# Patient Record
Sex: FEMALE | Race: WHITE | NOT HISPANIC OR LATINO | Employment: OTHER | URBAN - METROPOLITAN AREA
[De-identification: names, ages, dates, MRNs, and addresses within clinical notes are randomized per-mention and may not be internally consistent; named-entity substitution may affect disease eponyms.]

---

## 2017-01-30 ENCOUNTER — PATIENT OUTREACH (OUTPATIENT)
Dept: HEALTH INFORMATION MANAGEMENT | Facility: OTHER | Age: 67
End: 2017-01-30

## 2017-01-31 NOTE — PROGRESS NOTES
01/30/17 1ST ATTEMPT AWV-MOBILE NUMBER IS NOT PT'S NUMBER ANYMORE  DUE: , DIABETES MONOFILAMENT, RETINAL SCREENING, MICROALBUMIN, A1C, FLU (WEBIZ CONFIRMED)

## 2017-02-10 ENCOUNTER — APPOINTMENT (OUTPATIENT)
Dept: RADIOLOGY | Facility: IMAGING CENTER | Age: 67
End: 2017-02-10
Attending: FAMILY MEDICINE
Payer: COMMERCIAL

## 2017-02-10 ENCOUNTER — OFFICE VISIT (OUTPATIENT)
Dept: MEDICAL GROUP | Facility: PHYSICIAN GROUP | Age: 67
End: 2017-02-10
Payer: COMMERCIAL

## 2017-02-10 VITALS
OXYGEN SATURATION: 94 % | SYSTOLIC BLOOD PRESSURE: 124 MMHG | HEIGHT: 63 IN | RESPIRATION RATE: 16 BRPM | TEMPERATURE: 97.6 F | BODY MASS INDEX: 31.54 KG/M2 | WEIGHT: 178 LBS | DIASTOLIC BLOOD PRESSURE: 72 MMHG | HEART RATE: 86 BPM

## 2017-02-10 DIAGNOSIS — M16.0 BILATERAL PRIMARY OSTEOARTHRITIS OF HIP: ICD-10-CM

## 2017-02-10 DIAGNOSIS — C50.912 BILATERAL MALIGNANT NEOPLASM OF BREAST IN FEMALE, UNSPECIFIED SITE OF BREAST: ICD-10-CM

## 2017-02-10 DIAGNOSIS — C50.911 BILATERAL MALIGNANT NEOPLASM OF BREAST IN FEMALE, UNSPECIFIED SITE OF BREAST: ICD-10-CM

## 2017-02-10 DIAGNOSIS — E66.9 OBESITY (BMI 30-39.9): ICD-10-CM

## 2017-02-10 DIAGNOSIS — D22.9 CHANGING NEVUS: ICD-10-CM

## 2017-02-10 DIAGNOSIS — Z13.6 SCREENING FOR CARDIOVASCULAR CONDITION: ICD-10-CM

## 2017-02-10 DIAGNOSIS — R73.02 GLUCOSE INTOLERANCE (IMPAIRED GLUCOSE TOLERANCE): ICD-10-CM

## 2017-02-10 DIAGNOSIS — R01.1 SYSTOLIC MURMUR: ICD-10-CM

## 2017-02-10 PROBLEM — C50.919 BREAST CANCER (HCC): Status: ACTIVE | Noted: 2017-02-10

## 2017-02-10 PROCEDURE — G8432 DEP SCR NOT DOC, RNG: HCPCS | Performed by: FAMILY MEDICINE

## 2017-02-10 PROCEDURE — 3014F SCREEN MAMMO DOC REV: CPT | Performed by: FAMILY MEDICINE

## 2017-02-10 PROCEDURE — G8484 FLU IMMUNIZE NO ADMIN: HCPCS | Performed by: FAMILY MEDICINE

## 2017-02-10 PROCEDURE — 4040F PNEUMOC VAC/ADMIN/RCVD: CPT | Performed by: FAMILY MEDICINE

## 2017-02-10 PROCEDURE — 73521 X-RAY EXAM HIPS BI 2 VIEWS: CPT | Mod: TC | Performed by: FAMILY MEDICINE

## 2017-02-10 PROCEDURE — G8419 CALC BMI OUT NRM PARAM NOF/U: HCPCS | Performed by: FAMILY MEDICINE

## 2017-02-10 PROCEDURE — 1101F PT FALLS ASSESS-DOCD LE1/YR: CPT | Performed by: FAMILY MEDICINE

## 2017-02-10 PROCEDURE — 3017F COLORECTAL CA SCREEN DOC REV: CPT | Performed by: FAMILY MEDICINE

## 2017-02-10 PROCEDURE — 99214 OFFICE O/P EST MOD 30 MIN: CPT | Performed by: FAMILY MEDICINE

## 2017-02-10 PROCEDURE — 1036F TOBACCO NON-USER: CPT | Performed by: FAMILY MEDICINE

## 2017-02-10 RX ORDER — LIDOCAINE 50 MG/G
1 PATCH TOPICAL EVERY 12 HOURS
Qty: 30 PATCH | Refills: 2 | Status: SHIPPED | OUTPATIENT
Start: 2017-02-10 | End: 2019-04-11

## 2017-02-10 ASSESSMENT — PATIENT HEALTH QUESTIONNAIRE - PHQ9: CLINICAL INTERPRETATION OF PHQ2 SCORE: 0

## 2017-02-10 NOTE — PROGRESS NOTES
Subjective:     Chief Complaint   Patient presents with   • Establish Alicia Madsen Maura Romo is a 66 y.o. female here today for follow up on chronic medical conditions.  Patient has a new complaint:Pt has a hx of bilat hip OA.  Pt notes R hip has become worse and for approximately 3-4 weeks. Patient reports pain is in the lateral and front aspect of her hip. She denies numbness, tingling, loss of sensation, loss of bowel or bladder function, low back pain or falls. Pain is up to 9/10 when working as a . Patient denies history of osteoporosis. Pt reports DEXA at [facility] in 2015.    Patient's history of glucose intolerance. She has not yet met criteria for diabetes. Risks discussed with patient.    Patient has history of bilateral malignant breast cancer. She is currently followed by oncology. Patient underwent bilateral mastectomy. She is currently taking anastrozole daily. Patient did not need chemotherapy or radiation.    Patient's also concerned about changing pigmented lesion on the lateral aspect of her face. Patient states it has been present for approximately 2-3 months. Is currently raised irritated at times.  Allergies   Allergen Reactions   • Aspirin      Intolerance     • Cipro Xr Hives   • Diclofenac      Current medicines (including changes today)  Current Outpatient Prescriptions   Medication Sig Dispense Refill   • lidocaine (LIDODERM) 5 % Patch Apply 1 Patch to skin as directed every 12 hours. 30 Patch 2   • zolpidem (AMBIEN) 10 MG Tab TAKE ONE TABLET BY MOUTH AT BEDTIME AS NEEDED FOR SLEEP 90 Tab 4   • simvastatin (ZOCOR) 20 MG Tab Take 1 Tab by mouth every evening. 90 Tab 4   • telmisartan-hydrochlorothiazide (MICARDIS HCT) 40-12.5 MG per tablet Take 1 Tab by mouth every day. 90 Tab 4   • anastrozole (ARIMIDEX) 1 MG Tab Take 1 mg by mouth every day.     • Blood Glucose Monitoring Suppl (ONE TOUCH ULTRA MINI) W/DEVICE KIT      • ONE TOUCH ULTRA TEST strip      • ONETOUCH  "DELICA LANCETS 33G MISC      • Misc. Devices KIT Diagnosis 250.02. Freestyle glucometer, test strips, #100, RF11 and lancets #100, RF 11. To test fasting in the AM. May substitute brand acceptable to insurance. 1 Each 0     No current facility-administered medications for this visit.     Social History   Substance Use Topics   • Smoking status: Never Smoker    • Smokeless tobacco: Never Used      Comment: avoid any and all tobacco products   • Alcohol Use: 0.0 oz/week     0 Standard drinks or equivalent per week      Comment: once a year     Family Status   Relation Status Death Age   • Mother       alzheimer    • Father       Family History   Problem Relation Age of Onset   • Lung Disease Father      pneumonia   • Stroke Mother      She    has a past medical history of Diverticulosis of colon (2011); Post hysterectomy menopause (2011); Dental disorder; Pain; Anesthesia; HTN (hypertension) (3/14/2013); and Breast cancer (CMS-HCC) (2/10/2017).        ROS   GEN: no weight loss, fevers, or chills  HEENT: no blurry vision or change in vision, no ear pain, no difficulty swallowing, no throat pain, no runny nose, no nasal congestion  Resp: no shortness of breath, no cough  CV: no racing heart, no irregular beats, no chest pain  Abd: no nausea, no vomiting, no diarrhea, no constipation, no blood in stool, no dark stools, no incontinence  : no dysuria, no hematuria, no urinary incontinence, no increased frequency  MSK: Hip pain as per history of present illness Neuro: no headaches, no dizziness, no LOC, no weakness, no numbness/tingling       Objective:     Blood pressure 124/72, pulse 86, temperature 36.4 °C (97.6 °F), resp. rate 16, height 1.6 m (5' 3\"), weight 80.74 kg (178 lb), SpO2 94 %. Body mass index is 31.54 kg/(m^2).   Physical Exam:  Constitutional: Alert, no distress.  Skin: Warm, dry, good turgor, no rashes in visible areas, right temporal area 0.5 cm raised scaly hyperpigmented " lesion.  Eye: Equal, round and reactive, conjunctiva clear, lids normal.  ENMT: Lips without lesions, good dentition, oropharynx non-erythematous, no exudate, moist oral mucosa  Neck: Trachea midline, no masses, no thyromegaly. No cervical or supraclavicular lymphadenopathy. Full ROM  Respiratory: Unlabored respiratory effort, good air movement, lungs clear to auscultation, no wheezes, no ronchi.  Cardiovascular:RRR, +S1, S2, 3/6 systolic ejection murmur , no peripheral edema, pedal and radial pulses equal and intact bilat  Abdomen: Soft, non-tender, no masses, no hepatosplenomegaly.  MSK:5/5 muscle strength in upper extremities as well as lower extremity bilaterally, right hip TTP on lateral and anterior aspect, pain with abduction and abduction.  Psych: Alert and oriented x3, appropriate affect and mood.  Neuro: CN2-12 intact, no gross motor or sensory deficits      Assessment and Plan:   The following treatment plan was discussed    1. Bilateral primary osteoarthritis of hip  Acute on chronic. Worsening pain. Recommend bilateral hip x-rays. Also recommend Lidoderm patches for treatment as needed. Based on findings will suggest further follow-up. I also recommend patient referred to orthopedics.  - DX-HIP-BILATERAL-WITH PELVIS-2 VIEWS; Future  - lidocaine (LIDODERM) 5 % Patch; Apply 1 Patch to skin as directed every 12 hours.  Dispense: 30 Patch; Refill: 2  - REFERRAL TO ORTHOPEDICS  - COMP METABOLIC PANEL; Future  - CBC WITH DIFFERENTIAL; Future    2. Systolic murmur  Patient reports history of systolic murmur. Patient denies ever having a cold. Therefore echo will be ordered.   EHOCARDIOGRAM COMP W/O CONT; Future    3. Glucose intolerance (impaired glucose tolerance)  Based on history. Will obtain A1c and treat as indicated. We advise to reduce sugar/carbohydrate/alcohol, loose weight, eat more vegetables and lean meats such as fish/chicken/turkey. We also recommend 30 minutes of cardiovascular exercise 5 days  of the week.   - COMP METABOLIC PANEL; Future  - CBC WITH DIFFERENTIAL; Future  - HEMOGLOBIN A1C; Future    4. Bilateral malignant neoplasm of breast in female, unspecified site of breast (CMS-HCC)  Patient will continue to follow with oncology. Patient will also continue anastrozole.  - COMP METABOLIC PANEL; Future  - CBC WITH DIFFERENTIAL; Future    5. Changing nevus  New problem Reported. Recommend follow-up with dermatology  - REFERRAL TO DERMATOLOGY    6. Screening for cardiovascular condition  - LIPID PROFILE; Future    7. Obesity (BMI 30-39.9)  Pt educated on the increase of morbidity and mortality associated with excess weight including DM, Heart Disease, HTN, stroke, and sleep apnea.  Pt advised weight loss of 5% through reduced calorie, low fat diet and 150 mins of exercise a week  - Patient identified as having weight management issue.  Appropriate orders and counseling given.      Followup: Return in about 3 months (around 5/10/2017) for chronic conditions.    Please note that this dictation was created using voice recognition software. I have made every reasonable attempt to correct obvious errors, but I expect that there are errors of grammar and possibly content that I did not discover before finalizing the note.

## 2017-02-10 NOTE — MR AVS SNAPSHOT
"        Tena Carter Demetrius   2/10/2017 3:40 PM   Office Visit   MRN: [id number]    Department:  [facility]   Dept Phone:  [phone]    Description:  Female : 1950   Provider:  Erica Tristan D.O.           Reason for Visit     Establish Care           Allergies as of 2/10/2017     Allergen Noted Reactions    Aspirin 10/16/2014       Intolerance      Cipro Xr 10/30/2010   Hives    Diclofenac 2012         You were diagnosed with     Bilateral primary osteoarthritis of hip   [4927890]       Glucose intolerance (impaired glucose tolerance)   [434774]       Bilateral malignant neoplasm of breast in female, unspecified site of breast (CMS-HCC)   [7651978]       Screening for cardiovascular condition   [891118]       Changing nevus   [153448]         Vital Signs     Blood Pressure Pulse Temperature Respirations Height Weight    124/72 mmHg 86 36.4 °C (97.6 °F) 16 1.6 m (5' 3\") 80.74 kg (178 lb)    Body Mass Index Oxygen Saturation Smoking Status             31.54 kg/m2 94% Never Smoker          Basic Information     Date Of Birth Sex Race Ethnicity Preferred Language    1950 Female White Non- English      Problem List              ICD-10-CM Priority Class Noted - Resolved    Diverticulosis of colon K57.30   2011 - Present    Post hysterectomy menopause E89.40, Z90.710   2011 - Present    Vitamin D deficiency E55.9   4/10/2012 - Present    Hypercholesteremia E78.00   4/10/2012 - Present    Essential hypertension I10   3/14/2013 - Present    Atrophic vaginitis N95.2   2013 - Present    Glucose intolerance (impaired glucose tolerance) R73.02   2015 - Present    Breast cancer (CMS-HCC) C50.919   2/10/2017 - Present      Health Maintenance        Date Due Completion Dates    IMM ZOSTER VACCINE 2010 ---    RETINAL SCREENING 2015 (Done)    Override on 2014: Done (no retinopathy by pt report)    DIABETES MONOFILAMENT / LE EXAM 2015 " 4/17/2014 (Done)    Override on 4/17/2014: Done (normal)    URINE ACR / MICROALBUMIN 4/18/2015 4/18/2014    A1C SCREENING 7/14/2016 1/14/2016, 8/13/2015, 4/16/2015, 10/17/2014, 10/16/2014, 4/18/2014, 5/9/2013    IMM INFLUENZA (1) 9/1/2016 10/14/2015    BONE DENSITY 4/19/2017 4/19/2012    FASTING LIPID PROFILE 4/28/2017 4/28/2016, 8/11/2015, 10/17/2014, 4/18/2014, 3/15/2013, 11/11/2011    SERUM CREATININE 12/29/2017 12/29/2016, 2/2/2016, 12/29/2015, 8/11/2015, 4/18/2014, 3/15/2013, 11/11/2011    IMM PNEUMOCOCCAL 65+ (ADULT) LOW/MEDIUM RISK SERIES (2 of 2 - PPSV23) 4/17/2019 4/16/2015, 4/17/2014    IMM DTaP/Tdap/Td Vaccine (2 - Td) 4/10/2022 4/10/2012            Current Immunizations     13-VALENT PCV PREVNAR 4/16/2015    Influenza Vaccine Adult HD 10/14/2015    Pneumococcal polysaccharide vaccine (PPSV-23) 4/17/2014    Tdap Vaccine 4/10/2012  1:33 PM      Below and/or attached are the medications your provider expects you to take. Review all of your home medications and newly ordered medications with your provider and/or pharmacist. Follow medication instructions as directed by your provider and/or pharmacist. Please keep your medication list with you and share with your provider. Update the information when medications are discontinued, doses are changed, or new medications (including over-the-counter products) are added; and carry medication information at all times in the event of emergency situations     Allergies:  ASPIRIN - (reactions not documented)     CIPRO XR - Hives     DICLOFENAC - (reactions not documented)               Medications  Valid as of: February 10, 2017 -  4:58 PM    Generic Name Brand Name Tablet Size Instructions for use    Anastrozole (Tab) ARIMIDEX 1 MG Take 1 mg by mouth every day.        Blood Glucose Monitoring Suppl (Kit) ONE TOUCH ULTRA MINI W/DEVICE         Glucose Blood (Strip) ONE TOUCH ULTRA TEST          Lancets (Misc) ONETOUCH DELICA LANCETS 33G          Lidocaine (Patch)  LIDODERM 5 % Apply 1 Patch to skin as directed every 12 hours.        Misc. Devices (Kit) Misc. Devices  Diagnosis 250.02. Freestyle glucometer, test strips, #100, RF11 and lancets #100, RF 11. To test fasting in the AM. May substitute brand acceptable to insurance.        Simvastatin (Tab) ZOCOR 20 MG Take 1 Tab by mouth every evening.        Telmisartan-HCTZ (Tab) MICARDIS HCT 40-12.5 MG Take 1 Tab by mouth every day.        Zolpidem Tartrate (Tab) AMBIEN 10 MG TAKE ONE TABLET BY MOUTH AT BEDTIME AS NEEDED FOR SLEEP        .                 Medicines prescribed today were sent to:     [facility] [address] [place] [address], [place] - [address] [place] [address]    Phone: [phone] Fax: [phone]    Open 24 Hours?: No      Medication refill instructions:       If your prescription bottle indicates you have medication refills left, it is not necessary to call your provider’s office. Please contact your pharmacy and they will refill your medication.    If your prescription bottle indicates you do not have any refills left, you may request refills at any time through one of the following ways: The online [employer] system (except Urgent Care), by calling your provider’s office, or by asking your pharmacy to contact your provider’s office with a refill request. Medication refills are processed only during regular business hours and may not be available until the next business day. Your provider may request additional information or to have a follow-up visit with you prior to refilling your medication.   *Please Note: Medication refills are assigned a new Rx number when refilled electronically. Your pharmacy may indicate that no refills were authorized even though a new prescription for the same medication is available at the pharmacy. Please request the medicine by name with the pharmacy before contacting your provider for a refill.        Your To Do List     Future Labs/Procedures Complete By  Expires    HEMOGLOBIN A1C  5/11/2017 2/10/2018    CBC WITH DIFFERENTIAL  As directed 2/10/2018    COMP METABOLIC PANEL  As directed 2/10/2018    DX-HIP-BILATERAL-WITH PELVIS-2 VIEWS  As directed 2/10/2018    LIPID PROFILE  As directed 2/10/2018      Referral     A referral request has been sent to our patient care coordination department. Please allow 3-5 business days for us to process this request and contact you either by phone or mail. If you do not hear from us by the 5th business day, please call us at [phone].        Other Notes About Your Plan                  MyChart Status: Patient Declined

## 2017-02-14 ENCOUNTER — TELEPHONE (OUTPATIENT)
Dept: MEDICAL GROUP | Facility: PHYSICIAN GROUP | Age: 67
End: 2017-02-14

## 2017-02-14 NOTE — PROGRESS NOTES
Attempt #:4     Annual Wellness Visit Scheduling  1. Scheduling Status:Not Scheduled. Patient states they are not interested        Care Gap Scheduling (Attempt to Schedule EACH Overdue Care Gap!)  PT DECLINED AND HUNG UP-UNABLE TO OFFER CARE GAPS     Health Maintenance Due   Topic Date Due   • IMM ZOSTER VACCINE  02/28/2010   • IMM INFLUENZA (1) 09/01/2016         MyChart Activation: declined

## 2017-02-14 NOTE — TELEPHONE ENCOUNTER
----- Message from Erica Tristan D.O. sent at 2/10/2017  6:06 PM PST -----  Please call pt to inform her that hip x-ray shows no sign of fracture. There is mild degenerative changes of both hips. I recommend she follow orthopedics.    -Dr. Tristan

## 2017-03-10 LAB
ALBUMIN SERPL-MCNC: 4.3 G/DL (ref 3.6–4.8)
ALBUMIN/GLOB SERPL: 1.7 {RATIO} (ref 1.1–2.5)
ALP SERPL-CCNC: 83 IU/L (ref 39–117)
ALT SERPL-CCNC: 14 IU/L (ref 0–32)
AST SERPL-CCNC: 12 IU/L (ref 0–40)
BASOPHILS # BLD AUTO: 0 X10E3/UL (ref 0–0.2)
BASOPHILS NFR BLD AUTO: 0 %
BILIRUB SERPL-MCNC: 0.4 MG/DL (ref 0–1.2)
BUN SERPL-MCNC: 18 MG/DL (ref 8–27)
BUN/CREAT SERPL: 23 (ref 11–26)
CALCIUM SERPL-MCNC: 9.7 MG/DL (ref 8.7–10.3)
CHLORIDE SERPL-SCNC: 102 MMOL/L (ref 96–106)
CHOLEST SERPL-MCNC: 151 MG/DL (ref 100–199)
CO2 SERPL-SCNC: 27 MMOL/L (ref 18–29)
COMMENT 011824: NORMAL
CREAT SERPL-MCNC: 0.77 MG/DL (ref 0.57–1)
EOSINOPHIL # BLD AUTO: 0.5 X10E3/UL (ref 0–0.4)
EOSINOPHIL NFR BLD AUTO: 5 %
ERYTHROCYTE [DISTWIDTH] IN BLOOD BY AUTOMATED COUNT: 13.1 % (ref 12.3–15.4)
GLOBULIN SER CALC-MCNC: 2.6 G/DL (ref 1.5–4.5)
GLUCOSE SERPL-MCNC: 128 MG/DL (ref 65–99)
HBA1C MFR BLD: 6.6 % (ref 4.8–5.6)
HCT VFR BLD AUTO: 40.7 % (ref 34–46.6)
HDLC SERPL-MCNC: 48 MG/DL
HGB BLD-MCNC: 12.9 G/DL (ref 11.1–15.9)
IMM GRANULOCYTES # BLD: 0 X10E3/UL (ref 0–0.1)
IMM GRANULOCYTES NFR BLD: 0 %
IMMATURE CELLS  115398: ABNORMAL
LDLC SERPL CALC-MCNC: 80 MG/DL (ref 0–99)
LYMPHOCYTES # BLD AUTO: 3 X10E3/UL (ref 0.7–3.1)
LYMPHOCYTES NFR BLD AUTO: 34 %
MCH RBC QN AUTO: 29.3 PG (ref 26.6–33)
MCHC RBC AUTO-ENTMCNC: 31.7 G/DL (ref 31.5–35.7)
MCV RBC AUTO: 92 FL (ref 79–97)
MONOCYTES # BLD AUTO: 0.6 X10E3/UL (ref 0.1–0.9)
MONOCYTES NFR BLD AUTO: 7 %
MORPHOLOGY BLD-IMP: ABNORMAL
NEUTROPHILS # BLD AUTO: 4.8 X10E3/UL (ref 1.4–7)
NEUTROPHILS NFR BLD AUTO: 54 %
NRBC BLD AUTO-RTO: ABNORMAL %
PLATELET # BLD AUTO: 330 X10E3/UL (ref 150–379)
POTASSIUM SERPL-SCNC: 4.7 MMOL/L (ref 3.5–5.2)
PROT SERPL-MCNC: 6.9 G/DL (ref 6–8.5)
RBC # BLD AUTO: 4.41 X10E6/UL (ref 3.77–5.28)
SODIUM SERPL-SCNC: 145 MMOL/L (ref 134–144)
TRIGL SERPL-MCNC: 116 MG/DL (ref 0–149)
VLDLC SERPL CALC-MCNC: 23 MG/DL (ref 5–40)
WBC # BLD AUTO: 8.9 X10E3/UL (ref 3.4–10.8)

## 2017-03-13 ENCOUNTER — TELEPHONE (OUTPATIENT)
Dept: MEDICAL GROUP | Facility: PHYSICIAN GROUP | Age: 67
End: 2017-03-13

## 2017-03-13 NOTE — TELEPHONE ENCOUNTER
----- Message from Alyssa Otero D.O. sent at 3/10/2017  7:28 PM PST -----  Glycemic control has worsened compared to previous labs and is now in the range of diabetes.  Advise scheduling follow-up appointment with PCP to discuss lab results.  (Alyssa Otero DO covering for Erica Tristan D.O.)

## 2017-03-14 ENCOUNTER — TELEPHONE (OUTPATIENT)
Dept: URGENT CARE | Facility: PHYSICIAN GROUP | Age: 67
End: 2017-03-14

## 2017-03-22 ENCOUNTER — OFFICE VISIT (OUTPATIENT)
Dept: MEDICAL GROUP | Facility: PHYSICIAN GROUP | Age: 67
End: 2017-03-22
Payer: COMMERCIAL

## 2017-03-22 VITALS
HEIGHT: 63 IN | SYSTOLIC BLOOD PRESSURE: 130 MMHG | TEMPERATURE: 98.1 F | OXYGEN SATURATION: 97 % | RESPIRATION RATE: 16 BRPM | DIASTOLIC BLOOD PRESSURE: 70 MMHG | BODY MASS INDEX: 31.54 KG/M2 | HEART RATE: 104 BPM | WEIGHT: 178 LBS

## 2017-03-22 DIAGNOSIS — E78.00 HYPERCHOLESTEREMIA: ICD-10-CM

## 2017-03-22 DIAGNOSIS — I10 ESSENTIAL HYPERTENSION: ICD-10-CM

## 2017-03-22 DIAGNOSIS — E66.9 OBESITY (BMI 30-39.9): ICD-10-CM

## 2017-03-22 DIAGNOSIS — E11.9 TYPE 2 DIABETES MELLITUS WITHOUT COMPLICATION, WITHOUT LONG-TERM CURRENT USE OF INSULIN (HCC): ICD-10-CM

## 2017-03-22 PROCEDURE — 3044F HG A1C LEVEL LT 7.0%: CPT | Performed by: FAMILY MEDICINE

## 2017-03-22 PROCEDURE — 99214 OFFICE O/P EST MOD 30 MIN: CPT | Performed by: FAMILY MEDICINE

## 2017-03-22 PROCEDURE — 1101F PT FALLS ASSESS-DOCD LE1/YR: CPT | Performed by: FAMILY MEDICINE

## 2017-03-22 PROCEDURE — 3014F SCREEN MAMMO DOC REV: CPT | Performed by: FAMILY MEDICINE

## 2017-03-22 PROCEDURE — G8417 CALC BMI ABV UP PARAM F/U: HCPCS | Performed by: FAMILY MEDICINE

## 2017-03-22 PROCEDURE — G8432 DEP SCR NOT DOC, RNG: HCPCS | Performed by: FAMILY MEDICINE

## 2017-03-22 PROCEDURE — 1036F TOBACCO NON-USER: CPT | Performed by: FAMILY MEDICINE

## 2017-03-22 PROCEDURE — G8484 FLU IMMUNIZE NO ADMIN: HCPCS | Performed by: FAMILY MEDICINE

## 2017-03-22 PROCEDURE — 4040F PNEUMOC VAC/ADMIN/RCVD: CPT | Performed by: FAMILY MEDICINE

## 2017-03-22 RX ORDER — METFORMIN HYDROCHLORIDE 500 MG/1
500 TABLET, EXTENDED RELEASE ORAL DAILY
Qty: 90 TAB | Refills: 0 | Status: SHIPPED | OUTPATIENT
Start: 2017-03-22 | End: 2017-04-27 | Stop reason: SINTOL

## 2017-03-22 NOTE — MR AVS SNAPSHOT
"        Tena Romo   3/22/2017 2:40 PM   Office Visit   MRN: [id number]    Department:  [facility]   Dept Phone:  [phone]    Description:  Female : 1950   Provider:  Erica Tristan D.O.           Reason for Visit     Follow-Up           Allergies as of 3/22/2017     Allergen Noted Reactions    Aspirin 10/16/2014       Intolerance      Cipro Xr 10/30/2010   Hives    Diclofenac 2012         You were diagnosed with     Type 2 diabetes mellitus without complication, without long-term current use of insulin (CMS-HCC)   [1887790]         Vital Signs     Blood Pressure Pulse Temperature Respirations Height Weight    130/70 mmHg 104 36.7 °C (98.1 °F) 16 1.6 m (5' 2.99\") 80.74 kg (178 lb)    Body Mass Index Oxygen Saturation Smoking Status             31.54 kg/m2 97% Never Smoker          Basic Information     Date Of Birth Sex Race Ethnicity Preferred Language    1950 Female White Non- English      Your appointments     2017  1:00 PM   ANNUAL EXAM PREVENTATIVE with Erica Tristan D.O.   [facility])    [address], [address]  [facility] [id number] [phone]              Problem List              ICD-10-CM Priority Class Noted - Resolved    Diverticulosis of colon K57.30   2011 - Present    Post hysterectomy menopause E89.40, Z90.710   2011 - Present    Vitamin D deficiency E55.9   4/10/2012 - Present    Hypercholesteremia E78.00   4/10/2012 - Present    Essential hypertension I10   3/14/2013 - Present    Atrophic vaginitis N95.2   2013 - Present    Type 2 diabetes mellitus without complication, without long-term current use of insulin (CMS-HCC) E11.9   2015 - Present    Breast cancer (CMS-HCC) C50.919   2/10/2017 - Present    Obesity (BMI 30-39.9) E66.9   2/10/2017 - Present      Health Maintenance        Date Due Completion Dates    IMM ZOSTER VACCINE 2010 ---    IMM INFLUENZA (1) 2016 " 10/14/2015    BONE DENSITY 4/19/2017 4/19/2012    IMM PNEUMOCOCCAL 65+ (ADULT) LOW/MEDIUM RISK SERIES (2 of 2 - PPSV23) 4/17/2019 4/16/2015, 4/17/2014    IMM DTaP/Tdap/Td Vaccine (2 - Td) 4/10/2022 4/10/2012            Current Immunizations     13-VALENT PCV PREVNAR 4/16/2015    Influenza Vaccine Adult HD 10/14/2015    Pneumococcal polysaccharide vaccine (PPSV-23) 4/17/2014    Tdap Vaccine 4/10/2012  1:33 PM      Below and/or attached are the medications your provider expects you to take. Review all of your home medications and newly ordered medications with your provider and/or pharmacist. Follow medication instructions as directed by your provider and/or pharmacist. Please keep your medication list with you and share with your provider. Update the information when medications are discontinued, doses are changed, or new medications (including over-the-counter products) are added; and carry medication information at all times in the event of emergency situations     Allergies:  ASPIRIN - (reactions not documented)     CIPRO XR - Hives     DICLOFENAC - (reactions not documented)               Medications  Valid as of: March 22, 2017 -  4:40 PM    Generic Name Brand Name Tablet Size Instructions for use    Anastrozole (Tab) ARIMIDEX 1 MG Take 1 mg by mouth every day.        Blood Glucose Monitoring Suppl (Kit) ONE TOUCH ULTRA MINI W/DEVICE         Glucose Blood (Strip) ONE TOUCH ULTRA TEST          Lancets (Misc) ONETOUCH DELICA LANCETS 33G          Lidocaine (Patch) LIDODERM 5 % Apply 1 Patch to skin as directed every 12 hours.        MetFORMIN HCl (TABLET SR 24 HR) GLUCOPHAGE  MG Take 1 Tab by mouth every day.        Misc. Devices (Kit) Misc. Devices  Diagnosis 250.02. Freestyle glucometer, test strips, #100, RF11 and lancets #100, RF 11. To test fasting in the AM. May substitute brand acceptable to insurance.        Simvastatin (Tab) ZOCOR 20 MG Take 1 Tab by mouth every evening.        Telmisartan-HCTZ (Tab)  MICARDIS HCT 40-12.5 MG Take 1 Tab by mouth every day.        Zolpidem Tartrate (Tab) AMBIEN 10 MG TAKE ONE TABLET BY MOUTH AT BEDTIME AS NEEDED FOR SLEEP        .                 Medicines prescribed today were sent to:     [facility] [address] [place] [address], [place] - [address] [facility]    [address] [facility] [place] [address]    Phone: [phone] Fax: [phone]    Open 24 Hours?: No      Medication refill instructions:       If your prescription bottle indicates you have medication refills left, it is not necessary to call your provider’s office. Please contact your pharmacy and they will refill your medication.    If your prescription bottle indicates you do not have any refills left, you may request refills at any time through one of the following ways: The online [employer] system (except Urgent Care), by calling your provider’s office, or by asking your pharmacy to contact your provider’s office with a refill request. Medication refills are processed only during regular business hours and may not be available until the next business day. Your provider may request additional information or to have a follow-up visit with you prior to refilling your medication.   *Please Note: Medication refills are assigned a new Rx number when refilled electronically. Your pharmacy may indicate that no refills were authorized even though a new prescription for the same medication is available at the pharmacy. Please request the medicine by name with the pharmacy before contacting your provider for a refill.        Your To Do List     Future Labs/Procedures Complete By Expires    HEMOGLOBIN A1C  As directed 3/22/2018    MICROALBUMIN CREAT RATIO URINE  As directed 3/22/2018      Other Notes About Your Plan                  MyChart Status: Patient Declined

## 2017-03-22 NOTE — PROGRESS NOTES
Subjective:     Chief Complaint   Patient presents with   • Follow-Up       Tena Maura Romo is a 67 y.o. female here today for hyperglycemia on lab results. Patient is a history of impaired fasting blood sugars. Patient's most recent A1c is 6.6. Patient has history impaired fasting glucose. Review of A1c from 2012 to 2017 shows range 6.1-6.6. Prior A1c in January 2016 was 6.4.  Patient states she is currently trying to make diet and lifestyle changes. She reports exercising approximately 2-3 times per week by walking. Patient understands carb counting and healthy diet changes. She is currently in a health improvement plan through her employer.      Allergies   Allergen Reactions   • Aspirin      Intolerance     • Cipro Xr Hives   • Diclofenac      Current medicines (including changes today)  Current Outpatient Prescriptions   Medication Sig Dispense Refill   • metformin ER (GLUCOPHAGE XR) 500 MG TABLET SR 24 HR Take 1 Tab by mouth every day. 90 Tab 0   • lidocaine (LIDODERM) 5 % Patch Apply 1 Patch to skin as directed every 12 hours. 30 Patch 2   • zolpidem (AMBIEN) 10 MG Tab TAKE ONE TABLET BY MOUTH AT BEDTIME AS NEEDED FOR SLEEP 90 Tab 4   • simvastatin (ZOCOR) 20 MG Tab Take 1 Tab by mouth every evening. 90 Tab 4   • telmisartan-hydrochlorothiazide (MICARDIS HCT) 40-12.5 MG per tablet Take 1 Tab by mouth every day. 90 Tab 4   • anastrozole (ARIMIDEX) 1 MG Tab Take 1 mg by mouth every day.     • Blood Glucose Monitoring Suppl (ONE TOUCH ULTRA MINI) W/DEVICE KIT      • ONE TOUCH ULTRA TEST strip      • ONETOUCH DELICA LANCETS 33G MISC      • Misc. Devices KIT Diagnosis 250.02. Freestyle glucometer, test strips, #100, RF11 and lancets #100, RF 11. To test fasting in the AM. May substitute brand acceptable to insurance. 1 Each 0     No current facility-administered medications for this visit.     Social History   Substance Use Topics   • Smoking status: Never Smoker    • Smokeless tobacco: Never Used       "Comment: avoid any and all tobacco products   • Alcohol Use: 0.0 oz/week     0 Standard drinks or equivalent per week      Comment: once a year     Family Status   Relation Status Death Age   • Mother       alzheimer    • Father       Family History   Problem Relation Age of Onset   • Lung Disease Father      pneumonia   • Stroke Mother      She    has a past medical history of Diverticulosis of colon (2011); Post hysterectomy menopause (2011); Dental disorder; Pain; Anesthesia; HTN (hypertension) (3/14/2013); and Breast cancer (CMS-HCC) (2/10/2017).        ROS   GEN: no weight loss, fevers, or chills  HEENT: no blurry vision or change in vision  Resp: no shortness of breath, no cough  CV: no racing heart, no irregular beats, no chest pain  Abd: no nausea, no vomiting, no diarrhea, no constipation, no blood in stool, no dark stools, no incontinence  : no dysuria, no hematuria, no urinary incontinence, no increased frequency  MSK: no muscle aches, no joint pain, no limited motion  Neuro: no headaches, no dizziness, no LOC, no weakness, no numbness/tingling       Objective:     Blood pressure 130/70, pulse 104, temperature 36.7 °C (98.1 °F), resp. rate 16, height 1.6 m (5' 2.99\"), weight 80.74 kg (178 lb), SpO2 97 %. Body mass index is 31.54 kg/(m^2).   Physical Exam:  Constitutional: Alert, no distress.  Skin: Warm, dry, good turgor, no rashes in visible areas.  Eye: Equal, round and reactive, conjunctiva clear, lids normal.  ENMT: Lips without lesions, good dentition, oropharynx non-erythematous, no exudate, moist oral mucosa, bilateral tympanic membranes: No bulging, no retraction, no fluid, nonerythematous, positive light reflex, external auditory canals: Clear, scant cerumen, nonerythematous  Neck: Trachea midline, no masses, no thyromegaly. No cervical or supraclavicular lymphadenopathy. Full ROM  Respiratory: Unlabored respiratory effort, good air movement, lungs clear to " auscultation, no wheezes, no ronchi.  Cardiovascular:RRR, +S1, S2, no murmur, no peripheral edema, pedal and radial pulses equal and intact bilat  Abdomen: Soft, non-tender, no masses, no hepatosplenomegaly.  MSK:5/5 muscle strength in upper extremities as well as lower extremity bilaterally  Psych: Alert and oriented x3, appropriate affect and mood.  Neuro: CN2-12 intact, no gross motor or sensory deficits      Assessment and Plan:   The following treatment plan was discussed    1. Type 2 diabetes mellitus without complication, without long-term current use of insulin (CMS-[facility])  New diagnosis based on most recent A1c of 6.6. Discussed A1c goals extensively with the patient. Recommend she start metformin. Risks and benefits of metformin discussed with patient. She is currently taking statin and ARB. Patient has a reported allergy to aspirin and therefore she will not be starting this medication. Recommend follow-up in 3 months after A1c has been rechecked. Patient also for diabetic services but she states she is getting current health improvement plans to work and is not interested in any other services at this time.  - metformin ER (GLUCOPHAGE XR) 500 MG TABLET SR 24 HR; Take 1 Tab by mouth every day.  Dispense: 90 Tab; Refill: 0  - HEMOGLOBIN A1C; Future  - MICROALBUMIN CREAT RATIO URINE; Future    2. Essential hypertension  Chronic: Well-controlled continue Micardis. Need for careful compliance with medication and controlled blood pressure in relation to diabetes discussed with patient.    3. Hypercholesteremia  Chronic: Continue statin medication. Patient is currently having no symptoms of muscle aches or pains. Role of statin medication in relation to diabetes discussed with patient.    4. Obesity (BMI 30-39.9)  Pt educated on the increase of morbidity and mortality associated with excess weight including DM, Heart Disease, HTN, stroke, and sleep apnea.  Pt advised weight loss of 5% through reduced calorie, low  fat diet and 150 mins of exercise a week        Followup: Return in about 3 months (around 6/22/2017).    Please note that this dictation was created using voice recognition software. I have made every reasonable attempt to correct obvious errors, but I expect that there are errors of grammar and possibly content that I did not discover before finalizing the note.

## 2017-04-27 ENCOUNTER — OFFICE VISIT (OUTPATIENT)
Dept: MEDICAL GROUP | Facility: PHYSICIAN GROUP | Age: 67
End: 2017-04-27
Payer: COMMERCIAL

## 2017-04-27 VITALS
DIASTOLIC BLOOD PRESSURE: 64 MMHG | HEART RATE: 74 BPM | OXYGEN SATURATION: 97 % | SYSTOLIC BLOOD PRESSURE: 128 MMHG | HEIGHT: 63 IN | WEIGHT: 176 LBS | RESPIRATION RATE: 16 BRPM | BODY MASS INDEX: 31.18 KG/M2 | TEMPERATURE: 98.1 F

## 2017-04-27 DIAGNOSIS — C50.912 BILATERAL MALIGNANT NEOPLASM OF BREAST IN FEMALE, UNSPECIFIED SITE OF BREAST: ICD-10-CM

## 2017-04-27 DIAGNOSIS — I10 ESSENTIAL HYPERTENSION: ICD-10-CM

## 2017-04-27 DIAGNOSIS — E11.9 TYPE 2 DIABETES MELLITUS WITHOUT COMPLICATION, WITHOUT LONG-TERM CURRENT USE OF INSULIN (HCC): ICD-10-CM

## 2017-04-27 DIAGNOSIS — C50.911 BILATERAL MALIGNANT NEOPLASM OF BREAST IN FEMALE, UNSPECIFIED SITE OF BREAST: ICD-10-CM

## 2017-04-27 DIAGNOSIS — Z00.00 ANNUAL PHYSICAL EXAM: ICD-10-CM

## 2017-04-27 PROCEDURE — G8419 CALC BMI OUT NRM PARAM NOF/U: HCPCS | Performed by: FAMILY MEDICINE

## 2017-04-27 PROCEDURE — 99397 PER PM REEVAL EST PAT 65+ YR: CPT | Performed by: FAMILY MEDICINE

## 2017-04-27 NOTE — MR AVS SNAPSHOT
"        Tena Romo   2017 1:00 PM   Office Visit   MRN: [id number]    Department:  [facility]   Dept Phone:  [phone]    Description:  Female : 1950   Provider:  Erica Tristan D.O.           Reason for Visit     Annual Exam           Allergies as of 2017     Allergen Noted Reactions    Aspirin 10/16/2014       Intolerance      Cipro Xr 10/30/2010   Hives    Diclofenac 2012       Metformin 2017   Rash    Erythematous rash      You were diagnosed with     Annual physical exam   [632774]       Type 2 diabetes mellitus without complication, without long-term current use of insulin (CMS-HCC)   [1808097]       Bilateral malignant neoplasm of breast in female, unspecified site of breast (CMS-HCC)   [8469638]       Essential hypertension   [0427206]         Vital Signs     Blood Pressure Pulse Temperature Respirations Height Weight    128/64 mmHg 74 36.7 °C (98.1 °F) 16 1.6 m (5' 2.99\") 79.833 kg (176 lb)    Body Mass Index Oxygen Saturation Smoking Status             31.18 kg/m2 97% Never Smoker          Basic Information     Date Of Birth Sex Race Ethnicity Preferred Language    1950 Female White Non- English      Your appointments     2017  9:00 AM   Established Patient with Erica Tristan D.O.   [facility])    [address] [place] [address], [address]  [facility] [address] [phone]           You will be receiving a confirmation call a few days before your appointment from our automated call confirmation system.              Problem List              ICD-10-CM Priority Class Noted - Resolved    Diverticulosis of colon K57.30   2011 - Present    Post hysterectomy menopause E89.40, Z90.710   2011 - Present    Vitamin D deficiency E55.9   4/10/2012 - Present    Hypercholesteremia E78.00   4/10/2012 - Present    Essential hypertension I10   3/14/2013 - Present    Atrophic vaginitis N95.2   2013 - Present "    Type 2 diabetes mellitus without complication, without long-term current use of insulin (CMS-HCC) E11.9   4/16/2015 - Present    Breast cancer (CMS-HCC) C50.919   2/10/2017 - Present    Obesity (BMI 30-39.9) E66.9   2/10/2017 - Present      Health Maintenance        Date Due Completion Dates    IMM ZOSTER VACCINE 2/28/2010 ---    IMM PNEUMOCOCCAL 65+ (ADULT) LOW/MEDIUM RISK SERIES (2 of 2 - PPSV23) 4/17/2019 4/16/2015, 4/17/2014    BONE DENSITY 2/1/2020 2/1/2015 (N/S), 4/19/2012    Override on 2/1/2015: (N/S)    IMM DTaP/Tdap/Td Vaccine (2 - Td) 4/10/2022 4/10/2012            Current Immunizations     13-VALENT PCV PREVNAR 4/16/2015    Influenza Vaccine Adult HD 10/14/2015    Pneumococcal polysaccharide vaccine (PPSV-23) 4/17/2014    Tdap Vaccine 4/10/2012  1:33 PM      Below and/or attached are the medications your provider expects you to take. Review all of your home medications and newly ordered medications with your provider and/or pharmacist. Follow medication instructions as directed by your provider and/or pharmacist. Please keep your medication list with you and share with your provider. Update the information when medications are discontinued, doses are changed, or new medications (including over-the-counter products) are added; and carry medication information at all times in the event of emergency situations     Allergies:  ASPIRIN - (reactions not documented)     CIPRO XR - Hives     DICLOFENAC - (reactions not documented)     METFORMIN - Rash               Medications  Valid as of: April 27, 2017 -  1:36 PM    Generic Name Brand Name Tablet Size Instructions for use    Anastrozole (Tab) ARIMIDEX 1 MG Take 1 mg by mouth every day.        Blood Glucose Monitoring Suppl (Kit) ONE TOUCH ULTRA MINI W/DEVICE         Glucose Blood (Strip) glucose blood  1 Strip by Other route every day.        Lancets (Misc) ONETOUCH DELICA LANCETS 33G          Lidocaine (Patch) LIDODERM 5 % Apply 1 Patch to skin as  directed every 12 hours.        Misc. Devices (Kit) Misc. Devices  Diagnosis 250.02. Freestyle glucometer, test strips, #100, RF11 and lancets #100, RF 11. To test fasting in the AM. May substitute brand acceptable to insurance.        Simvastatin (Tab) ZOCOR 20 MG Take 1 Tab by mouth every evening.        Telmisartan-HCTZ (Tab) MICARDIS HCT 40-12.5 MG Take 1 Tab by mouth every day.        .                 Medicines prescribed today were sent to:     [facility] [address] [place] [address], [place] - [address] [place] [address]    Phone: [phone] Fax: [phone]    Open 24 Hours?: No      Medication refill instructions:       If your prescription bottle indicates you have medication refills left, it is not necessary to call your provider’s office. Please contact your pharmacy and they will refill your medication.    If your prescription bottle indicates you do not have any refills left, you may request refills at any time through one of the following ways: The online [employer] system (except Urgent Care), by calling your provider’s office, or by asking your pharmacy to contact your provider’s office with a refill request. Medication refills are processed only during regular business hours and may not be available until the next business day. Your provider may request additional information or to have a follow-up visit with you prior to refilling your medication.   *Please Note: Medication refills are assigned a new Rx number when refilled electronically. Your pharmacy may indicate that no refills were authorized even though a new prescription for the same medication is available at the pharmacy. Please request the medicine by name with the pharmacy before contacting your provider for a refill.        Your To Do List     Future Labs/Procedures Complete By Expires    COMP METABOLIC PANEL  7/26/2017 4/27/2018    HEMOGLOBIN A1C  As directed 4/27/2018    MICROALBUMIN CREAT RATIO URINE  As  directed 4/27/2018      Other Notes About Your Plan                  MyChart Status: Patient Declined

## 2017-04-27 NOTE — PROGRESS NOTES
Subjective:     Chief Complaint   Patient presents with   • Annual Exam       Tena Romo is a 67 y.o. female here today for annual exam.     Pt has a hx of DM.  Pt reports hives to metformin.  She has made lifestyule and diet changes.  Morning fasting blood sugars are 113-138.   Patient has a history of breast cancer. She is currently taking anastrozole daily for a total of 5 years. She also continues to follow with oncology.    The patient has a history of hypertension. She is currently taking Micardis daily without side effects. Denies blurry vision, change of vision, headaches, chest pain, change in urination or lower extremity swelling.    Pt uses Lidoderm patch and ibuprofen PRN for hip pain.   Allergies   Allergen Reactions   • Aspirin      Intolerance     • Cipro Xr Hives   • Diclofenac    • Metformin Rash     Erythematous rash     Current medicines (including changes today)  Current Outpatient Prescriptions   Medication Sig Dispense Refill   • glucose blood (ONE TOUCH ULTRA TEST) strip 1 Strip by Other route every day. 100 Strip 3   • simvastatin (ZOCOR) 20 MG Tab Take 1 Tab by mouth every evening. 90 Tab 4   • telmisartan-hydrochlorothiazide (MICARDIS HCT) 40-12.5 MG per tablet Take 1 Tab by mouth every day. 90 Tab 4   • anastrozole (ARIMIDEX) 1 MG Tab Take 1 mg by mouth every day.     • lidocaine (LIDODERM) 5 % Patch Apply 1 Patch to skin as directed every 12 hours. 30 Patch 2   • Blood Glucose Monitoring Suppl (ONE TOUCH ULTRA MINI) W/DEVICE KIT      • ONETOUCH DELICA LANCETS 33G MISC      • Misc. Devices KIT Diagnosis 250.02. Freestyle glucometer, test strips, #100, RF11 and lancets #100, RF 11. To test fasting in the AM. May substitute brand acceptable to insurance. 1 Each 0     No current facility-administered medications for this visit.     Social History   Substance Use Topics   • Smoking status: Never Smoker    • Smokeless tobacco: Never Used      Comment: avoid any and all tobacco  "products   • Alcohol Use: 0.0 oz/week     0 Standard drinks or equivalent per week      Comment: once a year     Family Status   Relation Status Death Age   • Mother       alzheimer    • Father       Family History   Problem Relation Age of Onset   • Lung Disease Father      pneumonia   • Stroke Mother      She    has a past medical history of Diverticulosis of colon (2011); Post hysterectomy menopause (2011); Dental disorder; Pain; Anesthesia; HTN (hypertension) (3/14/2013); and Breast cancer (CMS-HCC) (2/10/2017).        ROS  GEN: no weight loss, fevers, or chills  HEENT: no blurry vision or change in vision, no ear pain, no difficulty swallowing, no throat pain, no runny nose, no nasal congestion  Resp: no shortness of breath, no cough  CV: no racing heart, no irregular beats, no chest pain  Abd: no nausea, no vomiting, no diarrhea, no constipation, no blood in stool, no dark stools, no incontinence  : no dysuria, no hematuria, no urinary incontinence, no increased frequency  MSK: no muscle aches, no joint pain, no limited motion  Neuro: no headaches, no dizziness, no LOC, no weakness, no numbness/tingling       Objective:     Blood pressure 128/64, pulse 74, temperature 36.7 °C (98.1 °F), resp. rate 16, height 1.6 m (5' 2.99\"), weight 79.833 kg (176 lb), SpO2 97 %. Body mass index is 31.18 kg/(m^2).   Physical Exam:  Constitutional: Alert, no distress.  Skin: Warm, dry, good turgor, no rashes in visible areas.  Eye: Equal, round and reactive, conjunctiva clear, lids normal.  ENMT: Lips without lesions, oropharynx non-erythematous, no exudate, moist oral mucosa, bilateral tympanic membranes: No bulging, no retraction, no fluid, nonerythematous, positive light reflex, external auditory canals: Clear, scant cerumen, nonerythematous  Neck: Trachea midline, no masses, no thyromegaly. No cervical or supraclavicular lymphadenopathy. Full ROM  Respiratory: Unlabored respiratory effort, good " air movement, lungs clear to auscultation, no wheezes, no ronchi.  Cardiovascular:RRR, +S1, S2, no murmur, no peripheral edema, pedal and radial pulses equal and intact bilat  Abdomen: Soft, non-tender, no masses, no hepatosplenomegaly.  MSK:5/5 muscle strength in upper extremities as well as lower extremity bilaterally  Psych: Alert and oriented x3, appropriate affect and mood.  Neuro: CN2-12 intact, no gross motor or sensory deficits      Assessment and Plan:   The following treatment plan was discussed    1. Annual physical exam      2. Type 2 diabetes mellitus without complication, without long-term current use of insulin (CMS-HCC)  Currently diet controlled. Recommend repeat A1c as well as CMP.  - HEMOGLOBIN A1C; Future  - MICROALBUMIN CREAT RATIO URINE; Future  - COMP METABOLIC PANEL; Future  - glucose blood (ONE TOUCH ULTRA TEST) strip; 1 Strip by Other route every day.  Dispense: 100 Strip; Refill: 3    3. Bilateral malignant neoplasm of breast in female, unspecified site of breast (CMS-HCC)  Chronic: Continue to follow with oncology. Continue anastrozole    4. Essential hypertension  Chronic: Well-controlled continue Micardis      Followup: Return in about 6 months (around 10/27/2017) for F/U DM.    Please note that this dictation was created using voice recognition software. I have made every reasonable attempt to correct obvious errors, but I expect that there are errors of grammar and possibly content that I did not discover before finalizing the note.

## 2017-05-03 RX ORDER — LANCETS 33 GAUGE
EACH MISCELLANEOUS
Qty: 100 EACH | Refills: 1 | Status: SHIPPED
Start: 2017-05-03 | End: 2020-04-15

## 2017-05-30 RX ORDER — TELMISARTAN AND HYDROCHLORTHIAZIDE 40; 12.5 MG/1; MG/1
TABLET ORAL
Qty: 90 TAB | Refills: 1 | Status: SHIPPED | OUTPATIENT
Start: 2017-05-30 | End: 2017-11-25 | Stop reason: SDUPTHER

## 2017-05-30 RX ORDER — SIMVASTATIN 20 MG
TABLET ORAL
Qty: 90 TAB | Refills: 1 | Status: SHIPPED | OUTPATIENT
Start: 2017-05-30 | End: 2018-01-07 | Stop reason: SDUPTHER

## 2017-05-30 NOTE — TELEPHONE ENCOUNTER
Was the patient seen in the last year in this department? Yes     Does patient have an active prescription for medications requested? No     Received Request Via: Pharmacy      Pt met protocol?: Yes, OV last month   Lab Results   Component Value Date/Time    [facility] 48 03/09/2017 07:28 AM

## 2017-05-30 NOTE — TELEPHONE ENCOUNTER
Pt has had OV within the 12 month protocol and lipid panel is current. 6 month supply sent to pharmacy.   Lab Results   Component Value Date/Time    CHOLESTEROL, 03/09/2017 07:28 AM    LDL 80 03/09/2017 07:28 AM    HDL 48 03/09/2017 07:28 AM    TRIGLYCERIDES 116 03/09/2017 07:28 AM       Lab Results   Component Value Date/Time    SODIUM 145* 03/09/2017 07:28 AM    POTASSIUM 4.7 03/09/2017 07:28 AM    CHLORIDE 102 03/09/2017 07:28 AM    CO2 27 03/09/2017 07:28 AM    GLUCOSE 128* 03/09/2017 07:28 AM    BUN 18 03/09/2017 07:28 AM    CREATININE 0.77 03/09/2017 07:28 AM    BUN-CREATININE RATIO 23 03/09/2017 07:28 AM    GLOM FILT RATE, EST >59 12/01/2010 12:00 AM     Lab Results   Component Value Date/Time    ALKALINE PHOSPHATASE 83 03/09/2017 07:28 AM    AST(SGOT) 12 03/09/2017 07:28 AM    ALT(SGPT) 14 03/09/2017 07:28 AM    TOTAL BILIRUBIN 0.4 03/09/2017 07:28 AM

## 2017-05-31 NOTE — TELEPHONE ENCOUNTER
Refill X 6 months, sent to pharmacy.Pt. Seen in the last 6 months per protocol.   Lab Results   Component Value Date/Time    SODIUM 145* 03/09/2017 07:28 AM    POTASSIUM 4.7 03/09/2017 07:28 AM    CHLORIDE 102 03/09/2017 07:28 AM    CO2 27 03/09/2017 07:28 AM    GLUCOSE 128* 03/09/2017 07:28 AM    BUN 18 03/09/2017 07:28 AM    CREATININE 0.77 03/09/2017 07:28 AM    BUN-CREATININE RATIO 23 03/09/2017 07:28 AM    GLOM FILT RATE, EST >59 12/01/2010 12:00 AM

## 2017-08-28 ENCOUNTER — OFFICE VISIT (OUTPATIENT)
Dept: MEDICAL GROUP | Facility: PHYSICIAN GROUP | Age: 67
End: 2017-08-28
Payer: COMMERCIAL

## 2017-08-28 VITALS
DIASTOLIC BLOOD PRESSURE: 64 MMHG | HEIGHT: 63 IN | RESPIRATION RATE: 16 BRPM | SYSTOLIC BLOOD PRESSURE: 122 MMHG | WEIGHT: 176 LBS | HEART RATE: 74 BPM | BODY MASS INDEX: 31.18 KG/M2 | OXYGEN SATURATION: 93 % | TEMPERATURE: 98.1 F

## 2017-08-28 DIAGNOSIS — M15.9 PRIMARY OSTEOARTHRITIS INVOLVING MULTIPLE JOINTS: ICD-10-CM

## 2017-08-28 DIAGNOSIS — E11.9 TYPE 2 DIABETES MELLITUS WITHOUT COMPLICATION, WITHOUT LONG-TERM CURRENT USE OF INSULIN (HCC): ICD-10-CM

## 2017-08-28 DIAGNOSIS — I10 ESSENTIAL HYPERTENSION: ICD-10-CM

## 2017-08-28 PROCEDURE — 20610 DRAIN/INJ JOINT/BURSA W/O US: CPT | Performed by: FAMILY MEDICINE

## 2017-08-28 PROCEDURE — S0020 INJECTION, BUPIVICAINE HYDRO: HCPCS | Performed by: FAMILY MEDICINE

## 2017-08-28 PROCEDURE — 99213 OFFICE O/P EST LOW 20 MIN: CPT | Mod: 25 | Performed by: FAMILY MEDICINE

## 2017-08-28 RX ORDER — BUPIVACAINE HYDROCHLORIDE 5 MG/ML
10 INJECTION, SOLUTION EPIDURAL; INTRACAUDAL ONCE
Status: DISCONTINUED | OUTPATIENT
Start: 2017-08-28 | End: 2017-08-28

## 2017-08-28 RX ORDER — BUPIVACAINE HYDROCHLORIDE 5 MG/ML
1 INJECTION, SOLUTION EPIDURAL; INTRACAUDAL ONCE
OUTPATIENT
Start: 2017-08-28 | End: 2017-08-29

## 2017-08-28 RX ORDER — METHYLPREDNISOLONE ACETATE 40 MG/ML
40 INJECTION, SUSPENSION INTRA-ARTICULAR; INTRALESIONAL; INTRAMUSCULAR; SOFT TISSUE ONCE
OUTPATIENT
Start: 2017-08-28 | End: 2017-08-29

## 2017-08-28 RX ORDER — LIDOCAINE HYDROCHLORIDE 10 MG/ML
1 INJECTION, SOLUTION EPIDURAL; INFILTRATION; INTRACAUDAL; PERINEURAL ONCE
OUTPATIENT
Start: 2017-08-28 | End: 2017-08-29

## 2017-08-28 NOTE — PROGRESS NOTES
Subjective:     Chief Complaint   Patient presents with   • Arthritis       Tena Maura Romo is a 67 y.o. female here today for Left knee pain. Pt reports chronic Bilat hip pain and L knee Pt was seen by Ortho who recommend MRi, but pt has not yet gone. Patient reports she currently has left knee pain has significantly worsened. She is now having 10/10 pain throughout most the day. Patient is taking Aleve and Tylenol with little improvement. The patient was prescribed Mobic but wishes not to take due to cardiovascular risks.   Patient works as a  and also does extensive guarding. Patient states that with standing and bending of the knee pain is 10/10. Pain improves with rest ice and elevation down to 6/10. Pain radiates up into hip and down to mid calf. Patient denies any fall,. Patient denies weakness, numbness, tingling.      The patient reports no recent changes in her blood sugars.    Patient also reports that her blood pressure has been well controlled.      Allergies   Allergen Reactions   • Aspirin      Intolerance     • Cipro Xr Hives   • Diclofenac    • Metformin Rash     Erythematous rash     Current medicines (including changes today)  Current Outpatient Prescriptions   Medication Sig Dispense Refill   • lidocaine (XYLOCAINE) 2 % Gel 1 application BID PRN for pain 1 Bottle 2   • simvastatin (ZOCOR) 20 MG Tab TAKE ONE TABLET BY MOUTH ONCE DAILY IN THE EVENING 90 Tab 1   • telmisartan-hydrochlorothiazide (MICARDIS HCT) 40-12.5 MG per tablet TAKE ONE TABLET BY MOUTH ONCE DAILY 90 Tab 1   • anastrozole (ARIMIDEX) 1 MG Tab Take 1 mg by mouth every day.     • ONETOUCH DELICA LANCETS 33G Misc USE TO TEST BLOOD SUGAR IN THE MORNING 100 Each 1   • glucose blood (ONE TOUCH ULTRA TEST) strip 1 Strip by Other route every day. 100 Strip 3   • lidocaine (LIDODERM) 5 % Patch Apply 1 Patch to skin as directed every 12 hours. 30 Patch 2   • Blood Glucose Monitoring Suppl (ONE TOUCH ULTRA MINI) W/DEVICE KIT  "     • [facility] [place]. Devices KIT Diagnosis 250.02. Freestyle glucometer, test strips, #100, RF11 and lancets #100, RF 11. To test fasting in the AM. May substitute brand acceptable to insurance. 1 Each 0     Current Facility-Administered Medications   Medication Dose Route Frequency Provider Last Rate Last Dose   • methylPREDNISolone acetate (DEPO-MEDROL) injection 40 mg  40 mg Intramuscular Once Erica Tristan D.O.       • lidocaine PF (XYLOCAINE-MPF) 1 % injection 1 mL  1 mL Injection Once Erica Tristan D.O.       • bupivacaine (pf) (MARCAINE/SENSORCAINE) 0.5 % injection 1 mL  1 mL Injection Once Erica Tristan D.O.         Social History   Substance Use Topics   • Smoking status: Never Smoker   • Smokeless tobacco: Never Used      Comment: avoid any and all tobacco products   • Alcohol use 0.0 oz/week      Comment: once a year     Family Status   Relation Status   • Mother     alzheimer    • Father      Family History   Problem Relation Age of Onset   • Stroke Mother    • Lung Disease Father      pneumonia     She    has a past medical history of Anesthesia; Breast cancer (CMS-HCC) (2/10/2017); Dental disorder; Diverticulosis of colon (2011); HTN (hypertension) (3/14/2013); Pain; and Post hysterectomy menopause (2011).        ROS   GEN: no weight loss, fevers, or chills  HEENT: no blurry vision or change in vision, no ear pain, no difficulty swallowing, no throat pain, no runny nose, no nasal congestion  Resp: no shortness of breath, no cough  CV: no racing heart, no irregular beats, no chest pain  MSK: no muscle aches  Neuro: no headaches, no dizziness, no LOC, no weakness, no numbness/tingling       Objective:     Blood pressure 122/64, pulse 74, temperature 36.7 °C (98.1 °F), resp. rate 16, height 1.6 m (5' 3\"), weight 79.8 kg (176 lb), SpO2 93 %. Body mass index is 31.18 kg/m².   Physical Exam:  Constitutional: Alert, no distress.  Skin: Warm, dry, good turgor, no rashes in visible " areas.  Eye: Equal, round and reactive, conjunctiva clear, lids normal.  Respiratory: Unlabored respiratory effort, good air movement, lungs clear to auscultation, no wheezes, no ronchi.  Cardiovascular:RRR, +S1, S2, no murmur, no peripheral edema, pedal and radial pulses equal and intact bilat  Abdomen: Soft, non-tender, no masses, no hepatosplenomegaly.  MSK:5/5 muscle strength in upper extremities as well as lower extremity bilaterally, left knee mild effusion, crepitus, tenderness to palpation mostly on medial aspect  Psych: Alert and oriented, appropriate affect and mood.  Neuro: CN2-12 intact, no gross motor or sensory deficits    PROCEDURE NOTE.  Discussed risk and benefit and patient agrees to kenalog injection of L knee. The joint was prepped with betadine which was allowed to dry. A 25 guage needle was inserted into the anterior medial aspect of the joint. 1 cc of 1% lidocaine without epi, 1cc of bupivacaine, and 1 cc of kenalog 40 was then injected into the joint. The area was cleaned with alcohol swab and band-aid was applied. Patient tolerated procedure well with no complications, no blood loss. Pt advised to move knee over the next few hours.  Effects of treatment will slowly onset over the next 24-48hrs      Assessment and Plan:   The following treatment plan was discussed    1. Primary osteoarthritis involving multiple joints  Recommend lidocaine cream. Risks and benefits as discussed as above. Injection noted as above.  - lidocaine (XYLOCAINE) 2 % Gel; 1 application BID PRN for pain  Dispense: 1 Bottle; Refill: 2  - methylPREDNISolone acetate (DEPO-MEDROL) injection 40 mg; 1 mL by Intramuscular route Once.  - lidocaine PF (XYLOCAINE-MPF) 1 % injection 1 mL; 1 mL by Injection route Once.  - bupivacaine (pf) (MARCAINE/SENSORCAINE) 0.5 % injection 1 mL; 1 mL by Injection route Once.    2. Type 2 diabetes mellitus without complication, without long-term current use of insulin (CMS-[facility])  Recommend  patient continue to take blood sugars. Advised her that blood sugars may be elevated due to steroid injection.    3. Essential hypertension  Chronic: Recommend continuing to follow blood pressure as injection may cause slight elevation in blood pressures.      Followup: Return if symptoms worsen or fail to improve.    Please note that this dictation was created using voice recognition software. I have made every reasonable attempt to correct obvious errors, but I expect that there are errors of grammar and possibly content that I did not discover before finalizing the note.

## 2017-10-13 LAB
ALBUMIN/CREAT UR: ABNORMAL MG/G CREAT (ref 0–30)
CREAT UR-MCNC: 71.5 MG/DL
HBA1C MFR BLD: 6.4 % (ref 4.8–5.6)
MICROALBUMIN UR-MCNC: <3 UG/ML

## 2017-11-03 ENCOUNTER — OFFICE VISIT (OUTPATIENT)
Dept: MEDICAL GROUP | Facility: PHYSICIAN GROUP | Age: 67
End: 2017-11-03
Payer: COMMERCIAL

## 2017-11-03 VITALS
HEIGHT: 63 IN | SYSTOLIC BLOOD PRESSURE: 132 MMHG | HEART RATE: 86 BPM | TEMPERATURE: 98.3 F | WEIGHT: 176 LBS | RESPIRATION RATE: 16 BRPM | OXYGEN SATURATION: 95 % | DIASTOLIC BLOOD PRESSURE: 72 MMHG | BODY MASS INDEX: 31.18 KG/M2

## 2017-11-03 DIAGNOSIS — M15.9 PRIMARY OSTEOARTHRITIS INVOLVING MULTIPLE JOINTS: ICD-10-CM

## 2017-11-03 DIAGNOSIS — E11.9 TYPE 2 DIABETES MELLITUS WITHOUT COMPLICATION, WITHOUT LONG-TERM CURRENT USE OF INSULIN (HCC): ICD-10-CM

## 2017-11-03 PROBLEM — M15.0 PRIMARY OSTEOARTHRITIS INVOLVING MULTIPLE JOINTS: Status: ACTIVE | Noted: 2017-11-03

## 2017-11-03 PROCEDURE — 99214 OFFICE O/P EST MOD 30 MIN: CPT | Performed by: FAMILY MEDICINE

## 2017-11-03 RX ORDER — METFORMIN HYDROCHLORIDE 500 MG/1
500 TABLET, EXTENDED RELEASE ORAL DAILY
Qty: 90 TAB | Refills: 0
Start: 2017-11-03 | End: 2018-03-04

## 2017-11-03 NOTE — PROGRESS NOTES
Subjective:     Chief Complaint   Patient presents with   • Diabetes Mellitus   • Arthritis       Tena Romo is a 67 y.o. female here today for follow up on DM and OA.   Pt reports improvement in L knee pain with Steriod injection. Patient continues to have pain in right knee with difficulty standing and ambulating great distances.    Patient does not routinely take blood sugars. She was previously controlling sugars with diet only but is now interested in starting medication.    Allergies   Allergen Reactions   • Aspirin      Intolerance     • Cipro Xr Hives   • Diclofenac    • Metformin Rash     Erythematous rash     Current medicines (including changes today)  Current Outpatient Prescriptions   Medication Sig Dispense Refill   • metformin ER (GLUCOPHAGE XR) 500 MG TABLET SR 24 HR Take 1 Tab by mouth every day. 90 Tab 0   • lidocaine (XYLOCAINE) 2 % Gel 1 application BID PRN for pain 1 Bottle 2   • simvastatin (ZOCOR) 20 MG Tab TAKE ONE TABLET BY MOUTH ONCE DAILY IN THE EVENING 90 Tab 1   • telmisartan-hydrochlorothiazide (MICARDIS HCT) 40-12.5 MG per tablet TAKE ONE TABLET BY MOUTH ONCE DAILY 90 Tab 1   • ONETOUCH DELICA LANCETS 33G Misc USE TO TEST BLOOD SUGAR IN THE MORNING 100 Each 1   • glucose blood (ONE TOUCH ULTRA TEST) strip 1 Strip by Other route every day. 100 Strip 3   • lidocaine (LIDODERM) 5 % Patch Apply 1 Patch to skin as directed every 12 hours. 30 Patch 2   • anastrozole (ARIMIDEX) 1 MG Tab Take 1 mg by mouth every day.     • Blood Glucose Monitoring Suppl (ONE TOUCH ULTRA MINI) W/DEVICE KIT      • Misc. Devices KIT Diagnosis 250.02. Freestyle glucometer, test strips, #100, RF11 and lancets #100, RF 11. To test fasting in the AM. May substitute brand acceptable to insurance. 1 Each 0     No current facility-administered medications for this visit.      Social History   Substance Use Topics   • Smoking status: Never Smoker   • Smokeless tobacco: Never Used      Comment: avoid any and  "all tobacco products   • Alcohol use 0.0 oz/week      Comment: once a year     Family Status   Relation Status   • Mother     alzheimer    • Father      Family History   Problem Relation Age of Onset   • Stroke Mother    • Lung Disease Father      pneumonia     She    has a past medical history of Anesthesia; Breast cancer (CMS-HCC) (2/10/2017); Dental disorder; Diverticulosis of colon (2011); HTN (hypertension) (3/14/2013); Pain; and Post hysterectomy menopause (2011).        ROS  GEN: no weight loss, fevers, or chills  HEENT: no blurry vision or change in vision  Resp: no shortness of breath, no cough  CV: no racing heart, no irregular beats, no chest pain  Abd: no nausea, no vomiting, no diarrhea, no constipation, no blood in stool, no dark stools, no incontinence  : no dysuria, no hematuria, no urinary incontinence, no increased frequency  MSK: no muscle aches, bilat knee joint pain, no limited motion  Neuro: no headaches, no dizziness, no LOC, no weakness, no numbness/tingling       Objective:     Blood pressure 132/72, pulse 86, temperature 36.8 °C (98.3 °F), resp. rate 16, height 1.6 m (5' 3\"), weight 79.8 kg (176 lb), SpO2 95 %. Body mass index is 31.18 kg/m².  Physical Exam:  Constitutional: Alert, no distress.  Skin: Warm, dry, good turgor, no rashes in visible areas.  Eye: Equal, round and reactive, conjunctiva clear, lids normal.  ENMT: Lips without lesions, oropharynx non-erythematous, no exudate, moist oral mucosa  Neck: Trachea midline, no masses, no thyromegaly. No cervical or supraclavicular lymphadenopathy. Full ROM  Respiratory: Unlabored respiratory effort, good air movement, lungs clear to auscultation, no wheezes, no ronchi.  Cardiovascular:RRR, +S1, S2, no murmur, no peripheral edema, pedal and radial pulses equal and intact bilat  Abdomen: Soft, non-tender, no masses, no hepatosplenomegaly.  MSK:5/5 muscle strength in upper extremities as well as lower extremity " bilaterallyBilateral knee crepitus, difficulty with flexion and extension of right knee  Psych: Alert and oriented, appropriate affect and mood.  Neuro: CN2-12 intact, no gross motor or sensory deficits      Assessment and Plan:   The following treatment plan was discussed    1. Type 2 diabetes mellitus without complication, without long-term current use of insulin (CMS-HCC)  A1c currently 6.2. Patient is not taking any medications. Patient states that she would like to start taking metformin which was previously prescribed. Patient has medication at home. Risk and benefits discussed. Patient reports previous rash noted with metformin was probably not due to medication.    2. Primary osteoarthritis involving multiple joints  Chronic: Bilateral knees. Recommend during injection of right knee      Followup: Return if symptoms worsen or fail to improve, for R knee injections.    Please note that this dictation was created using voice recognition software. I have made every reasonable attempt to correct obvious errors, but I expect that there are errors of grammar and possibly content that I did not discover before finalizing the note.

## 2017-11-08 ENCOUNTER — OFFICE VISIT (OUTPATIENT)
Dept: OTHER | Facility: IMAGING CENTER | Age: 67
End: 2017-11-08
Payer: COMMERCIAL

## 2017-11-08 DIAGNOSIS — M15.9 PRIMARY OSTEOARTHRITIS INVOLVING MULTIPLE JOINTS: ICD-10-CM

## 2017-11-08 DIAGNOSIS — M54.41 CHRONIC RIGHT-SIDED LOW BACK PAIN WITH RIGHT-SIDED SCIATICA: ICD-10-CM

## 2017-11-08 DIAGNOSIS — G89.29 CHRONIC RIGHT-SIDED LOW BACK PAIN WITH RIGHT-SIDED SCIATICA: ICD-10-CM

## 2017-11-08 PROCEDURE — 99999 PR NO CHARGE: CPT | Mod: 25 | Performed by: FAMILY MEDICINE

## 2017-11-08 PROCEDURE — 97813 ACUP 1/> W/ESTIM 1ST 15 MIN: CPT | Performed by: FAMILY MEDICINE

## 2017-11-08 PROCEDURE — 97811 ACUP 1/> W/O ESTIM EA ADD 15: CPT | Performed by: FAMILY MEDICINE

## 2017-11-08 NOTE — PATIENT INSTRUCTIONS
Have encouraged the patient to rest after the acupuncture session today - taking naps or going to sleep early as necessary.  Increase intake of water and refrain from strenuous activities.  Patient may expect to feel transient worsening of symptoms, but this should resolve to benefit in the next day or two after treatment.    The side effects of Acupuncture needle insertion include: minor bruising, bleeding, or pain at the site of needle insertion.  If more worrisome symptoms, such as continued bleeding, severe bruising, or continue pain or altered sensation persist, please contact [facility] office @ [phone]

## 2017-11-08 NOTE — PROGRESS NOTES
[facility] Acupuncture Progress Note  [address] [place] [address]  Dept: [phone]      Patient Name: Tena Romo   MRN: [id number]  YOB: 1950  PCP: Erica Mcconnell D.O.  Date of Service: 11/8/2017 12:55 PM    CC Hip pain   HPI Patient is a 66 yo  female with chronic problems with her hips at both sides that hurts her the most at night.  She was evaluated for surgery and does not wish to be dependent on the opioid with her chronic needs of waking up with pain in the middle of the night.  She also has issues with chronic posterior shoulder that is tight at least twice a week.  Currently she has been using nightly ibuprofen to reduce her pain during the night.  She has no tenderness now.  She had prior family experience with acupuncture and now she has been excited about trying this out on her own.   ROS Birthplace: Not asked  Color:  Season:  -handed  Scars:   PMH Past Medical History:   Diagnosis Date   • Anesthesia     post of nausea   • Breast cancer (CMS-HCC) 2/10/2017   • Dental disorder     permanent bridge   • Diverticulosis of colon 11/4/2011   • HTN (hypertension) 3/14/2013   • Pain     right shoulder down to thumb   • Post hysterectomy menopause 11/4/2011     Past Surgical History:   Procedure Laterality Date   • SHOULDER ARTHROSCOPY W/ ROTATOR CUFF REPAIR  2/9/2012    Performed by JENNY LIMON at [facility]   • SHOULDER DECOMPRESSION ARTHROSCOPIC  2/9/2012    Performed by JENNY LIMON at [facility]   • CLAVICLE DISTAL EXCISION  2/9/2012    Performed by JENNY LIMON at [facility]   • BUNIONECTOMY  2009    bilateral   • ABDOMINAL HYSTERECTOMY TOTAL  2001    endometriosis   • CHOLECYSTECTOMY  1999    laparoscopy   • MASTECTOMY     • TONSILLECTOMY  as child       Social History     Social History   • Marital status:      Spouse name: N/A   • Number of children: N/A   • Years of  education: N/A     Social History Main Topics   • Smoking status: Never Smoker   • Smokeless tobacco: Never Used      Comment: avoid any and all tobacco products   • Alcohol use 0.0 oz/week      Comment: once a year   • Drug use: No   • Sexual activity: Not on file      Comment: . Work at [employer]     Other Topics Concern   • Not on file     Social History Narrative   • No narrative on file      MEDS Current Outpatient Prescriptions on File Prior to Visit   Medication Sig Dispense Refill   • metformin ER (GLUCOPHAGE XR) 500 MG TABLET SR 24 HR Take 1 Tab by mouth every day. 90 Tab 0   • lidocaine (XYLOCAINE) 2 % Gel 1 application BID PRN for pain 1 Bottle 2   • simvastatin (ZOCOR) 20 MG Tab TAKE ONE TABLET BY MOUTH ONCE DAILY IN THE EVENING 90 Tab 1   • telmisartan-hydrochlorothiazide (MICARDIS HCT) 40-12.5 MG per tablet TAKE ONE TABLET BY MOUTH ONCE DAILY 90 Tab 1   • ONETOUCH DELICA LANCETS 33G Misc USE TO TEST BLOOD SUGAR IN THE MORNING 100 Each 1   • glucose blood (ONE TOUCH ULTRA TEST) strip 1 Strip by Other route every day. 100 Strip 3   • lidocaine (LIDODERM) 5 % Patch Apply 1 Patch to skin as directed every 12 hours. 30 Patch 2   • anastrozole (ARIMIDEX) 1 MG Tab Take 1 mg by mouth every day.     • Blood Glucose Monitoring Suppl (ONE TOUCH ULTRA MINI) W/DEVICE KIT      • Misc. Devices KIT Diagnosis 250.02. Freestyle glucometer, test strips, #100, RF11 and lancets #100, RF 11. To test fasting in the AM. May substitute brand acceptable to insurance. 1 Each 0     No current facility-administered medications on file prior to visit.       ALLERGIES Allergies   Allergen Reactions   • Aspirin      Intolerance     • Cipro Xr Hives   • Diclofenac    • Metformin Rash     Erythematous rash      PE Titi Exam: Stomach Qi: (-) pecking radial pulse  (+) Oketsu, (-) Immune,   (+) Adrenal - B/LKid16 St9 DaiMai ASIS Kid2         Assessment Eastern Liver/blood stagnation, Adrenal exhaustion.    Western Encounter Diagnoses    Name Primary?   • Chronic right-sided low back pain with right-sided sciatica    • Primary osteoarthritis involving multiple joints                   Plan Set 1: Left (Lv4, Lu5)  Set 2: B/L LI10-11 area, B/L (Kd10, Lv8, Tw9)  Set 3: L (TW 3--> 10, LR 3.3 --> 8, KD 7 --> KD 10), R (PC 5--> 9, GB 39 ++> 34, BL 59 ++> BL 40)  Set 4: Tyrone shaikh, R elis lei.     More than 16 minutes of this 20 minute interview were spent in discussing the benefits and utility of acupuncture.  I answered patient questions about efficacy, safety, and what to expect during a treatment, and the likely number of treatments needed. Have encouraged the patient to rest after the acupuncture session today - taking naps or going to sleep early as necessary.  Increase intake of water and refrain from strenuous activities.  Patient may expect to feel transient worsening of symptoms, but this should resolve to benefit in the next day or two after treatment.    The side effects of Acupuncture needle insertion include: minor bruising, bleeding, or pain at the site of needle insertion.  If more worrisome symptoms, such as continued bleeding, severe bruising, or continue pain or altered sensation persist, please contact [facility] office.  Total acupuncture treatment time = 45 minutes.  Patient will schedule another appointment to return for treatment.    Demetrio Angulo D.O.

## 2017-11-21 ENCOUNTER — TELEPHONE (OUTPATIENT)
Dept: MEDICAL GROUP | Facility: PHYSICIAN GROUP | Age: 67
End: 2017-11-21

## 2017-11-21 NOTE — TELEPHONE ENCOUNTER
I called pt to get her appt rescheduled. Once she picked up I told her who I was, I asked her how she was doing and she told me she was busy. I asked her if she had a quick minuet to reschedule her appointment because Dr Tristan was going to be out on 11/22. She said no you cannot cancel my appointment or reschedule, I said ok im really sorry but what would you like me to do to help you, she said find someone else to do it and I informed her that no one else in this office does injections only Dr Tristan does, she stated that she would go to urgent care or the ER, I said ok, would you like to reschedule your appointment still for next week sometime to have Dr Tristan take a look and she said no that she will call and complain and I cannot do anything. And she hung up.    Supervisor Carol has been notified.

## 2017-11-27 RX ORDER — TELMISARTAN AND HYDROCHLORTHIAZIDE 40; 12.5 MG/1; MG/1
TABLET ORAL
Qty: 90 TAB | Refills: 1 | Status: SHIPPED | OUTPATIENT
Start: 2017-11-27 | End: 2018-01-07 | Stop reason: SDUPTHER

## 2017-11-27 NOTE — TELEPHONE ENCOUNTER
Refill X 6 months, sent to pharmacy.Pt. Seen in the last 6 months per protocol.   Lab Results   Component Value Date/Time    SODIUM 145 (H) 03/09/2017 07:28 AM    SODIUM 140 12/29/2016 09:37 AM    POTASSIUM 4.7 03/09/2017 07:28 AM    POTASSIUM 4.8 12/29/2016 09:37 AM    CHLORIDE 102 03/09/2017 07:28 AM    CHLORIDE 106 12/29/2016 09:37 AM    CO2 27 03/09/2017 07:28 AM    CO2 27 12/29/2016 09:37 AM    GLUCOSE 128 (H) 03/09/2017 07:28 AM    GLUCOSE 123 (H) 12/29/2016 09:37 AM    BUN 18 03/09/2017 07:28 AM    BUN 29 (H) 12/29/2016 09:37 AM    CREATININE 0.77 03/09/2017 07:28 AM    CREATININE 0.90 12/29/2016 09:37 AM    CREATININE 0.86 12/01/2010 12:00 AM    BUNCREATRAT 23 03/09/2017 07:28 AM    BUNCREATRAT 27 12/01/2010 12:00 AM    GLOMRATE >59 12/01/2010 12:00 AM

## 2017-12-07 ENCOUNTER — OFFICE VISIT (OUTPATIENT)
Dept: OTHER | Facility: IMAGING CENTER | Age: 67
End: 2017-12-07
Payer: COMMERCIAL

## 2017-12-07 DIAGNOSIS — M15.9 PRIMARY OSTEOARTHRITIS INVOLVING MULTIPLE JOINTS: ICD-10-CM

## 2017-12-07 DIAGNOSIS — G89.29 CHRONIC RIGHT-SIDED LOW BACK PAIN WITH RIGHT-SIDED SCIATICA: ICD-10-CM

## 2017-12-07 DIAGNOSIS — M54.41 CHRONIC RIGHT-SIDED LOW BACK PAIN WITH RIGHT-SIDED SCIATICA: ICD-10-CM

## 2017-12-07 PROCEDURE — 99213 OFFICE O/P EST LOW 20 MIN: CPT | Mod: 25 | Performed by: FAMILY MEDICINE

## 2017-12-07 PROCEDURE — 97813 ACUP 1/> W/ESTIM 1ST 15 MIN: CPT | Performed by: FAMILY MEDICINE

## 2017-12-07 PROCEDURE — 97811 ACUP 1/> W/O ESTIM EA ADD 15: CPT | Performed by: FAMILY MEDICINE

## 2017-12-07 NOTE — PROGRESS NOTES
[facility] Acupuncture Progress Note  [address] [place] [address]  Dept: [phone]      Patient Name: Tena Romo   MRN: [id number]  YOB: 1950  PCP: Erica Mcconnell D.O.  Date of Service: 12/7/2017 12:47 PM    CC Hip pain   HPI Patient is a 66 yo  female with chronic problems with her hips at both sides that hurts her the most at night.  She was evaluated for surgery and does not wish to be dependent on the opioid with her chronic needs of waking up with pain in the middle of the night.  She also has issues with chronic posterior shoulder that is tight at least twice a week.  Currently she has been using nightly ibuprofen to reduce her pain during the night.  She has no tenderness now.  She had prior family experience with acupuncture and now she has been excited about trying this out on her own.   ROS Birthplace: Not asked  Color:  Season:  -handed  Scars:   PMH Past Medical History:   Diagnosis Date   • Anesthesia     post of nausea   • Breast cancer (CMS-HCC) 2/10/2017   • Dental disorder     permanent bridge   • Diverticulosis of colon 11/4/2011   • HTN (hypertension) 3/14/2013   • Pain     right shoulder down to thumb   • Post hysterectomy menopause 11/4/2011     Past Surgical History:   Procedure Laterality Date   • SHOULDER ARTHROSCOPY W/ ROTATOR CUFF REPAIR  2/9/2012    Performed by [facility]NY [facility]ON at [facility]   • SHOULDER DECOMPRESSION ARTHROSCOPIC  2/9/2012    Performed by JENNY LIMON at [facility]   • CLAVICLE DISTAL EXCISION  2/9/2012    Performed by JENNY LIMON at [facility]   • BUNIONECTOMY  2009    bilateral   • ABDOMINAL HYSTERECTOMY TOTAL  2001    endometriosis   • CHOLECYSTECTOMY  1999    laparoscopy   • MASTECTOMY     • TONSILLECTOMY  as child       Social History     Social History   • Marital status:      Spouse name: N/A   • Number of children: N/A   • Years of  education: N/A     Social History Main Topics   • Smoking status: Never Smoker   • Smokeless tobacco: Never Used      Comment: avoid any and all tobacco products   • Alcohol use 0.0 oz/week      Comment: once a year   • Drug use: No   • Sexual activity: Not on file      Comment: . Work at [employer]     Other Topics Concern   • Not on file     Social History Narrative   • No narrative on file      MEDS Current Outpatient Prescriptions on File Prior to Visit   Medication Sig Dispense Refill   • telmisartan-hydrochlorothiazide (MICARDIS HCT) 40-12.5 MG per tablet TAKE ONE TABLET BY MOUTH ONCE DAILY 90 Tab 1   • metformin ER (GLUCOPHAGE XR) 500 MG TABLET SR 24 HR Take 1 Tab by mouth every day. 90 Tab 0   • lidocaine (XYLOCAINE) 2 % Gel 1 application BID PRN for pain 1 Bottle 2   • simvastatin (ZOCOR) 20 MG Tab TAKE ONE TABLET BY MOUTH ONCE DAILY IN THE EVENING 90 Tab 1   • ONETOUCH DELICA LANCETS 33G Misc USE TO TEST BLOOD SUGAR IN THE MORNING 100 Each 1   • glucose blood (ONE TOUCH ULTRA TEST) strip 1 Strip by Other route every day. 100 Strip 3   • lidocaine (LIDODERM) 5 % Patch Apply 1 Patch to skin as directed every 12 hours. 30 Patch 2   • anastrozole (ARIMIDEX) 1 MG Tab Take 1 mg by mouth every day.     • Blood Glucose Monitoring Suppl (ONE TOUCH ULTRA MINI) W/DEVICE KIT      • Misc. Devices KIT Diagnosis 250.02. Freestyle glucometer, test strips, #100, RF11 and lancets #100, RF 11. To test fasting in the AM. May substitute brand acceptable to insurance. 1 Each 0     No current facility-administered medications on file prior to visit.       ALLERGIES Allergies   Allergen Reactions   • Aspirin      Intolerance     • Cipro Xr Hives   • Diclofenac    • Metformin Rash     Erythematous rash      PE Titi Exam: Stomach Qi: (-) pecking radial pulse  (+) Oketsu, (-) Immune,   (+) Adrenal - B/LKid16 St9 DaiMai ASIS Kid2         Assessment Eastern Liver/blood stagnation, Adrenal exhaustion.    Western Encounter Diagnoses    Name Primary?   • Primary osteoarthritis involving multiple joints    • Chronic right-sided low back pain with right-sided sciatica                   Plan Set 1: Left (Lv4, Lu5)  Set 2: B/L LI10-11 area, B/L (Kd10, Lv8, Tw9)  Set 3: L (TW 3--> 10, LR 3.3 --> 8, KD 7 --> KD 10), R (PC 5--> 9, GB 39 ++> 34, BL 59 ++> BL 40)  Set 4: Tyrone shaikh, R elis lei.   Total face to face time was 20 minutes with more than 15 minutes spent discussing with the patient about her condition which did not include procedure time. >50% of the face to face time was spent in counseling and coordination of care. Topics discussed included:   Continue BID back stretch and utilize antiinflammatory diet.  Total acupuncture treatment time = 45 minutes      Demetrio Angulo D.O.

## 2017-12-08 NOTE — PATIENT INSTRUCTIONS
Have encouraged the patient to rest after the acupuncture session today - taking naps or going to sleep early as necessary.  Increase intake of water and refrain from strenuous activities.  Patient may expect to feel transient worsening of symptoms, but this should resolve to benefit in the next day or two after treatment.    The side effects of Acupuncture needle insertion include: minor bruising, bleeding, or pain at the site of needle insertion.  If more worrisome symptoms, such as continued bleeding, severe bruising, or continue pain or altered sensation persist, please contact [facility] office @ [phone]

## 2017-12-14 ENCOUNTER — GYNECOLOGY VISIT (OUTPATIENT)
Dept: OBGYN | Facility: CLINIC | Age: 67
End: 2017-12-14
Payer: COMMERCIAL

## 2017-12-14 VITALS
SYSTOLIC BLOOD PRESSURE: 132 MMHG | HEIGHT: 63 IN | WEIGHT: 177 LBS | DIASTOLIC BLOOD PRESSURE: 82 MMHG | BODY MASS INDEX: 31.36 KG/M2

## 2017-12-14 DIAGNOSIS — N94.819 VULVODYNIA: ICD-10-CM

## 2017-12-14 DIAGNOSIS — N95.2 ATROPHIC VAGINITIS: ICD-10-CM

## 2017-12-14 PROCEDURE — 99203 OFFICE O/P NEW LOW 30 MIN: CPT | Performed by: OBSTETRICS & GYNECOLOGY

## 2017-12-14 RX ORDER — CLOBETASOL PROPIONATE 0.5 MG/G
1 OINTMENT TOPICAL
Qty: 1 TUBE | Refills: 6 | Status: SHIPPED | OUTPATIENT
Start: 2017-12-14 | End: 2020-02-19 | Stop reason: SDUPTHER

## 2017-12-14 NOTE — PROGRESS NOTES
Chief complaint; new patient    Tena Romo is a 67 y.o.  who presents complaining of vaginal itching and irritation over the last 3-4 years. Patient is status post bilateral mastectomy for stage I breast cancer she also receives tamoxifen-negative lymph nodes Dr. Inman is her oncologist. Patient has used clobetasol with good success in the past. Status post JOLYNN/BSO very mild stress urinary incontinence    Review of systems; denies fever chills abdominal pain, denies chest pain shortness of breath or urinary symptoms  Past medical history-  Past Medical History:   Diagnosis Date   • Anesthesia     post of nausea   • Arthritis    • Breast cancer (CMS-HCC) 2/10/2017   • Dental disorder     permanent bridge   • Diabetes (CMS-HCC)    • Diverticulosis of colon 2011   • HTN (hypertension) 3/14/2013   • Hypertension    • Pain     right shoulder down to thumb   • Post hysterectomy menopause 2011     Past surgical history-  Past Surgical History:   Procedure Laterality Date   • SHOULDER ARTHROSCOPY W/ ROTATOR CUFF REPAIR  2012    Performed by JENNY LIMON at [facility] ORS   • SHOULDER DECOMPRESSION ARTHROSCOPIC  2012    Performed by JENNY LIMON at [facility] ORS   • CLAVICLE DISTAL EXCISION  2012    Performed by JENNY LIMON at [facility] ORS   • BUNIONECTOMY      bilateral   • ABDOMINAL HYSTERECTOMY TOTAL      endometriosis   • CHOLECYSTECTOMY      laparoscopy   • MASTECTOMY     • TONSILLECTOMY  as child     Allergies-Aspirin; Cipro xr; Diclofenac; and Metformin  Medications-  Current Outpatient Prescriptions on File Prior to Visit   Medication Sig Dispense Refill   • telmisartan-hydrochlorothiazide (MICARDIS HCT) 40-12.5 MG per tablet TAKE ONE TABLET BY MOUTH ONCE DAILY 90 Tab 1   • metformin ER (GLUCOPHAGE XR) 500 MG TABLET SR 24 HR Take 1 Tab by mouth every day. 90 Tab 0   • lidocaine (XYLOCAINE) 2 % Gel 1 application BID PRN  "for pain 1 Bottle 2   • simvastatin (ZOCOR) 20 MG Tab TAKE ONE TABLET BY MOUTH ONCE DAILY IN THE EVENING 90 Tab 1   • ONETOUCH DELICA LANCETS 33G Misc USE TO TEST BLOOD SUGAR IN THE MORNING 100 Each 1   • glucose blood (ONE TOUCH ULTRA TEST) strip 1 Strip by Other route every day. 100 Strip 3   • lidocaine (LIDODERM) 5 % Patch Apply 1 Patch to skin as directed every 12 hours. 30 Patch 2   • anastrozole (ARIMIDEX) 1 MG Tab Take 1 mg by mouth every day.     • Blood Glucose Monitoring Suppl (ONE TOUCH ULTRA MINI) W/DEVICE KIT      • Misc. Devices KIT Diagnosis 250.02. Freestyle glucometer, test strips, #100, RF11 and lancets #100, RF 11. To test fasting in the AM. May substitute brand acceptable to insurance. 1 Each 0     No current facility-administered medications on file prior to visit.      Social history-  Social History     Social History   • Marital status:      Spouse name: N/A   • Number of children: N/A   • Years of education: N/A     Occupational History   • Not on file.     Social History Main Topics   • Smoking status: Never Smoker   • Smokeless tobacco: Never Used      Comment: avoid any and all tobacco products   • Alcohol use 0.0 oz/week      Comment: once a year   • Drug use: No   • Sexual activity: Not Currently     Partners: Male      Comment: . Work at [employer]     Other Topics Concern   • Not on file     Social History Narrative   • No narrative on file     Past Family History-no history of breast or ovarian cancer    Physical examination;  Alert and oriented x3  General a thin well-developed well-nourished female in no apparent distress  Vitals:    12/14/17 1316   BP: 132/82   Weight: 80.3 kg (177 lb)   Height: 1.6 m (5' 3\")     Skin is warm and dry  Neck-supple  HEENT-head-atraumatic, normocephalic, EOMI, PERRLA  Cardiovascular-regular rate and rhythm, normal S1-S2, no murmurs or gallops  Lungs-clear to auscultation bilaterally  Back-negative CVA tenderness  Abdomen-nondistended " positive bowel sounds soft nontender no masses or hepatosplenomegaly  Pelvic examination;  External genitalia-no visible lesions   Vagina-no blood or discharge  Cervix-surgically absent  Uterus-surgically absent  Adnexa surgically absent  Extremities without cyanosis clubbing or edema  Neurologic exam grossly intact    Impression;  Vulvodynia  Atrophic vaginitis    Plan;  Clobetasol 0.05% ointment to be used one time every week with refills  Would not recommend HRT or Premarin vaginal cream due to history of breast cancer      30  Minutes spent with the patient in face-to-face contact, greater than 50% of the time spent on counseling and coordination of care. All questions answered in detail.

## 2018-01-08 RX ORDER — TELMISARTAN AND HYDROCHLORTHIAZIDE 40; 12.5 MG/1; MG/1
1 TABLET ORAL DAILY
Qty: 30 TAB | Refills: 1 | Status: SHIPPED | OUTPATIENT
Start: 2018-01-08 | End: 2019-02-25 | Stop reason: SDUPTHER

## 2018-01-08 RX ORDER — SIMVASTATIN 20 MG
TABLET ORAL
Qty: 30 TAB | Refills: 1 | Status: SHIPPED | OUTPATIENT
Start: 2018-01-08 | End: 2018-01-25 | Stop reason: SDUPTHER

## 2018-01-08 NOTE — TELEPHONE ENCOUNTER
Pt had to reschedule her new pt appt with Nicki due to work conflict. The next soonest was in March. She will need refills of two of her meds before march. Please advise, thank you

## 2018-01-25 ENCOUNTER — PATIENT OUTREACH (OUTPATIENT)
Dept: HEALTH INFORMATION MANAGEMENT | Facility: OTHER | Age: 68
End: 2018-01-25

## 2018-01-25 RX ORDER — SIMVASTATIN 20 MG
TABLET ORAL
Qty: 90 TAB | Refills: 1 | Status: SHIPPED | OUTPATIENT
Start: 2018-01-25 | End: 2018-07-26 | Stop reason: SDUPTHER

## 2018-01-25 NOTE — TELEPHONE ENCOUNTER
Was the patient seen in the last year in this department? Yes     Does patient have an active prescription for medications requested? No     Received Request Via: Pharmacy      Pt met protocol?: Yes, OV 11/17, refills done early this month, pharmacy requesting change in qty for insurance billing. Please change to 90 day supply and re-send, if appropriate.

## 2018-01-25 NOTE — PROGRESS NOTES
1. Attempt #: Final    2. HealthConnect Verified: no    3. Verify PCP: yes    4. Care Team Updated:       •   DME Company (gait device, O2, CPAP, etc.): NO       •   Other Specialists (eye doctor, derm, GYN, cardiology, endo, etc): NO    5.  Reviewed/Updated the following with patient:       •   Communication Preference Obtained? YES       •   Preferred Pharmacy? YES       •   Preferred Lab? YES       •   Family History (document living status of immediate family members and if + hx of cancer, diabetes, hypertension, hyperlipidemia, heart attack, stroke) YES. Was Abstract Encounter opened and chart updated? YES    6. Stream Tags Activation: declined    7. Stream Tags [place]: no    8. Annual Wellness Visit Scheduling  Scheduling Status:Scheduled      9. Care Gap Scheduling (Attempt to Schedule EACH Overdue Care Gap!)     Health Maintenance Due   Topic Date Due   • IMM ZOSTER VACCINE  02/28/2010        Scheduled patient for Annual Wellness Visit    10. Patient was advised: “This is a free wellness visit. The provider will screen for medical conditions to help you stay healthy. If you have other concerns to address you may be asked to discuss these at a separate visit or there may be an additional fee.”     11. Patient was informed to arrive 15 min prior to their scheduled appointment and bring in their medication bottles.

## 2018-01-25 NOTE — TELEPHONE ENCOUNTER
Pt has had OV within the 12 month protocol and lipid panel is current. 6 month supply sent to pharmacy.   Lab Results   Component Value Date/Time    CHOLSTRLTOT 151 03/09/2017 07:28 AM    CHOLSTRLTOT 174 04/28/2016 09:17 AM    LDL 80 03/09/2017 07:28 AM    LDL 95 04/28/2016 09:17 AM    HDL 48 03/09/2017 07:28 AM    HDL 50 04/28/2016 09:17 AM    TRIGLYCERIDE 116 03/09/2017 07:28 AM    TRIGLYCERIDE 146 04/28/2016 09:17 AM       Lab Results   Component Value Date/Time    SODIUM 145 (H) 03/09/2017 07:28 AM    SODIUM 140 12/29/2016 09:37 AM    POTASSIUM 4.7 03/09/2017 07:28 AM    POTASSIUM 4.8 12/29/2016 09:37 AM    CHLORIDE 102 03/09/2017 07:28 AM    CHLORIDE 106 12/29/2016 09:37 AM    CO2 27 03/09/2017 07:28 AM    CO2 27 12/29/2016 09:37 AM    GLUCOSE 128 (H) 03/09/2017 07:28 AM    GLUCOSE 123 (H) 12/29/2016 09:37 AM    BUN 18 03/09/2017 07:28 AM    BUN 29 (H) 12/29/2016 09:37 AM    CREATININE 0.77 03/09/2017 07:28 AM    CREATININE 0.90 12/29/2016 09:37 AM    CREATININE 0.86 12/01/2010 12:00 AM    BUNCREATRAT 23 03/09/2017 07:28 AM    BUNCREATRAT 27 12/01/2010 12:00 AM    GLOMRATE >59 12/01/2010 12:00 AM     Lab Results   Component Value Date/Time    ALKPHOSPHAT 83 03/09/2017 07:28 AM    ALKPHOSPHAT 70 12/29/2016 09:37 AM    ASTSGOT 12 03/09/2017 07:28 AM    ASTSGOT 13 12/29/2016 09:37 AM    ALTSGPT 14 03/09/2017 07:28 AM    ALTSGPT 14 12/29/2016 09:37 AM    TBILIRUBIN 0.4 03/09/2017 07:28 AM    TBILIRUBIN 0.5 12/29/2016 09:37 AM

## 2018-02-26 ENCOUNTER — APPOINTMENT (OUTPATIENT)
Dept: OTHER | Facility: IMAGING CENTER | Age: 68
End: 2018-02-26

## 2018-03-04 ENCOUNTER — OFFICE VISIT (OUTPATIENT)
Dept: URGENT CARE | Facility: PHYSICIAN GROUP | Age: 68
End: 2018-03-04
Payer: COMMERCIAL

## 2018-03-04 ENCOUNTER — HOSPITAL ENCOUNTER (OUTPATIENT)
Dept: RADIOLOGY | Facility: MEDICAL CENTER | Age: 68
End: 2018-03-04
Attending: NURSE PRACTITIONER
Payer: COMMERCIAL

## 2018-03-04 VITALS
OXYGEN SATURATION: 98 % | RESPIRATION RATE: 16 BRPM | HEIGHT: 63 IN | BODY MASS INDEX: 30.65 KG/M2 | SYSTOLIC BLOOD PRESSURE: 122 MMHG | WEIGHT: 173 LBS | HEART RATE: 87 BPM | TEMPERATURE: 100.3 F | DIASTOLIC BLOOD PRESSURE: 60 MMHG

## 2018-03-04 DIAGNOSIS — K52.9 AGE (ACUTE GASTROENTERITIS): ICD-10-CM

## 2018-03-04 DIAGNOSIS — R10.12 LEFT UPPER QUADRANT PAIN: ICD-10-CM

## 2018-03-04 DIAGNOSIS — K57.90 DIVERTICULOSIS OF INTESTINE WITHOUT BLEEDING, UNSPECIFIED INTESTINAL TRACT LOCATION: ICD-10-CM

## 2018-03-04 PROCEDURE — 99214 OFFICE O/P EST MOD 30 MIN: CPT | Performed by: NURSE PRACTITIONER

## 2018-03-04 PROCEDURE — 74176 CT ABD & PELVIS W/O CONTRAST: CPT

## 2018-03-04 RX ORDER — ONDANSETRON 4 MG/1
4 TABLET, ORALLY DISINTEGRATING ORAL EVERY 8 HOURS PRN
Qty: 10 TAB | Refills: 0 | Status: SHIPPED | OUTPATIENT
Start: 2018-03-04 | End: 2018-04-18

## 2018-03-04 RX ORDER — ONDANSETRON 4 MG/1
4 TABLET, ORALLY DISINTEGRATING ORAL ONCE
Status: COMPLETED | OUTPATIENT
Start: 2018-03-04 | End: 2018-03-04

## 2018-03-04 RX ADMIN — ONDANSETRON 4 MG: 4 TABLET, ORALLY DISINTEGRATING ORAL at 13:56

## 2018-03-04 ASSESSMENT — ENCOUNTER SYMPTOMS
SORE THROAT: 0
DIZZINESS: 0
CHILLS: 0
MYALGIAS: 0
DIARRHEA: 1
ABDOMINAL PAIN: 1
CONSTIPATION: 0
SHORTNESS OF BREATH: 0
NAUSEA: 1
EYE PAIN: 0
VOMITING: 1
FEVER: 1

## 2018-03-15 ENCOUNTER — TELEPHONE (OUTPATIENT)
Dept: MEDICAL GROUP | Facility: PHYSICIAN GROUP | Age: 68
End: 2018-03-15

## 2018-03-15 NOTE — TELEPHONE ENCOUNTER
Future Appointments       Provider Department Center    3/16/2018 3:05 PM Heather Thayer P.A.-C. [facility]    4/5/2018 10:30 AM TRISHA Silva; [place] [facility]  [facility]        ESTABLISHED PATIENT PRE-VISIT PLANNING     Note: Patient will not be contacted if there is no indication to call.     1.  Reviewed notes from the last few office visits within the medical group: Yes 03/04/2018    2.  If any orders were placed at last visit or intended to be done for this visit (i.e. 6 mos follow-up), do we have Results/Consult Notes?        •  Labs - Labs ordered, NOT completed. Patient advised to complete prior to next appointment.       •  Imaging - Imaging ordered, completed and results are in chart.       •  Referrals - Referral ordered, patient has NOT been seen.    3. Is this appointment scheduled as a Hospital Follow-Up? No    4.  Immunizations were updated in Epic using WebIZ?: Yes       •  Web Iz Recommendations: ZOSTAVAX (Shingles)    5.  Patient is due for the following Health Maintenance Topics:   Health Maintenance Due   Topic Date Due   • IMM ZOSTER VACCINE  02/28/2010         6.  MDX printed and highlighted for Provider? N\A INS UMR     7.  Patient was informed to arrive 15 min prior to their scheduled appointment and bring in their medication bottles. Confirmed through automated call

## 2018-03-16 ENCOUNTER — OFFICE VISIT (OUTPATIENT)
Dept: MEDICAL GROUP | Facility: PHYSICIAN GROUP | Age: 68
End: 2018-03-16
Payer: COMMERCIAL

## 2018-03-16 VITALS
HEIGHT: 63 IN | BODY MASS INDEX: 31.18 KG/M2 | WEIGHT: 176 LBS | SYSTOLIC BLOOD PRESSURE: 126 MMHG | HEART RATE: 85 BPM | OXYGEN SATURATION: 95 % | TEMPERATURE: 98.1 F | DIASTOLIC BLOOD PRESSURE: 70 MMHG

## 2018-03-16 DIAGNOSIS — R10.12 LUQ ABDOMINAL PAIN: ICD-10-CM

## 2018-03-16 DIAGNOSIS — I10 ESSENTIAL HYPERTENSION: ICD-10-CM

## 2018-03-16 DIAGNOSIS — E66.9 OBESITY (BMI 30-39.9): ICD-10-CM

## 2018-03-16 DIAGNOSIS — E78.00 HYPERCHOLESTEREMIA: ICD-10-CM

## 2018-03-16 DIAGNOSIS — E11.9 TYPE 2 DIABETES MELLITUS WITHOUT COMPLICATION, WITHOUT LONG-TERM CURRENT USE OF INSULIN (HCC): ICD-10-CM

## 2018-03-16 LAB
HBA1C MFR BLD: 6.4 % (ref ?–5.8)
INT CON NEG: NEGATIVE
INT CON POS: POSITIVE

## 2018-03-16 PROCEDURE — 83036 HEMOGLOBIN GLYCOSYLATED A1C: CPT | Performed by: PHYSICIAN ASSISTANT

## 2018-03-16 PROCEDURE — 99214 OFFICE O/P EST MOD 30 MIN: CPT | Performed by: PHYSICIAN ASSISTANT

## 2018-03-16 RX ORDER — LANCETS 30 GAUGE
EACH MISCELLANEOUS
Qty: 100 EACH | Refills: 3 | Status: SHIPPED
Start: 2018-03-16 | End: 2020-04-15

## 2018-03-16 ASSESSMENT — PATIENT HEALTH QUESTIONNAIRE - PHQ9: CLINICAL INTERPRETATION OF PHQ2 SCORE: 0

## 2018-03-19 NOTE — PROGRESS NOTES
"Subjective:   Tena Romo is a 68 y.o. female here today for follow-up on diabetes, hypertension, hyperlipidemia. She is a former patient of Erica Tristan DO.    HPI:    Patient presents to the office today to follow up on her diabetes. She states she is worried because her fasting blood sugars have been running a bit higher than normal and she is wondering if she needs to be started on a medication. She brings her log in to show me today. She states she was previously taking Metformin, but developed a rash on arm so is scared to take it again. She denies being on any other diabetes medications. She has been trying to improve her lifestyle. Has completely cut out soda and has significantly decreased sugar consumption. Is walking about 5 miles at least 4 days per week.     Patient also has hypertension which has been well-controlled with telmisartan-HCTZ 40-12.5 mg, and hyperlipidemia, for which she takes simvastatin 20 mg daily.     Finally, patient endorses an ongoing intermittent left upper abdominal pain that radiates to her back. Has been going on for the last ~4 months. Describes pain as \"burning\" sensation that is worse after eating. Pain is not associated with any nausea, vomiting, stools changes/bloody stools. She mentions that she had what she thinks was food poisoning or stomach flu at the beginning of the month. She was seen in urgent care for this and underwent CT scan of her abdomen. Would like to know the results of this.    Current medicines (including changes today)  Current Outpatient Prescriptions   Medication Sig Dispense Refill   • Blood Glucose Monitoring Suppl Device Meter: Dispense Device of Insurance Preference. Sig. Use as directed for blood sugar monitoring. #1. NR. 1 Device 0   • Blood Glucose Monitoring Suppl Supplies Misc Test strips order: Test strips for insurance-preferred meter. Sig: use every morning before breakfast and prn ssx high or low sugar 100 Strip 3   • Lancets " "Misc Lancets order: Lancets for insurance-preferred meter. Sig: use every morning before breakfast and prn ssx high or low sugar. 100 Each 3   • simvastatin (ZOCOR) 20 MG Tab TAKE ONE TABLET BY MOUTH ONCE DAILY IN THE EVENING 90 Tab 1   • telmisartan-hydrochlorothiazide (MICARDIS HCT) 40-12.5 MG per tablet Take 1 Tab by mouth every day. 30 Tab 1   • ONETOUCH DELICA LANCETS 33G Misc USE TO TEST BLOOD SUGAR IN THE MORNING 100 Each 1   • glucose blood (ONE TOUCH ULTRA TEST) strip 1 Strip by Other route every day. 100 Strip 3   • anastrozole (ARIMIDEX) 1 MG Tab Take 1 mg by mouth every day.     • Blood Glucose Monitoring Suppl (ONE TOUCH ULTRA MINI) W/DEVICE KIT      • ondansetron (ZOFRAN ODT) 4 MG TABLET DISPERSIBLE Take 1 Tab by mouth every 8 hours as needed. 10 Tab 0   • clobetasol (TEMOVATE) 0.05 % Ointment Apply 1 Drop to affected area(s) 1 time daily as needed. 1 Tube 6   • lidocaine (XYLOCAINE) 2 % Gel 1 application BID PRN for pain 1 Bottle 2   • lidocaine (LIDODERM) 5 % Patch Apply 1 Patch to skin as directed every 12 hours. 30 Patch 2   • Misc. Devices KIT Diagnosis 250.02. Freestyle glucometer, test strips, #100, RF11 and lancets #100, RF 11. To test fasting in the AM. May substitute brand acceptable to insurance. 1 Each 0     No current facility-administered medications for this visit.      She  has a past medical history of Anesthesia; Arthritis; Breast cancer (CMS-HCC) (2/10/2017); Dental disorder; Diabetes (CMS-HCC); Diverticulosis of colon (11/4/2011); HTN (hypertension) (3/14/2013); Hypertension; Pain; and Post hysterectomy menopause (11/4/2011).    ROS   Pertinent positives as per HPI  Pulmonary ROS: No shortness of breath  Cardiovascular ROS: No chest pain     Objective:     Blood pressure 126/70, pulse 85, temperature 36.7 °C (98.1 °F), height 1.6 m (5' 3\"), weight 79.8 kg (176 lb), SpO2 95 %. Body mass index is 31.18 kg/m².     Physical Exam:  Constitutional: Alert, no distress.  Skin: Warm, dry, " good turgor, no rashes in visible areas.  Eye: Conjunctiva clear, lids normal.  ENMT: Lips without lesions, moist mucus membranes.  Respiratory: Unlabored respiratory effort, lungs clear to auscultation, no wheezes, no rhonchi.  Cardiovascular: Normal S1, S2, no murmur, no lower extremity edema.  Abdomen: Normoactive bowel sounds. Abdomen is soft, non-distended. Mild tenderness to LUQ. No rebound or guarding. No palpable HSM.      Assessment and Plan:   The following treatment plan was discussed    1. Type 2 diabetes mellitus without complication, without long-term current use of insulin (CMS-HCC)  Reviewed patient's BS log. Fasting values between 130-high 150s. POCT A1c done during visit which was 6.4. Advised patient that given that A1c is at goal, recommend continuing management of diabetes with lifestyle changes that she has initiated. She did express concerns that her current glucometer may be malfunctioning, so I placed order for new meter and supplies.Needs labs before next visit.  - POCT  A1C  - COMP METABOLIC PANEL; Future  - Blood Glucose Monitoring Suppl Device; Meter: Dispense Device of Insurance Preference. Sig. Use as directed for blood sugar monitoring. #1. NR.  Dispense: 1 Device; Refill: 0  - Blood Glucose Monitoring Suppl Supplies Misc; Test strips order: Test strips for insurance-preferred meter. Sig: use every morning before breakfast and prn ssx high or low sugar  Dispense: 100 Strip; Refill: 3  - Lancets Misc; Lancets order: Lancets for insurance-preferred meter. Sig: use every morning before breakfast and prn ssx high or low sugar.  Dispense: 100 Each; Refill: 3    2. Essential hypertension  Chronic issue, well-controlled on current medication. Continue telmisartan-HCTZ.at current dose.  - COMP METABOLIC PANEL; Future    3. Hypercholesteremia  Chronic issue, well-controlled on statin. Last lipid panel from 3/2017 reviewed.    Cholesterol,Tot 151  100 - 199 mg/dL Final   Triglycerides 116  0 -  149 mg/dL Final   HDL 48  >39 mg/dL Final   VLDL Cholesterol Calc 23  5 - 40 mg/dL Final   LDL 80  0 - 99 mg/dL Final     - COMP METABOLIC PANEL; Future  - LIPID PROFILE; Future    4. LUQ abdominal pain  Ongoing issue since last November. Reviewed CT abdomen results with patient which were normal other than mention of diverticulosis without diverticulitis. I have recommended to patient that she start an OTC acid-reducing medication like ranitidine or omeprazole. Given location and worsening after eating, may be secondary to GERD/gastritis. Follow up if symptoms significantly worsen or persist.    5. Obesity (BMI 30-39.9)  - Patient identified as having weight management issue.  Appropriate orders and counseling given.      Followup: Return for establish new PCP; Jacqueline.    Heather Thayer P.A.-C.

## 2018-03-23 LAB
ALBUMIN SERPL-MCNC: 4.4 G/DL (ref 3.6–4.8)
ALBUMIN/GLOB SERPL: 1.5 {RATIO} (ref 1.2–2.2)
ALP SERPL-CCNC: 87 IU/L (ref 39–117)
ALT SERPL-CCNC: 16 IU/L (ref 0–32)
AST SERPL-CCNC: 13 IU/L (ref 0–40)
BILIRUB SERPL-MCNC: 0.5 MG/DL (ref 0–1.2)
BUN SERPL-MCNC: 23 MG/DL (ref 8–27)
BUN/CREAT SERPL: 32 (ref 12–28)
CALCIUM SERPL-MCNC: 9.7 MG/DL (ref 8.7–10.3)
CHLORIDE SERPL-SCNC: 100 MMOL/L (ref 96–106)
CHOLEST SERPL-MCNC: 156 MG/DL (ref 100–199)
CO2 SERPL-SCNC: 25 MMOL/L (ref 18–29)
CREAT SERPL-MCNC: 0.73 MG/DL (ref 0.57–1)
GFR SERPLBLD CREATININE-BSD FMLA CKD-EPI: 85 ML/MIN/1.73
GFR SERPLBLD CREATININE-BSD FMLA CKD-EPI: 98 ML/MIN/1.73
GLOBULIN SER CALC-MCNC: 3 G/DL (ref 1.5–4.5)
GLUCOSE SERPL-MCNC: 123 MG/DL (ref 65–99)
HDLC SERPL-MCNC: 45 MG/DL
LABORATORY COMMENT REPORT: NORMAL
LDLC SERPL CALC-MCNC: 88 MG/DL (ref 0–99)
POTASSIUM SERPL-SCNC: 4.4 MMOL/L (ref 3.5–5.2)
PROT SERPL-MCNC: 7.4 G/DL (ref 6–8.5)
SODIUM SERPL-SCNC: 139 MMOL/L (ref 134–144)
TRIGL SERPL-MCNC: 113 MG/DL (ref 0–149)
VLDLC SERPL CALC-MCNC: 23 MG/DL (ref 5–40)

## 2018-03-26 ENCOUNTER — TELEPHONE (OUTPATIENT)
Dept: MEDICAL GROUP | Facility: PHYSICIAN GROUP | Age: 68
End: 2018-03-26

## 2018-03-26 NOTE — TELEPHONE ENCOUNTER
----- Message from Heather Thayer P.A.-C. sent at 3/23/2018  3:48 PM PDT -----  Please inform patient that her recent labs show nothing of concern.  Heather Thayer P.A.-C.

## 2018-04-09 ENCOUNTER — TELEPHONE (OUTPATIENT)
Dept: MEDICAL GROUP | Facility: PHYSICIAN GROUP | Age: 68
End: 2018-04-09

## 2018-04-09 NOTE — TELEPHONE ENCOUNTER
Future Appointments       Provider Department Center    4/18/2018 3:20 PM Ivonne Minor M.D.; [place] [facility]  [facility]    6/27/2018 9:25 AM Heather Thayer P.A.-C. [facility]        ANNUAL WELLNESS VISIT PRE-VISIT PLANNING WITH OUTREACH    1.  Immunizations were updated in Epic using WebIZ?:Yes       •  WebIZ Recommendations: PREVNAR (PCV13) , TDAP and ZOSTAVAX (Shingles)       •  Is patient due for Tdap? YES. Patient was notified of copay/out of pocket cost.       •  Is patient due for Shingles?YES. Patient was notified of copay/out of pocket cost.    2.  MDX printed for Provider? NO INS MCR     3.  Reviewed note from last office visit with PCP: YES 03/16/2018    4.  If any orders were placed at last visit, do we have Results/Consult Notes?        •  Labs - Labs ordered, completed on 03/22/2018 and results are in chart.       •  Imaging - Imaging was not ordered at last office visit.       •  Referrals - No referrals were ordered at last office visit.    5.  Patient is due for the following Health Maintenance Topics:   Health Maintenance Due   Topic Date Due   • IMM ZOSTER VACCINE  02/28/2010     6.  Patient has the following Care Path diagnoses on Problem List:  DIABETES    Verify Care Teams During Visit

## 2018-04-18 ENCOUNTER — OFFICE VISIT (OUTPATIENT)
Dept: MEDICAL GROUP | Facility: PHYSICIAN GROUP | Age: 68
End: 2018-04-18
Payer: COMMERCIAL

## 2018-04-18 VITALS
WEIGHT: 177 LBS | OXYGEN SATURATION: 95 % | TEMPERATURE: 98.6 F | HEIGHT: 63 IN | DIASTOLIC BLOOD PRESSURE: 68 MMHG | BODY MASS INDEX: 31.36 KG/M2 | SYSTOLIC BLOOD PRESSURE: 126 MMHG | HEART RATE: 80 BPM

## 2018-04-18 DIAGNOSIS — Z00.00 HEALTHCARE MAINTENANCE: ICD-10-CM

## 2018-04-18 DIAGNOSIS — E11.9 TYPE 2 DIABETES MELLITUS WITHOUT COMPLICATION, WITHOUT LONG-TERM CURRENT USE OF INSULIN (HCC): ICD-10-CM

## 2018-04-18 DIAGNOSIS — Z12.11 COLON CANCER SCREENING: ICD-10-CM

## 2018-04-18 DIAGNOSIS — M15.9 PRIMARY OSTEOARTHRITIS INVOLVING MULTIPLE JOINTS: ICD-10-CM

## 2018-04-18 DIAGNOSIS — I10 ESSENTIAL HYPERTENSION: ICD-10-CM

## 2018-04-18 DIAGNOSIS — E55.9 VITAMIN D DEFICIENCY: ICD-10-CM

## 2018-04-18 DIAGNOSIS — E78.00 HYPERCHOLESTEREMIA: ICD-10-CM

## 2018-04-18 DIAGNOSIS — C50.912 BILATERAL MALIGNANT NEOPLASM OF BREAST IN FEMALE, UNSPECIFIED ESTROGEN RECEPTOR STATUS, UNSPECIFIED SITE OF BREAST (HCC): ICD-10-CM

## 2018-04-18 DIAGNOSIS — C50.911 BILATERAL MALIGNANT NEOPLASM OF BREAST IN FEMALE, UNSPECIFIED ESTROGEN RECEPTOR STATUS, UNSPECIFIED SITE OF BREAST (HCC): ICD-10-CM

## 2018-04-18 DIAGNOSIS — G89.29 CHRONIC RIGHT-SIDED LOW BACK PAIN WITH RIGHT-SIDED SCIATICA: ICD-10-CM

## 2018-04-18 DIAGNOSIS — E66.9 OBESITY (BMI 30-39.9): ICD-10-CM

## 2018-04-18 DIAGNOSIS — M54.41 CHRONIC RIGHT-SIDED LOW BACK PAIN WITH RIGHT-SIDED SCIATICA: ICD-10-CM

## 2018-04-18 PROCEDURE — G0438 PPPS, INITIAL VISIT: HCPCS | Performed by: INTERNAL MEDICINE

## 2018-04-18 ASSESSMENT — ACTIVITIES OF DAILY LIVING (ADL): BATHING_REQUIRES_ASSISTANCE: 0

## 2018-04-18 ASSESSMENT — PATIENT HEALTH QUESTIONNAIRE - PHQ9: CLINICAL INTERPRETATION OF PHQ2 SCORE: 0

## 2018-04-18 NOTE — ASSESSMENT & PLAN NOTE
This is a chronic health problem that is well controlled with current medications and lifestyle measures.Diagnosed in 2015. Kit mastom, now on anastrozole. Follows Oncology .

## 2018-04-18 NOTE — ASSESSMENT & PLAN NOTE
This is a chronic health problem that is well controlled with current medications and lifestyle measures. Well-controlled on current simvastatin 20 mg daily at bedtime for

## 2018-04-18 NOTE — ASSESSMENT & PLAN NOTE
This is a chronic health problem that is well controlled with current medications and lifestyle measures. Well controlled on once a month of Aleve very rarely used.

## 2018-04-18 NOTE — PATIENT INSTRUCTIONS
American Diabetes Association (ADA) nutritional guidelines --   1. A diet that includes carbohydrates from fruits, vegetables, whole grains, legumes, and low-fat milk is encouraged.The use of lower glycemic index and glycemic load meals may provide a modest additional benefit for glycemic control.  Low-Glycemic Foods  1. Sweet potatoes  2. Many vegetables, including leafy greens, asparagus, cauliflower  3. Steel-cut oatmeal  4. [place]arrow  5. Quinoa  6. Legumes, including lentils, chickpeas  7. Elijah bread  8. Skim milk  9. Reduced-fat yogurt  10. Sesame seeds, peanuts, flax seeds  2. A variety of eating patterns (Mediterranean, low fat, low carbohydrate, vegetarian) are acceptable.  3. Fat quality is more important than fat quantity-  with monounsaturated and polyunsaturated fatty acids (eg, those found in fish, olive oil, nuts).   4. Protein intake goals should be individualized but not lower than 0.8 g/kg body weight per day (the recommended daily allowance). Patients should be encouraged to substitute lean meats, fish, eggs, beans, peas, soy products, and nuts and seeds for red meat.  5. Fiber intake should be at least 14 grams per 1000 calories daily; higher fiber intake may improve glycemic control.  6. A reduced sodium intake of 2300 mg per day with a diet high in fruits, vegetables, and low-fat dairy products is prudent. For individuals with hypertension, further reduction in sodium may be necessary.  7. Foods containing sucrose to be avoided.  Physical Activity - 30 minutes or more of moderate-intensity physical activity on most days of the week .      ------------------------------------------------

## 2018-04-18 NOTE — PROGRESS NOTES
CC : Patient is here for annual wellness visit, colon cancer screening.    HPI:  Tena is a 68 y.o. here for Medicare Annual Wellness Visit.     Essential hypertension  This is a chronic health problem that is well controlled with current medications and lifestyle measures.. Patient off Estrace 126/68. Currently on medication telmisartan/HCTZ 40/12.5 mg daily. Denies any symptoms of headache, blurry vision, focal weakness.    Healthcare maintenance  Last colonoscopy around 5 years back at [place], Dr. Demarco. Polypectomy done at that time and will need a repeat colonoscopy at this time now. No need of any more mammograms. Last DEXA scan in 2017 was within normal limits done at [place]'s need to get the records from that.    Hypercholesteremia  This is a chronic health problem that is well controlled with current medications and lifestyle measures. Well-controlled on current simvastatin 20 mg daily at bedtime for    Type 2 diabetes mellitus without complication, without long-term current use of insulin (CMS-[facility])  This is a chronic health problem that is well controlled with current medications and lifestyle measures. Currently managed well on diet and lifestyle modification. Not on any medications at this time.    Vitamin D deficiency  This is a chronic health problem that is well controlled with current medications and lifestyle measures. Currently on multivitamin vitamin D supplements, last checked in 2016 within normal limits.    Breast cancer (CMS-HCC)  This is a chronic health problem that is well controlled with current medications and lifestyle measures.Diagnosed in 2015. Bilat masectomy, now on anastrozole. Follows Oncology .     Chronic right-sided low back pain with right-sided sciatica  This is a chronic health problem that is well controlled with current medications and lifestyle measures. Well controlled on once a month of Aleve very rarely used.      PHQ score 0, BMI > 31 refusing for any  health improvement program , no tobacco, no fall injuries.      Patient Active Problem List    Diagnosis Date Noted   • Chronic right-sided low back pain with right-sided sciatica 11/08/2017   • Primary osteoarthritis involving multiple joints 11/03/2017   • Breast cancer (CMS-HCC) 02/10/2017   • Obesity (BMI 30-39.9) 02/10/2017   • Type 2 diabetes mellitus without complication, without long-term current use of insulin (CMS-HCC) 04/16/2015   • Atrophic vaginitis 07/25/2013   • Essential hypertension 03/14/2013   • Vitamin D deficiency 04/10/2012   • Hypercholesteremia 04/10/2012   • Diverticulosis of colon 11/04/2011   • Post hysterectomy menopause 11/04/2011       Current Outpatient Prescriptions   Medication Sig Dispense Refill   • Blood Glucose Monitoring Suppl Device Meter: Dispense Device of Insurance Preference. Sig. Use as directed for blood sugar monitoring. #1. NR. 1 Device 0   • Blood Glucose Monitoring Suppl Supplies Misc Test strips order: Test strips for insurance-preferred meter. Sig: use every morning before breakfast and prn ssx high or low sugar 100 Strip 3   • Lancets Misc Lancets order: Lancets for insurance-preferred meter. Sig: use every morning before breakfast and prn ssx high or low sugar. 100 Each 3   • simvastatin (ZOCOR) 20 MG Tab TAKE ONE TABLET BY MOUTH ONCE DAILY IN THE EVENING 90 Tab 1   • telmisartan-hydrochlorothiazide (MICARDIS HCT) 40-12.5 MG per tablet Take 1 Tab by mouth every day. 30 Tab 1   • clobetasol (TEMOVATE) 0.05 % Ointment Apply 1 Drop to affected area(s) 1 time daily as needed. 1 Tube 6   • ONETOUCH DELICA LANCETS 33G Misc USE TO TEST BLOOD SUGAR IN THE MORNING 100 Each 1   • glucose blood (ONE TOUCH ULTRA TEST) strip 1 Strip by Other route every day. 100 Strip 3   • lidocaine (LIDODERM) 5 % Patch Apply 1 Patch to skin as directed every 12 hours. 30 Patch 2   • anastrozole (ARIMIDEX) 1 MG Tab Take 1 mg by mouth every day.     • Blood Glucose Monitoring Suppl (ONE TOUCH  ULTRA MINI) W/DEVICE KIT      • Misc. Devices KIT Diagnosis 250.02. Freestyle glucometer, test strips, #100, RF11 and lancets #100, RF 11. To test fasting in the AM. May substitute brand acceptable to insurance. 1 Each 0   • ondansetron (ZOFRAN ODT) 4 MG TABLET DISPERSIBLE Take 1 Tab by mouth every 8 hours as needed. 10 Tab 0   • lidocaine (XYLOCAINE) 2 % Gel 1 application BID PRN for pain 1 Bottle 2     No current facility-administered medications for this visit.         Patient is taking medications as noted in medication list.  Current supplements as per medication list.     Allergies: Aspirin; Cipro xr; Diclofenac; and Metformin    Current social contact/activities: Visit with friends at work   Patient's perception of their health: good    Is patient current with immunizations? Yes.    She  reports that she has never smoked. She has never used smokeless tobacco. She reports that she does not drink alcohol or use drugs.  Counseling given: Not Answered        DPA/Advanced directive: Patient has Living Will, but it is not on file. Instructed to bring in a copy to scan into their chart.    ROS:    Gait: Uses no assistive device   Ostomy: No   Other tubes: No   Amputations: Yes Masectomy   Chronic oxygen use: No   Last eye exam: 2017   Wears hearing aids: No   : Reports urinary leakage during the last 6 months that has not interfered at all with their daily activities or sleep.      Screening:  DIABETES    Has patient ever had diabetes education? Yes, and is NOT interested in more at this time.      Depression Screening  Little interest or pleasure in doing things?  0 - not at all  Feeling down, depressed, or hopeless? 0 - not at all  Patient Health Questionnaire Score: 0    If depressive symptoms identified deferred to follow up visit unless specifically addressed in assessment and plan.    Interpretation of PHQ-9 Total Score   Score Severity   1-4 No Depression   5-9 Mild Depression   10-14 Moderate Depression    15-19 Moderately Severe Depression   20-27 Severe Depression    Screening for Cognitive Impairment  Three Minute Recall (apple, watch, bryan)  3/3 [facility] kitchen baby   Draw clock face with all 12 numbers set to the hand to show 10 minutes past 11 o'clock  1 3:45 4/5  If cognitive concerns identified, deferred for follow up unless specifically addressed in assessment and plan.    Fall Risk Assessment  Has the patient had two or more falls in the last year or any fall with injury in the last year?  No  If fall risk identified, deferred for follow up unless specifically addressed in assessment and plan.    Safety Assessment  Throw rugs on floor.  No  Handrails on all stairs.  Yes  Good lighting in all hallways.  Yes  Difficulty hearing.  No  Patient counseled about all safety risks that were identified.    Functional Assessment ADLs  Are there any barriers preventing you from cooking for yourself or meeting nutritional needs?  No.    Are there any barriers preventing you from driving safely or obtaining transportation?  No.    Are there any barriers preventing you from using a telephone or calling for help?  No.    Are there any barriers preventing you from shopping?  No.    Are there any barriers preventing you from taking care of your own finances?  No.    Are there any barriers preventing you from managing your medications?  No.    Are there any barriers preventing you from showering, bathing or dressing yourself?  No.    Are you currently engaging any exercise or physical activity?  Yes.       Health Maintenance Summary                IMM PNEUMOCOCCAL 65+ (ADULT) LOW/MEDIUM RISK SERIES Next Due 4/17/2019      Done 4/16/2015 Imm Admin: Pneumococcal Conjugate Vaccine (Prevnar/PCV-13)     Patient has more history with this topic...    BONE DENSITY Next Due 2/1/2020      Not specified 2/1/2015      Patient has more history with this topic...    IMM DTaP/Tdap/Td Vaccine Next Due 4/10/2022      Done 4/10/2012  "Imm Admin: Tdap Vaccine          Patient Care Team:  Pcp Pt States None as PCP - General (Family Medicine)  Erendira Gifford M.D. (Oncology)    Social History   Substance Use Topics   • Smoking status: Never Smoker   • Smokeless tobacco: Never Used      Comment: avoid any and all tobacco products   • Alcohol use No      Comment: once a year     Family History   Problem Relation Age of Onset   • Stroke Mother    • Lung Disease Father      pneumonia   • Hyperlipidemia Father      She  has a past medical history of Anesthesia; Arthritis; Breast cancer (CMS-HCC) (2/10/2017); Dental disorder; Diabetes (CMS-HCC); Diverticulosis of colon (11/4/2011); HTN (hypertension) (3/14/2013); Hypertension; Pain; and Post hysterectomy menopause (11/4/2011).   Past Surgical History:   Procedure Laterality Date   • SHOULDER ARTHROSCOPY W/ ROTATOR CUFF REPAIR  2/9/2012    Performed by JENNY LIMON at [facility]   • SHOULDER DECOMPRESSION ARTHROSCOPIC  2/9/2012    Performed by JENNY LIMON at [facility]   • CLAVICLE DISTAL EXCISION  2/9/2012    Performed by JENNY LIMON at [facility]   • BUNIONECTOMY  2009    bilateral   • ABDOMINAL HYSTERECTOMY TOTAL  2001    endometriosis   • CHOLECYSTECTOMY  1999    laparoscopy   • MASTECTOMY     • TONSILLECTOMY  as child           PHYSICAL EXAM  VITAL SIGNS:   /68   Pulse 80   Temp 37 °C (98.6 °F)   Ht 1.6 m (5' 3\")   Wt 80.3 kg (177 lb)   SpO2 95%   BMI 31.35 kg/m²   Constitutional: Well developed, Well nourished, No acute distress, Non-toxic appearance.    HENT: Normocephalic, Atraumatic, Bilateral external ears normal, Oropharynx moist, No oral exudates, Nose normal.    Eyes: PERRLA, EOMI, Conjunctiva normal, No discharge.    Neck: Normal range of motion, No tenderness, Supple, No stridor.    Lymphatic: No lymphadenopathy noted.    Chest exam : Bilateral complete radical mastectomy.  Cardiovascular: Regular rate and rhythm,  No " murmurs, No rubs, No gallops.    Thorax & Lungs: Normal breath sounds, No respiratory distress, No wheezing, No chest tenderness.    Abdomen: Bowel sounds normal, Soft, No tenderness, No masses, No pulsatile masses.    Skin: Warm, Dry, No erythema, No rash.    Back: No tenderness, No CVA tenderness.   Extremities: Intact distal pulses, No edema, No tenderness, No cyanosis, No clubbing.    Musculoskeletal: Good range of motion in all major joints. No tenderness to palpation or major deformities noted.    Neurologic: Alert & oriented x 3, Normal motor function, Normal sensory function, No focal deficits noted.    Psychiatric: Affect normal, Judgment normal, Mood normal.       Hearing good.    Dentition upper dentures  Alert, oriented in no acute distress.  Eye contact is good, speech goal directed, affect calm      Assessment and Plan. The following treatment and monitoring plan is recommended:      1. Type 2 diabetes mellitus without complication, without long-term current use of insulin (CMS-[facility])  Stable at this time last A1c 6.4 in March 2018, currently well controlled on diet and lifestyle modification. I have given her instructions of American diabetes Association diet on her after visit summary today.    2. Primary osteoarthritis involving multiple joints  3. Chronic right-sided low back pain with right-sided sciatica  Well controlled on when necessary Aleve rarely used once a month.    4. Obesity (BMI 30-39.9)  Pt educated on the increase of morbidity and mortality associated with excess weight including DM, Heart Disease, HTN, stroke, and sleep apnea.  Pt advised weight loss of 5% through reduced calorie, low fat diet and 150 mins of exercise a week . Recommend obesity clinic if patient is unsuccessful with weight loss, patient refusing for any help in treatment program at this time.    5. Hypercholesteremia  Stable and well controlled on current simvastatin 20 mg daily at bedtime to continue the same.    6.  Essential hypertension  Stable and well controlled on current telemetry started on/HCTZ at 40/12.5 mg daily. Continue same    7. Bilateral malignant neoplasm of breast in female, unspecified estrogen receptor status, unspecified site of breast (CMS-HCC)  Currently on medication and anastrozole 1 mg daily, has regular DEXA scan last done in 2017 within normal limits. Follows oncology Dr. Gifford.      8. Vitamin D deficiency  Well-controlled, currently on over-the-counter vitamin D supplements.    9. Colon cancer screening  Given strong family history of colon cancer, previous history of diverticulosis of colon last colonoscopy 5 years back will need a repeat colonoscopy at this time. Referral to GI for colonoscopy given.  - REFERRAL TO GI FOR COLONOSCOPY      Services suggested: No services needed at this time  Health Care Screening: Age-appropriate preventive services recommended by USPTF and ACIP covered by Medicare were discussed today. Services ordered if indicated and agreed upon by the patient.  Referrals offered: PT/OT/Nutrition counseling/Behavioral Health/Smoking cessation as per orders if indicated.    Discussion today about general wellness and lifestyle habits:    · Prevent falls and reduce trip hazards; Cautioned about securing or removing rugs.  · Have a working fire alarm and carbon monoxide detector;   · Engage in regular physical activity and social activities     Follow-up: No Follow-up on file.

## 2018-04-18 NOTE — ASSESSMENT & PLAN NOTE
This is a chronic health problem that is well controlled with current medications and lifestyle measures. Currently managed well on diet and lifestyle modification. Not on any medications at this time.

## 2018-04-18 NOTE — ASSESSMENT & PLAN NOTE
This is a chronic health problem that is well controlled with current medications and lifestyle measures. Currently on multivitamin vitamin D supplements, last checked in 2016 within normal limits.

## 2018-04-18 NOTE — ASSESSMENT & PLAN NOTE
Last colonoscopy around 5 years back at [place], Dr. Demarco. Polypectomy done at that time and will need a repeat colonoscopy at this time now. No need of any more mammograms. Last DEXA scan in 2017 was within normal limits done at [place]'s need to get the records from that.

## 2018-04-18 NOTE — LETTER
[facility]  Pcp Pt States None  No address on file  Fax: [phone]   Authorization for Release/Disclosure of   Protected Health Information   Name: GREY ROMO : 1950 SSN: [id number]   Address: [address] [place] [address]  [place] [address] Phone:    [phone] (home)    I authorize the entity listed below to release/disclose the PHI below to:   [facility]/Pcp Pt States None and Ivonne Minor M.D.   Provider or Entity Name:  Oncology   Address   City, State, [facility]   Phone:      Fax:     Reason for request: continuity of care   Information to be released:    [  ] LAST COLONOSCOPY,  including any PATH REPORT and follow-up  [  ] LAST FIT/COLOGUARD RESULT [  ] LAST DEXA  [  ] LAST MAMMOGRAM  [  ] LAST PAP  [  ] LAST LABS [  ] RETINA EXAM REPORT  [  ] IMMUNIZATION RECORDS  [ xxx ] Release all info      [  ] Check here and initial the line next to each item to release ALL health information INCLUDING  _____ Care and treatment for drug and / or alcohol abuse  _____ HIV testing, infection status, or AIDS  _____ Genetic Testing    DATES OF SERVICE OR TIME PERIOD TO BE DISCLOSED: _____________  I understand and acknowledge that:  * This Authorization may be revoked at any time by you in writing, except if your health information has already been used or disclosed.  * Your health information that will be used or disclosed as a result of you signing this authorization could be re-disclosed by the recipient. If this occurs, your re-disclosed health information may no longer be protected by State or Federal laws.  * You may refuse to sign this Authorization. Your refusal will not affect your ability to obtain treatment.  * This Authorization becomes effective upon signing and will  on (date) __________.      If no date is indicated, this Authorization will  one (1) year from the signature date.    Name: Grey Romo    Signature:   Date:     2018       PLEASE FAX REQUESTED  RECORDS BACK TO: [phone]

## 2018-04-18 NOTE — LETTER
[facility]  Pcp Pt States None  No address on file  Fax: [phone]   Authorization for Release/Disclosure of   Protected Health Information   Name: GREY ROMO : 1950 SSN: [id number]   Address: [address] [place] [id number] Phone:    [phone] (home)    I authorize the entity listed below to release/disclose the PHI below to:   [facility]/Pcp Pt States None and Ivonne Minor M.D.   Provider or Entity Name:     Address   City, State, [facility]   Phone:      Fax:     Reason for request: continuity of care   Information to be released:    [ xxx ] LAST COLONOSCOPY,  including any PATH REPORT and follow-up  [  ] LAST FIT/COLOGUARD RESULT [  ] LAST DEXA  [  ] LAST MAMMOGRAM  [  ] LAST PAP  [  ] LAST LABS [  ] RETINA EXAM REPORT  [  ] IMMUNIZATION RECORDS  [  ] Release all info      [  ] Check here and initial the line next to each item to release ALL health information INCLUDING  _____ Care and treatment for drug and / or alcohol abuse  _____ HIV testing, infection status, or AIDS  _____ Genetic Testing    DATES OF SERVICE OR TIME PERIOD TO BE DISCLOSED: _____________  I understand and acknowledge that:  * This Authorization may be revoked at any time by you in writing, except if your health information has already been used or disclosed.  * Your health information that will be used or disclosed as a result of you signing this authorization could be re-disclosed by the recipient. If this occurs, your re-disclosed health information may no longer be protected by State or Federal laws.  * You may refuse to sign this Authorization. Your refusal will not affect your ability to obtain treatment.  * This Authorization becomes effective upon signing and will  on (date) __________.      If no date is indicated, this Authorization will  one (1) year from the signature date.    Name: Grey Romo    Signature:   Date:     2018       PLEASE FAX REQUESTED RECORDS  BACK TO: [phone]

## 2018-04-18 NOTE — ASSESSMENT & PLAN NOTE
This is a chronic health problem that is well controlled with current medications and lifestyle measures.. Patient off Estrace 126/68. Currently on medication telmisartan/HCTZ 40/12.5 mg daily. Denies any symptoms of headache, blurry vision, focal weakness.

## 2018-06-26 ENCOUNTER — TELEPHONE (OUTPATIENT)
Dept: MEDICAL GROUP | Facility: PHYSICIAN GROUP | Age: 68
End: 2018-06-26

## 2018-06-26 NOTE — TELEPHONE ENCOUNTER
Future Appointments       Provider Department Center    6/27/2018 1:25 PM Heather Thayer P.A.-C. [facility] MELANIE Arnett    7/5/2018 9:00 AM INTEGRATIVE PROVIDER [facility]         ESTABLISHED PATIENT PRE-VISIT PLANNING     Note: Patient will not be contacted if there is no indication to call.     1.  Reviewed notes from the last few office visits within the medical group: Yes    2.  If any orders were placed at last visit or intended to be done for this visit (i.e. 6 mos follow-up), do we have Results/Consult Notes?        •  Labs - Labs were not ordered at last office visit.       •  Imaging - Imaging was not ordered at last office visit.       •  Referrals - Referral ordered, patient has NOT been seen.    3. Is this appointment scheduled as a Hospital Follow-Up? No    4.  Immunizations were updated in Epic using WebIZ?: Yes       •  Web Iz Recommendations: PREVNAR (PCV13) , TDAP and ZOSTAVAX (Shingles)    5.  Patient is due for the following Health Maintenance Topics:   There are no preventive care reminders to display for this patient.      6.  MDX printed for Provider? NO INS UMR     7.  Patient was informed to arrive 15 min prior to their scheduled appointment and bring in their medication bottles. Confirmed through automated call

## 2018-06-27 ENCOUNTER — OFFICE VISIT (OUTPATIENT)
Dept: MEDICAL GROUP | Facility: PHYSICIAN GROUP | Age: 68
End: 2018-06-27
Payer: COMMERCIAL

## 2018-06-27 VITALS
HEART RATE: 64 BPM | TEMPERATURE: 98.3 F | HEIGHT: 63 IN | BODY MASS INDEX: 31.89 KG/M2 | DIASTOLIC BLOOD PRESSURE: 64 MMHG | WEIGHT: 180 LBS | SYSTOLIC BLOOD PRESSURE: 110 MMHG | OXYGEN SATURATION: 94 %

## 2018-06-27 DIAGNOSIS — E66.9 OBESITY (BMI 30-39.9): ICD-10-CM

## 2018-06-27 DIAGNOSIS — C50.912 BILATERAL MALIGNANT NEOPLASM OF BREAST IN FEMALE, UNSPECIFIED ESTROGEN RECEPTOR STATUS, UNSPECIFIED SITE OF BREAST (HCC): ICD-10-CM

## 2018-06-27 DIAGNOSIS — C50.911 BILATERAL MALIGNANT NEOPLASM OF BREAST IN FEMALE, UNSPECIFIED ESTROGEN RECEPTOR STATUS, UNSPECIFIED SITE OF BREAST (HCC): ICD-10-CM

## 2018-06-27 DIAGNOSIS — E11.9 TYPE 2 DIABETES MELLITUS WITHOUT COMPLICATION, WITHOUT LONG-TERM CURRENT USE OF INSULIN (HCC): Primary | ICD-10-CM

## 2018-06-27 DIAGNOSIS — E78.00 HYPERCHOLESTEREMIA: ICD-10-CM

## 2018-06-27 DIAGNOSIS — R07.89 CHEST TIGHTNESS: ICD-10-CM

## 2018-06-27 DIAGNOSIS — I10 ESSENTIAL HYPERTENSION: ICD-10-CM

## 2018-06-27 DIAGNOSIS — M15.9 PRIMARY OSTEOARTHRITIS INVOLVING MULTIPLE JOINTS: ICD-10-CM

## 2018-06-27 PROBLEM — Z00.00 HEALTHCARE MAINTENANCE: Status: RESOLVED | Noted: 2018-04-18 | Resolved: 2018-06-27

## 2018-06-27 PROCEDURE — 99214 OFFICE O/P EST MOD 30 MIN: CPT | Performed by: PHYSICIAN ASSISTANT

## 2018-06-27 NOTE — ASSESSMENT & PLAN NOTE
Diagnosed in 2016. Underwent bilateral mastectomy in 10/2016. Continues to take Arimidex. Follows with oncologist every 6 months.

## 2018-06-27 NOTE — ASSESSMENT & PLAN NOTE
Complains of ongoing intermittent bilateral chest tightness for the last 6 months. Denies worsening with exertion. Typically notices the most at the end of the day when she is relaxing. Doesn't happen every day, but usually at least a couple times per week. Chest discomfort is not associated with any other symptoms including dyspnea, dizziness, palpitations, or nausea. She states she mentioned the chest tightness to her oncologist who told her it was a result of scar tissue from her prior breast surgery, but she is still worried about it.

## 2018-06-27 NOTE — ASSESSMENT & PLAN NOTE
Patient endorses chronic arthritis issues in shoulders and knees. States pain is not bad enough to the point where she feels the need to take any medication.

## 2018-06-29 NOTE — PROGRESS NOTES
Subjective:   Tena Romo is a 68 y.o. female here today for chest tightness, follow-up on chronic conditions. Is a new patient to me and is also establishing care today.    Previous PCP: Erica Tristan DO    HPI: Patient has the following current medical problems/concerns:    Chest tightness  Complains of ongoing intermittent bilateral chest tightness for the last 6 months. Denies worsening with exertion. Typically notices the most at the end of the day when she is relaxing. Doesn't happen every day, but usually at least a couple times per week. Chest discomfort is not associated with any other symptoms including dyspnea, dizziness, palpitations, or nausea. She states she mentioned the chest tightness to her oncologist who told her it was a result of scar tissue from her prior breast surgery, but she is still worried about it.    Breast cancer (CMS-HCC)  Diagnosed in 2016. Underwent bilateral mastectomy in 10/2016. Continues to take Arimidex. Follows with oncologist every 6 months.    Essential hypertension  Treated with telmisartan-HCTZ 40-12.5 mg daily. States blood pressures have been running fine.     Type 2 diabetes mellitus without complication, without long-term current use of insulin (CMS-HCC)  Not currently on any medications. Controls with diet. Last A1c in 3/2018.    Primary osteoarthritis involving multiple joints  Patient endorses chronic arthritis issues in shoulders and knees. States pain is not bad enough to the point where she feels the need to take any medication.    Hypercholesteremia  Treated with simvastatin 20 mg daily. Last lipid panel in 3/2018.       Current medicines (including changes today)  Current Outpatient Prescriptions   Medication Sig Dispense Refill   • VITAMIN D, ERGOCALCIFEROL, PO Take  by mouth.     • Blood Glucose Monitoring Suppl Device Meter: Dispense Device of Insurance Preference. Sig. Use as directed for blood sugar monitoring. #1. NR. 1 Device 0   • Blood  "Glucose Monitoring Suppl Supplies Misc Test strips order: Test strips for insurance-preferred meter. Sig: use every morning before breakfast and prn ssx high or low sugar 100 Strip 3   • Lancets Misc Lancets order: Lancets for insurance-preferred meter. Sig: use every morning before breakfast and prn ssx high or low sugar. 100 Each 3   • simvastatin (ZOCOR) 20 MG Tab TAKE ONE TABLET BY MOUTH ONCE DAILY IN THE EVENING 90 Tab 1   • telmisartan-hydrochlorothiazide (MICARDIS HCT) 40-12.5 MG per tablet Take 1 Tab by mouth every day. 30 Tab 1   • ONETOUCH DELICA LANCETS 33G Misc USE TO TEST BLOOD SUGAR IN THE MORNING 100 Each 1   • glucose blood (ONE TOUCH ULTRA TEST) strip 1 Strip by Other route every day. 100 Strip 3   • anastrozole (ARIMIDEX) 1 MG Tab Take 1 mg by mouth every day.     • Blood Glucose Monitoring Suppl (ONE TOUCH ULTRA MINI) W/DEVICE KIT      • Misc. Devices KIT Diagnosis 250.02. Freestyle glucometer, test strips, #100, RF11 and lancets #100, RF 11. To test fasting in the AM. May substitute brand acceptable to insurance. 1 Each 0   • clobetasol (TEMOVATE) 0.05 % Ointment Apply 1 Drop to affected area(s) 1 time daily as needed. 1 Tube 6   • lidocaine (LIDODERM) 5 % Patch Apply 1 Patch to skin as directed every 12 hours. 30 Patch 2     No current facility-administered medications for this visit.      She  has a past medical history of Anesthesia; Arthritis; Breast cancer (HCC) (2/10/2017); Dental disorder; Diabetes (HCC); Diverticulosis of colon (11/4/2011); HTN (hypertension) (3/14/2013); Hypertension; Pain; and Post hysterectomy menopause (11/4/2011).    ROS  As per HPI.     Objective:     Blood pressure 110/64, pulse 64, temperature 36.8 °C (98.3 °F), height 1.6 m (5' 3\"), weight 81.6 kg (180 lb), SpO2 94 %. Body mass index is 31.89 kg/m².     Physical Exam:  Constitutional: Alert, well-appearing, no distress.  Skin: Warm, dry, good turgor, no rashes in visible areas.  Eye: Pupils are equal and round, " conjunctiva clear, lids normal.  ENMT: Lips without lesions, moist mucus membranes.  Chest wall: No deformity or tenderness to palpation.  Respiratory: Unlabored respiratory effort, lungs clear to auscultation, no wheezes, no rhonchi.  Cardiovascular: Normal S1, S2, no murmur, no lower extremity edema.      Assessment and Plan:   The following treatment plan was discussed    1. Chest tightness  New problem. Agree that scar tissue is likely contributing. Symptoms not consistent with angina, but she does have risk factors for cardiac disease including hypertension, diabetes, and obesity. Recommend outpatient stress testing for further evaluation.  - CARDIAC STRESS TEST TREADMILL ONLY; Future    2. Essential hypertension  Chronic issue, well-controlled on current medication. Blood pressure today in the office is 110/64. Continue telmisartan and hydrochlorothiazide.    3. Type 2 diabetes mellitus without complication, without long-term current use of insulin (HCC)  Chronic issue, well-controlled with lifestyle intervention. No current medications. Last A1c from 3/2018 reviewed which was at goal at 6.4. Will be due for another A1c before next appointment in 9/2018.  - HEMOGLOBIN A1C; Future    4. Bilateral malignant neoplasm of breast in female, unspecified estrogen receptor status, unspecified site of breast (HCC)  Previous bilateral mastectomy, stable on Arimidex. Will continue to follow with her oncologist.    5. Hypercholesteremia  Chronic issue, well-controlled with daily simvastatin. Reviewed last lipid panel from 3/2018 which was normal. Continue current management.    Cholesterol,Tot 151  100 - 199 mg/dL Final   Triglycerides 116  0 - 149 mg/dL Final   HDL 48  >39 mg/dL Final   VLDL Cholesterol Calc 23  5 - 40 mg/dL Final   LDL 80  0 - 99 mg/dL Final     6. Primary osteoarthritis involving multiple joints  Chronic issue, stable and not functionally limiting. We'll continue to monitor.    7. Obesity (BMI  30-39.9)  - REFERRAL TO [facility]PROV[facility]GR[facility] (HIP) Services Requested: Physician Medical Weight Management Program, Registered Dietitian for Medical Nutrition Therapy; Reason for Referral? BMI>30; Reason for Visit: Overweight/Obesity      Followup: Return in about 3 months (around 9/27/2018).    Heather Thayer P.A.-C.

## 2018-07-12 ENCOUNTER — NON-PROVIDER VISIT (OUTPATIENT)
Dept: CARDIOLOGY | Facility: MEDICAL CENTER | Age: 68
End: 2018-07-12
Payer: COMMERCIAL

## 2018-07-12 VITALS
DIASTOLIC BLOOD PRESSURE: 70 MMHG | SYSTOLIC BLOOD PRESSURE: 100 MMHG | BODY MASS INDEX: 31.89 KG/M2 | WEIGHT: 180 LBS | OXYGEN SATURATION: 93 % | HEART RATE: 77 BPM | HEIGHT: 63 IN

## 2018-07-12 DIAGNOSIS — R07.89 CHEST TIGHTNESS: ICD-10-CM

## 2018-07-12 LAB — TREADMILL STRESS: NORMAL

## 2018-07-12 PROCEDURE — 93015 CV STRESS TEST SUPVJ I&R: CPT | Performed by: INTERNAL MEDICINE

## 2018-07-26 NOTE — TELEPHONE ENCOUNTER
Was the patient seen in the last year in this department? Yes    Does patient have an active prescription for medications requested? No     Received Request Via: Pharmacy      Pt met protocol?: Yes    LAT OV 06/27/2018    BP Readings from Last 1 Encounters:   07/12/18 100/70       Lab Results  Component Value Date/Time   HBA1C 6.4 03/16/2018 1542       Lab Results  Component Value Date/Time   CHOLSTRLTOT 156 03/22/2018 0914       Lab Results  Component Value Date/Time   TRIGLYCERIDE 113 03/22/2018 0914       Lab Results  Component Value Date/Time   HDL 45 03/22/2018 0914       Lab Results  Component Value Date/Time   LDL 88 03/22/2018 0914

## 2018-07-27 DIAGNOSIS — I10 ESSENTIAL HYPERTENSION: ICD-10-CM

## 2018-07-27 DIAGNOSIS — E11.9 TYPE 2 DIABETES MELLITUS WITHOUT COMPLICATION, WITHOUT LONG-TERM CURRENT USE OF INSULIN (HCC): ICD-10-CM

## 2018-07-27 DIAGNOSIS — E66.9 OBESITY (BMI 30-39.9): ICD-10-CM

## 2018-07-27 DIAGNOSIS — E78.00 HYPERCHOLESTEREMIA: ICD-10-CM

## 2018-07-27 RX ORDER — SIMVASTATIN 20 MG
TABLET ORAL
Qty: 90 TAB | Refills: 1 | Status: SHIPPED | OUTPATIENT
Start: 2018-07-27 | End: 2019-01-11 | Stop reason: SDUPTHER

## 2018-07-27 NOTE — TELEPHONE ENCOUNTER
Pt has had OV within the 12 month protocol and lipid panel is current. 6 month supply sent to pharmacy.   Lab Results   Component Value Date/Time    CHOLSTRLTOT 156 03/22/2018 09:14 AM    CHOLSTRLTOT 174 04/28/2016 09:17 AM    LDL 88 03/22/2018 09:14 AM    LDL 95 04/28/2016 09:17 AM    HDL 45 03/22/2018 09:14 AM    HDL 50 04/28/2016 09:17 AM    TRIGLYCERIDE 113 03/22/2018 09:14 AM    TRIGLYCERIDE 146 04/28/2016 09:17 AM       Lab Results   Component Value Date/Time    SODIUM 139 03/22/2018 09:14 AM    SODIUM 140 12/29/2016 09:37 AM    POTASSIUM 4.4 03/22/2018 09:14 AM    POTASSIUM 4.8 12/29/2016 09:37 AM    CHLORIDE 100 03/22/2018 09:14 AM    CHLORIDE 106 12/29/2016 09:37 AM    CO2 25 03/22/2018 09:14 AM    CO2 27 12/29/2016 09:37 AM    GLUCOSE 123 (H) 03/22/2018 09:14 AM    GLUCOSE 123 (H) 12/29/2016 09:37 AM    BUN 23 03/22/2018 09:14 AM    BUN 29 (H) 12/29/2016 09:37 AM    CREATININE 0.73 03/22/2018 09:14 AM    CREATININE 0.90 12/29/2016 09:37 AM    CREATININE 0.86 12/01/2010 12:00 AM    BUNCREATRAT 32 (H) 03/22/2018 09:14 AM    BUNCREATRAT 27 12/01/2010 12:00 AM    GLOMRATE >59 12/01/2010 12:00 AM     Lab Results   Component Value Date/Time    ALKPHOSPHAT 87 03/22/2018 09:14 AM    ALKPHOSPHAT 70 12/29/2016 09:37 AM    ASTSGOT 13 03/22/2018 09:14 AM    ASTSGOT 13 12/29/2016 09:37 AM    ALTSGPT 16 03/22/2018 09:14 AM    ALTSGPT 14 12/29/2016 09:37 AM    TBILIRUBIN 0.5 03/22/2018 09:14 AM    TBILIRUBIN 0.5 12/29/2016 09:37 AM

## 2018-09-21 LAB — HBA1C MFR BLD: 6.7 % (ref 4.8–5.6)

## 2018-09-25 ENCOUNTER — TELEPHONE (OUTPATIENT)
Dept: MEDICAL GROUP | Facility: PHYSICIAN GROUP | Age: 68
End: 2018-09-25

## 2018-09-25 NOTE — TELEPHONE ENCOUNTER
----- Message from Heather Thayer P.A.-C. sent at 9/25/2018 10:17 AM PDT -----  Please advise patient that A1c has gone up from last check up but is still at goal of less than 7. Recommend re-check in 6 months.  Heather Thayer P.A.-C.

## 2018-09-26 ENCOUNTER — TELEPHONE (OUTPATIENT)
Dept: MEDICAL GROUP | Facility: PHYSICIAN GROUP | Age: 68
End: 2018-09-26

## 2018-09-26 NOTE — TELEPHONE ENCOUNTER
Future Appointments       Provider Department Center    9/27/2018 11:05 AM Heather Thayer P.A.-C. [facility]        ESTABLISHED PATIENT PRE-VISIT PLANNING     Note: Patient will not be contacted if there is no indication to call.     1.  Reviewed notes from the last few office visits within the medical group: Yes    2.  If any orders were placed at last visit or intended to be done for this visit (i.e. 6 mos follow-up), do we have Results/Consult Notes?        •  Labs - Labs ordered, completed on 09/20/18 and results are in chart.       •  Imaging - Imaging was not ordered at last office visit.       •  Referrals - Referral ordered, patient has NOT been seen.    3. Is this appointment scheduled as a Hospital Follow-Up? No    4.  Immunizations were updated in [employer] using WebIZ?: Yes       •  Web Iz Recommendations: FLU, PREVNAR (PCV13) , TDAP and ZOSTAVAX (Shingles)    5.  Patient is due for the following Health Maintenance Topics:   Health Maintenance Due   Topic Date Due   • IMM ZOSTER VACCINES (1 of 2) 02/28/2000   • IMM INFLUENZA (1) 09/01/2018       6.  MDX printed for Provider? NO    7.  Patient was informed to arrive 15 min prior to their scheduled appointment and bring in their medication bottles.

## 2018-09-27 ENCOUNTER — OFFICE VISIT (OUTPATIENT)
Dept: MEDICAL GROUP | Facility: PHYSICIAN GROUP | Age: 68
End: 2018-09-27
Payer: COMMERCIAL

## 2018-09-27 VITALS
OXYGEN SATURATION: 96 % | HEART RATE: 66 BPM | BODY MASS INDEX: 31.36 KG/M2 | DIASTOLIC BLOOD PRESSURE: 70 MMHG | HEIGHT: 63 IN | TEMPERATURE: 98.6 F | SYSTOLIC BLOOD PRESSURE: 118 MMHG | WEIGHT: 177 LBS

## 2018-09-27 DIAGNOSIS — E78.00 HYPERCHOLESTEREMIA: ICD-10-CM

## 2018-09-27 DIAGNOSIS — R07.89 CHEST TIGHTNESS: ICD-10-CM

## 2018-09-27 DIAGNOSIS — E11.9 TYPE 2 DIABETES MELLITUS WITHOUT COMPLICATION, WITHOUT LONG-TERM CURRENT USE OF INSULIN (HCC): ICD-10-CM

## 2018-09-27 DIAGNOSIS — J30.2 SEASONAL ALLERGIC RHINITIS, UNSPECIFIED TRIGGER: ICD-10-CM

## 2018-09-27 PROCEDURE — 99214 OFFICE O/P EST MOD 30 MIN: CPT | Performed by: PHYSICIAN ASSISTANT

## 2018-09-27 NOTE — PROGRESS NOTES
"Subjective:   Tena Romo is a 68 y.o. female here today for follow-up on diabetes, chest tightness, allergies. Is an established patient of mine.    HPI:    Patient presents to the office today in follow-up. She recently had recheck of A1c and would like to go through those results. She also recently had a treadmill stress test done for evaluation of some ongoing chest tightness that she's been experiencing off and on over the last 6 months. She states that other than feeling \"exhausted\" from exercising, she did not experience any chest pain, significant dyspnea, or other symptoms during the test. Since the last time I saw her, states that the chest tightness has completely resolved. She did recall her oncologist telling her at one point that it may be due to scar tissue from previous breast surgery.    Patient mentions today that she is suffering from allergies. She states that she typically gets bad allergy symptoms around this time of year every year, right around the transition from summer to fall. States that usually symptoms significantly dissipated within about a month. She has significant nasal congestion, runny nose, and postnasal drainage. The drainage is causing her to gag at night when she is trying to sleep. Has been using oral antihistamines, but only in the evening hours as they make her too sleepy during the day. Is using some type of nasal spray as well. She does state that she has a slew of various allergy medications at home that she plans to start using.      Current medicines (including changes today)  Current Outpatient Prescriptions   Medication Sig Dispense Refill   • simvastatin (ZOCOR) 20 MG Tab TAKE ONE TABLET BY MOUTH ONCE DAILY IN THE EVENING 90 Tab 1   • VITAMIN D, ERGOCALCIFEROL, PO Take  by mouth.     • Blood Glucose Monitoring Suppl Device Meter: Dispense Device of Insurance Preference. Sig. Use as directed for blood sugar monitoring. #1. NR. 1 Device 0   • Blood " "Glucose Monitoring Suppl Supplies Misc Test strips order: Test strips for insurance-preferred meter. Sig: use every morning before breakfast and prn ssx high or low sugar 100 Strip 3   • Lancets Misc Lancets order: Lancets for insurance-preferred meter. Sig: use every morning before breakfast and prn ssx high or low sugar. 100 Each 3   • telmisartan-hydrochlorothiazide (MICARDIS HCT) 40-12.5 MG per tablet Take 1 Tab by mouth every day. 30 Tab 1   • ONETOUCH DELICA LANCETS 33G Misc USE TO TEST BLOOD SUGAR IN THE MORNING 100 Each 1   • glucose blood (ONE TOUCH ULTRA TEST) strip 1 Strip by Other route every day. 100 Strip 3   • anastrozole (ARIMIDEX) 1 MG Tab Take 1 mg by mouth every day.     • Blood Glucose Monitoring Suppl (ONE TOUCH ULTRA MINI) W/DEVICE KIT      • Misc. Devices KIT Diagnosis 250.02. Freestyle glucometer, test strips, #100, RF11 and lancets #100, RF 11. To test fasting in the AM. May substitute brand acceptable to insurance. 1 Each 0   • clobetasol (TEMOVATE) 0.05 % Ointment Apply 1 Drop to affected area(s) 1 time daily as needed. 1 Tube 6   • lidocaine (LIDODERM) 5 % Patch Apply 1 Patch to skin as directed every 12 hours. 30 Patch 2     No current facility-administered medications for this visit.      She  has a past medical history of Anesthesia; Arthritis; Breast cancer (HCC) (2/10/2017); Dental disorder; Diabetes (HCC); Diverticulosis of colon (11/4/2011); HTN (hypertension) (3/14/2013); Hypertension; Pain; and Post hysterectomy menopause (11/4/2011).    ROS  Pulmonary ROS: No wheezing, No shortness of breath  Cardiovascular ROS: No chest pain       Objective:     Blood pressure 118/70, pulse 66, temperature 37 °C (98.6 °F), height 1.6 m (5' 3\"), weight 80.3 kg (177 lb), SpO2 96 %, not currently breastfeeding. Body mass index is 31.35 kg/m².     Physical Exam:  Constitutional: Alert, well-appearing, no distress.  Skin: Warm, dry, good turgor, no rashes in visible areas.  Eye: Pupils are equal and " round, conjunctiva clear, lids normal.  ENMT: +nasal congestion. Lips without lesions, moist mucus membranes.        Assessment and Plan:   The following treatment plan was discussed    1. Type 2 diabetes mellitus without complication, without long-term current use of insulin (HCC)  Chronic issue, currently well controlled with lifestyle modification only. Is not taking any medications. Most recent A1c this month was 6.7 which is a slight increase from her previous in March, which was 6.4. Advised patient that she is still at-goal and she should continue lifestyle changes to control blood sugars. We'll plan on another recheck along with metabolic panel in 6 months with another follow-up visit at that time.  - COMP METABOLIC PANEL; Future  - HEMOGLOBIN A1C; Future    2. Chest tightness  Established problem, has been getting intermittent symptoms since late last year, none recently. Reviewed exercise stress test results. Other than poor exercise capacity, results were normal. No evidence of myocardial ischemia. Patient reassured. Given that chest tightness has resolved, will continue to monitor. Chest pain likely musculoskeletal in nature, possibly due to scar tissue as her oncologist has suggested.    3. Hypercholesteremia  Chronic issue, well-controlled on daily simvastatin. Will be due for repeat lipid check in 6 months, which I have ordered.  - LIPID PROFILE; Future    4. Seasonal allergic rhinitis, unspecified trigger  New problem to me but chronic symptoms per patient. Has had recent flare-up. Discussed treatment options with patient including oral antihistamines, nasal steroids, nasal antihistamines. Patient declines any prescriptions currently as she has quite a bit of things at home already that she plans on trying. Follow up as needed.      Followup: Return in about 6 months (around 3/27/2019).    Heather Thayer P.A.-C.

## 2018-10-30 ENCOUNTER — TELEPHONE (OUTPATIENT)
Dept: MEDICAL GROUP | Facility: PHYSICIAN GROUP | Age: 68
End: 2018-10-30

## 2018-10-30 NOTE — TELEPHONE ENCOUNTER
Future Appointments       Provider Department [place]    10/31/2018 9:45 AM Heather Thayer P.A.-C. [facility]    4/11/2019 11:05 AM Heather Thayer P.A.-C. [facility]        ESTABLISHED PATIENT PRE-VISIT PLANNING     Note: Patient will not be contacted if there is no indication to call.     1.  Reviewed notes from the last few office visits within the medical group: Yes    2.  If any orders were placed at last visit or intended to be done for this visit (i.e. 6 mos follow-up), do we have Results/Consult Notes?        •  Labs - Labs ordered, NOT completed. Patient advised to complete prior to next appointment.       •  Imaging - Imaging was not ordered at last office visit.       •  Referrals - No referrals were ordered at last office visit.    3. Is this appointment scheduled as a Hospital Follow-Up? No    4.  Immunizations were updated in Epic using WebIZ?: Yes       •  Web Iz Recommendations: PREVNAR (PCV13) , TDAP and ZOSTAVAX (Shingles)    5.  Patient is due for the following Health Maintenance Topics:   Health Maintenance Due   Topic Date Due   • IMM ZOSTER VACCINES (1 of 2) 02/28/2000       6.  MDX printed for Provider? NO    7.  Patient was informed to arrive 15 min prior to their scheduled appointment and bring in their medication bottles.

## 2018-10-31 ENCOUNTER — OFFICE VISIT (OUTPATIENT)
Dept: MEDICAL GROUP | Facility: PHYSICIAN GROUP | Age: 68
End: 2018-10-31
Payer: COMMERCIAL

## 2018-10-31 ENCOUNTER — APPOINTMENT (OUTPATIENT)
Dept: RADIOLOGY | Facility: IMAGING CENTER | Age: 68
End: 2018-10-31
Attending: PHYSICIAN ASSISTANT
Payer: COMMERCIAL

## 2018-10-31 VITALS
HEART RATE: 86 BPM | DIASTOLIC BLOOD PRESSURE: 60 MMHG | WEIGHT: 177.8 LBS | BODY MASS INDEX: 31.5 KG/M2 | OXYGEN SATURATION: 95 % | SYSTOLIC BLOOD PRESSURE: 128 MMHG | TEMPERATURE: 98.8 F | HEIGHT: 63 IN | RESPIRATION RATE: 18 BRPM

## 2018-10-31 DIAGNOSIS — R07.89 LEFT-SIDED CHEST WALL PAIN: ICD-10-CM

## 2018-10-31 PROCEDURE — 99214 OFFICE O/P EST MOD 30 MIN: CPT | Performed by: PHYSICIAN ASSISTANT

## 2018-10-31 PROCEDURE — 71046 X-RAY EXAM CHEST 2 VIEWS: CPT | Mod: TC | Performed by: PHYSICIAN ASSISTANT

## 2018-10-31 ASSESSMENT — PAIN SCALES - GENERAL: PAINLEVEL: 4=SLIGHT-MODERATE PAIN

## 2018-10-31 NOTE — PATIENT INSTRUCTIONS
Chest Wall Pain  Chest wall pain is pain in or around the bones  and muscles of your chest. Sometimes, an injury   causes this pain and sometimes, the cause may  not be known. This pain may take several weeks or     longer to get better.  Follow these instructions at home:  Pay attention to any changes in your symptoms. Take   these actions to help with your pain:  · Rest as told by your health care provider.  · Avoid activities that cause pain. These include             any activities that use your chest           muscles or your abdominal and side muscles            To lift heavy items.  · If directed, apply ice to the painful area:  ¨ Put ice in a plastic bag.  ¨ Place a towel between your skin and the       bag.  ¨ Leave the ice on for 20 minutes, 2-3 times per day.  · Take over-the-counter and prescription medicines only as told by your health care provider.  · Do not use tobacco products, including cigarettes, chewing tobacco, and e-cigarettes. If you need help quitting, ask your health care provider.  · Keep all follow-up visits as told by your health care provider. This is important.  Contact a health care provider if:  · You have a fever.  · Your chest pain becomes worse.  · You have new symptoms.  Get help right away if:  · You have nausea or vomiting.  · You feel sweaty or light-headed.  · You have a cough with phlegm (sputum) or you cough up blood.  · You develop shortness of breath.  This information is not intended to replace advice given to you by your health care provider. Make sure you discuss any questions you have with your health care provider.  Document Released: 12/18/2006 Document Revised: 04/27/2017 Document Reviewed: 03/14/2016  [employer]vier Interactive Patient Education © 2017 [employer]

## 2018-10-31 NOTE — PROGRESS NOTES
Subjective:   Tena Romo is a 68 y.o. female here today for chest pain. Is an established patient of mine.    HPI:    Patient presents to the office today with complaints of a few episodes of severe left-sided chest pain.  She states that she noticed the first episode a little over a week ago.  She was just lying in bed at the time and mentions that her  wanted to call for an ambulance, but then the pain subsided after about 30 minutes.  She then had the same type of pain a couple of nights later and most recently, experienced it on Saturday.  She states that when the pain comes, she will develop extreme tenderness to the left side of her chest, pain with respiration and pain with arm movement.  She has felt somewhat short of breath when the pain comes, but attributes this to worsening pain with deep breaths rather than having any difficulty breathing.  She denies any triggers for the episodes.  She was not exerting herself in any way when the pains came.  Denies any associated diaphoresis or nausea.  Pain would start in the left lower chest wall and radiated into her armpit and shoulder on that side.  She denies any known precipitating injury or recent increase in activity.  Her biggest concern is for her heart. She currently feels a mild discomfort in the left lower chest and armpit, but not severe like it has been on the 3 recent occasions of concern.    Current medicines (including changes today)  Current Outpatient Prescriptions   Medication Sig Dispense Refill   • simvastatin (ZOCOR) 20 MG Tab TAKE ONE TABLET BY MOUTH ONCE DAILY IN THE EVENING 90 Tab 1   • VITAMIN D, ERGOCALCIFEROL, PO Take  by mouth.     • Blood Glucose Monitoring Suppl Device Meter: Dispense Device of Insurance Preference. Sig. Use as directed for blood sugar monitoring. #1. NR. 1 Device 0   • Blood Glucose Monitoring Suppl Supplies Misc Test strips order: Test strips for insurance-preferred meter. Sig: use every morning  "before breakfast and prn ssx high or low sugar 100 Strip 3   • Lancets Misc Lancets order: Lancets for insurance-preferred meter. Sig: use every morning before breakfast and prn ssx high or low sugar. 100 Each 3   • telmisartan-hydrochlorothiazide (MICARDIS HCT) 40-12.5 MG per tablet Take 1 Tab by mouth every day. 30 Tab 1   • clobetasol (TEMOVATE) 0.05 % Ointment Apply 1 Drop to affected area(s) 1 time daily as needed. 1 Tube 6   • ONETOUCH DELICA LANCETS 33G Misc USE TO TEST BLOOD SUGAR IN THE MORNING 100 Each 1   • glucose blood (ONE TOUCH ULTRA TEST) strip 1 Strip by Other route every day. 100 Strip 3   • lidocaine (LIDODERM) 5 % Patch Apply 1 Patch to skin as directed every 12 hours. 30 Patch 2   • anastrozole (ARIMIDEX) 1 MG Tab Take 1 mg by mouth every day.     • Blood Glucose Monitoring Suppl (ONE TOUCH ULTRA MINI) W/DEVICE KIT      • Misc. Devices KIT Diagnosis 250.02. Freestyle glucometer, test strips, #100, RF11 and lancets #100, RF 11. To test fasting in the AM. May substitute brand acceptable to insurance. 1 Each 0     No current facility-administered medications for this visit.      She  has a past medical history of Anesthesia; Arthritis; Breast cancer (HCC) (2/10/2017); Dental disorder; Diabetes (HCC); Diverticulosis of colon (11/4/2011); HTN (hypertension) (3/14/2013); Hypertension; Pain; and Post hysterectomy menopause (11/4/2011).    ROS  As per HPI.       Objective:     Blood pressure 128/60, pulse 86, temperature 37.1 °C (98.8 °F), temperature source Temporal, resp. rate 18, height 1.6 m (5' 3\"), weight 80.6 kg (177 lb 12.8 oz), SpO2 95 %, not currently breastfeeding. Body mass index is 31.5 kg/m².     Physical Exam:  Constitutional: Alert, well-appearing, no current distress.  Skin: Warm, dry, good turgor, no rashes in visible areas.  Eye: Pupils are equal and round, conjunctiva clear, lids normal.  ENMT: Lips without lesions, moist mucus membranes.  Neck: No masses. No submandibular or " cervical lymphadenopathy.  Chest: Chest wall examined.  Bilateral breasts are surgically absent and well-healed surgical scars visible.  There is no erythema or edema noted to the chest wall.  There is localized tenderness to palpation of the left lower chest wall and lower axilla.  Patient endorses increase in pain with internal and external rotation of her left shoulder.  Respiratory: Unlabored respiratory effort, SpO2 95% on room air. Lungs clear to auscultation, no wheezes, no rhonchi.  Cardiovascular: Normal S1, S2, no murmur.      Assessment and Plan:   The following treatment plan was discussed    1. Left-sided chest wall pain  New problem, uncontrolled.  Exam today is consistent with musculoskeletal origin, likely chest wall strain.  Vitals today are very reassuring.  Presentation is not consistent with cardiac origin for pain and she recently had cardiac stress test which was negative for any signs of ischemia.  Will obtain chest x-ray today.  Patient was counseled on supportive management including starting daily anti-inflammatories, heating pad, avoidance of excessive activity.  Will have her follow-up with in 2 weeks for reevaluation to see if her symptoms are improving.  - DX-CHEST-2 VIEWS; Future      Followup: Return in about 2 weeks (around 11/14/2018) for f/u chest wall pain; Short.    Heather Thayer P.A.-C.

## 2018-11-05 ENCOUNTER — TELEPHONE (OUTPATIENT)
Dept: MEDICAL GROUP | Facility: PHYSICIAN GROUP | Age: 68
End: 2018-11-05

## 2018-11-05 NOTE — TELEPHONE ENCOUNTER
----- Message from Heather Thayer P.A.-C. sent at 11/4/2018  9:11 PM PST -----  Please inform patient of normal CXR results.  Heather Thayer P.A.-C.

## 2018-11-05 NOTE — TELEPHONE ENCOUNTER
Phone Number Called: [phone] (Cell)      Message: LMTCB    Left Message for patient to call back: yes

## 2018-11-06 NOTE — TELEPHONE ENCOUNTER
Phone Number Called: [phone] (home)     Message: Informed pt's  of message below.     Left Message for patient to call back: yes

## 2019-01-11 ENCOUNTER — OFFICE VISIT (OUTPATIENT)
Dept: MEDICAL GROUP | Facility: PHYSICIAN GROUP | Age: 69
End: 2019-01-11
Payer: COMMERCIAL

## 2019-01-11 VITALS
DIASTOLIC BLOOD PRESSURE: 78 MMHG | TEMPERATURE: 97.9 F | OXYGEN SATURATION: 98 % | HEIGHT: 63 IN | BODY MASS INDEX: 31.18 KG/M2 | WEIGHT: 176 LBS | SYSTOLIC BLOOD PRESSURE: 124 MMHG | HEART RATE: 68 BPM

## 2019-01-11 DIAGNOSIS — E78.00 HYPERCHOLESTEREMIA: ICD-10-CM

## 2019-01-11 DIAGNOSIS — Z85.3 HISTORY OF BREAST CANCER: ICD-10-CM

## 2019-01-11 DIAGNOSIS — Z79.811 USE OF ANASTROZOLE (ARIMIDEX): ICD-10-CM

## 2019-01-11 PROCEDURE — 99213 OFFICE O/P EST LOW 20 MIN: CPT | Performed by: PHYSICIAN ASSISTANT

## 2019-01-11 RX ORDER — SIMVASTATIN 20 MG
TABLET ORAL
Qty: 90 TAB | Refills: 3 | Status: SHIPPED | OUTPATIENT
Start: 2019-01-11 | End: 2019-04-11 | Stop reason: SDUPTHER

## 2019-01-11 ASSESSMENT — PATIENT HEALTH QUESTIONNAIRE - PHQ9: CLINICAL INTERPRETATION OF PHQ2 SCORE: 0

## 2019-01-11 NOTE — PROGRESS NOTES
Subjective:   Tena Romo is a 68 y.o. female here today for follow-up on breast cancer, new oncology referral. Is an established patient of mine.    HPI:    Patient is here today to request new referral to oncology. She hast history of left breast cancer diagnosed in 2015 and is s/p bilateral mastectomy. She is requesting referral to a new oncologist. She was following with Dr. Gifford at [facility] but states the office location recently changed to [facility] and she doesn't want to make the drive. She is on Arimidex which she continues to tolerate well.    Patient also needing refills of simvastatin which she takes for hyperlipidemia.      Current medicines (including changes today)  Current Outpatient Prescriptions   Medication Sig Dispense Refill   • simvastatin (ZOCOR) 20 MG Tab TAKE ONE TABLET BY MOUTH ONCE DAILY IN THE EVENING 90 Tab 3   • VITAMIN D, ERGOCALCIFEROL, PO Take  by mouth.     • Blood Glucose Monitoring Suppl Device Meter: Dispense Device of Insurance Preference. Sig. Use as directed for blood sugar monitoring. #1. NR. 1 Device 0   • Blood Glucose Monitoring Suppl Supplies Misc Test strips order: Test strips for insurance-preferred meter. Sig: use every morning before breakfast and prn ssx high or low sugar 100 Strip 3   • Lancets Misc Lancets order: Lancets for insurance-preferred meter. Sig: use every morning before breakfast and prn ssx high or low sugar. 100 Each 3   • telmisartan-hydrochlorothiazide (MICARDIS HCT) 40-12.5 MG per tablet Take 1 Tab by mouth every day. 30 Tab 1   • ONETOUCH DELICA LANCETS 33G Misc USE TO TEST BLOOD SUGAR IN THE MORNING 100 Each 1   • glucose blood (ONE TOUCH ULTRA TEST) strip 1 Strip by Other route every day. 100 Strip 3   • anastrozole (ARIMIDEX) 1 MG Tab Take 1 mg by mouth every day.     • Blood Glucose Monitoring Suppl (ONE TOUCH ULTRA MINI) W/DEVICE KIT      • Misc. Devices KIT Diagnosis 250.02. Freestyle glucometer, test strips,  "#100, RF11 and lancets #100, RF 11. To test fasting in the AM. May substitute brand acceptable to insurance. 1 Each 0   • clobetasol (TEMOVATE) 0.05 % Ointment Apply 1 Drop to affected area(s) 1 time daily as needed. 1 Tube 6   • lidocaine (LIDODERM) 5 % Patch Apply 1 Patch to skin as directed every 12 hours. 30 Patch 2     No current facility-administered medications for this visit.      She  has a past medical history of Anesthesia; Arthritis; Breast cancer (HCC) (2/10/2017); Dental disorder; Diabetes (HCC); Diverticulosis of colon (11/4/2011); HTN (hypertension) (3/14/2013); Hypertension; Pain; and Post hysterectomy menopause (11/4/2011).    ROS  Constitutional ROS: Positive for fatigue - attributes to long hours as work       Objective:     Blood pressure 124/78, pulse 68, temperature 36.6 °C (97.9 °F), height 1.6 m (5' 3\"), weight 79.8 kg (176 lb), SpO2 98 %, not currently breastfeeding. Body mass index is 31.18 kg/m².     Physical Exam:  Constitutional: Alert, well-appearing, no distress.  Skin: No rashes in visible areas.  Eye: Conjunctiva clear, lids normal.  ENMT: Lips without lesions, moist mucus membranes.      Assessment and Plan:   The following treatment plan was discussed    1. History of breast cancer  2. Use of anastrozole (Arimidex)  Established problems, well-controlled. I have ordered new referral to [facility].  - REFERRAL TO HEMATOLOGY ONCOLOGY Referral to? [facility]    3. Hypercholesteremia  Established problem, well-controlled. Simvastatin refilled. Due for labs before next appointment in April which were previously ordered.  - simvastatin (ZOCOR) 20 MG Tab; TAKE ONE TABLET BY MOUTH ONCE DAILY IN THE EVENING  Dispense: 90 Tab; Refill: 3      Followup: Return in about 3 months (around 4/11/2019).    Heather Thayer P.A.-C.             "

## 2019-01-15 ENCOUNTER — TELEPHONE (OUTPATIENT)
Dept: HEMATOLOGY ONCOLOGY | Facility: MEDICAL CENTER | Age: 69
End: 2019-01-15

## 2019-01-15 NOTE — TELEPHONE ENCOUNTER
1st attempt to contact the patient- Left message with patients , for patient requesting a return call to schedule New Oncology appointment. NP/[facility] /History of breast cancer/ Heather Thayer

## 2019-02-08 LAB
ALBUMIN SERPL-MCNC: 4.6 G/DL (ref 3.6–4.8)
ALBUMIN/GLOB SERPL: 1.7 {RATIO} (ref 1.2–2.2)
ALP SERPL-CCNC: 89 IU/L (ref 39–117)
ALT SERPL-CCNC: 14 IU/L (ref 0–32)
AST SERPL-CCNC: 13 IU/L (ref 0–40)
BILIRUB SERPL-MCNC: 0.5 MG/DL (ref 0–1.2)
BUN SERPL-MCNC: 22 MG/DL (ref 8–27)
BUN/CREAT SERPL: 23 (ref 12–28)
CALCIUM SERPL-MCNC: 10.1 MG/DL (ref 8.7–10.3)
CHLORIDE SERPL-SCNC: 100 MMOL/L (ref 96–106)
CHOLEST SERPL-MCNC: 163 MG/DL (ref 100–199)
CO2 SERPL-SCNC: 24 MMOL/L (ref 20–29)
CREAT SERPL-MCNC: 0.94 MG/DL (ref 0.57–1)
GLOBULIN SER CALC-MCNC: 2.7 G/DL (ref 1.5–4.5)
GLUCOSE SERPL-MCNC: 132 MG/DL (ref 65–99)
HBA1C MFR BLD: 6.7 % (ref 4.8–5.6)
HDLC SERPL-MCNC: 45 MG/DL
LABORATORY COMMENT REPORT: NORMAL
LDLC SERPL CALC-MCNC: 90 MG/DL (ref 0–99)
POTASSIUM SERPL-SCNC: 4.7 MMOL/L (ref 3.5–5.2)
PROT SERPL-MCNC: 7.3 G/DL (ref 6–8.5)
SODIUM SERPL-SCNC: 139 MMOL/L (ref 134–144)
TRIGL SERPL-MCNC: 138 MG/DL (ref 0–149)
VLDLC SERPL CALC-MCNC: 28 MG/DL (ref 5–40)

## 2019-02-11 ENCOUNTER — TELEPHONE (OUTPATIENT)
Dept: MEDICAL GROUP | Facility: PHYSICIAN GROUP | Age: 69
End: 2019-02-11

## 2019-02-11 NOTE — TELEPHONE ENCOUNTER
----- Message from Heather Thayer P.A.-C. sent at 2/10/2019 10:43 PM PST -----  Please inform patient that A1c is stable at 6.7 and her other labs look fine.  Heather Thayer P.A.-C.

## 2019-02-13 ENCOUNTER — OFFICE VISIT (OUTPATIENT)
Dept: HEMATOLOGY ONCOLOGY | Facility: MEDICAL CENTER | Age: 69
End: 2019-02-13
Payer: COMMERCIAL

## 2019-02-13 VITALS
WEIGHT: 177.25 LBS | DIASTOLIC BLOOD PRESSURE: 72 MMHG | TEMPERATURE: 97.9 F | BODY MASS INDEX: 31.41 KG/M2 | OXYGEN SATURATION: 97 % | RESPIRATION RATE: 16 BRPM | SYSTOLIC BLOOD PRESSURE: 128 MMHG | HEIGHT: 63 IN | HEART RATE: 82 BPM

## 2019-02-13 DIAGNOSIS — Z85.3 HISTORY OF BREAST CANCER: ICD-10-CM

## 2019-02-13 PROCEDURE — 99203 OFFICE O/P NEW LOW 30 MIN: CPT | Mod: GC | Performed by: INTERNAL MEDICINE

## 2019-02-13 RX ORDER — ANASTROZOLE 1 MG/1
1 TABLET ORAL DAILY
Qty: 90 TAB | Refills: 3 | Status: SHIPPED | OUTPATIENT
Start: 2019-02-13 | End: 2019-03-22 | Stop reason: SDUPTHER

## 2019-02-13 ASSESSMENT — PAIN SCALES - GENERAL: PAINLEVEL: NO PAIN

## 2019-02-13 NOTE — PROGRESS NOTES
Date of visit: 2/13/2019  11:55 AM    Chief Complaint-breast cancer surveillance    Identification/Prior relevant history: Tena Romo  is a 68 y.o. year old female who is here as a new patient for breast cancer surveillance.  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  Status post bilateral mastectomy on 10/26/2015.  She is currently on anastrozole, and has been tolerating it.  She has been following with Dr. Gifford at [facility], however the office location changed and the patient would prefer not to drive to that location.    HPI: Overall the patient is doing well.  She has no new complaints.  She experiences intermittent chest pain tightness 2-3 times a month.  This has been addressed by her primary care provider.  She works a stressful job as a  and will sometimes feel this pain.  Taking aspirin resolves it.  Her primary care provider is also managing impaired fasting glucose and hypercholesterolemia.  The patient does not note any new lymphadenopathy.  She states that her incision sites are well healing and appearing good.  She denies any history of heart attack.  No bone pain.  She does have occasional vaginal dryness and slight weight gain, however no hot flashes.  Dr. Giffrod had ordered a DEXA scan for bone density, however the patient has not had that study done yet.  She denies any fever, chills, shortness of breath.    Past Medical History:      Past Medical History:   Diagnosis Date   • Anesthesia     post of nausea   • Arthritis    • Breast cancer (HCC) 2/10/2017   • Dental disorder     permanent bridge   • Diabetes (HCC)    • Diverticulosis of colon 11/4/2011   • HTN (hypertension) 3/14/2013   • Hypertension    • Pain     right shoulder down to thumb   • Post hysterectomy menopause 11/4/2011       Past surgical history:       Past Surgical History:   Procedure Laterality Date   • SHOULDER ARTHROSCOPY  W/ ROTATOR CUFF REPAIR  2/9/2012    Performed by JENNY LIMON at SURGERY [facility]   • SHOULDER DECOMPRESSION ARTHROSCOPIC  2/9/2012    Performed by JENNY LIMON at SURGERY [facility]   • CLAVICLE DISTAL EXCISION  2/9/2012    Performed by JENNY LIMON at SURGERY [facility]   • BUNIONECTOMY  2009    bilateral   • ABDOMINAL HYSTERECTOMY TOTAL  2001    endometriosis   • CHOLECYSTECTOMY  1999    laparoscopy   • MASTECTOMY     • TONSILLECTOMY  as child       Allergies:       Aspirin; Cipro xr; Diclofenac; and Metformin    Medications:         Current Outpatient Prescriptions   Medication Sig Dispense Refill   • anastrozole (ARIMIDEX) 1 MG Tab Take 1 Tab by mouth every day. 90 Tab 3   • simvastatin (ZOCOR) 20 MG Tab TAKE ONE TABLET BY MOUTH ONCE DAILY IN THE EVENING 90 Tab 3   • Blood Glucose Monitoring Suppl Device Meter: Dispense Device of Insurance Preference. Sig. Use as directed for blood sugar monitoring. #1. NR. 1 Device 0   • Blood Glucose Monitoring Suppl Supplies Misc Test strips order: Test strips for insurance-preferred meter. Sig: use every morning before breakfast and prn ssx high or low sugar 100 Strip 3   • Lancets Misc Lancets order: Lancets for insurance-preferred meter. Sig: use every morning before breakfast and prn ssx high or low sugar. 100 Each 3   • telmisartan-hydrochlorothiazide (MICARDIS HCT) 40-12.5 MG per tablet Take 1 Tab by mouth every day. 30 Tab 1   • clobetasol (TEMOVATE) 0.05 % Ointment Apply 1 Drop to affected area(s) 1 time daily as needed. 1 Tube 6   • ONETOUCH DELICA LANCETS 33G Misc USE TO TEST BLOOD SUGAR IN THE MORNING 100 Each 1   • glucose blood (ONE TOUCH ULTRA TEST) strip 1 Strip by Other route every day. 100 Strip 3   • Blood Glucose Monitoring Suppl (ONE TOUCH ULTRA MINI) W/DEVICE KIT      • Misc. Devices KIT Diagnosis 250.02. Freestyle glucometer, test strips, #100, RF11 and lancets #100, RF 11. To test fasting in the AM. May substitute brand  acceptable to insurance. 1 Each 0   • VITAMIN D, ERGOCALCIFEROL, PO Take  by mouth.     • lidocaine (LIDODERM) 5 % Patch Apply 1 Patch to skin as directed every 12 hours. 30 Patch 2     No current facility-administered medications for this visit.          Social History:     Social History     Social History   • Marital status:      Spouse name: N/A   • Number of children: N/A   • Years of education: N/A     Occupational History   • Not on file.     Social History Main Topics   • Smoking status: Never Smoker   • Smokeless tobacco: Never Used      Comment: avoid any and all tobacco products   • Alcohol use No      Comment: once a year   • Drug use: No   • Sexual activity: Not Currently     Partners: Male      Comment: . Work at [employer]     Other Topics Concern   • Not on file     Social History Narrative   • No narrative on file       Family History:      Family History   Problem Relation Age of Onset   • Stroke Mother    • Lung Disease Father         pneumonia   • Hyperlipidemia Father        Review of Systems:  All other review of systems are negative except what was mentioned above in the HPI.    Constitutional: Negative for fever, chills, weight loss and malaise/fatigue.    HEENT: No new auditory or visual complaints. No sore throat and neck pain.     Respiratory: Negative for cough, sputum production, shortness of breath and wheezing.    Cardiovascular: Intermittent chest tightness.  Negative for palpitations, orthopnea and leg swelling.    Gastrointestinal: Negative for heartburn, nausea, vomiting and abdominal pain.    Genitourinary: Occasional vaginal dryness.  Negative for dysuria, hematuria    Musculoskeletal: No new arthralgias or myalgias   Skin: Negative for rash and itching.    Neurological: Negative for focal weakness and headaches.    Endo/Heme/Allergies: No abnormal bleed/bruise.    Psychiatric/Behavioral: No new depression/anxiety.    Physical Exam:  Vitals: /72 (BP Location:  "Right arm, Patient Position: Sitting, BP Cuff Size: Adult)   Pulse 82   Temp 36.6 °C (97.9 °F) (Temporal)   Resp 16   Ht 1.6 m (5' 3\")   Wt 80.4 kg (177 lb 4 oz)   SpO2 97%   BMI 31.40 kg/m²     General: Not in acute distress, alert and oriented x 3  HEENT: No pallor, icterus. Oropharynx clear.   Neck: Supple, no palpable masses.  Lymph nodes: No palpable cervical, supraclavicular, axillary or inguinal lymphadenopathy.    CVS: regular rate and rhythm, no rubs or gallops  *Chest wall: Well-healing bilateral mastectomy scars.  RESP: Clear to auscultate bilaterally, no wheezing or crackles.   ABD: Soft, non tender, non distended, positive bowel sounds, no palpable organomegaly  EXT: No edema or cyanosis  CNS: Alert and oriented x3, No focal deficits.  Skin- No rash      Labs:   Orders Only on 02/07/2019   Component Date Value Ref Range Status   • Glucose 02/07/2019 132* 65 - 99 mg/dL Final   • Bun 02/07/2019 22  8 - 27 mg/dL Final   • Creatinine 02/07/2019 0.94  0.57 - 1.00 mg/dL Final   • GFR If Non  02/07/2019 63  >59 mL/min/1.73 Final   • GFR If  02/07/2019 72  >59 mL/min/1.73 Final   • Bun-Creatinine Ratio 02/07/2019 23  12 - 28 Final   • Sodium 02/07/2019 139  134 - 144 mmol/L Final   • Potassium 02/07/2019 4.7  3.5 - 5.2 mmol/L Final   • Chloride 02/07/2019 100  96 - 106 mmol/L Final   • Co2 02/07/2019 24  20 - 29 mmol/L Final   • Calcium 02/07/2019 10.1  8.7 - 10.3 mg/dL Final   • Total Protein 02/07/2019 7.3  6.0 - 8.5 g/dL Final   • Albumin 02/07/2019 4.6  3.6 - 4.8 g/dL Final   • Globulin 02/07/2019 2.7  1.5 - 4.5 g/dL Final   • A-G Ratio 02/07/2019 1.7  1.2 - 2.2 Final   • Total Bilirubin 02/07/2019 0.5  0.0 - 1.2 mg/dL Final   • Alkaline Phosphatase 02/07/2019 89  39 - 117 IU/L Final   • AST(SGOT) 02/07/2019 13  0 - 40 IU/L Final   • ALT(SGPT) 02/07/2019 14  0 - 32 IU/L Final   • Cholesterol,Tot 02/07/2019 163  100 - 199 mg/dL Final   • Triglycerides 02/07/2019 138  0 " - 149 mg/dL Final   • HDL 02/07/2019 45  >39 mg/dL Final   • VLDL Cholesterol Calc 02/07/2019 28  5 - 40 mg/dL Final   • LDL 02/07/2019 90  0 - 99 mg/dL Final   • Comment: 02/07/2019 CANCELED   Final    Result canceled by the ancillary   • Glycohemoglobin 02/07/2019 6.7* 4.8 - 5.6 % Final    Comment: Prediabetes: 5.7 - 6.4  Diabetes: >6.4  Glycemic control for adults with diabetes: <7.0               Assessment and Plan:    History of breast cancer  -Stage IA two primary breast cancers left side and right breast DCIS on right side s/p bilateral mastectomy on 10/26/15. Margins clear, Two LN neg.  -Former patient of Dr. Gifford at [facility]  -Started anastrozole 1 mg by mouth daily on 12/3/2015.  She has been tolerating it well without any major side effects.  -Clinically, the patient is doing well without any evidence of disease recurrence.   -Intermittent chest pain is likely associated with mastectomy.  -Plan: Continue anastrozole for a total of 5 years as planned by prior oncologist.  Ordered bone density scan.  Patient follow-up in 6 months.     She agreed and verbalized  agreement and understanding with the current plan.  I answered all questions and concerns at this time         Please note that this dictation was created using voice recognition software. I have made every reasonable attempt to correct obvious errors, but I expect that there are errors of grammar and possibly content that I did not discover before finalizing the note.      SIGNATURES:  Manjinder Sow    CC:  KADIE Polanco Ashley L, P.A*

## 2019-02-25 RX ORDER — TELMISARTAN AND HYDROCHLORTHIAZIDE 40; 12.5 MG/1; MG/1
1 TABLET ORAL DAILY
Qty: 90 TAB | Refills: 1 | Status: SHIPPED | OUTPATIENT
Start: 2019-02-25 | End: 2019-08-30 | Stop reason: SDUPTHER

## 2019-02-25 NOTE — TELEPHONE ENCOUNTER
Refill X 6 months, sent to pharmacy.Pt. Seen in the last 6 months per protocol.   Lab Results   Component Value Date/Time    SODIUM 139 02/07/2019 08:55 AM    SODIUM 140 12/29/2016 09:37 AM    POTASSIUM 4.7 02/07/2019 08:55 AM    POTASSIUM 4.8 12/29/2016 09:37 AM    CHLORIDE 100 02/07/2019 08:55 AM    CHLORIDE 106 12/29/2016 09:37 AM    CO2 24 02/07/2019 08:55 AM    CO2 27 12/29/2016 09:37 AM    GLUCOSE 132 (H) 02/07/2019 08:55 AM    GLUCOSE 123 (H) 12/29/2016 09:37 AM    BUN 22 02/07/2019 08:55 AM    BUN 29 (H) 12/29/2016 09:37 AM    CREATININE 0.94 02/07/2019 08:55 AM    CREATININE 0.90 12/29/2016 09:37 AM    CREATININE 0.86 12/01/2010 12:00 AM    BUNCREATRAT 23 02/07/2019 08:55 AM    BUNCREATRAT 27 12/01/2010 12:00 AM    GLOMRATE >59 12/01/2010 12:00 AM

## 2019-02-25 NOTE — TELEPHONE ENCOUNTER
Was the patient seen in the last year in this department? Yes    Does patient have an active prescription for medications requested? No     Received Request Via: Pharmacy      Pt met protocol?: Yes, OV last week   BP Readings from Last 1 Encounters:   02/13/19 128/72

## 2019-02-27 ENCOUNTER — HOSPITAL ENCOUNTER (OUTPATIENT)
Dept: RADIOLOGY | Facility: MEDICAL CENTER | Age: 69
End: 2019-02-27
Attending: INTERNAL MEDICINE
Payer: COMMERCIAL

## 2019-02-27 DIAGNOSIS — Z85.3 HISTORY OF BREAST CANCER: ICD-10-CM

## 2019-02-27 PROCEDURE — 77080 DXA BONE DENSITY AXIAL: CPT

## 2019-03-26 RX ORDER — ANASTROZOLE 1 MG/1
1 TABLET ORAL DAILY
Qty: 90 TAB | Refills: 1 | Status: SHIPPED | OUTPATIENT
Start: 2019-03-26 | End: 2019-08-13 | Stop reason: SDUPTHER

## 2019-04-11 ENCOUNTER — OFFICE VISIT (OUTPATIENT)
Dept: MEDICAL GROUP | Facility: PHYSICIAN GROUP | Age: 69
End: 2019-04-11
Payer: COMMERCIAL

## 2019-04-11 VITALS
HEIGHT: 63 IN | TEMPERATURE: 99 F | OXYGEN SATURATION: 96 % | DIASTOLIC BLOOD PRESSURE: 62 MMHG | SYSTOLIC BLOOD PRESSURE: 102 MMHG | WEIGHT: 180 LBS | BODY MASS INDEX: 31.89 KG/M2 | HEART RATE: 70 BPM

## 2019-04-11 DIAGNOSIS — M15.9 PRIMARY OSTEOARTHRITIS INVOLVING MULTIPLE JOINTS: ICD-10-CM

## 2019-04-11 DIAGNOSIS — Z12.12 SCREENING FOR COLORECTAL CANCER: ICD-10-CM

## 2019-04-11 DIAGNOSIS — I10 ESSENTIAL HYPERTENSION: ICD-10-CM

## 2019-04-11 DIAGNOSIS — E66.9 OBESITY (BMI 30-39.9): ICD-10-CM

## 2019-04-11 DIAGNOSIS — K57.30 DIVERTICULOSIS OF COLON: ICD-10-CM

## 2019-04-11 DIAGNOSIS — E11.9 TYPE 2 DIABETES MELLITUS WITHOUT COMPLICATION, WITHOUT LONG-TERM CURRENT USE OF INSULIN (HCC): ICD-10-CM

## 2019-04-11 DIAGNOSIS — G89.29 CHRONIC RIGHT-SIDED LOW BACK PAIN WITH RIGHT-SIDED SCIATICA: ICD-10-CM

## 2019-04-11 DIAGNOSIS — Z79.811 USE OF ANASTROZOLE (ARIMIDEX): ICD-10-CM

## 2019-04-11 DIAGNOSIS — R07.89 CHEST TIGHTNESS: ICD-10-CM

## 2019-04-11 DIAGNOSIS — M54.41 CHRONIC RIGHT-SIDED LOW BACK PAIN WITH RIGHT-SIDED SCIATICA: ICD-10-CM

## 2019-04-11 DIAGNOSIS — N95.2 ATROPHIC VAGINITIS: ICD-10-CM

## 2019-04-11 DIAGNOSIS — Z12.11 SCREENING FOR COLORECTAL CANCER: ICD-10-CM

## 2019-04-11 DIAGNOSIS — Z85.3 HISTORY OF BREAST CANCER: ICD-10-CM

## 2019-04-11 DIAGNOSIS — Z00.00 ANNUAL PHYSICAL EXAM: ICD-10-CM

## 2019-04-11 DIAGNOSIS — E78.00 HYPERCHOLESTEREMIA: ICD-10-CM

## 2019-04-11 DIAGNOSIS — J30.2 SEASONAL ALLERGIC RHINITIS, UNSPECIFIED TRIGGER: ICD-10-CM

## 2019-04-11 PROCEDURE — 99397 PER PM REEVAL EST PAT 65+ YR: CPT | Performed by: PHYSICIAN ASSISTANT

## 2019-04-11 RX ORDER — SIMVASTATIN 40 MG
TABLET ORAL
Qty: 90 TAB | Refills: 3 | Status: SHIPPED | OUTPATIENT
Start: 2019-04-11 | End: 2019-08-30 | Stop reason: SDUPTHER

## 2019-04-11 NOTE — PROGRESS NOTES
Chief Complaint: Annual    Tena Maura Romo is a 69 y.o. female who presents for annual exam    Pt has GYN provider: no  Last colonoscopy: unknown but states is due  Bone density test: yes  Last Td: 4/10/12  Regular exercise: yes     Current Problems:  History of breast cancer  Previous breast cancer in 2016 s/p bilateral mastectomy. Continues with anastrozole and regularly follows with oncology.    Type 2 diabetes mellitus without complication, without long-term current use of insulin (CMS-[facility])  Historically diet-controlled. Most recent A1c in 2/2019 was 6.7.     Primary osteoarthritis involving multiple joints  Continues to experience pain in shoulder and knees. Endorses difficulty exercising because of pain. Thinking about trying acupuncture.    Obesity (BMI 30-39.9)  Current BMI 31.89. Currently involved in weight loss classes.    Hypercholesteremia  Continues on simvastatin 20 mg daily. Tolerating well--denies myalgias or other side effects. Last lipid panel in 2/2019 was normal. LDL is above-goal at 90.    Essential hypertension  Treated with telmisartan-HCTZ 40-12.5 mg daily. BP today in the office is 102/62.     Chronic right-sided low back pain with right-sided sciatica  Tends to only bother her with significant activity, like lots of yard work. Controls pain with OTC pain patches.     Chest tightness  Continues to experience periodic chest discomfort. This is stable. Has had cardiac stress test last year which was unremarkable. Oncology previously attributed pain to scar tissue from prior mastectomy. Pain resolves with aspirin.    Atrophic vaginitis  Chronic, feels is stable. Does not bother her at all given that she is not currently sexually active.    Seasonal allergic rhinitis  Denies current symptoms. Typically experiences symptoms in summer and fall. Treats with OTC allergy medication which helps.    Diverticulosis of colon  Found on previous colonoscopy. She is due for repeat and would like  to do virtual colonoscopy. Denies h/o colon CA in first-degree relative. Has had previous polyps as well.      She  has a past medical history of Anesthesia; Arthritis; Breast cancer (HCC) (2/10/2017); Dental disorder; Diabetes (HCC); Diverticulosis of colon (11/4/2011); HTN (hypertension) (3/14/2013); Hypertension; Pain; and Post hysterectomy menopause (11/4/2011).  She  has a past surgical history that includes cholecystectomy (1999); abdominal hysterectomy total (2001); bunionectomy (2009); tonsillectomy (as child); shoulder arthroscopy w/ rotator cuff repair (2/9/2012); shoulder decompression arthroscopic (2/9/2012); clavicle distal excision (2/9/2012); and mastectomy.  Current Outpatient Prescriptions   Medication Sig Dispense Refill   • simvastatin (ZOCOR) 40 MG Tab TAKE ONE TABLET BY MOUTH ONCE DAILY IN THE EVENING 90 Tab 3   • anastrozole (ARIMIDEX) 1 MG Tab Take 1 Tab by mouth every day. 90 Tab 1   • telmisartan-hydrochlorothiazide (MICARDIS HCT) 40-12.5 MG per tablet Take 1 Tab by mouth every day. 90 Tab 1   • VITAMIN D, ERGOCALCIFEROL, PO Take  by mouth.     • Lancets Misc Lancets order: Lancets for insurance-preferred meter. Sig: use every morning before breakfast and prn ssx high or low sugar. 100 Each 3   • ONETOUCH DELICA LANCETS 33G Misc USE TO TEST BLOOD SUGAR IN THE MORNING 100 Each 1   • glucose blood (ONE TOUCH ULTRA TEST) strip 1 Strip by Other route every day. 100 Strip 3   • Blood Glucose Monitoring Suppl (ONE TOUCH ULTRA MINI) W/DEVICE KIT      • Blood Glucose Monitoring Suppl Device Meter: Dispense Device of Insurance Preference. Sig. Use as directed for blood sugar monitoring. #1. NR. 1 Device 0   • Blood Glucose Monitoring Suppl Supplies Misc Test strips order: Test strips for insurance-preferred meter. Sig: use every morning before breakfast and prn ssx high or low sugar 100 Strip 3   • clobetasol (TEMOVATE) 0.05 % Ointment Apply 1 Drop to affected area(s) 1 time daily as needed. (Patient  "not taking: Reported on 4/11/2019) 1 Tube 6   • Misc. Devices KIT Diagnosis 250.02. Freestyle glucometer, test strips, #100, RF11 and lancets #100, RF 11. To test fasting in the AM. May substitute brand acceptable to insurance. 1 Each 0     No current facility-administered medications for this visit.      Social History   Substance Use Topics   • Smoking status: Never Smoker   • Smokeless tobacco: Never Used      Comment: avoid any and all tobacco products   • Alcohol use No      Comment: once a year       Review Of Systems  General ROS: negative for weight changes  Skin: negative for rash, changing moles  Eyes: negative for visual blurring, double vision, eye pain  Ears/Nose/Throat: negative for ear pain, hearing loss, oral or dental problem, frequent URI's, sinus trouble  Respiratory: negative for dyspnea  Cardiovascular: +intermittent chest pain - see above.  Gastrointestinal: +intermittent heartburn, loose stools. negative for abdominal pain, constipation  Musculoskeletal: +joint, back pain, see above. negative for joint swelling and muscle pain/ soreness  Psychiatric: negative for mood or sleep disturbance, anxiety, depression      PHYSICAL EXAMINATION:  /62 (BP Location: Right arm, Patient Position: Sitting, BP Cuff Size: Large adult)   Pulse 70   Temp 37.2 °C (99 °F)   Ht 1.6 m (5' 3\")   Wt 81.6 kg (180 lb)   SpO2 96%   Body mass index is 31.89 kg/m².  Wt Readings from Last 4 Encounters:   04/11/19 81.6 kg (180 lb)   02/13/19 80.4 kg (177 lb 4 oz)   01/11/19 79.8 kg (176 lb)   10/31/18 80.6 kg (177 lb 12.8 oz)       Constitutional: Alert, obese, no distress.  Skin: Warm, dry, good turgor, no rashes or suspicious lesions in visible areas.  Eye: pupils equal, round and reactive to light, conjunctivae clear, lids normal.  ENMT: Lips without lesions, good dentition, oropharynx clear. Pinnae skin normal with no lesions. TM pearly gray with normal light reflex.   Neck: supple, no masses. No submandibular " or anterior cervical adenopathy. No thyromegaly.  Chest: Breasts surgically absent. Well-healed surgical scar.  Respiratory: Unlabored respiratory effort, lungs clear to auscultation, no wheezes, no ronchi.  Cardiovascular: Normal S1, S2, no murmur, no peripheral edema.  Abdomen: Abdomen Soft, non-tender, no masses, no hepatosplenomegaly.   Psych: Alert and oriented x3, normal affect and mood.  Musc/Skel: Normal motion UE and LE proximal and distal.        ASSESSMENT/PLAN:    1. Annual physical exam  Normal exam today with exception of bolded areas above.  I did complete the wellness form that she needs for her place of employment.  Copy will be scanned into her chart.  HCM:  Due for colonoscopy which has been ordered (virtual). Due for Zoster vaccine which she refuses.  Labs UTD. Results reviewed and discussed with patient.  Immunizations per orders  Pt counseled about skin care, diet, supplements, and exercise.    2. Type 2 diabetes mellitus without complication, without long-term current use of insulin (HCC)  Chronic issue, well-controlled with dietary changes.  Continue current management.    3. Hypercholesteremia  Chronic issue, uncontrolled as LDL is above goal given her diabetes.  Recommend increasing simvastatin to 40 mg daily.  - simvastatin (ZOCOR) 40 MG Tab; TAKE ONE TABLET BY MOUTH ONCE DAILY IN THE EVENING  Dispense: 90 Tab; Refill: 3    4. Essential hypertension  Chronic issue, well controlled on current medication.  Blood pressure is at goal.  Continue telmisartan-hydrochlorothiazide at current dose.    5. History of breast cancer  6. Use of anastrozole (Arimidex)  Breast cancer in remission, doing well on Arimidex.  We will continue to follow with oncology.    7. Primary osteoarthritis involving multiple joints  Chronic issue, stable.  May try acupuncture as adjunct for pain.    8. Obesity (BMI 30-39.9)  Chronic issue, stable but uncontrolled.  She is actively working on weight loss through the class  she is attending.    9. Chronic right-sided low back pain with right-sided sciatica  Chronic issue, stable.  Experiences pain with excess activity which is reasonably well controlled with over-the-counter pain patches.  Continue current management.    10. Chest tightness  Chronic issue, stable and likely related to scar tissue from previous mastectomy.  Has had normal cardiac stress test.  We will continue to monitor.    11. Atrophic vaginitis  Chronic issue, stable.  Symptoms not bothersome.  We will continue to monitor.    12. Seasonal allergic rhinitis, unspecified trigger  Chronic issue, well controlled with over-the-counter allergy medication.  Continue current management.    13. Diverticulosis of colon  Chronic issue, stable. Will be having updated colonoscopy.    14. Screening for colorectal cancer  - CT-VIRTUAL COLONOSCOPY-SCREEN; Future        Return in about 6 months (around 10/11/2019).    Heather Thayer P.A.-C.

## 2019-04-11 NOTE — ASSESSMENT & PLAN NOTE
Continues to experience pain in shoulder and knees. Endorses difficulty exercising because of pain. Thinking about trying acupuncture.

## 2019-04-11 NOTE — ASSESSMENT & PLAN NOTE
Previous breast cancer in 2016 s/p bilateral mastectomy. Continues with anastrozole and regularly follows with oncology.

## 2019-04-11 NOTE — ASSESSMENT & PLAN NOTE
Denies current symptoms. Typically experiences symptoms in summer and fall. Treats with OTC allergy medication which helps.

## 2019-04-11 NOTE — ASSESSMENT & PLAN NOTE
Found on previous colonoscopy. She is due for repeat and would like to do virtual colonoscopy. Denies h/o colon CA in first-degree relative. Has had previous polyps as well.

## 2019-04-11 NOTE — ASSESSMENT & PLAN NOTE
Continues on simvastatin 20 mg daily. Tolerating well--denies myalgias or other side effects. Last lipid panel in 2/2019 was normal. LDL is above-goal at 90.

## 2019-04-11 NOTE — ASSESSMENT & PLAN NOTE
Continues to experience periodic chest discomfort. This is stable. Has had cardiac stress test last year which was unremarkable. Oncology previously attributed pain to scar tissue from prior mastectomy. Pain resolves with aspirin.

## 2019-04-11 NOTE — ASSESSMENT & PLAN NOTE
Tends to only bother her with significant activity, like lots of yard work. Controls pain with OTC pain patches.

## 2019-04-11 NOTE — ASSESSMENT & PLAN NOTE
Chronic, feels is stable. Does not bother her at all given that she is not currently sexually active.

## 2019-04-18 ENCOUNTER — TELEPHONE (OUTPATIENT)
Dept: MEDICAL GROUP | Facility: PHYSICIAN GROUP | Age: 69
End: 2019-04-18

## 2019-04-18 DIAGNOSIS — Z12.11 ENCOUNTER FOR SCREENING FOR MALIGNANT NEOPLASM OF COLON: ICD-10-CM

## 2019-04-18 NOTE — TELEPHONE ENCOUNTER
Patient was already given paper order for the virtual colonoscopy. She just needs to contact [facility] to schedule appointment.  Heather Thayer P.A.-C.

## 2019-04-18 NOTE — TELEPHONE ENCOUNTER
1. Caller Name: Tena                                         Call Back Number: [phone] (home)       Patient approves a detailed voicemail message: N\Are    Pt called and would like a referral to [facility] for a Virtual Colonoscopy. She stated no office in [place] provides that service.  Please advise.

## 2019-04-18 NOTE — TELEPHONE ENCOUNTER
1. Caller Name: Tena                                         Call Back Number: [phone] (home)         Patient approves a detailed voicemail message: N\A    Pt called and is requesting a medication for her Virtual Colonoscopy. She stated it was a colon cleanser for the procedure. Do we prescribe that? Please advise.

## 2019-04-20 NOTE — TELEPHONE ENCOUNTER
Contacted patient. She says [facility] was going to give her all the medications but she would have to  and she doesn't want to drive. Please call [facility] and have them fax over their typical bowel cleaning protocol and medications she needs and I will prescribe.   Heather Thayer P.A.-C.

## 2019-04-22 RX ORDER — BISACODYL 10 MG
SUPPOSITORY, RECTAL RECTAL
Qty: 1 SUPPOSITORY | Refills: 0 | Status: SHIPPED
Start: 2019-04-22 | End: 2020-04-15

## 2019-04-22 RX ORDER — BISACODYL 5 MG/1
TABLET, DELAYED RELEASE ORAL
Qty: 3 TAB | Refills: 0 | Status: SHIPPED | OUTPATIENT
Start: 2019-04-22 | End: 2020-08-25

## 2019-04-22 NOTE — TELEPHONE ENCOUNTER
Please advise patient that I have sent medications to [facility]. Will leave pre-procedure instruction sheet from [facility] at the  for her to  unless she prefers that it be mailed to her.  Heather Thayer P.A.-C.

## 2019-04-22 NOTE — TELEPHONE ENCOUNTER
Phone Number Called: [phone]    Message: Left message for the film room to fax over meds needed for procedure. If there's any problems then I left them my direct line.     Left Message for patient to call back: N\A

## 2019-04-23 NOTE — TELEPHONE ENCOUNTER
Pt needs to  medications at [facility] for CT virtual scan. Local pharmacys do not carry meds needed for procedure.

## 2019-08-12 NOTE — PROGRESS NOTES
08/12/19    Subjective    Chief Complaint:  69 female f/u bilateral breast cancer on Arimidex    HPI:  Bilateral mastectomy 10/26/2015. 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 Both left breast cancers were T1N0 ER(+) HER-2(-). Started on Arimidex 12/3/2015. Initially patient of [facility]. Dexa 2/2019 showed normal bone density.  Works as a  at [employer].    ROS:Allergies. occ chest pain, non-exertional. No GI or  issues.     PMH:    Allergies   Allergen Reactions   • Aspirin      Intolerance     • Cipro Xr Hives   • Diclofenac    • Metformin Rash     Erythematous rash       Medications:    Current Outpatient Medications on File Prior to Visit   Medication Sig Dispense Refill   • simvastatin (ZOCOR) 40 MG Tab TAKE ONE TABLET BY MOUTH ONCE DAILY IN THE EVENING 90 Tab 3   • telmisartan-hydrochlorothiazide (MICARDIS HCT) 40-12.5 MG per tablet Take 1 Tab by mouth every day. 90 Tab 1   • VITAMIN D, ERGOCALCIFEROL, PO Take  by mouth.     • clobetasol (TEMOVATE) 0.05 % Ointment Apply 1 Drop to affected area(s) 1 time daily as needed. 1 Tube 6   • Barium Sulfate (TAGITOL V) 40 % Suspension On day prior to procedure, drink 1 bottle at 8 am, 1 bottle at noon, and 1 bottle at 6 pm (Patient not taking: Reported on 8/13/2019) 3 Bottle 0   • magnesium citrate Solution On day prior to procedure, drink 1 bottle at 3 pm and 1 bottle at 8 pm (Patient not taking: Reported on 8/13/2019) 2 Bottle 0   • bisacodyl (DULCOLAX) 5 MG EC tablet On day prior to procedure, take 3 tablets by mouth at 9 pm. Do not chew or dissolve tablets; take whole. (Patient not taking: Reported on 8/13/2019) 3 Tab 0   • bisacodyl (DULCOLAX) 10 MG Suppos On day of procedure, insert rectally 2 hours prior to procedure (Patient not taking: Reported on 8/13/2019) 1 Suppository 0   • Blood Glucose Monitoring Suppl Device Meter: Dispense Device of Insurance Preference. Sig. Use as directed for blood sugar monitoring. #1. NR. (Patient not taking:  "Reported on 8/13/2019) 1 Device 0   • Blood Glucose Monitoring Suppl Supplies Misc Test strips order: Test strips for insurance-preferred meter. Sig: use every morning before breakfast and prn ssx high or low sugar (Patient not taking: Reported on 8/13/2019) 100 Strip 3   • Lancets Misc Lancets order: Lancets for insurance-preferred meter. Sig: use every morning before breakfast and prn ssx high or low sugar. (Patient not taking: Reported on 8/13/2019) 100 Each 3   • ONETOUCH DELICA LANCETS 33G Misc USE TO TEST BLOOD SUGAR IN THE MORNING (Patient not taking: Reported on 8/13/2019) 100 Each 1   • glucose blood (ONE TOUCH ULTRA TEST) strip 1 Strip by Other route every day. (Patient not taking: Reported on 8/13/2019) 100 Strip 3   • Blood Glucose Monitoring Suppl (ONE TOUCH ULTRA MINI) W/DEVICE KIT      • Misc. Devices KIT Diagnosis 250.02. Freestyle glucometer, test strips, #100, RF11 and lancets #100, RF 11. To test fasting in the AM. May substitute brand acceptable to insurance. (Patient not taking: Reported on 8/13/2019) 1 Each 0     No current facility-administered medications on file prior to visit.          Objective    Vitals:    /66 (BP Location: Right arm, Patient Position: Sitting, BP Cuff Size: Adult)   Pulse 88   Temp 37.1 °C (98.7 °F) (Temporal)   Resp 18   Ht 1.62 m (5' 3.78\")   Wt 82.9 kg (182 lb 10.4 oz)   SpO2 97%   BMI 31.57 kg/m²     Physical Exam: WD WN NAD    HEENT - PERRL  Neck - supple  Node - none  Chest - clear  Breasts - Absent  COR - RSR no murmur  Abd - No palpable liver, spleen. JOLYNN scar  Ext - No edema  Skin - No rash or jaundice    Labs:  None    Assessment    Imp:    Visit Diagnosis:    1. History of breast cancer  anastrozole (ARIMIDEX) 1 MG Tab   2. Use of anastrozole (Arimidex)  anastrozole (ARIMIDEX) 1 MG Tab         Plan:    Continue Arimidex for 5 years  RTC 6 mo or prn    Darrius Teixeira M.D.      "

## 2019-08-13 ENCOUNTER — OFFICE VISIT (OUTPATIENT)
Dept: HEMATOLOGY ONCOLOGY | Facility: MEDICAL CENTER | Age: 69
End: 2019-08-13
Payer: COMMERCIAL

## 2019-08-13 VITALS
DIASTOLIC BLOOD PRESSURE: 66 MMHG | BODY MASS INDEX: 31.18 KG/M2 | OXYGEN SATURATION: 97 % | HEIGHT: 64 IN | RESPIRATION RATE: 18 BRPM | SYSTOLIC BLOOD PRESSURE: 138 MMHG | TEMPERATURE: 98.7 F | HEART RATE: 88 BPM | WEIGHT: 182.65 LBS

## 2019-08-13 DIAGNOSIS — E89.40 POST HYSTERECTOMY MENOPAUSE: ICD-10-CM

## 2019-08-13 DIAGNOSIS — Z90.710 POST HYSTERECTOMY MENOPAUSE: ICD-10-CM

## 2019-08-13 DIAGNOSIS — R07.89 CHEST TIGHTNESS: ICD-10-CM

## 2019-08-13 DIAGNOSIS — Z85.3 HISTORY OF BREAST CANCER: ICD-10-CM

## 2019-08-13 DIAGNOSIS — E66.9 OBESITY (BMI 30-39.9): ICD-10-CM

## 2019-08-13 DIAGNOSIS — E11.9 TYPE 2 DIABETES MELLITUS WITHOUT COMPLICATION, WITHOUT LONG-TERM CURRENT USE OF INSULIN (HCC): ICD-10-CM

## 2019-08-13 DIAGNOSIS — J30.2 SEASONAL ALLERGIC RHINITIS, UNSPECIFIED TRIGGER: ICD-10-CM

## 2019-08-13 DIAGNOSIS — Z79.811 USE OF ANASTROZOLE (ARIMIDEX): ICD-10-CM

## 2019-08-13 PROCEDURE — 99213 OFFICE O/P EST LOW 20 MIN: CPT | Performed by: INTERNAL MEDICINE

## 2019-08-13 RX ORDER — ANASTROZOLE 1 MG/1
1 TABLET ORAL DAILY
Qty: 90 TAB | Refills: 1 | Status: SHIPPED | OUTPATIENT
Start: 2019-08-13 | End: 2019-12-12 | Stop reason: SDUPTHER

## 2019-08-13 ASSESSMENT — PAIN SCALES - GENERAL: PAINLEVEL: NO PAIN

## 2019-08-30 DIAGNOSIS — E78.00 HYPERCHOLESTEREMIA: ICD-10-CM

## 2019-08-30 RX ORDER — SIMVASTATIN 40 MG
TABLET ORAL
Qty: 90 TAB | Refills: 2 | Status: SHIPPED | OUTPATIENT
Start: 2019-08-30 | End: 2020-04-15 | Stop reason: SDUPTHER

## 2019-08-30 RX ORDER — TELMISARTAN AND HYDROCHLORTHIAZIDE 40; 12.5 MG/1; MG/1
1 TABLET ORAL DAILY
Qty: 90 TAB | Refills: 1 | Status: SHIPPED | OUTPATIENT
Start: 2019-08-30 | End: 2020-02-26

## 2019-08-30 NOTE — TELEPHONE ENCOUNTER
Pt has had OV within the 12 month protocol and lipid panel is current. 9 month supply sent to pharmacy of chol med and 6 months of BP.   Lab Results   Component Value Date/Time    CHOLSTRLTOT 163 02/07/2019 08:55 AM    CHOLSTRLTOT 174 04/28/2016 09:17 AM    LDL 90 02/07/2019 08:55 AM    LDL 95 04/28/2016 09:17 AM    HDL 45 02/07/2019 08:55 AM    HDL 50 04/28/2016 09:17 AM    TRIGLYCERIDE 138 02/07/2019 08:55 AM    TRIGLYCERIDE 146 04/28/2016 09:17 AM       Lab Results   Component Value Date/Time    SODIUM 139 02/07/2019 08:55 AM    SODIUM 140 12/29/2016 09:37 AM    POTASSIUM 4.7 02/07/2019 08:55 AM    POTASSIUM 4.8 12/29/2016 09:37 AM    CHLORIDE 100 02/07/2019 08:55 AM    CHLORIDE 106 12/29/2016 09:37 AM    CO2 24 02/07/2019 08:55 AM    CO2 27 12/29/2016 09:37 AM    GLUCOSE 132 (H) 02/07/2019 08:55 AM    GLUCOSE 123 (H) 12/29/2016 09:37 AM    BUN 22 02/07/2019 08:55 AM    BUN 29 (H) 12/29/2016 09:37 AM    CREATININE 0.94 02/07/2019 08:55 AM    CREATININE 0.90 12/29/2016 09:37 AM    CREATININE 0.86 12/01/2010 12:00 AM    BUNCREATRAT 23 02/07/2019 08:55 AM    BUNCREATRAT 27 12/01/2010 12:00 AM    GLOMRATE >59 12/01/2010 12:00 AM     Lab Results   Component Value Date/Time    ALKPHOSPHAT 89 02/07/2019 08:55 AM    ALKPHOSPHAT 70 12/29/2016 09:37 AM    ASTSGOT 13 02/07/2019 08:55 AM    ASTSGOT 13 12/29/2016 09:37 AM    ALTSGPT 14 02/07/2019 08:55 AM    ALTSGPT 14 12/29/2016 09:37 AM    TBILIRUBIN 0.5 02/07/2019 08:55 AM    TBILIRUBIN 0.5 12/29/2016 09:37 AM

## 2019-10-01 ENCOUNTER — OFFICE VISIT (OUTPATIENT)
Dept: MEDICAL GROUP | Facility: PHYSICIAN GROUP | Age: 69
End: 2019-10-01
Payer: COMMERCIAL

## 2019-10-01 VITALS
HEART RATE: 72 BPM | BODY MASS INDEX: 31.07 KG/M2 | SYSTOLIC BLOOD PRESSURE: 130 MMHG | TEMPERATURE: 99.5 F | WEIGHT: 182 LBS | DIASTOLIC BLOOD PRESSURE: 62 MMHG | HEIGHT: 64 IN | OXYGEN SATURATION: 96 %

## 2019-10-01 DIAGNOSIS — M70.61 TROCHANTERIC BURSITIS OF RIGHT HIP: ICD-10-CM

## 2019-10-01 DIAGNOSIS — J06.9 ACUTE UPPER RESPIRATORY INFECTION: ICD-10-CM

## 2019-10-01 DIAGNOSIS — Z23 NEED FOR VACCINATION: ICD-10-CM

## 2019-10-01 PROCEDURE — 99214 OFFICE O/P EST MOD 30 MIN: CPT | Mod: 25 | Performed by: PHYSICIAN ASSISTANT

## 2019-10-01 PROCEDURE — 90662 IIV NO PRSV INCREASED AG IM: CPT | Performed by: PHYSICIAN ASSISTANT

## 2019-10-01 PROCEDURE — 90471 IMMUNIZATION ADMIN: CPT | Performed by: PHYSICIAN ASSISTANT

## 2019-10-01 PROCEDURE — 90472 IMMUNIZATION ADMIN EACH ADD: CPT | Performed by: PHYSICIAN ASSISTANT

## 2019-10-01 PROCEDURE — 90732 PPSV23 VACC 2 YRS+ SUBQ/IM: CPT | Performed by: PHYSICIAN ASSISTANT

## 2019-10-01 RX ORDER — DOXYCYCLINE HYCLATE 100 MG
100 TABLET ORAL 2 TIMES DAILY
Qty: 14 TAB | Refills: 0 | Status: SHIPPED | OUTPATIENT
Start: 2019-10-01 | End: 2019-10-08

## 2019-10-01 NOTE — PROGRESS NOTES
"Subjective:   Tena Romo is a 69 y.o. female here today for cold symptoms. Is an established patient of mine.    HPI:    Patient presents to the office today with complaints of cold symptoms.  Onset was about 2 weeks ago.  Since that time, she has had \"teary\" eyes, runny nose, and a nonproductive cough.  She has never had any fever, shortness of breath, or GI symptoms to include vomiting or diarrhea.  She thought at first she had allergies so she has been taking Claritin which hasn't seemed to help at all. She comments that she feels better today. What prompted her visit today is her 's concern for possible Hantavirus.  She states that 3 weeks ago, she was outside and was going to empty out a box when she noticed that there were dead mice inside.  She did not get close to the mice, did not touch the mice.  She immediately set the boxes aside and went inside the house.    She also has concern for right-sided hip pain.  This has been going on for a while and is starting to really bother her.  She feels the pain on the side of her right upper thigh.  It does limit her activity quite a bit.    Current medicines (including changes today)  Current Outpatient Medications   Medication Sig Dispense Refill   • doxycycline (VIBRAMYCIN) 100 MG Tab Take 1 Tab by mouth 2 times a day for 7 days. 14 Tab 0   • simvastatin (ZOCOR) 40 MG Tab TAKE ONE TABLET BY MOUTH ONCE DAILY IN THE EVENING 90 Tab 2   • telmisartan-hydrochlorothiazide (MICARDIS HCT) 40-12.5 MG per tablet Take 1 Tab by mouth every day. 90 Tab 1   • anastrozole (ARIMIDEX) 1 MG Tab Take 1 Tab by mouth every day. 90 Tab 1   • VITAMIN D, ERGOCALCIFEROL, PO Take  by mouth.     • clobetasol (TEMOVATE) 0.05 % Ointment Apply 1 Drop to affected area(s) 1 time daily as needed. 1 Tube 6   • Blood Glucose Monitoring Suppl (ONE TOUCH ULTRA MINI) W/DEVICE KIT      • Barium Sulfate (TAGITOL V) 40 % Suspension On day prior to procedure, drink 1 bottle at 8 am, 1 " "bottle at noon, and 1 bottle at 6 pm (Patient not taking: Reported on 8/13/2019) 3 Bottle 0   • bisacodyl (DULCOLAX) 5 MG EC tablet On day prior to procedure, take 3 tablets by mouth at 9 pm. Do not chew or dissolve tablets; take whole. (Patient not taking: Reported on 8/13/2019) 3 Tab 0   • bisacodyl (DULCOLAX) 10 MG Suppos On day of procedure, insert rectally 2 hours prior to procedure (Patient not taking: Reported on 8/13/2019) 1 Suppository 0   • Blood Glucose Monitoring Suppl Device Meter: Dispense Device of Insurance Preference. Sig. Use as directed for blood sugar monitoring. #1. NR. (Patient not taking: Reported on 8/13/2019) 1 Device 0   • Blood Glucose Monitoring Suppl Supplies Misc Test strips order: Test strips for insurance-preferred meter. Sig: use every morning before breakfast and prn ssx high or low sugar (Patient not taking: Reported on 8/13/2019) 100 Strip 3   • Lancets Misc Lancets order: Lancets for insurance-preferred meter. Sig: use every morning before breakfast and prn ssx high or low sugar. (Patient not taking: Reported on 8/13/2019) 100 Each 3   • ONETOUCH DELICA LANCETS 33G Misc USE TO TEST BLOOD SUGAR IN THE MORNING (Patient not taking: Reported on 8/13/2019) 100 Each 1   • glucose blood (ONE TOUCH ULTRA TEST) strip 1 Strip by Other route every day. (Patient not taking: Reported on 8/13/2019) 100 Strip 3     No current facility-administered medications for this visit.      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    ROS  As per HPI.     Objective:     /62 (BP Location: Right arm, Patient Position: Sitting, BP Cuff Size: Adult)   Pulse 72   Temp 37.5 °C (99.5 °F)   Ht 1.62 m (5' 3.78\")   Wt 82.6 kg (182 lb)   SpO2 96%  Body mass index is 31.46 kg/m².     Physical " Exam:  Constitutional: Alert, well-appearing, very pleasant, no distress.  Skin: Warm, dry, good turgor, no rashes in visible areas.  Eye: Pupils are equal and round, conjunctiva clear, lids normal.  ENMT: External ear canals are clear without erythema, edema, or drainage.  Tympanic membranes are pearly gray bilaterally and without injection or bulging.  Nasal turbinates appear noninflamed, noninjected with no visible rhinorrhea.  Lips without lesions, moist mucus membranes.  No pharyngeal erythema.  Neck: No masses. No submandibular or cervical lymphadenopathy.  Respiratory: Unlabored respiratory effort, SPO2 96% on room air.  Lungs clear to auscultation, no wheezes, no rhonchi.  Cardiovascular: Normal S1, S2, no murmur.  Musculoskeletal: Localized tenderness to palpation over right greater trochanteric bursa. +pain with active R hip ROM.       Assessment and Plan:   The following treatment plan was discussed    1. Acute upper respiratory infection  New problem, uncontrolled but improving.  Patient has had 2 weeks of typical cold symptoms.  Reassured that I am not concerned at this time for hantavirus given her clinical presentation.  We discussed that hantavirus has a prodromal phase, during which cough is actually atypical.  She has had a cough the entire time.  She also has not had any progression of her symptoms, which is also atypical of hantavirus which is typically rapidly progressive after the prodromal phase.  She actually had commented that she is feeling better today.  Her exposure is also not concerning.  It does not sound like she got close enough to the mice to have significant respiratory exposure and was not in an enclosed space, but rather outside.  She likely has a viral URI.  I have given her prescription for doxycycline with instruction to only fill if she does not feel that things are continuing to improve over the next 3 days.  Of course if she were to have significant worsening of her  symptoms including any shortness of breath, should go to the hospital for evaluation.  - doxycycline (VIBRAMYCIN) 100 MG Tab; Take 1 Tab by mouth 2 times a day for 7 days.  Dispense: 14 Tab; Refill: 0    2. Trochanteric bursitis of right hip  New problem, uncontrolled.  Exam is most consistent with trochanteric bursitis.  We discussed that injection can be helpful to help alleviate pain.  I do not perform this myself, so will refer to sports med for further evaluation and treatment.  - REFERRAL TO SPORTS MEDICINE    3. Need for vaccination  - Pneumococal Polysaccharide Vaccine 23-Valent =>1YO SQ/IM  - INFLUENZA VACCINE, HIGH DOSE (65+ ONLY)      Followup: Return if symptoms worsen or fail to improve.    Heather Thayer P.A.-C.

## 2019-10-01 NOTE — PATIENT INSTRUCTIONS
Bursitis  Introduction  Bursitis is inflammation and irritation of a bursa, which is one of the small, fluid-filled sacs that cushion and protect the moving parts of your body. These sacs are located between bones and muscles, muscle attachments, or skin areas next to bones. A bursa protects these structures from the wear and tear that results from frequent movement.  An inflamed bursa causes pain and swelling. Fluid may build up inside the sac. Bursitis is most common near joints, especially the knees, elbows, hips, and shoulders.  What are the causes?  Bursitis can be caused by:  · Injury from:  ¨ A direct blow, like falling on your knee or elbow.  ¨ Overuse of a joint (repetitive stress).  · Infection. This can happen if bacteria gets into a bursa through a cut or scrape near a joint.  · Diseases that cause joint inflammation, such as gout and rheumatoid arthritis.  What increases the risk?  You may be at risk for bursitis if you:  · Have a job or hobby that involves a lot of repetitive stress on your joints.  · Have a condition that weakens your body's defense system (immune system), such as diabetes, cancer, or HIV.  · Lift and reach overhead often.  · Kneel or lean on hard surfaces often.  · Run or walk often.  What are the signs or symptoms?  The most common signs and symptoms of bursitis are:  · Pain that gets worse when you move the affected body part or put weight on it.  · Inflammation.  · Stiffness.  Other signs and symptoms may include:  · Redness.  · Tenderness.  · Warmth.  · Pain that continues after rest.  · Fever and chills. This may occur in bursitis caused by infection.  How is this diagnosed?  Bursitis may be diagnosed by:  · Medical history and physical exam.  · MRI.  · A procedure to drain fluid from the bursa with a needle (aspiration). The fluid may be checked for signs of infection or gout.  · Blood tests to rule out other causes of inflammation.  How is this treated?  Bursitis can usually  be treated at home with rest, ice, compression, and elevation (RICE). For mild bursitis, RICE treatment may be all you need. Other treatments may include:  · Nonsteroidal anti-inflammatory drugs (NSAIDs) to treat pain and inflammation.  · Corticosteroids to fight inflammation. You may have these drugs injected into and around the area of bursitis.  · Aspiration of bursitis fluid to relieve pain and improve movement.  · Antibiotic medicine to treat an infected bursa.  · A splint, brace, or walking aid.  · Physical therapy if you continue to have pain or limited movement.  · Surgery to remove a damaged or infected bursa. This may be needed if you have a very bad case of bursitis or if other treatments have not worked.  Follow these instructions at home:  · Take medicines only as directed by your health care provider.  · If you were prescribed an antibiotic medicine, finish it all even if you start to feel better.  · Rest the affected area as directed by your health care provider.  ¨ Keep the area elevated.  ¨ Avoid activities that make pain worse.  · Apply ice to the injured area:  ¨ Place ice in a plastic bag.  ¨ Place a towel between your skin and the bag.  ¨ Leave the ice on for 20 minutes, 2-3 times a day.  · Use splints, braces, pads, or walking aids as directed by your health care provider.  · Keep all follow-up visits as directed by your health care provider. This is important.  How is this prevented?  · Wear knee pads if you kneel often.  · Wear sturdy running or walking shoes that fit you well.  · Take regular breaks from repetitive activity.  · Warm up by stretching before doing any strenuous activity.  · Maintain a healthy weight or lose weight as recommended by your health care provider. Ask your health care provider if you need help.  · Exercise regularly. Start any new physical activity gradually.  Contact a health care provider if:  · Your bursitis is not responding to treatment or home care.  · You  have a fever.  · You have chills.  This information is not intended to replace advice given to you by your health care provider. Make sure you discuss any questions you have with your health care provider.  Document Released: 12/15/2001 Document Revised: 05/25/2017 Document Reviewed: 03/09/2015  © 2017 [employer]

## 2019-10-17 ENCOUNTER — OFFICE VISIT (OUTPATIENT)
Dept: MEDICAL GROUP | Facility: CLINIC | Age: 69
End: 2019-10-17
Payer: COMMERCIAL

## 2019-10-17 VITALS
WEIGHT: 182 LBS | BODY MASS INDEX: 31.07 KG/M2 | TEMPERATURE: 98.5 F | SYSTOLIC BLOOD PRESSURE: 118 MMHG | HEIGHT: 64 IN | HEART RATE: 70 BPM | OXYGEN SATURATION: 97 % | RESPIRATION RATE: 16 BRPM | DIASTOLIC BLOOD PRESSURE: 74 MMHG

## 2019-10-17 DIAGNOSIS — M70.61 TROCHANTERIC BURSITIS, RIGHT HIP: ICD-10-CM

## 2019-10-17 DIAGNOSIS — M76.01 GLUTEAL TENDINITIS, RIGHT HIP: ICD-10-CM

## 2019-10-17 PROCEDURE — 99203 OFFICE O/P NEW LOW 30 MIN: CPT | Performed by: FAMILY MEDICINE

## 2019-10-17 ASSESSMENT — ENCOUNTER SYMPTOMS
NAUSEA: 0
FEVER: 0
VOMITING: 0
SHORTNESS OF BREATH: 0
CHILLS: 1
DIZZINESS: 0

## 2019-10-17 NOTE — PROGRESS NOTES
Subjective:      Tena Romo is a 69 y.o. female who presents with Hip Pain (Referral from PCP/ R hip pain )     Referred by Heather Thayer P.A.-C. for evaluation of RIGHT hip pain    HPI   RIGHT hip pain  Hip pain is predominantly at the lateral aspect of the hip  Pain is intermittent, improved with pain patch  Improved with rest  Worse with ambulation and flexion of the RIGHT hip  Insidious onset without injury, on and off for the past 5 years (2015 roughly)  Works as a , walks with 3 to 4 miles per day  Aleve which helps, but causes GI issues  Now none prior injuries or issues with the hip  POSITIVE night symptoms, difficulty sleeping on the RIGHT side  Imaging was performed back in February 2017 of BOTH hips and she was advised she had osteoarthritis at that time    Works as a   Mostly walking for activity    Review of Systems   Constitutional: Positive for chills. Negative for fever.   Respiratory: Negative for shortness of breath.    Cardiovascular: Negative for chest pain.   Gastrointestinal: Negative for nausea and vomiting.   Neurological: Negative for dizziness.     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  MEDS:   Current Outpatient Medications:   •  simvastatin (ZOCOR) 40 MG Tab, TAKE ONE TABLET BY MOUTH ONCE DAILY IN THE EVENING, Disp: 90 Tab, Rfl: 2  •  telmisartan-hydrochlorothiazide (MICARDIS HCT) 40-12.5 MG per tablet, Take 1 Tab by mouth every day., Disp: 90 Tab, Rfl: 1  •  anastrozole (ARIMIDEX) 1 MG Tab, Take 1 Tab by mouth every day., Disp: 90 Tab, Rfl: 1  •  Barium Sulfate (TAGITOL V) 40 % Suspension, On day prior to procedure, drink 1 bottle at 8 am, 1 bottle at noon, and 1 bottle at 6 pm (Patient not taking: Reported on 8/13/2019), Disp: 3  Bottle, Rfl: 0  •  bisacodyl (DULCOLAX) 5 MG EC tablet, On day prior to procedure, take 3 tablets by mouth at 9 pm. Do not chew or dissolve tablets; take whole. (Patient not taking: Reported on 8/13/2019), Disp: 3 Tab, Rfl: 0  •  bisacodyl (DULCOLAX) 10 MG Suppos, On day of procedure, insert rectally 2 hours prior to procedure (Patient not taking: Reported on 8/13/2019), Disp: 1 Suppository, Rfl: 0  •  VITAMIN D, ERGOCALCIFEROL, PO, Take  by mouth., Disp: , Rfl:   •  Blood Glucose Monitoring Suppl Device, Meter: Dispense Device of Insurance Preference. Sig. Use as directed for blood sugar monitoring. #1. NR. (Patient not taking: Reported on 8/13/2019), Disp: 1 Device, Rfl: 0  •  Blood Glucose Monitoring Suppl Supplies Misc, Test strips order: Test strips for insurance-preferred meter. Sig: use every morning before breakfast and prn ssx high or low sugar (Patient not taking: Reported on 8/13/2019), Disp: 100 Strip, Rfl: 3  •  Lancets Misc, Lancets order: Lancets for insurance-preferred meter. Sig: use every morning before breakfast and prn ssx high or low sugar. (Patient not taking: Reported on 8/13/2019), Disp: 100 Each, Rfl: 3  •  clobetasol (TEMOVATE) 0.05 % Ointment, Apply 1 Drop to affected area(s) 1 time daily as needed., Disp: 1 Tube, Rfl: 6  •  ONETOUCH DELICA LANCETS 33G Misc, USE TO TEST BLOOD SUGAR IN THE MORNING (Patient not taking: Reported on 8/13/2019), Disp: 100 Each, Rfl: 1  •  glucose blood (ONE TOUCH ULTRA TEST) strip, 1 Strip by Other route every day. (Patient not taking: Reported on 8/13/2019), Disp: 100 Strip, Rfl: 3  •  Blood Glucose Monitoring Suppl (ONE TOUCH ULTRA MINI) W/DEVICE KIT, , Disp: , Rfl:   ALLERGIES:   Allergies   Allergen Reactions   • Aspirin      Intolerance     • Cipro Xr Hives   • Diclofenac    • Metformin Rash     Erythematous rash     SURGHX:   Past Surgical History:   Procedure Laterality Date   • SHOULDER ARTHROSCOPY W/ ROTATOR CUFF REPAIR  2/9/2012    Performed by  "JENNY LIMON at [facility]   • SHOULDER DECOMPRESSION ARTHROSCOPIC  2/9/2012    Performed by JENNY LIMON at [facility]   • CLAVICLE DISTAL EXCISION  2/9/2012    Performed by JENNY LIMON at [facility]   • BUNIONECTOMY  2009    bilateral   • ABDOMINAL HYSTERECTOMY TOTAL  2001    endometriosis   • CHOLECYSTECTOMY  1999    laparoscopy   • MASTECTOMY     • TONSILLECTOMY  as child     SOCHX:  reports that she has never smoked. She has never used smokeless tobacco. She reports that she does not drink alcohol or use drugs.  FH: Family history was reviewed, no pertinent findings to report     Objective:     /74 (BP Location: Left arm, Patient Position: Sitting, BP Cuff Size: Adult)   Pulse 70   Temp 36.9 °C (98.5 °F) (Temporal)   Resp 16   Ht 1.62 m (5' 3.78\")   Wt 82.6 kg (182 lb)   SpO2 97%   BMI 31.46 kg/m²      Physical Exam     HIP EXAM:  NORMAL gait    Right hip: Range of motion is intact  NEGATIVE pain with internal rotation  andrew's test is NEGATIVE  POSITIVE tenderness of the trochanteric bursa  POSITIVE tenderness of the gluteus medius  Leticia's test is NEGATIVE    Left hip: Range of motion is intact  NEGATIVE pain with internal rotation  andrew's test is NEGATIVE  POSITIVE tenderness of the trochanteric bursa  NO tenderness of the gluteus medius  Leticia's test is NEGATIVE       Assessment/Plan:     1. Trochanteric bursitis, right hip  REFERRAL TO PHYSICAL THERAPY Reason for Therapy: Eval/Treat/Report   2. Gluteal tendinitis, right hip       RIGHT trochanteric bursitis and gluteal tendinitis  Provided with home exercise program  Referral for formal physical therapy    Offered corticosteroid injection of the RIGHT trochanteric bursa, patient would like to try exercises first to see if that helps    Return in about 4 weeks (around 11/14/2019).  To see how she is doing with home exercises and formal physical therapy  If symptoms persist consider trochanteric " bursa injection of the RIGHT hip        2/10/2017 4:25 PM    HISTORY/REASON FOR EXAM:  Atraumatic Pelvic/Hip Pain.  Bilateral hip pain for 6 months    TECHNIQUE/EXAM DESCRIPTION AND NUMBER OF VIEWS:  3 views of the bilateral hips.    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  Degenerative change of lower lumbar spine.  Postoperative changes suggesting prior pelvic sling.  Mild loss of joint space in both hips at the weightbearing portion, with osteophyte formation.  Visualized proximal femurs are intact and normally located.  Pelvis and sacrum are intact.      Impression       1.  No hip or pelvic fracture.  2.  Mild to moderate degenerative change of both hips.  3.  Transitional vertebral anatomy.     Interpreted in the office today with the patient    Thank you Heather Thayer P.A.-C. for allowing me to participate in caring for your patient.

## 2019-10-24 ENCOUNTER — OFFICE VISIT (OUTPATIENT)
Dept: MEDICAL GROUP | Facility: CLINIC | Age: 69
End: 2019-10-24
Payer: COMMERCIAL

## 2019-10-24 ENCOUNTER — HOSPITAL ENCOUNTER (OUTPATIENT)
Facility: MEDICAL CENTER | Age: 69
End: 2019-10-24
Attending: FAMILY MEDICINE
Payer: COMMERCIAL

## 2019-10-24 VITALS — WEIGHT: 182 LBS | BODY MASS INDEX: 31.07 KG/M2 | HEIGHT: 64 IN

## 2019-10-24 DIAGNOSIS — R30.0 DYSURIA: ICD-10-CM

## 2019-10-24 DIAGNOSIS — M70.61 TROCHANTERIC BURSITIS, RIGHT HIP: ICD-10-CM

## 2019-10-24 DIAGNOSIS — N30.01 ACUTE CYSTITIS WITH HEMATURIA: ICD-10-CM

## 2019-10-24 LAB
APPEARANCE UR: CLEAR
BILIRUB UR STRIP-MCNC: NEGATIVE MG/DL
COLOR UR AUTO: YELLOW
GLUCOSE UR STRIP.AUTO-MCNC: NEGATIVE MG/DL
KETONES UR STRIP.AUTO-MCNC: NEGATIVE MG/DL
LEUKOCYTE ESTERASE UR QL STRIP.AUTO: NORMAL
NITRITE UR QL STRIP.AUTO: NEGATIVE
PH UR STRIP.AUTO: 5 [PH] (ref 5–8)
PROT UR QL STRIP: NEGATIVE MG/DL
RBC UR QL AUTO: NORMAL
SP GR UR STRIP.AUTO: 1
UROBILINOGEN UR STRIP-MCNC: 0.2 MG/DL

## 2019-10-24 PROCEDURE — 87086 URINE CULTURE/COLONY COUNT: CPT

## 2019-10-24 PROCEDURE — 20610 DRAIN/INJ JOINT/BURSA W/O US: CPT | Performed by: FAMILY MEDICINE

## 2019-10-24 PROCEDURE — 81002 URINALYSIS NONAUTO W/O SCOPE: CPT | Performed by: FAMILY MEDICINE

## 2019-10-24 PROCEDURE — 99213 OFFICE O/P EST LOW 20 MIN: CPT | Mod: 25 | Performed by: FAMILY MEDICINE

## 2019-10-24 RX ORDER — TRIAMCINOLONE ACETONIDE 40 MG/ML
40 INJECTION, SUSPENSION INTRA-ARTICULAR; INTRAMUSCULAR ONCE
Status: COMPLETED | OUTPATIENT
Start: 2019-10-24 | End: 2019-10-24

## 2019-10-24 RX ORDER — NITROFURANTOIN 25; 75 MG/1; MG/1
100 CAPSULE ORAL EVERY 12 HOURS
Qty: 10 CAP | Refills: 0 | Status: SHIPPED | OUTPATIENT
Start: 2019-10-24 | End: 2019-10-29

## 2019-10-24 RX ADMIN — TRIAMCINOLONE ACETONIDE 40 MG: 40 INJECTION, SUSPENSION INTRA-ARTICULAR; INTRAMUSCULAR at 09:33

## 2019-10-24 NOTE — PROGRESS NOTES
"  Chief Complaint   Patient presents with   • Hip Pain     F/V R hip pain. U/S injection        Patient originally here for RIGHT trochanteric bursa corticosteroid injection, UNFORTUNATELY, she has NEW complaint, dysuria in addition to a visit for her procedure    HPI:  2 days of sxs  Current symptoms: Painful, urgent, frequent voids. No blood noted in urine.  Since onset symptoms are: Unchanged  Treatments tried: OTC antispasm med.  Associated symptoms: Negative for fever, flank pain, nausea and vomiting, vaginal discharge, pelvic pain.  History is positive for frequent UTI.     ROS:  Denies fever, chills, vomiting or abdominal pain.     OBJECTIVE:  Ht 1.62 m (5' 3.78\")   Wt 82.6 kg (182 lb)   Gen: Alert, NAD.  Chest: Lungs clear to auscultation, CV RRR.  Abdomen: Soft, tender in suprapubic region. No CVAT. Normal bowel sounds.     POC Color Negative yellow  YELLOW    POC Appearance Negative clear  CLEAR    POC Leukocyte Esterase Negative trace  NEG    POC Nitrites Negative negative  NEG    POC Urobiligen Negative (0.2) mg/dL 0.2  NEG    POC Protein Negative mg/dL negative  NEG    POC Urine PH 5.0 - 8.0 5.0  6.0    POC Blood Negative small  NEG    POC Specific Gravity <1.005 - >1.030 1.005  1.020    POC Ketones Negative mg/dL negative  NEG    POC Bilirubin Negative mg/dL negative  NEG    POC Glucose Negative mg/dL negative  NEG        ASSESSMENT/PLAN:     1. Trochanteric bursitis, right hip  triamcinolone acetonide (KENALOG-40) injection 40 mg   2. Dysuria  POCT Urinalysis    URINE CULTURE(NEW)   3. Acute cystitis with hematuria  nitrofurantoin monohyd macro (MACROBID) 100 MG Cap     1. Abnormal urine dipstick in office. Urine sent for culture. Start antibiotics.  2. Provided education to drink plenty of fluids, wipe front to back every void and bowel movement.   3. Return to clinic if symptoms not improving within 3-4 days or in case of vomiting, fever, increasing pain.  "

## 2019-10-25 DIAGNOSIS — R30.0 DYSURIA: ICD-10-CM

## 2019-10-27 LAB
BACTERIA UR CULT: NORMAL
SIGNIFICANT IND 70042: NORMAL
SITE SITE: NORMAL
SOURCE SOURCE: NORMAL

## 2019-11-21 ENCOUNTER — OFFICE VISIT (OUTPATIENT)
Dept: MEDICAL GROUP | Facility: CLINIC | Age: 69
End: 2019-11-21
Payer: COMMERCIAL

## 2019-11-21 VITALS
SYSTOLIC BLOOD PRESSURE: 118 MMHG | OXYGEN SATURATION: 97 % | TEMPERATURE: 98.2 F | DIASTOLIC BLOOD PRESSURE: 76 MMHG | WEIGHT: 182 LBS | HEIGHT: 64 IN | RESPIRATION RATE: 16 BRPM | BODY MASS INDEX: 31.07 KG/M2 | HEART RATE: 85 BPM

## 2019-11-21 DIAGNOSIS — M76.01 GLUTEAL TENDINITIS, RIGHT HIP: ICD-10-CM

## 2019-11-21 DIAGNOSIS — M70.61 TROCHANTERIC BURSITIS, RIGHT HIP: ICD-10-CM

## 2019-11-21 PROCEDURE — 99213 OFFICE O/P EST LOW 20 MIN: CPT | Performed by: FAMILY MEDICINE

## 2019-11-21 NOTE — PROGRESS NOTES
"Subjective:      Tena Romo is a 69 y.o. female who presents with Hip Pain (Referral from PCP/ R hip pain )     Referred by Heather Thayer P.A.-C. for evaluation of RIGHT hip pain    HPI   RIGHT hip pain  Hip pain is predominantly at the lateral aspect of the hip  improved after trochanteric injection done 10/24/19  Worse with ambulation and flexion of the RIGHT hip  Insidious onset without injury, on and off for the past 5 years (2015 roughly)    Aleve which helps, but causes GI issues    POSITIVE night symptoms, difficulty sleeping on the RIGHT side  Imaging was performed back in February 2017 of BOTH hips and she was advised she had osteoarthritis at that time    Works as a   Mostly walking for activity     Objective:     /76 (BP Location: Left arm, Patient Position: Sitting, BP Cuff Size: Adult)   Pulse 85   Temp 36.8 °C (98.2 °F) (Temporal)   Resp 16   Ht 1.62 m (5' 3.78\")   Wt 82.6 kg (182 lb)   SpO2 97%   BMI 31.46 kg/m²     Physical Exam     HIP EXAM:  NORMAL gait    Right hip: Range of motion is intact     Assessment/Plan:     1. Trochanteric bursitis, right hip  REFERRAL TO PHYSICAL THERAPY Reason for Therapy: Eval/Treat/Report   2. Gluteal tendinitis, right hip  REFERRAL TO PHYSICAL THERAPY Reason for Therapy: Eval/Treat/Report     RIGHT trochanteric bursitis and gluteal tendinitis  improved after trochanteric injection done 10/24/19  Continue home exercise program  Referral for formal physical therapy [facility] to [facility]     Return if symptoms worsen or fail to improve.          2/10/2017 4:25 PM    HISTORY/REASON FOR EXAM:  Atraumatic Pelvic/Hip Pain.  Bilateral hip pain for 6 months    TECHNIQUE/EXAM DESCRIPTION AND NUMBER OF VIEWS:  3 views of the bilateral hips.    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  Degenerative change of lower lumbar spine.  Postoperative " changes suggesting prior pelvic sling.  Mild loss of joint space in both hips at the weightbearing portion, with osteophyte formation.  Visualized proximal femurs are intact and normally located.  Pelvis and sacrum are intact.      Impression       1.  No hip or pelvic fracture.  2.  Mild to moderate degenerative change of both hips.  3.  Transitional vertebral anatomy.      Thank you Heather Thayer P.A.-C. for allowing me to participate in caring for your patient.

## 2019-12-05 ENCOUNTER — TELEPHONE (OUTPATIENT)
Dept: MEDICAL GROUP | Facility: PHYSICIAN GROUP | Age: 69
End: 2019-12-05

## 2019-12-05 DIAGNOSIS — Z12.12 SCREENING FOR COLORECTAL CANCER: ICD-10-CM

## 2019-12-05 DIAGNOSIS — Z12.11 SCREENING FOR COLORECTAL CANCER: ICD-10-CM

## 2019-12-05 NOTE — TELEPHONE ENCOUNTER
Pt presented  To office a few weeks ago requesting her virtual colonoscopy orders be sent to [facility] instead of [place] so that she would not need to drive to [place]. After sending orders to [facility], they sent a fax back stating they do not perform this test. Pt will need to go to [place]. I have called Pt and LVM for her to .

## 2019-12-09 NOTE — TELEPHONE ENCOUNTER
Does she have any family history of colon cancer? If so, should have colonoscopy. If not, I can order her a Cologuard which is a noninvasive screening test for colon cancer that she can do at home by collecting her stool. Is she OK with having this done?  Heather Thayer P.A.-C.

## 2019-12-09 NOTE — TELEPHONE ENCOUNTER
Pt informed that [facility] does not do virtual colonoscopy. She states she will not go to [place] to have it done and refuses to do a colonoscopy. Pt would like to know what she should do about having this done. Please advise.

## 2019-12-12 DIAGNOSIS — Z79.811 USE OF ANASTROZOLE (ARIMIDEX): ICD-10-CM

## 2019-12-12 DIAGNOSIS — Z85.3 HISTORY OF BREAST CANCER: ICD-10-CM

## 2019-12-12 RX ORDER — ANASTROZOLE 1 MG/1
1 TABLET ORAL DAILY
Qty: 90 TAB | Refills: 1 | Status: SHIPPED | OUTPATIENT
Start: 2019-12-12 | End: 2020-05-26

## 2019-12-12 NOTE — TELEPHONE ENCOUNTER
[facility] INPATIENT ENCOUNTER  HEMATOLOGY/ONCOLOGY DAILY PROGRESS NOTE    ADMISSION DATE:  2/23/2017  DATE:  2/26/2017  CURRENT HOSPITAL DAY:  Hospital Day: 4  ATTENDING PHYSICIAN:  Dudley Askew MD  CODE STATUS:  No Resuscitation with Maximal Medical Support    CHIEF COMPLAINT: Chemotherapy, pain control, myeloma    ACTIVE PROBLEMS:  Specialty Problems           ICD-10-CM Noted       Oncology Problems    Multiple myeloma not having achieved remission C90.00 2/15/2017              INTERVAL HISTORY:  Brendon Agarwal is a 73 year old male patient admitted with Generalized weakness [R53.1]  Compression fracture [T14.8]  Multiple myeloma not having achieved remission [C90.00]  Acute renal failure, unspecified acute renal failure type [N17.9]  Atrial fibrillation, unspecified type [I48.91]   He is doing well today.  Pain is well controlled.  Bowels moving with a bowel regimen.  No fevers/infectious symptoms.  No side effects from treatment.  He is considering subacute rehab on discharge.  No stated concerns today except some soreness of the gums related to his dental plates.    MEDICATIONS / ALLERGIES:  Current Facility-Administered Medications   Medication Dose Route Frequency Provider Last Rate Last Dose   • amLODIPine (NORVASC) tablet 5 mg  5 mg Oral Daily Dudley Askew MD   5 mg at 02/26/17 0932   • docusate sodium-sennosides (SENOKOT S) 50-8.6 MG 2 tablet  2 tablet Oral Nightly Joni Montero MD   2 tablet at 02/25/17 2214   • enoxaparin (LOVENOX) injection 40 mg  40 mg Subcutaneous Daily Dudley Askew MD   40 mg at 02/26/17 0932   • prochlorperazine (COMPAZINE) injection 5 mg  5 mg Intravenous Q6H PRN Mercy Langston MD   5 mg at 02/25/17 2217   • [START ON 2/27/2017] dexamethasone (DECADRON) tablet 20 mg  20 mg Oral Daily with breakfast Joni Montero MD       • [START ON 3/3/2017] dexamethasone (DECADRON) tablet 20 mg  20 mg Oral Daily with breakfast Joni Montero MD       • [START ON 3/6/2017] dexamethasone (DECADRON)  Phone Number Called: [phone] (home)     Call outcome: spoke to patient regarding message below    Message: spoke to Tena about her Rx's and she agreed and stated thank you, Also wanted to know when next appt was and told her 2/13/2020 she agreed and ask for a letter sent in the mail with that information will send info.   tablet 20 mg  20 mg Oral Daily with breakfast Joni Montero MD       • acyclovir (ZOVIRAX) capsule 400 mg  400 mg Oral BID Joni Montero MD   400 mg at 02/26/17 0932   • hydroCORTisone (solu-CORTEF) PF injection 100 mg  100 mg Intravenous Once PRN Joni Montero MD       • diphenhydrAMINE (BENADRYL) injection 25 mg  25 mg Intravenous Once PRN Joni Montero MD       • EPINEPHrine (ADRENALIN) injection 0.5 mg  0.5 mg Intramuscular Once PRN Joni Montero MD       • EPINEPHrine (ADRENALIN) injection 0.5 mg  0.5 mg Intravenous Once PRN Joni Montero MD       • prochlorperazine (COMPAZINE) tablet 5 mg  5 mg Oral Q6H PRN Joni Montero MD       • metoPROLOL (LOPRESSOR) tablet 25 mg  25 mg Oral 2 times per day Chica Watt, APNP   25 mg at 02/26/17 0934   • HYDROcodone-acetaminophen (NORCO) 5-325 MG per tablet 2 tablet  2 tablet Oral Q4H PRN Dudley Askew MD   2 tablet at 02/26/17 1057   • pantoprazole (PROTONIX) EC tablet 40 mg  40 mg Oral Nightly Dudley Askew MD   40 mg at 02/25/17 2209   • sodium chloride (PF) 0.9 % injection 2 mL  2 mL Injection 2 times per day Dudley Askew MD   2 mL at 02/23/17 2111   • sodium chloride (PF) 0.9 % injection 2 mL  2 mL Injection PRN Dudley Askew MD       • sodium chloride (NORMAL SALINE) 0.9 % bolus 500 mL  500 mL Intravenous PRN Dudley Askew MD       • nitroGLYcerin (NITROSTAT) sublingual tablet 0.4 mg  0.4 mg Sublingual Q5 Min PRN Dudley Askew MD       • potassium chloride (K-DUR,KLOR-CON) CR tablet 20 mEq  20 mEq Oral Q4H PRN Dudley Askew MD   20 mEq at 02/24/17 1854   • potassium chloride (KLOR-CON) packet 20 mEq  20 mEq Per NG tube Q4H PRN Dudley Askew MD       • potassium chloride 20 mEq/100mL IVPB premix  20 mEq Intravenous Q4H PRN Dudley Askew MD       • potassium chloride (K-DUR,KLOR-CON) CR tablet 40 mEq  40 mEq Oral Q4H PRN Dudley Askew MD   40 mEq at 02/23/17 2105   • potassium chloride (KLOR-CON) packet 40 mEq  40 mEq Per NG tube Q4H PRN Dudley Askew MD       • potassium chloride 20  mEq/100mL IVPB premix  40 mEq Intravenous Q4H PRN Dudley Askew MD       • acetaminophen (TYLENOL) tablet 650 mg  650 mg Oral Q4H PRN Dudley Askew MD       • docusate sodium-sennosides (SENOKOT S) 50-8.6 MG 2 tablet  2 tablet Oral Daily PRN Dudley Askew MD   2 tablet at 02/25/17 1346   • bisacodyl (DULCOLAX) suppository 10 mg  10 mg Rectal Daily PRN Dudley Askew MD       • magnesium hydroxide (MILK OF MAGNESIA) concentrate 2,400 mg  10 mL Oral Daily PRN Dudley Askew MD       • magnesium sulfate 1 g in dextrose 5% 100 mL IVPB premix  1 g Intravenous Daily PRN Dudley Askew MD       • magnesium sulfate 2 g in sodium chloride 0.9% IVPB  2 g Intravenous Daily PRN Dudley Askew MD       • magnesium sulfate 2 g in sodium chloride 0.9% IVPB  2 g Intravenous Q4H PRN Dudley Askew MD       • calcium gluconate 2 g in dextrose 5 % 70 mL total volume IVPB  2 g Intravenous PRN Dudley Askew MD       • dextrose 5 % / sodium chloride 0.45% with KCl 20 mEq infusion   Intravenous Continuous Dudley Askew  mL/hr at 02/26/17 0933     • morphine SR (MS CONTIN) tablet 15 mg  15 mg Oral 2 times per day Dudley Askew MD   15 mg at 02/26/17 0642     ALLERGIES:   Allergen Reactions   • Lipitor [Atorvastatin Calcium] HEADACHES     Severe headaches and fatigue   • Pravachol [Pravastatin Sodium] HEADACHES     Severe headaches and fatigue       HISTORIES:  I have reviewed the past medical history, family history, and social history listed in the medical record as obtained by my nursing staff and support staff and agree with their documentation.    REVIEW OF SYSTEMS:  All other systems are reviewed and are negative except as documented in the history of present illness.    OBJECTIVE:  VITAL SIGNS:  Vital Last Value 24 Hour Range   Temperature 98 °F (36.7 °C) (02/26/17 0749) Temp  Min: 97.4 °F (36.3 °C)  Max: 98.3 °F (36.8 °C)   Pulse 75 (02/26/17 0749) Pulse  Min: 73  Max: 90   Respiratory 17 (02/26/17 0749) Resp  Min: 16  Max: 20   Non-Invasive  Blood Pressure 178/80 (02/26/17  0749) BP  Min: 154/83  Max: 178/80   Pulse Oximetry 97 % (02/26/17 0749) SpO2  Min: 94 %  Max: 98 %     Vital Today Admitted   Weight 67 kg (02/25/17 0653) Weight: 76.9 kg (02/23/17 1039)   Height N/A Height: 5' 9\" (175.3 cm) (02/23/17 1229)   BMI N/A BMI (Calculated): 25.22 (02/23/17 1229)     INTAKE/OUTPUT:  I/O last 3 completed shifts:  In: 1995.5 [P.O.:540; I.V.:1455.5]  Out: 1600 [Urine:1600]    PHYSICAL EXAM:  General: The patient is alert, well-developed, well-nourished, no distress.  Skin: Warm, normal color, normal texture, normal turgor and without rash.  Head: Normocephalic, atraumatic.  Neck: Supple with no significant adenopathy.  Eyes: Normal conjunctivae and sclerae. Pupils equal, round.  Cardiovascular: Regular rate and rhythm, no murmurs, gallops or rubs.  Respiratory: Normal respiratory effort. Clear to auscultation. No wheezes, rales, or rhonchi.  Abdomen: Abdomen is soft and non-tender with active bowel sounds. There is no detected organomegaly, masses, or ascites.  Extremities: No clubbing, no cyanosis, no edema. Normal muscle tone and development bilaterally.  Lymph: No cervical, supraclavicular, axillary, inguinal lymphadenopathy.  Psychiatric: Cooperative. Appropriate mood and affect. Normal judgment.    LABORATORY DATA:    Recent Labs  Lab 02/26/17  0610  02/23/17  1040   WBC 7.0  < > 6.7   RBC 2.56*  < > 3.61*   HGB 7.8*  < > 11.2*   HCT 24.2*  < > 34.1*   MCV 94.5  < > 94.5     < > 301   ANEUT  --   --  4.3   ALYMS  --   --  1.6   MAL  --   --  0.7   AEOS  --   --  0.2   ABASO  --   --  0.0   < > = values in this interval not displayed.    Recent Labs  Lab 02/26/17  0610 02/25/17  0640  02/23/17  1040   GLUCOSE 109* 138*  < > 116*   SODIUM 138 138  < > 140   POTASSIUM 4.4 4.9  < > 3.3*   CHLORIDE 106 105  < > 103   CO2 22 21  < > 24   BUN 25* 25*  < > 48*   CREATININE 1.14 1.16  < > 1.53*   CALCIUM 7.2* 7.8*  < > 8.7   MG  --  2.2  < > 2.2   TOTPROTEIN  --   --   --  11.1*    ALBUMIN  --   --   --  1.6*   AST  --   --   --  15   ALKPT  --   --   --  48   GPT  --   --   --  28   < > = values in this interval not displayed.    Recent Labs  Lab 02/26/17  0610  02/23/17  1040  02/15/17  1750   ANIONGAP 14  < > 16  < > 14   GLOB  --   --  9.5*  --  6.2*   LDH  --   --   --   --  775*   BILIRUBIN  --   --  0.5  --  0.4   < > = values in this interval not displayed.    IMAGING STUDIES:  None new    ASSESSMENT:  1.  Multiple myeloma: He is doing well with velcade/dex. Continue same.  Due for velcade again tomorrow.  Will assess for discharge pending status tomorrow but from my standpoint, could go home or to subacute rehab with further treatment on an poutpatient basis.  Would be due for steroids Tuesday and repeat velcade/steroids on Friday.  2.  Renal insuffuciency: secondary to #1.  Resolved   3.  Anemia: Secondary to #1. Transfuse PRN.  Discussed that Hgb is trending down and he may need repeat transfusion.  4.  Hypercalcemia: Currently resolved after zometa.  Discussed use of zometa and he will contact his dentist.  Discussed ONJ risk--he had extractions in November and has healed so risk is likely minimal at this point.  5.  Atrial flutter: VQ scan low probability for PE. Regular R/R on my exam.  6. Proph:  Discussed ensuring DVT prophylaxis  7. PT/OT to maintain strength  8. Malig pain:  Continue narcotics.  Scheduled bowel regimen ordered due to constipation with improvement.    Discussed above with Dr. Askew.

## 2019-12-12 NOTE — LETTER
December 12, 2019        Tena Romo  [id number] [facility] [place] [address]        Dear Tena:   Your appt  is 2/13/2020 at 10:00am      [facility]  [address] [place] [address], [address]  [facility] [address]  Phone:  [phone]  Fax:  [phone]      If you have any questions or concerns, please don't hesitate to call.        Sincerely,        Elizabeth Rosas A.P.N.    Electronically Signed

## 2020-02-13 ENCOUNTER — OFFICE VISIT (OUTPATIENT)
Dept: HEMATOLOGY ONCOLOGY | Facility: MEDICAL CENTER | Age: 70
End: 2020-02-13
Payer: COMMERCIAL

## 2020-02-13 VITALS
WEIGHT: 183.09 LBS | BODY MASS INDEX: 31.26 KG/M2 | OXYGEN SATURATION: 98 % | RESPIRATION RATE: 16 BRPM | DIASTOLIC BLOOD PRESSURE: 60 MMHG | HEART RATE: 70 BPM | HEIGHT: 64 IN | TEMPERATURE: 99.3 F | SYSTOLIC BLOOD PRESSURE: 122 MMHG

## 2020-02-13 DIAGNOSIS — Z85.3 HISTORY OF BREAST CANCER: ICD-10-CM

## 2020-02-13 DIAGNOSIS — Z79.811 USE OF ANASTROZOLE (ARIMIDEX): ICD-10-CM

## 2020-02-13 PROCEDURE — 99213 OFFICE O/P EST LOW 20 MIN: CPT | Performed by: NURSE PRACTITIONER

## 2020-02-13 RX ORDER — SIMVASTATIN 40 MG
TABLET ORAL
COMMUNITY
Start: 2019-11-25 | End: 2020-04-15

## 2020-02-13 RX ORDER — ANASTROZOLE 1 MG/1
TABLET ORAL
COMMUNITY
Start: 2019-11-25 | End: 2020-04-15

## 2020-02-13 RX ORDER — TELMISARTAN AND HYDROCHLORTHIAZIDE 40; 12.5 MG/1; MG/1
TABLET ORAL
COMMUNITY
Start: 2019-11-30 | End: 2020-02-26

## 2020-02-13 ASSESSMENT — PATIENT HEALTH QUESTIONNAIRE - PHQ9
CLINICAL INTERPRETATION OF PHQ2 SCORE: 4
SUM OF ALL RESPONSES TO PHQ QUESTIONS 1-9: 4
5. POOR APPETITE OR OVEREATING: 0 - NOT AT ALL

## 2020-02-13 ASSESSMENT — ENCOUNTER SYMPTOMS
PALPITATIONS: 0
COUGH: 0
HEADACHES: 0
CHILLS: 0
NAUSEA: 0
VOMITING: 0
DIAPHORESIS: 1
DIZZINESS: 1
FEVER: 0
MYALGIAS: 0
DIARRHEA: 0
WEIGHT LOSS: 0
CONSTIPATION: 1
SHORTNESS OF BREATH: 0
INSOMNIA: 0

## 2020-02-13 ASSESSMENT — PAIN SCALES - GENERAL: PAINLEVEL: NO PAIN

## 2020-02-13 NOTE — PROGRESS NOTES
Subjective:      Tena Romo is a 69 y.o. female who presents for 6 month follow up for breast cancer.         HPI    Patient seen today in follow up for bilateral breast cancer. She presents unaccompanied for today's visit.     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  Per Dr. Tinoco's note on February 13, 2019: Left medial showed 0.5 cm moderately differentiated invasive ductal carcinoma, margins positive medial, grade 2, ER 99%, TN 97%, Ki67 26%, HER2 by IHC [id number].  Left medial/lateral showed invasive ductal carcinoma measuring about 0.7 cm, grade 2 moderate differentiated, margins negative,distance from closest margin 0.3 cm from superior and inferior.  [id number]. ER 99%, TN 100%, Her 2 was not completed, margins clear, 2 LN negative.  Per Dr. Tinoco Oncotype DX was requested on 11/3/2015 and not sent.  Dr. Tinoco did discuss with patient at visit last year to send out an Oncotype however patient refused.  She was started on anastrozole 1 mg p.o. daily on December 3, 2015.    Patient is doing very well and tolerating anastrozole well.  She states she has hot flashes every once in a while but not very often.  She does have some fatigue but states it is related to stress at work.  She denies fevers chills or weight loss.  Her appetite is stable.  She denies any coughing or shortness of breath.  She does have some generalized chest wall pain tenderness from her previous mastectomy but no cardiac chest pain noted.  She also has intermittent constipation at times but manages it with hydration and stool softeners as needed.  She denies any nausea or vomiting.  She voids without difficulty denies any pain otherwise.  She may note some dizziness when getting up too fast but denies headaches.  Patient did confirm that she takes vitamin D and calcium supplements only every once in a  while.    Last DEXA scan completed on February 27, 2019 showed bone mineral density to be within normal limits.    Allergies   Allergen Reactions   • Aspirin      Intolerance     • Cipro Xr Hives   • Diclofenac    • Metformin Rash     Erythematous rash     Current Outpatient Medications on File Prior to Visit   Medication Sig Dispense Refill   • anastrozole (ARIMIDEX) 1 MG Tab Take 1 Tab by mouth every day. 90 Tab 1   • simvastatin (ZOCOR) 40 MG Tab TAKE ONE TABLET BY MOUTH ONCE DAILY IN THE EVENING 90 Tab 2   • telmisartan-hydrochlorothiazide (MICARDIS HCT) 40-12.5 MG per tablet Take 1 Tab by mouth every day. 90 Tab 1   • VITAMIN D, ERGOCALCIFEROL, PO Take  by mouth.     • clobetasol (TEMOVATE) 0.05 % Ointment Apply 1 Drop to affected area(s) 1 time daily as needed. 1 Tube 6   • anastrozole (ARIMIDEX) 1 MG Tab      • simvastatin (ZOCOR) 40 MG Tab      • telmisartan-hydrochlorothiazide (MICARDIS HCT) 40-12.5 MG per tablet      • Barium Sulfate (TAGITOL V) 40 % Suspension On day prior to procedure, drink 1 bottle at 8 am, 1 bottle at noon, and 1 bottle at 6 pm (Patient not taking: Reported on 8/13/2019) 3 Bottle 0   • bisacodyl (DULCOLAX) 5 MG EC tablet On day prior to procedure, take 3 tablets by mouth at 9 pm. Do not chew or dissolve tablets; take whole. (Patient not taking: Reported on 8/13/2019) 3 Tab 0   • bisacodyl (DULCOLAX) 10 MG Suppos On day of procedure, insert rectally 2 hours prior to procedure (Patient not taking: Reported on 8/13/2019) 1 Suppository 0   • Blood Glucose Monitoring Suppl Device Meter: Dispense Device of Insurance Preference. Sig. Use as directed for blood sugar monitoring. #1. NR. (Patient not taking: Reported on 8/13/2019) 1 Device 0   • Blood Glucose Monitoring Suppl Supplies Misc Test strips order: Test strips for insurance-preferred meter. Sig: use every morning before breakfast and prn ssx high or low sugar (Patient not taking: Reported on 8/13/2019) 100 Strip 3   • Lancets Misc  "Lancets order: Lancets for insurance-preferred meter. Sig: use every morning before breakfast and prn ssx high or low sugar. (Patient not taking: Reported on 8/13/2019) 100 Each 3   • ONETOUCH DELICA LANCETS 33G Misc USE TO TEST BLOOD SUGAR IN THE MORNING (Patient not taking: Reported on 8/13/2019) 100 Each 1   • glucose blood (ONE TOUCH ULTRA TEST) strip 1 Strip by Other route every day. (Patient not taking: Reported on 8/13/2019) 100 Strip 3   • Blood Glucose Monitoring Suppl (ONE TOUCH ULTRA MINI) W/DEVICE KIT        No current facility-administered medications on file prior to visit.          Review of Systems   Constitutional: Positive for diaphoresis (every once in a while) and malaise/fatigue (d/t stress at work). Negative for chills, fever and weight loss.   Respiratory: Negative for cough and shortness of breath.    Cardiovascular: Positive for chest pain (tenderness of the chest wall from previous mastectomy). Negative for palpitations and leg swelling.   Gastrointestinal: Positive for constipation (intermittently at times and patient manages it with stool softeners as needed). Negative for diarrhea, nausea and vomiting.   Genitourinary: Negative for dysuria.   Musculoskeletal: Negative for myalgias.   Neurological: Positive for dizziness (when getting up too fast). Negative for headaches.   Psychiatric/Behavioral: The patient does not have insomnia.           Objective:     /60 (BP Location: Right arm, Patient Position: Sitting, BP Cuff Size: Adult)   Pulse 70   Temp 37.4 °C (99.3 °F) (Temporal)   Resp 16   Ht 1.626 m (5' 4\")   Wt 83 kg (183 lb 1.5 oz)   SpO2 98%   BMI 31.43 kg/m²      Physical Exam  Vitals signs reviewed.   Constitutional:       General: She is not in acute distress.     Appearance: Normal appearance. She is not diaphoretic.   HENT:      Head: Normocephalic and atraumatic.      Mouth/Throat:      Mouth: Mucous membranes are moist.      Pharynx: Oropharynx is clear. "   Cardiovascular:      Rate and Rhythm: Normal rate and regular rhythm.      Pulses: Normal pulses.      Heart sounds: Normal heart sounds. No murmur. No friction rub. No gallop.    Pulmonary:      Effort: Pulmonary effort is normal. No respiratory distress.      Breath sounds: Normal breath sounds. No wheezing.   Chest:      Chest wall: Tenderness (mild tenderness at previous mastectomy sites) present. No mass or swelling.      Breasts:         Right: Absent.         Left: Absent.   Abdominal:      General: Bowel sounds are normal. There is no distension.      Palpations: Abdomen is soft.      Tenderness: There is no abdominal tenderness.   Musculoskeletal: Normal range of motion.         General: No swelling or tenderness.   Lymphadenopathy:      Cervical: No cervical adenopathy.      Upper Body:      Right upper body: No axillary adenopathy.      Left upper body: No axillary adenopathy.   Skin:     General: Skin is warm and dry.   Neurological:      Mental Status: She is alert and oriented to person, place, and time.   Psychiatric:         Mood and Affect: Mood normal.         Behavior: Behavior normal.            Assessment/Plan:       1. History of breast cancer     2. Use of anastrozole (Arimidex)         Plan  1. 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 She started Arimidex on December 3, 2015.  Plan for patient to complete 5 years total of treatment.  She clinically is doing very well and will continue on Arimidex as prescribed.    No need for mammogram as patient s/p bilateral mastectomies.    Did discuss with patient taking vitamin D and calcium supplements.  She stated she is taking it only intermittently.  Her last DEXA scan was February 2019 which showed to be within normal limits.  Did strongly encourage vitamin D and calcium through the duration of her Arimidex therapy.  Next DEXA scan will be due in  February 2021.    2.  Patient did note that she has intermittent constipation every once a while which is controlled with stool softeners.  Did discuss the possibility of being seen by gastroenterology and will defer to her primary care provider for possible referral.  She stated that she is not able to complete a colonoscopy due to scar tissue.    3.  Patient to be seen in the clinic in 6 months or sooner if needed.

## 2020-02-19 ENCOUNTER — GYNECOLOGY VISIT (OUTPATIENT)
Dept: OBGYN | Facility: CLINIC | Age: 70
End: 2020-02-19
Payer: COMMERCIAL

## 2020-02-19 VITALS — BODY MASS INDEX: 30.9 KG/M2 | SYSTOLIC BLOOD PRESSURE: 168 MMHG | WEIGHT: 180 LBS | DIASTOLIC BLOOD PRESSURE: 84 MMHG

## 2020-02-19 DIAGNOSIS — L90.0 LICHEN SCLEROSUS: ICD-10-CM

## 2020-02-19 PROCEDURE — 99214 OFFICE O/P EST MOD 30 MIN: CPT | Performed by: OBSTETRICS & GYNECOLOGY

## 2020-02-19 RX ORDER — CLOBETASOL PROPIONATE 0.5 MG/G
1 OINTMENT TOPICAL
Qty: 1 TUBE | Refills: 6 | Status: SHIPPED | OUTPATIENT
Start: 2020-02-19 | End: 2020-02-19

## 2020-02-19 RX ORDER — CLOBETASOL PROPIONATE 0.5 MG/G
1 OINTMENT TOPICAL
Qty: 1 TUBE | Refills: 6 | Status: SHIPPED | OUTPATIENT
Start: 2020-02-19 | End: 2020-08-25

## 2020-02-19 NOTE — PROGRESS NOTES
Chief complaint;    Tena Romo is a 69 y.o.  who presents for follow-up due to history of lichen sclerosis.  Patient has breast cancer and is status post bilateral mastectomy as well as JOLYNN/BSO.  Patient has been on clobetasol with good results she uses it once weekly and has minimal symptoms sometimes she adds Neosporin to the vaginal area if there is some itching which greatly reduces her symptoms..    Review of systems; denies fever chills abdominal pain, denies chest pain shortness of breath or urinary symptoms  Past medical history-  Past Medical History:   Diagnosis Date   • Anesthesia     post of nausea   • Arthritis    • Breast cancer (HCC) 2/10/2017   • Dental disorder     permanent bridge   • Diabetes (HCC)    • Diverticulosis of colon 2011   • HTN (hypertension) 3/14/2013   • Hypertension    • Pain     right shoulder down to thumb   • Post hysterectomy menopause 2011     Past surgical history-  Past Surgical History:   Procedure Laterality Date   • SHOULDER ARTHROSCOPY W/ ROTATOR CUFF REPAIR  2012    Performed by JENNY LIMON at SURGERY [facility]S   • SHOULDER DECOMPRESSION ARTHROSCOPIC  2012    Performed by JENNY LIMON at [facility]   • CLAVICLE DISTAL EXCISION  2012    Performed by JENNY LIMON at [facility]   • BUNIONECTOMY      bilateral   • ABDOMINAL HYSTERECTOMY TOTAL      endometriosis   • CHOLECYSTECTOMY      laparoscopy   • MASTECTOMY     • TONSILLECTOMY  as child     Allergies-Aspirin; Cipro xr; Diclofenac; and Metformin  Medications-  Current Outpatient Medications on File Prior to Visit   Medication Sig Dispense Refill   • anastrozole (ARIMIDEX) 1 MG Tab      • simvastatin (ZOCOR) 40 MG Tab      • telmisartan-hydrochlorothiazide (MICARDIS HCT) 40-12.5 MG per tablet      • anastrozole (ARIMIDEX) 1 MG Tab Take 1 Tab by mouth every day. 90 Tab 1   • simvastatin (ZOCOR) 40 MG Tab TAKE ONE TABLET BY  MOUTH ONCE DAILY IN THE EVENING 90 Tab 2   • telmisartan-hydrochlorothiazide (MICARDIS HCT) 40-12.5 MG per tablet Take 1 Tab by mouth every day. 90 Tab 1   • clobetasol (TEMOVATE) 0.05 % Ointment Apply 1 Drop to affected area(s) 1 time daily as needed. 1 Tube 6   • Barium Sulfate (TAGITOL V) 40 % Suspension On day prior to procedure, drink 1 bottle at 8 am, 1 bottle at noon, and 1 bottle at 6 pm (Patient not taking: Reported on 8/13/2019) 3 Bottle 0   • bisacodyl (DULCOLAX) 5 MG EC tablet On day prior to procedure, take 3 tablets by mouth at 9 pm. Do not chew or dissolve tablets; take whole. (Patient not taking: Reported on 8/13/2019) 3 Tab 0   • bisacodyl (DULCOLAX) 10 MG Suppos On day of procedure, insert rectally 2 hours prior to procedure (Patient not taking: Reported on 8/13/2019) 1 Suppository 0   • VITAMIN D, ERGOCALCIFEROL, PO Take  by mouth.     • Blood Glucose Monitoring Suppl Device Meter: Dispense Device of Insurance Preference. Sig. Use as directed for blood sugar monitoring. #1. NR. (Patient not taking: Reported on 8/13/2019) 1 Device 0   • Blood Glucose Monitoring Suppl Supplies Misc Test strips order: Test strips for insurance-preferred meter. Sig: use every morning before breakfast and prn ssx high or low sugar (Patient not taking: Reported on 8/13/2019) 100 Strip 3   • Lancets Misc Lancets order: Lancets for insurance-preferred meter. Sig: use every morning before breakfast and prn ssx high or low sugar. (Patient not taking: Reported on 8/13/2019) 100 Each 3   • ONETOUCH DELICA LANCETS 33G Misc USE TO TEST BLOOD SUGAR IN THE MORNING (Patient not taking: Reported on 8/13/2019) 100 Each 1   • glucose blood (ONE TOUCH ULTRA TEST) strip 1 Strip by Other route every day. (Patient not taking: Reported on 8/13/2019) 100 Strip 3   • Blood Glucose Monitoring Suppl (ONE TOUCH ULTRA MINI) W/DEVICE KIT        No current facility-administered medications on file prior to visit.      Social history-  Social  History     Socioeconomic History   • Marital status:      Spouse name: Not on file   • Number of children: Not on file   • Years of education: Not on file   • Highest education level: Not on file   Occupational History   • Not on file   Social Needs   • Financial resource strain: Not on file   • Food insecurity     Worry: Not on file     Inability: Not on file   • Transportation needs     Medical: Not on file     Non-medical: Not on file   Tobacco Use   • Smoking status: Never Smoker   • Smokeless tobacco: Never Used   • Tobacco comment: avoid any and all tobacco products   Substance and Sexual Activity   • Alcohol use: No     Alcohol/week: 0.0 oz     Comment: once a year   • Drug use: No   • Sexual activity: Not Currently     Partners: Male     Comment: . Work at [employer]   Lifestyle   • Physical activity     Days per week: Not on file     Minutes per session: Not on file   • Stress: Not on file   Relationships   • Social connections     Talks on phone: Not on file     Gets together: Not on file     Attends Worship service: Not on file     Active member of club or organization: Not on file     Attends meetings of clubs or organizations: Not on file     Relationship status: Not on file   • Intimate partner violence     Fear of current or ex partner: Not on file     Emotionally abused: Not on file     Physically abused: Not on file     Forced sexual activity: Not on file   Other Topics Concern   • Not on file   Social History Narrative   • Not on file     Past Family History-no history of breast or ovarian cancer    Physical examination;  Alert and oriented x3  General a thin well-developed well-nourished female in no apparent distress  Vitals:    02/19/20 1055   BP: (!) 168/84   Weight: 81.6 kg (180 lb)     Skin is warm and dry  Neck-supple  HEENT-head-atraumatic, normocephalic, EOMI, PERRLA  Cardiovascular-regular rate and rhythm, normal S1-S2, no murmurs or gallops  Lungs-clear to auscultation  bilaterally  Back-negative CVA tenderness  Abdomen-nondistended positive bowel sounds soft nontender no masses or hepatosplenomegaly  Pelvic examination;  External genitalia-no visible lesions   Vagina-no blood or discharge-atrophic appearing  Cervix-surgically absent  Uterus-surgically absent  Adnexa surgically absent  Extremities without cyanosis clubbing or edema  Neurologic exam grossly intact    Impression;  Lichen sclerosus-stable on clobetasol  Atrophic vaginal changes  History of breast cancer    Plan;  Continue clobetasol once weekly.  With refills given        25  Minutes spent with the patient in face-to-face contact, greater than 50% of the time spent on counseling and coordination of care. All questions answered in detail.

## 2020-02-20 ENCOUNTER — TELEPHONE (OUTPATIENT)
Dept: MEDICAL GROUP | Facility: PHYSICIAN GROUP | Age: 70
End: 2020-02-20

## 2020-02-20 NOTE — TELEPHONE ENCOUNTER
Notes recorded by Kieran Holt, Med Ass't on 2/20/2020 at 10:20 AM PST    Left message for pt to call back

## 2020-02-20 NOTE — TELEPHONE ENCOUNTER
----- Message from Heather Thayer P.A.-C. sent at 2/19/2020  9:35 PM PST -----  Please inform patient that her recent Cologuard test for colon cancer screening came back negative. It is recommended that this test be repeated again in 3 years.  Heather Thayer P.A.-C.

## 2020-02-26 RX ORDER — TELMISARTAN AND HYDROCHLORTHIAZIDE 40; 12.5 MG/1; MG/1
TABLET ORAL
Qty: 90 TAB | Refills: 1 | Status: SHIPPED | OUTPATIENT
Start: 2020-02-26 | End: 2020-04-15 | Stop reason: SDUPTHER

## 2020-02-26 NOTE — TELEPHONE ENCOUNTER
Was the patient seen in the last year in this department? Yes    Does patient have an active prescription for medications requested? No     Received Request Via: Pharmacy      Pt met protocol?: Yes    LAST OV 10/01/2019    BP Readings from Last 1 Encounters:   02/19/20 (!) 168/84     Lab Results   Component Value Date/Time    CHOLSTRLTOT 163 02/07/2019 08:55 AM    CHOLSTRLTOT 174 04/28/2016 09:17 AM    LDL 90 02/07/2019 08:55 AM    LDL 95 04/28/2016 09:17 AM    HDL 45 02/07/2019 08:55 AM    HDL 50 04/28/2016 09:17 AM    TRIGLYCERIDE 138 02/07/2019 08:55 AM    TRIGLYCERIDE 146 04/28/2016 09:17 AM       Lab Results   Component Value Date/Time    SODIUM 139 02/07/2019 08:55 AM    SODIUM 140 12/29/2016 09:37 AM    POTASSIUM 4.7 02/07/2019 08:55 AM    POTASSIUM 4.8 12/29/2016 09:37 AM    CHLORIDE 100 02/07/2019 08:55 AM    CHLORIDE 106 12/29/2016 09:37 AM    CO2 24 02/07/2019 08:55 AM    CO2 27 12/29/2016 09:37 AM    GLUCOSE 132 (H) 02/07/2019 08:55 AM    GLUCOSE 123 (H) 12/29/2016 09:37 AM    BUN 22 02/07/2019 08:55 AM    BUN 29 (H) 12/29/2016 09:37 AM    CREATININE 0.94 02/07/2019 08:55 AM    CREATININE 0.90 12/29/2016 09:37 AM    CREATININE 0.86 12/01/2010 12:00 AM    BUNCREATRAT 23 02/07/2019 08:55 AM    BUNCREATRAT 27 12/01/2010 12:00 AM    GLOMRATE >59 12/01/2010 12:00 AM     Lab Results   Component Value Date/Time    ALKPHOSPHAT 89 02/07/2019 08:55 AM    ALKPHOSPHAT 70 12/29/2016 09:37 AM    ASTSGOT 13 02/07/2019 08:55 AM    ASTSGOT 13 12/29/2016 09:37 AM    ALTSGPT 14 02/07/2019 08:55 AM    ALTSGPT 14 12/29/2016 09:37 AM    TBILIRUBIN 0.5 02/07/2019 08:55 AM    TBILIRUBIN 0.5 12/29/2016 09:37 AM

## 2020-03-21 ENCOUNTER — OFFICE VISIT (OUTPATIENT)
Dept: URGENT CARE | Facility: PHYSICIAN GROUP | Age: 70
End: 2020-03-21
Payer: COMMERCIAL

## 2020-03-21 VITALS
WEIGHT: 180.6 LBS | SYSTOLIC BLOOD PRESSURE: 122 MMHG | OXYGEN SATURATION: 96 % | HEIGHT: 64 IN | HEART RATE: 74 BPM | RESPIRATION RATE: 16 BRPM | TEMPERATURE: 97.9 F | BODY MASS INDEX: 30.83 KG/M2 | DIASTOLIC BLOOD PRESSURE: 84 MMHG

## 2020-03-21 DIAGNOSIS — H65.01 RIGHT ACUTE SEROUS OTITIS MEDIA, RECURRENCE NOT SPECIFIED: ICD-10-CM

## 2020-03-21 PROCEDURE — 99214 OFFICE O/P EST MOD 30 MIN: CPT | Performed by: PHYSICIAN ASSISTANT

## 2020-03-21 RX ORDER — AMOXICILLIN 875 MG/1
875 TABLET, COATED ORAL 2 TIMES DAILY
Qty: 20 TAB | Refills: 0 | Status: SHIPPED | OUTPATIENT
Start: 2020-03-21 | End: 2020-03-31

## 2020-03-21 ASSESSMENT — ENCOUNTER SYMPTOMS
WHEEZING: 0
NECK PAIN: 0
NAUSEA: 0
HEADACHES: 0
ABDOMINAL PAIN: 0
VOMITING: 0
MYALGIAS: 0
COUGH: 0
SORE THROAT: 0
PALPITATIONS: 0
RHINORRHEA: 0
CHILLS: 0
DIARRHEA: 0
SHORTNESS OF BREATH: 0
FEVER: 0

## 2020-03-21 NOTE — PROGRESS NOTES
Subjective:      Tena Romo is a 70 y.o. female who presents with Ear Pain (R ear pain, pain goes to back of head, pulsing, x1 week )            Otalgia    There is pain in the right ear. This is a new problem. Episode onset: 1 week  The problem occurs constantly. The problem has been unchanged. There has been no fever. Pertinent negatives include no abdominal pain, coughing, diarrhea, ear discharge, headaches, hearing loss, neck pain, rash, rhinorrhea, sore throat or vomiting. She has tried ear drops and acetaminophen for the symptoms. The treatment provided mild relief.       Past Medical History:   Diagnosis Date   • Anesthesia     post of nausea   • Arthritis    • Breast cancer (HCC) 2/10/2017   • Dental disorder     permanent bridge   • Diabetes (HCC)    • Diverticulosis of colon 11/4/2011   • HTN (hypertension) 3/14/2013   • Hypertension    • Pain     right shoulder down to thumb   • Post hysterectomy menopause 11/4/2011       Past Surgical History:   Procedure Laterality Date   • SHOULDER ARTHROSCOPY W/ ROTATOR CUFF REPAIR  2/9/2012    Performed by JENNY LIMON at SURGERY [facility]   • SHOULDER DECOMPRESSION ARTHROSCOPIC  2/9/2012    Performed by JENNY LIMON at [facility]   • CLAVICLE DISTAL EXCISION  2/9/2012    Performed by JENNY LIMON at [facility] ORS   • BUNIONECTOMY  2009    bilateral   • ABDOMINAL HYSTERECTOMY TOTAL  2001    endometriosis   • CHOLECYSTECTOMY  1999    laparoscopy   • MASTECTOMY     • TONSILLECTOMY  as child       Family History   Problem Relation Age of Onset   • Stroke Mother    • Lung Disease Father         pneumonia   • Hyperlipidemia Father      Medications, Allergies, and current problem list reviewed today in Epic    Allergies   Allergen Reactions   • Aspirin      Intolerance     • Cipro Xr Hives   • Diclofenac    • Metformin Rash     Erythematous rash       .  Review of Systems   Constitutional: Negative for chills, fever  "and malaise/fatigue.   HENT: Positive for ear pain. Negative for congestion, ear discharge, hearing loss, rhinorrhea and sore throat.    Respiratory: Negative for cough, shortness of breath and wheezing.    Cardiovascular: Negative for chest pain and palpitations.   Gastrointestinal: Negative for abdominal pain, diarrhea, nausea and vomiting.   Musculoskeletal: Negative for myalgias and neck pain.   Skin: Negative for rash.   Neurological: Negative for headaches.     All other systems reviewed and are negative.        Objective:     /84 (BP Location: Left arm, Patient Position: Sitting, BP Cuff Size: Adult)   Pulse 74   Temp 36.6 °C (97.9 °F) (Temporal)   Resp 16   Ht 1.626 m (5' 4\")   Wt 81.9 kg (180 lb 9.6 oz)   SpO2 96%   BMI 31.00 kg/m²      Physical Exam  Constitutional:       General: She is not in acute distress.  HENT:      Head: Normocephalic and atraumatic.      Right Ear: Ear canal and external ear normal. A middle ear effusion is present. Tympanic membrane is injected.      Left Ear: Tympanic membrane, ear canal and external ear normal.      Ears:      Comments: No mastoid TTP     Mouth/Throat:      Mouth: Mucous membranes are moist.      Pharynx: No posterior oropharyngeal erythema.   Eyes:      Conjunctiva/sclera: Conjunctivae normal.   Cardiovascular:      Rate and Rhythm: Normal rate and regular rhythm.      Heart sounds: Normal heart sounds. No murmur. No friction rub. No gallop.    Pulmonary:      Effort: Pulmonary effort is normal. No respiratory distress.      Breath sounds: Normal breath sounds. No wheezing, rhonchi or rales.   Musculoskeletal: Normal range of motion.   Lymphadenopathy:      Cervical: No cervical adenopathy.   Skin:     General: Skin is warm and dry.      Findings: No rash.   Neurological:      General: No focal deficit present.      Mental Status: She is alert and oriented to person, place, and time.   Psychiatric:         Mood and Affect: Mood normal.         " Behavior: Behavior normal.         Thought Content: Thought content normal.         Judgment: Judgment normal.                 Assessment/Plan:       1. Right acute serous otitis media, recurrence not specified    - amoxicillin (AMOXIL) 875 MG tablet; Take 1 Tab by mouth 2 times a day for 10 days.  Dispense: 20 Tab; Refill: 0     Differential diagnoses, Supportive care, and indications for immediate follow-up discussed with patient.   Instructed to return to clinic or nearest emergency department for any change in condition, further concerns, or worsening of symptoms.    The patient demonstrated a good understanding and agreed with the treatment plan.    Coretta Resendiz P.A.-C.

## 2020-04-15 ENCOUNTER — OFFICE VISIT (OUTPATIENT)
Dept: MEDICAL GROUP | Facility: PHYSICIAN GROUP | Age: 70
End: 2020-04-15
Payer: COMMERCIAL

## 2020-04-15 ENCOUNTER — HOSPITAL ENCOUNTER (OUTPATIENT)
Facility: MEDICAL CENTER | Age: 70
End: 2020-04-15
Attending: FAMILY MEDICINE
Payer: COMMERCIAL

## 2020-04-15 VITALS
TEMPERATURE: 98.5 F | BODY MASS INDEX: 30.73 KG/M2 | HEIGHT: 64 IN | WEIGHT: 180 LBS | HEART RATE: 78 BPM | SYSTOLIC BLOOD PRESSURE: 124 MMHG | DIASTOLIC BLOOD PRESSURE: 58 MMHG | OXYGEN SATURATION: 97 %

## 2020-04-15 DIAGNOSIS — E11.9 TYPE 2 DIABETES MELLITUS WITHOUT COMPLICATION, WITHOUT LONG-TERM CURRENT USE OF INSULIN (HCC): ICD-10-CM

## 2020-04-15 DIAGNOSIS — Z02.89 ENCOUNTER FOR COMPLETION OF FORM WITH PATIENT: ICD-10-CM

## 2020-04-15 DIAGNOSIS — L90.0 LICHEN SCLEROSUS: ICD-10-CM

## 2020-04-15 DIAGNOSIS — N89.8 VAGINAL LESION: ICD-10-CM

## 2020-04-15 DIAGNOSIS — E78.00 HYPERCHOLESTEREMIA: ICD-10-CM

## 2020-04-15 DIAGNOSIS — I10 ESSENTIAL HYPERTENSION: ICD-10-CM

## 2020-04-15 PROBLEM — R07.89 CHEST TIGHTNESS: Status: RESOLVED | Noted: 2018-06-27 | Resolved: 2020-04-15

## 2020-04-15 LAB
GRAM STN SPEC: NORMAL
SIGNIFICANT IND 70042: NORMAL
SITE SITE: NORMAL
SOURCE SOURCE: NORMAL

## 2020-04-15 PROCEDURE — 87075 CULTR BACTERIA EXCEPT BLOOD: CPT

## 2020-04-15 PROCEDURE — 99214 OFFICE O/P EST MOD 30 MIN: CPT | Performed by: FAMILY MEDICINE

## 2020-04-15 PROCEDURE — 87205 SMEAR GRAM STAIN: CPT

## 2020-04-15 PROCEDURE — 87070 CULTURE OTHR SPECIMN AEROBIC: CPT

## 2020-04-15 RX ORDER — TELMISARTAN AND HYDROCHLORTHIAZIDE 40; 12.5 MG/1; MG/1
1 TABLET ORAL
Qty: 90 TAB | Refills: 3 | Status: SHIPPED | OUTPATIENT
Start: 2020-04-15 | End: 2021-04-19

## 2020-04-15 RX ORDER — SIMVASTATIN 40 MG
TABLET ORAL
Qty: 90 TAB | Refills: 3 | Status: SHIPPED | OUTPATIENT
Start: 2020-04-15 | End: 2021-01-27

## 2020-04-15 NOTE — PROGRESS NOTES
"CC: Vaginal lesion    HISTORY OF THE PRESENT ILLNESS: Patient is a 70 y.o. female. This pleasant patient is here today to discuss the following issues.    Patient is here today with primary concern for getting her annual checkup.  She has paperwork from work which needs to be signed stating that she has been seen this year and is up-to-date on preventative care.    Patient also has concerns with a vaginal lesion.  Unclear history, but she does have a history of lichen sclerosis and atrophic vaginitis.  She intermittently follows with a gynecologist for this and has been prescribed clobetasol ointment for this issue.  States that she intermittently gets \"blisters\" on her labia.  However, about a week ago she had quite severe swelling on her labia which turned into a fairly sizable and very painful blister.  States she is unsure whether or not she has had swabs of this area in the past.  States that she has been cleaning the area with rubbing alcohol and putting Neosporin on it.    With regard to her diabetes, his typically been well controlled.  Last lab work was over a year ago.  Hemoglobin A1c was 6.7 at that time.  She does not take any medicines for her diabetes and it is diet controlled.  States she does not check her sugars.    Also on Micardis for her hypertension which is well controlled.  Denies side effects from that medication and is in need of refills at this time.    Also has history of high cholesterol.  Takes simvastatin 40 mg.  In need of refills.  Last lipid profile over a year ago was within normal limits.  Denies side effects from the medication.    Allergies: Aspirin; Cipro xr; Diclofenac; and Metformin    Current Outpatient Medications Ordered in Epic   Medication Sig Dispense Refill   • telmisartan-hydrochlorothiazide (MICARDIS HCT) 40-12.5 MG per tablet Take 1 Tab by mouth every day. 90 Tab 3   • simvastatin (ZOCOR) 40 MG Tab TAKE ONE TABLET BY MOUTH ONCE DAILY IN THE EVENING 90 Tab 3   • " clobetasol (TEMOVATE) 0.05 % Ointment Apply 1 Drop to affected area(s) 1 time daily as needed. 1 Tube 6   • anastrozole (ARIMIDEX) 1 MG Tab Take 1 Tab by mouth every day. 90 Tab 1   • bisacodyl (DULCOLAX) 5 MG EC tablet On day prior to procedure, take 3 tablets by mouth at 9 pm. Do not chew or dissolve tablets; take whole. 3 Tab 0   • VITAMIN D, ERGOCALCIFEROL, PO Take  by mouth.       No current Epic-ordered facility-administered medications on file.        Past Medical History:   Diagnosis Date   • Anesthesia     post of nausea   • Arthritis    • Breast cancer (HCC) 2/10/2017   • Dental disorder     permanent bridge   • Diabetes (HCC)    • Diverticulosis of colon 11/4/2011   • HTN (hypertension) 3/14/2013   • Hypertension    • Pain     right shoulder down to thumb   • Post hysterectomy menopause 11/4/2011       Past Surgical History:   Procedure Laterality Date   • SHOULDER ARTHROSCOPY W/ ROTATOR CUFF REPAIR  2/9/2012    Performed by JENNY LIMON at SURGERY [facility]   • SHOULDER DECOMPRESSION ARTHROSCOPIC  2/9/2012    Performed by JENNY LIMON at [facility]   • CLAVICLE DISTAL EXCISION  2/9/2012    Performed by JENNY LIMON at [facility] ORS   • BUNIONECTOMY  2009    bilateral   • ABDOMINAL HYSTERECTOMY TOTAL  2001    endometriosis   • CHOLECYSTECTOMY  1999    laparoscopy   • MASTECTOMY     • TONSILLECTOMY  as child       Social History     Tobacco Use   • Smoking status: Never Smoker   • Smokeless tobacco: Never Used   • Tobacco comment: avoid any and all tobacco products   Substance Use Topics   • Alcohol use: No     Alcohol/week: 0.0 oz     Comment: once a year   • Drug use: No       Social History     Social History Narrative   • Not on file       Family History   Problem Relation Age of Onset   • Stroke Mother    • Lung Disease Father         pneumonia   • Hyperlipidemia Father        ROS:     - Constitutional: Negative for fever, chills, unexpected weight change, and  "fatigue/generalized weakness.     - HEENT: Negative for headaches, vision changes, hearing changes, ear pain, ear discharge, rhinorrhea, sinus congestion, sore throat, and neck pain.      - Respiratory: Negative for cough, sputum production, chest congestion, dyspnea, wheezing, and crackles.      - Cardiovascular: Negative for chest pain, palpitations, orthopnea, PND, and bilateral lower extremity edema.     - Gastrointestinal: Negative for heartburn, nausea, vomiting, abdominal pain, hematochezia, melena, diarrhea, constipation, and greasy/foul-smelling stools.     - Genitourinary: Negative for dysuria, polyuria, hematuria, pyuria, urinary urgency, and urinary incontinence.     - Musculoskeletal: Negative for myalgias, back pain, and joint pain.     - Skin:See HPI.     - Neurological: Negative for dizziness, tingling, tremors, focal sensory deficit, focal weakness and headaches.       - NOTE: All other systems reviewed and are negative, except as in HPI.      Labs: Labs reviewed from February 7, 2019.    Exam: /58 (BP Location: Right arm, Patient Position: Sitting, BP Cuff Size: Adult)   Pulse 78   Temp 36.9 °C (98.5 °F) (Temporal)   Ht 1.626 m (5' 4\")   Wt 81.6 kg (180 lb)   SpO2 97%  Body mass index is 30.9 kg/m².    General: Well appearing, NAD  HEENT: Normocephalic. Conjunctiva clear, lids without ptosis, pupils equal and reactive to light accommodation, ears normal shape and contour, canals are clear bilaterally, tympanic membranes without bulging or erythema and normal cone of light, oropharynx is without erythema, edema or exudates.   Neck: Supple without JVD. No thyromegaly or nodules  Pulmonary: Clear to ausculation.  Normal effort. No rales, ronchi, or wheezing.  Cardiovascular: Regular rate and rhythm without murmur, rubs or gallop.   Abdomen: Soft, nontender, nondistended. Normal bowel sounds. Liver and spleen are not palpable. No rebound or guarding  : Erythematous, blistering lesion " present on left inner labia approximately 1 cm in diameter.  Neurologic: normal gait  Lymph: No cervical, supraclavicular lymph nodes are palpable  Skin: Warm and dry.  No obvious lesions.  Musculoskeletal:  No extremity cyanosis, clubbing, or edema.  Psych: Normal mood and affect. Alert and oriented. Judgment and insight is normal.    Please note that this dictation was created using voice recognition software. I have made every reasonable attempt to correct obvious errors, but I expect that there are errors of grammar and possibly content that I did not discover before finalizing the note.      Assessment/Plan  Tena was seen today for annual exam, bump and paperwork.    Diagnoses and all orders for this visit:    Type 2 diabetes mellitus without complication, without long-term current use of insulin (HCC)  Chronic well-controlled problem, diet controlled only.  Due for lab work at this time.  -     Lipid Profile; Future  -     HEMOGLOBIN A1C; Future  -     Comp Metabolic Panel; Future  -     MICROALBUMIN CREAT RATIO URINE; Future    Essential hypertension  Chronic well-controlled problem for the patient.  Due for lab work, refills provided for Micardis.  -     Comp Metabolic Panel; Future  -     telmisartan-hydrochlorothiazide (MICARDIS HCT) 40-12.5 MG per tablet; Take 1 Tab by mouth every day.    Hypercholesteremia  Chronic well-controlled problem, due for lab work.  Refills provided for simvastatin.  -     Lipid Profile; Future  -     simvastatin (ZOCOR) 40 MG Tab; TAKE ONE TABLET BY MOUTH ONCE DAILY IN THE EVENING    Encounter for completion of form with patient  Paperwork was filled out for patient today stating that she has had a physical this year and is up-to-date on preventative care.  She declined shingles vaccine today.    Vaginal lesion  Lichen sclerosus  Acute on chronic issue for the patient.  Given appearance, perhaps could have some concern for HSV.  It was swabbed as such today as below.  Could also  be related to her lichen sclerosis.  I discussed that she should stop using rubbing alcohol on it as that could be irritating or caustic to the skin.  Okay to continue Neosporin at this time pending cultures.  No signs of surrounding cellulitis or secondary infection.  No active drainage.  Patient is requesting second opinion from alternative gynecologist so new referral has been placed.  -     REFERRAL TO GYNECOLOGY  -     HSV 1/2 By PCR(Herpes)+[id number]; Future  -     ANAEROBIC/AEROBIC/GRAM STAIN    Follow-up in 6 months or sooner if needed.    Nahed Waddell,   [facility]

## 2020-04-17 ENCOUNTER — HOSPITAL ENCOUNTER (OUTPATIENT)
Facility: MEDICAL CENTER | Age: 70
End: 2020-04-17
Attending: FAMILY MEDICINE
Payer: COMMERCIAL

## 2020-04-17 DIAGNOSIS — N89.8 VAGINAL LESION: ICD-10-CM

## 2020-04-17 PROCEDURE — 87529 HSV DNA AMP PROBE: CPT | Mod: 91

## 2020-04-18 LAB
BACTERIA GENITAL AEROBE CULT: NORMAL
BACTERIA SPEC ANAEROBE CULT: NORMAL
GRAM STN SPEC: NORMAL
SIGNIFICANT IND 70042: NORMAL
SIGNIFICANT IND 70042: NORMAL
SITE SITE: NORMAL
SITE SITE: NORMAL
SOURCE SOURCE: NORMAL
SOURCE SOURCE: NORMAL

## 2020-04-19 LAB
HSV1 DNA SPEC QL NAA+PROBE: NEGATIVE
HSV2 DNA SPEC QL NAA+PROBE: NEGATIVE
SPECIMEN SOURCE: NORMAL

## 2020-04-20 ENCOUNTER — TELEPHONE (OUTPATIENT)
Dept: MEDICAL GROUP | Facility: PHYSICIAN GROUP | Age: 70
End: 2020-04-20

## 2020-04-20 NOTE — TELEPHONE ENCOUNTER
----- Message from Nahed Waddell D.O. sent at 4/20/2020 11:08 AM PDT -----  Please let patient know that both of the swabs we performed were negative for any kinds of infection viral or bacterial.  Hopefully the area is healing now, if it is not please let me know.

## 2020-04-21 DIAGNOSIS — N89.8 VAGINAL LESION: ICD-10-CM

## 2020-04-21 NOTE — TELEPHONE ENCOUNTER
Pt. Informed.  States not feeling better and appointment with OBGYN cancelled, not rescheduled due to COVID.  Please advise.

## 2020-05-24 DIAGNOSIS — Z85.3 HISTORY OF BREAST CANCER: ICD-10-CM

## 2020-05-24 DIAGNOSIS — Z79.811 USE OF ANASTROZOLE (ARIMIDEX): ICD-10-CM

## 2020-05-26 RX ORDER — ANASTROZOLE 1 MG/1
TABLET ORAL
Qty: 90 TAB | Refills: 1 | Status: SHIPPED | OUTPATIENT
Start: 2020-05-26 | End: 2020-12-16

## 2020-06-01 ENCOUNTER — GYNECOLOGY VISIT (OUTPATIENT)
Dept: OBGYN | Facility: CLINIC | Age: 70
End: 2020-06-01
Payer: COMMERCIAL

## 2020-06-01 VITALS — DIASTOLIC BLOOD PRESSURE: 86 MMHG | WEIGHT: 185 LBS | BODY MASS INDEX: 31.76 KG/M2 | SYSTOLIC BLOOD PRESSURE: 132 MMHG

## 2020-06-01 DIAGNOSIS — N90.89 VULVAR LESION: ICD-10-CM

## 2020-06-01 PROCEDURE — 99214 OFFICE O/P EST MOD 30 MIN: CPT | Performed by: OBSTETRICS & GYNECOLOGY

## 2020-06-01 NOTE — PROGRESS NOTES
Chief complaint;    Tena Romo is a 70 y.o.  who presents complaining of patient's on her vagina for the last 2 months.  Painful patient denies a history of herpes she has not been sexually active x20 years.  Patient has breast cancer  Patient status post JOLYNN/BSO and bilateral mastectomy      Review of systems; denies fever chills abdominal pain, denies chest pain shortness of breath or urinary symptoms  Past medical history-  Past Medical History:   Diagnosis Date   • Anesthesia     post of nausea   • Arthritis    • Breast cancer (HCC) 2/10/2017   • Dental disorder     permanent bridge   • Diabetes (HCC)    • Diverticulosis of colon 2011   • HTN (hypertension) 3/14/2013   • Hypertension    • Pain     right shoulder down to thumb   • Post hysterectomy menopause 2011     Past surgical history-  Past Surgical History:   Procedure Laterality Date   • SHOULDER ARTHROSCOPY W/ ROTATOR CUFF REPAIR  2012    Performed by JENNY LIMON at [facility]   • SHOULDER DECOMPRESSION ARTHROSCOPIC  2012    Performed by JENNY LIMON at [facility]   • CLAVICLE DISTAL EXCISION  2012    Performed by JENNY LIMON at [facility]   • BUNIONECTOMY      bilateral   • ABDOMINAL HYSTERECTOMY TOTAL      endometriosis   • CHOLECYSTECTOMY      laparoscopy   • MASTECTOMY     • TONSILLECTOMY  as child     Allergies-Aspirin; Cipro xr; Diclofenac; and Metformin  Medications-  Current Outpatient Medications on File Prior to Visit   Medication Sig Dispense Refill   • anastrozole (ARIMIDEX) 1 MG Tab TAKE 1 TABLET BY MOUTH EVERY DAY 90 Tab 1   • telmisartan-hydrochlorothiazide (MICARDIS HCT) 40-12.5 MG per tablet Take 1 Tab by mouth every day. 90 Tab 3   • simvastatin (ZOCOR) 40 MG Tab TAKE ONE TABLET BY MOUTH ONCE DAILY IN THE EVENING 90 Tab 3   • clobetasol (TEMOVATE) 0.05 % Ointment Apply 1 Drop to affected area(s) 1 time daily as needed. 1 Tube 6   •  bisacodyl (DULCOLAX) 5 MG EC tablet On day prior to procedure, take 3 tablets by mouth at 9 pm. Do not chew or dissolve tablets; take whole. 3 Tab 0   • VITAMIN D, ERGOCALCIFEROL, PO Take  by mouth.       No current facility-administered medications on file prior to visit.      Social history-  Social History     Socioeconomic History   • Marital status:      Spouse name: Not on file   • Number of children: Not on file   • Years of education: Not on file   • Highest education level: Not on file   Occupational History   • Not on file   Social Needs   • Financial resource strain: Not on file   • Food insecurity     Worry: Not on file     Inability: Not on file   • Transportation needs     Medical: Not on file     Non-medical: Not on file   Tobacco Use   • Smoking status: Never Smoker   • Smokeless tobacco: Never Used   • Tobacco comment: avoid any and all tobacco products   Substance and Sexual Activity   • Alcohol use: No     Alcohol/week: 0.0 oz     Comment: once a year   • Drug use: No   • Sexual activity: Not Currently     Partners: Male     Comment: . Work at [employer]   Lifestyle   • Physical activity     Days per week: Not on file     Minutes per session: Not on file   • Stress: Not on file   Relationships   • Social connections     Talks on phone: Not on file     Gets together: Not on file     Attends [employer] service: Not on file     Active member of club or organization: Not on file     Attends meetings of clubs or organizations: Not on file     Relationship status: Not on file   • Intimate partner violence     Fear of current or ex partner: Not on file     Emotionally abused: Not on file     Physically abused: Not on file     Forced sexual activity: Not on file   Other Topics Concern   • Not on file   Social History Narrative   • Not on file     Past Family History-no history of breast or ovarian cancer    Physical examination;  Alert and oriented x3  General a thin well-developed  well-nourished female in no apparent distress  Vitals:    06/01/20 1511   BP: 132/86   Weight: 83.9 kg (185 lb)     Skin is warm and dry  Neck-supple  HEENT-head-atraumatic, normocephalic, EOMI, PERRLA  Cardiovascular-regular rate and rhythm, normal S1-S2, no murmurs or gallops  Lungs-clear to auscultation bilaterally  Back-negative CVA tenderness  Abdomen-nondistended positive bowel sounds soft nontender no masses or hepatosplenomegaly  Pelvic examination;  External genitalia-multiple small 3 to 4 mm raised whitish lesions papular  Vagina-no blood or discharge    Extremities without cyanosis clubbing or edema  Neurologic exam grossly intact    Impression;  Vaginal/vulvar lesion    Plan;  Referral for Viviana punch biopsy  Lidocaine 2% gel for discomfort      25  Minutes spent with the patient in face-to-face contact, greater than 50% of the time spent on counseling and coordination of care. All questions answered in detail.

## 2020-06-23 ENCOUNTER — HOSPITAL ENCOUNTER (OUTPATIENT)
Facility: MEDICAL CENTER | Age: 70
End: 2020-06-23
Attending: OBSTETRICS & GYNECOLOGY
Payer: COMMERCIAL

## 2020-06-23 ENCOUNTER — GYNECOLOGY VISIT (OUTPATIENT)
Dept: OBGYN | Facility: CLINIC | Age: 70
End: 2020-06-23
Payer: COMMERCIAL

## 2020-06-23 VITALS — WEIGHT: 185.4 LBS | BODY MASS INDEX: 31.82 KG/M2

## 2020-06-23 DIAGNOSIS — N90.89 VULVAR LESION: ICD-10-CM

## 2020-06-23 LAB — PATHOLOGY CONSULT NOTE: NORMAL

## 2020-06-23 PROCEDURE — 88304 TISSUE EXAM BY PATHOLOGIST: CPT

## 2020-06-23 PROCEDURE — 56605 BIOPSY OF VULVA/PERINEUM: CPT | Performed by: OBSTETRICS & GYNECOLOGY

## 2020-06-23 NOTE — PROGRESS NOTES
Procedure note; vulvar biopsy    Informed consent obtained, consent signed    Indications abnormal lesion of the vulva    Betadine prep performed    1% lidocaine local skin wheal      5 mm Viviana punch performed to take biopsy    Interrupted suture with 3-0 Vicryl x2    Sitz bath as needed    Follow-up with me in 2 weeks to review results    Patient is slightly anxious due to history of stage I breast cancer    Patient was very uncomfortable at her last visit several weeks ago but feels much better today with no symptoms.

## 2020-06-27 LAB
ALBUMIN SERPL-MCNC: 4.5 G/DL (ref 3.8–4.8)
ALBUMIN/CREAT UR: 17 MG/G CREAT (ref 0–29)
ALBUMIN/GLOB SERPL: 1.7 {RATIO} (ref 1.2–2.2)
ALP SERPL-CCNC: 97 IU/L (ref 39–117)
ALT SERPL-CCNC: 18 IU/L (ref 0–32)
AST SERPL-CCNC: 14 IU/L (ref 0–40)
BILIRUB SERPL-MCNC: 0.4 MG/DL (ref 0–1.2)
BUN SERPL-MCNC: 15 MG/DL (ref 8–27)
BUN/CREAT SERPL: 18 (ref 12–28)
CALCIUM SERPL-MCNC: 9.9 MG/DL (ref 8.7–10.3)
CHLORIDE SERPL-SCNC: 101 MMOL/L (ref 96–106)
CHOLEST SERPL-MCNC: 136 MG/DL (ref 100–199)
CO2 SERPL-SCNC: 21 MMOL/L (ref 20–29)
CREAT SERPL-MCNC: 0.82 MG/DL (ref 0.57–1)
CREAT UR-MCNC: 89.4 MG/DL
GLOBULIN SER CALC-MCNC: 2.6 G/DL (ref 1.5–4.5)
GLUCOSE SERPL-MCNC: 145 MG/DL (ref 65–99)
HBA1C MFR BLD: 6.8 % (ref 4.8–5.6)
HDLC SERPL-MCNC: 42 MG/DL
LABORATORY COMMENT REPORT: NORMAL
LDLC SERPL CALC-MCNC: 66 MG/DL (ref 0–99)
MICROALBUMIN UR-MCNC: 15.4 UG/ML
POTASSIUM SERPL-SCNC: 3.8 MMOL/L (ref 3.5–5.2)
PROT SERPL-MCNC: 7.1 G/DL (ref 6–8.5)
SODIUM SERPL-SCNC: 140 MMOL/L (ref 134–144)
TRIGL SERPL-MCNC: 142 MG/DL (ref 0–149)
VLDLC SERPL CALC-MCNC: 28 MG/DL (ref 5–40)

## 2020-07-02 ENCOUNTER — TELEPHONE (OUTPATIENT)
Dept: MEDICAL GROUP | Facility: PHYSICIAN GROUP | Age: 70
End: 2020-07-02

## 2020-07-02 NOTE — TELEPHONE ENCOUNTER
----- Message from Nahed Waddell D.O. sent at 6/30/2020  6:11 PM PDT -----  Please call patient let her know the labs were normal with the exception of hemoglobin A1c being elevated at 6.8.  This is stable from previous checks though.  She is still in the well-controlled diabetes range, but continue to slowly increase several checks.  If she gets much higher than where she is at now, she would likely need medication for her diabetes.  Recommend follow-up in 3 months.

## 2020-07-14 ENCOUNTER — GYNECOLOGY VISIT (OUTPATIENT)
Dept: OBGYN | Facility: CLINIC | Age: 70
End: 2020-07-14
Payer: COMMERCIAL

## 2020-07-14 VITALS — WEIGHT: 186 LBS | DIASTOLIC BLOOD PRESSURE: 78 MMHG | BODY MASS INDEX: 31.93 KG/M2 | SYSTOLIC BLOOD PRESSURE: 145 MMHG

## 2020-07-14 DIAGNOSIS — N90.89 VULVAR LESION: ICD-10-CM

## 2020-07-14 PROCEDURE — 99212 OFFICE O/P EST SF 10 MIN: CPT | Performed by: OBSTETRICS & GYNECOLOGY

## 2020-07-14 NOTE — PROGRESS NOTES
70 y.o.  female , S/P vulvar Bx of prior vulvar cystic lesion   Patient feels  well post op   Path :Benign epidermal inclusion cyst     Ass:   As Above   P > discuss with patient , ways to decrease said epidermal inclusion cyst   Sitz Bathes with baking Soda may help to soften and extract : cerumen   RTC annually

## 2020-07-14 NOTE — NON-PROVIDER
Patient here today for a f/u after a vulvar biopsy.  Vulvar biopsy done on 06/23/2020.  C/o a little itching here and there.  Patient states no more swelling and that she feels much better.  Patient is anxious to know her results from the biopsy.  Phone # [phone]  Pharmacy verified.

## 2020-08-13 ENCOUNTER — OFFICE VISIT (OUTPATIENT)
Dept: HEMATOLOGY ONCOLOGY | Facility: MEDICAL CENTER | Age: 70
End: 2020-08-13
Payer: MEDICARE

## 2020-08-13 VITALS
HEART RATE: 72 BPM | RESPIRATION RATE: 16 BRPM | BODY MASS INDEX: 31.73 KG/M2 | DIASTOLIC BLOOD PRESSURE: 62 MMHG | HEIGHT: 64 IN | SYSTOLIC BLOOD PRESSURE: 112 MMHG | OXYGEN SATURATION: 95 % | TEMPERATURE: 98.3 F | WEIGHT: 185.85 LBS

## 2020-08-13 DIAGNOSIS — Z91.89 AT HIGH RISK FOR OSTEOPOROSIS: ICD-10-CM

## 2020-08-13 DIAGNOSIS — Z85.3 HISTORY OF BREAST CANCER: ICD-10-CM

## 2020-08-13 DIAGNOSIS — Z79.811 USE OF ANASTROZOLE (ARIMIDEX): ICD-10-CM

## 2020-08-13 PROCEDURE — 99213 OFFICE O/P EST LOW 20 MIN: CPT | Performed by: NURSE PRACTITIONER

## 2020-08-13 ASSESSMENT — ENCOUNTER SYMPTOMS
DIARRHEA: 0
FEVER: 0
COUGH: 0
HEADACHES: 0
NAUSEA: 0
VOMITING: 0
PALPITATIONS: 0
MYALGIAS: 0
WHEEZING: 0
WEIGHT LOSS: 0
SHORTNESS OF BREATH: 0
CHILLS: 0
CONSTIPATION: 0
DIAPHORESIS: 0
DIZZINESS: 0

## 2020-08-13 NOTE — PROGRESS NOTES
Subjective:      Tena Damon is a 70 y.o. female who presents for 6 month follow up for Breast Cancer.          HPI    Patient seen today in follow-up for bilateral breast cancer.  She presents unaccompanied for today's visit.    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    Left medial showed 0.5 cm moderately differentiated invasive ductal carcinoma, margins positive medial, grade 2, ER 99%, ID 97%, Ki67 26%, HER2 by IHC [id number].  Left medial/lateral showed invasive ductal carcinoma measuring about 0.7 cm, grade 2 moderate differentiated, margins negative,distance from closest margin 0.3 cm from superior and inferior.  [id number]. ER 99%, ID 100%, Her-2 was not completed, margins clear, 2 LN negative.  Per Dr. Tinoco Oncotype DX was requested on 11/3/2015 and not sent.  Dr. Tinoco did discuss with patient at visit last year to send out an Oncotype however patient refused.  She was started on anastrozole 1 mg p.o. daily on December 3, 2015.    Last DEXA scan completed on 3/27/2019 shows bone mineral density within normal limits.    Interval History  Patient is doing very well and tolerating anastrozole well.  She does have some mild fatigue but states she is very active and tends to tire easily.  She denies any fevers chills or hot flashes.  She denies coughing, wheezing shortness of breath.  She does have some intermittent chest wall pain from previous breast surgery but is not too bothersome.  She denies any heart palpitations.  She denies nausea, vomiting, diarrhea constipation.  She denies any dysuria but does state that she voids frequently due to increased water intake.  She denies any generalized pain, rashes or itching, dizziness or headaches.    Did confirm with patient that she is not taking vitamin D or calcium.  She stated that these medications were required for  her to take in the morning when she does not eat breakfast therefore she does not want take the medication.    She did mention that she was recently seen by her gynecologist and was told that she does have a cyst on her vulva which was confirmed via biopsy.    Allergies   Allergen Reactions   • Aspirin      Intolerance     • Cipro Xr Hives   • Diclofenac    • Metformin Rash     Erythematous rash     Current Outpatient Medications on File Prior to Visit   Medication Sig Dispense Refill   • ASPIRIN 81 PO Take  by mouth.     • anastrozole (ARIMIDEX) 1 MG Tab TAKE 1 TABLET BY MOUTH EVERY DAY 90 Tab 1   • telmisartan-hydrochlorothiazide (MICARDIS HCT) 40-12.5 MG per tablet Take 1 Tab by mouth every day. 90 Tab 3   • simvastatin (ZOCOR) 40 MG Tab TAKE ONE TABLET BY MOUTH ONCE DAILY IN THE EVENING 90 Tab 3   • clobetasol (TEMOVATE) 0.05 % Ointment Apply 1 Drop to affected area(s) 1 time daily as needed. (Patient not taking: Reported on 6/23/2020) 1 Tube 6   • bisacodyl (DULCOLAX) 5 MG EC tablet On day prior to procedure, take 3 tablets by mouth at 9 pm. Do not chew or dissolve tablets; take whole. (Patient not taking: Reported on 8/13/2020) 3 Tab 0   • VITAMIN D, ERGOCALCIFEROL, PO Take  by mouth.       No current facility-administered medications on file prior to visit.          Review of Systems   Constitutional: Positive for malaise/fatigue (very active and tends to get tired easily). Negative for chills, diaphoresis, fever and weight loss.   Respiratory: Negative for cough, shortness of breath and wheezing.    Cardiovascular: Positive for chest pain (chest wall pain from surgery). Negative for palpitations.   Gastrointestinal: Negative for constipation, diarrhea, nausea and vomiting.   Genitourinary: Positive for frequency. Negative for dysuria.   Musculoskeletal: Negative for myalgias.   Skin: Negative for itching and rash.   Neurological: Negative for dizziness and headaches.          Objective:     /62 (BP  "Location: Right arm, Patient Position: Sitting, BP Cuff Size: Adult)   Pulse 72   Temp 36.8 °C (98.3 °F) (Temporal)   Resp 16   Ht 1.625 m (5' 3.98\")   Wt 84.3 kg (185 lb 13.6 oz)   SpO2 95%   BMI 31.92 kg/m²      Physical Exam  Vitals signs reviewed.   Constitutional:       General: She is not in acute distress.     Appearance: Normal appearance. She is not diaphoretic.   HENT:      Head: Normocephalic and atraumatic.      Mouth/Throat:      Mouth: Mucous membranes are dry.      Pharynx: Oropharynx is clear. No oropharyngeal exudate or posterior oropharyngeal erythema.   Cardiovascular:      Rate and Rhythm: Normal rate and regular rhythm.      Pulses: Normal pulses.      Heart sounds: Normal heart sounds. No murmur. No friction rub. No gallop.    Pulmonary:      Effort: Pulmonary effort is normal. No respiratory distress.      Breath sounds: Normal breath sounds. No wheezing.   Chest:      Breasts:         Right: Absent.         Left: Absent.      Comments: Bilateral mastectomies.  Chest wall with no evidence of masses, and no axillary adenopathy noted.  Abdominal:      General: Bowel sounds are normal. There is no distension.      Palpations: Abdomen is soft.      Tenderness: There is no abdominal tenderness.   Musculoskeletal: Normal range of motion.         General: No swelling or tenderness.   Lymphadenopathy:      Head:      Right side of head: No submental, submandibular, tonsillar, preauricular, posterior auricular or occipital adenopathy.      Left side of head: No submental, submandibular, tonsillar, preauricular, posterior auricular or occipital adenopathy.      Cervical: No cervical adenopathy.      Right cervical: No superficial, deep or posterior cervical adenopathy.     Left cervical: No superficial, deep or posterior cervical adenopathy.      Upper Body:      Right upper body: No supraclavicular or axillary adenopathy.      Left upper body: No supraclavicular or axillary adenopathy.   Skin:   "   General: Skin is warm and dry.   Neurological:      Mental Status: She is alert and oriented to person, place, and time.   Psychiatric:         Mood and Affect: Mood normal.         Behavior: Behavior normal.              Assessment/Plan:        1. History of breast cancer     2. Use of anastrozole (Arimidex)     3. At high risk for osteoporosis         Plan  1. 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 She started Arimidex on December 3, 2015.  Plan for patient to complete 5 years total of treatment, which will be this coming December 2020.  Discussed with patient and she will plan on continuing taking the Arimidex until December 2015 and will discontinue the medication at that time.    There is no need for mammograms this patient is status post bilateral mastectomies.    2.  Patient at higher risk for osteoporosis due to the use of an aromatase inhibitor.  Her next bone mineral density will be due at the end of February 2021.  I did highly recommend patient take vitamin D and calcium supplements however she has refused to take these medications as she is not interested at this time.  I did inform patient the increased risk for osteopenia/osteoporosis with the use of an AI, and patient did verbalize understanding.    3.  We will continue to follow-up with patient in 6 months or sooner if needed, at that time she should have discontinued the Arimidex.  She will be due for her bone mineral density at the end of February which can be ordered at next visit.  Patient did verbalize understanding and is in agreement with the plan.

## 2020-08-25 ENCOUNTER — OFFICE VISIT (OUTPATIENT)
Dept: MEDICAL GROUP | Facility: PHYSICIAN GROUP | Age: 70
End: 2020-08-25
Payer: MEDICARE

## 2020-08-25 VITALS
TEMPERATURE: 98 F | HEIGHT: 64 IN | SYSTOLIC BLOOD PRESSURE: 122 MMHG | DIASTOLIC BLOOD PRESSURE: 60 MMHG | BODY MASS INDEX: 31.76 KG/M2 | HEART RATE: 68 BPM | WEIGHT: 186 LBS | OXYGEN SATURATION: 97 %

## 2020-08-25 DIAGNOSIS — G44.209 ACUTE NON INTRACTABLE TENSION-TYPE HEADACHE: ICD-10-CM

## 2020-08-25 DIAGNOSIS — G44.86 CERVICOGENIC HEADACHE: ICD-10-CM

## 2020-08-25 PROCEDURE — 99214 OFFICE O/P EST MOD 30 MIN: CPT | Performed by: PHYSICIAN ASSISTANT

## 2020-08-25 RX ORDER — CYCLOBENZAPRINE HCL 5 MG
2.5-5 TABLET ORAL 3 TIMES DAILY PRN
Qty: 30 TAB | Refills: 0 | Status: SHIPPED | OUTPATIENT
Start: 2020-08-25 | End: 2020-12-02

## 2020-08-25 NOTE — PROGRESS NOTES
"Subjective:   Tena Damon is a 70 y.o. female here today for headaches. Is an established patient of mine.    HPI:    Tena presents to the office today with new complaint of headaches. States they have been ongoing for the last 2 weeks. Feels on right side of head which shoots down right side of neck. Describes as throbbing sensation.  Pain is noticeably worse in the evenings. Rates at 5-6/10. Pain has woken her from sleep--this happens every few days. Rates pain at \"a 10\" when this happens. Has taken aspirin for the pain which does help. She denies any visual changes including blurry vision, double vision, aura. No numbness, tingling, or weakness. Denies nausea or vomiting. She states she's never experienced headaches like this before. She denies any new medication or medication changes, change in caffeine/alcohol consumption, or head/neck injury. She does mention that she's been reading more over the last several months and often will have her neck bent downwards for long periods of time.      Current medicines (including changes today)  Current Outpatient Medications   Medication Sig Dispense Refill   • cyclobenzaprine (FLEXERIL) 5 MG tablet Take 0.5-1 Tabs by mouth 3 times a day as needed for Moderate Pain or Muscle Spasms. 30 Tab 0   • anastrozole (ARIMIDEX) 1 MG Tab TAKE 1 TABLET BY MOUTH EVERY DAY 90 Tab 1   • telmisartan-hydrochlorothiazide (MICARDIS HCT) 40-12.5 MG per tablet Take 1 Tab by mouth every day. 90 Tab 3   • simvastatin (ZOCOR) 40 MG Tab TAKE ONE TABLET BY MOUTH ONCE DAILY IN THE EVENING 90 Tab 3   • ASPIRIN 81 PO Take  by mouth.     • VITAMIN D, ERGOCALCIFEROL, PO Take  by mouth.       No current facility-administered medications for this visit.      She  has a past medical history of Anesthesia, Arthritis, Breast cancer (HCC) (2/10/2017), Dental disorder, Diabetes (HCC), Diverticulosis of colon (11/4/2011), HTN (hypertension) (3/14/2013), Hypertension, Pain, and Post hysterectomy " "menopause (11/4/2011).    ROS  As per HPI.       Objective:     /60 (BP Location: Right arm, Patient Position: Sitting, BP Cuff Size: Adult)   Pulse 68   Temp 36.7 °C (98 °F) (Temporal)   Ht 1.625 m (5' 3.98\")   Wt 84.4 kg (186 lb)   SpO2 97%  Body mass index is 31.95 kg/m².     Physical Exam:  Constitutional: Alert, well-appearing, pleasant, no distress.  Skin: Warm, dry, good turgor, no rashes in visible areas.  Eye: Pupils are equal, round, and reactive bilaterally. Conjunctiva clear, lids normal.  ENMT: Lips without lesions, moist mucus membranes.  Neck: No masses. No submandibular or cervical lymphadenopathy. Tender to palpation over right paraspinal musculature in posterior neck.   Respiratory: Unlabored respiratory effort, lungs clear to auscultation, no wheezes, no rhonchi.  Cardiovascular: Normal S1, S2, no murmur.  Neurologic: Cranial nerves II-XII grossly intact. Moves all extremities equally x 4 with normal strength bilaterally. No focal deficits noted.      Assessment and Plan:   The following treatment plan was discussed    1. Cervicogenic headache  New problem, uncontrolled. Presentation/exam is most consistent with cervicogenic headache. Suspect precipitating factor may have been the frequent reading with head/neck flexed for long periods of time. Recommend she avoid any activity that involves having head/neck in same position for too long. I have prescribed low-dose muscle relaxant for evening use which should help with pain. If no improvement in next couple of weeks, recommend CT head to rule out intracranial pathology. New onset headaches at her age and nighttime awakenings are concerning.  - cyclobenzaprine (FLEXERIL) 5 MG tablet; Take 0.5-1 Tabs by mouth 3 times a day as needed for Moderate Pain or Muscle Spasms.  Dispense: 30 Tab; Refill: 0    2. Acute non intractable tension-type headache  See above.  - cyclobenzaprine (FLEXERIL) 5 MG tablet; Take 0.5-1 Tabs by mouth 3 times a day " as needed for Moderate Pain or Muscle Spasms.  Dispense: 30 Tab; Refill: 0  - CT-HEAD W/O; Future      Followup: Return in about 2 months (around 10/25/2020) for f/u DM.    Heather Thayer P.A.-C.

## 2020-09-10 ENCOUNTER — TELEPHONE (OUTPATIENT)
Dept: MEDICAL GROUP | Facility: PHYSICIAN GROUP | Age: 70
End: 2020-09-10

## 2020-09-10 ENCOUNTER — HOSPITAL ENCOUNTER (OUTPATIENT)
Dept: RADIOLOGY | Facility: MEDICAL CENTER | Age: 70
End: 2020-09-10
Attending: PHYSICIAN ASSISTANT
Payer: MEDICARE

## 2020-09-10 DIAGNOSIS — G44.209 ACUTE NON INTRACTABLE TENSION-TYPE HEADACHE: ICD-10-CM

## 2020-09-10 PROCEDURE — 70450 CT HEAD/BRAIN W/O DYE: CPT

## 2020-09-10 NOTE — TELEPHONE ENCOUNTER
----- Message from TRISHA Wood sent at 9/10/2020  1:13 PM PDT -----  Please let the patient know that the CT of her head was normal. Thanks

## 2020-12-02 ENCOUNTER — TELEMEDICINE (OUTPATIENT)
Dept: MEDICAL GROUP | Facility: PHYSICIAN GROUP | Age: 70
End: 2020-12-02
Payer: MEDICARE

## 2020-12-02 ENCOUNTER — TELEPHONE (OUTPATIENT)
Dept: MEDICAL GROUP | Facility: PHYSICIAN GROUP | Age: 70
End: 2020-12-02

## 2020-12-02 VITALS
SYSTOLIC BLOOD PRESSURE: 114 MMHG | HEIGHT: 64 IN | DIASTOLIC BLOOD PRESSURE: 61 MMHG | OXYGEN SATURATION: 96 % | RESPIRATION RATE: 20 BRPM | BODY MASS INDEX: 31.93 KG/M2 | TEMPERATURE: 97.6 F | HEART RATE: 85 BPM

## 2020-12-02 DIAGNOSIS — Z09 HOSPITAL DISCHARGE FOLLOW-UP: Primary | ICD-10-CM

## 2020-12-02 DIAGNOSIS — Z20.822 SUSPECTED COVID-19 VIRUS INFECTION: ICD-10-CM

## 2020-12-02 DIAGNOSIS — J96.91 RESPIRATORY FAILURE WITH HYPOXIA, UNSPECIFIED CHRONICITY (HCC): ICD-10-CM

## 2020-12-02 PROCEDURE — 99214 OFFICE O/P EST MOD 30 MIN: CPT | Mod: 95,CR,CS | Performed by: FAMILY MEDICINE

## 2020-12-02 NOTE — TELEPHONE ENCOUNTER
[facility] does need a DME order for a 10L concentrator as well as additional O2 tanks, I think 7 should be adequate for now. Their fax number is [phone], and they will need demographics and the office note as well.

## 2020-12-02 NOTE — PROGRESS NOTES
Virtual Visit: Established Patient   This visit was conducted via Zoom using secure and encrypted videoconferencing technology. The patient was in a private location in the state of [place].    The patient's identity was confirmed and verbal consent was obtained for this virtual visit.    Subjective:   CC:   Chief Complaint   Patient presents with   • Hospital Follow-up   • Orders Needed     Excellence: Oxygen Order       Tena Damon is a 70 y.o. female presenting for evaluation and management of:    Patient is here today for hospital discharge follow-up.  She was discharged from [facility] 2 weeks ago after being hospitalized for 3 weeks for presumed COVID-19 pneumonia.  She actually never ended up testing positive for Covid after 3 tests.  However, per , chest CT was consistent with COVID-19 pneumonia.  She was given remdesivir and convalescent plasma.  Ultimately, she was discharged home on 5 to 6 L of oxygen which she remains on at this time.    She notes that she is slowly feeling better though she continues to desat quite low even into the upper 70s with exertion and needs to rest in order to recover her oxygen saturation.  She has not yet tried to taper down on her oxygen.    She reports today that she is going to need new oxygen fairly soon, and is hoping to get an order for home oxygen.    Other than shortness of breath, she denies any other symptoms at this time, and reports that she is eating well and overall continues to feel better.  She lives at home with her  who is closely monitoring her oxygen saturation and taking care of her.    ROS   Denies any recent fevers or chills. No nausea or vomiting. No chest pains.    Allergies   Allergen Reactions   • Aspirin      Intolerance     • Cipro Xr Hives   • Diclofenac    • Metformin Rash     Erythematous rash       Current medicines (including changes today)  Current Outpatient Medications   Medication Sig Dispense Refill   •  ASPIRIN 81 PO Take  by mouth.     • anastrozole (ARIMIDEX) 1 MG Tab TAKE 1 TABLET BY MOUTH EVERY DAY 90 Tab 1   • telmisartan-hydrochlorothiazide (MICARDIS HCT) 40-12.5 MG per tablet Take 1 Tab by mouth every day. 90 Tab 3   • simvastatin (ZOCOR) 40 MG Tab TAKE ONE TABLET BY MOUTH ONCE DAILY IN THE EVENING 90 Tab 3   • VITAMIN D, ERGOCALCIFEROL, PO Take  by mouth.       No current facility-administered medications for this visit.        Patient Active Problem List    Diagnosis Date Noted   • Trochanteric bursitis of right hip 10/01/2019   • Use of anastrozole (Arimidex) 01/11/2019   • Seasonal allergic rhinitis 09/27/2018   • Chronic right-sided low back pain with right-sided sciatica 11/08/2017   • Primary osteoarthritis involving multiple joints 11/03/2017   • History of breast cancer 02/10/2017   • Obesity (BMI 30-39.9) 02/10/2017   • Type 2 diabetes mellitus without complication, without long-term current use of insulin ([facility]) 04/16/2015   • Atrophic vaginitis 07/25/2013   • Essential hypertension 03/14/2013   • Hypercholesteremia 04/10/2012   • Diverticulosis of colon 11/04/2011   • Post hysterectomy menopause 11/04/2011       Family History   Problem Relation Age of Onset   • Stroke Mother    • Lung Disease Father         pneumonia   • Hyperlipidemia Father        She  has a past medical history of Anesthesia, Arthritis, Breast cancer (HCC) (2/10/2017), Dental disorder, Diabetes ([facility]), Diverticulosis of colon (11/4/2011), HTN (hypertension) (3/14/2013), Hypertension, Pain, and Post hysterectomy menopause (11/4/2011).  She  has a past surgical history that includes cholecystectomy (1999); abdominal hysterectomy total (2001); bunionectomy (2009); tonsillectomy (as child); shoulder arthroscopy w/ rotator cuff repair (2/9/2012); shoulder decompression arthroscopic (2/9/2012); clavicle distal excision (2/9/2012); and mastectomy.       Objective:   /61 (BP Location: Left arm, Patient Position: Sitting, BP Cuff  "Size: Adult)   Pulse 85   Temp 36.4 °C (97.6 °F) (Temporal)   Resp 20   Ht 1.626 m (5' 4\")   SpO2 96%   BMI 31.93 kg/m²     Physical Exam:  Constitutional: Alert, no distress, well-groomed.  Skin: No rashes in visible areas.  Eye: Round. Conjunctiva clear, lids normal. No icterus.   ENMT: Lips pink without lesions, good dentition, moist mucous membranes. Phonation normal.  Neck: No masses, no thyromegaly. Moves freely without pain.  Respiratory: Unlabored respiratory effort, no cough or audible wheeze  Psych: Alert and oriented x3, normal affect and mood.       Assessment and Plan:   The following treatment plan was discussed:     1. Hospital discharge follow-up  2. Suspected COVID-19 virus infection  3. Respiratory failure with hypoxia, unspecified chronicity (HCC)  As above, patient currently recovering from presumed COVID-19 pneumonia but still requiring 5 to 6 L of oxygen at all times.  Will fax new oxygen order over today.  Hopefully as her respiratory function improves post COVID-19 infection, she will be able to start weaning down to a lower amount of oxygen.  Will have close follow-up in about 2 weeks to see how she is doing.  Red flags and reasons to call sooner discussed.    Follow-up: Return in about 2 weeks (around 12/16/2020).         "

## 2020-12-16 ENCOUNTER — OFFICE VISIT (OUTPATIENT)
Dept: MEDICAL GROUP | Facility: PHYSICIAN GROUP | Age: 70
End: 2020-12-16
Payer: MEDICARE

## 2020-12-16 VITALS
BODY MASS INDEX: 31.58 KG/M2 | WEIGHT: 185 LBS | OXYGEN SATURATION: 98 % | DIASTOLIC BLOOD PRESSURE: 60 MMHG | SYSTOLIC BLOOD PRESSURE: 116 MMHG | TEMPERATURE: 97.9 F | HEIGHT: 64 IN | HEART RATE: 99 BPM

## 2020-12-16 DIAGNOSIS — J96.01 ACUTE RESPIRATORY FAILURE WITH HYPOXIA (HCC): ICD-10-CM

## 2020-12-16 DIAGNOSIS — Z20.822 SUSPECTED COVID-19 VIRUS INFECTION: ICD-10-CM

## 2020-12-16 PROCEDURE — 99214 OFFICE O/P EST MOD 30 MIN: CPT | Mod: CS | Performed by: FAMILY MEDICINE

## 2020-12-16 NOTE — PROGRESS NOTES
Six Minute Walk:       MA:Gen Madsen Maura CarterLoydaRomo is an 70 y.o. female   See Vital Signs  Normal ambulates with O2    O2 Sat on room Air: 91 %    Excercise (walking):    1 Minute: SaO2: 88   HR: 123      Recovery:     Room Air:  SaO2: 91   HR:    114    On O2 4 LPM for 1 minutes:  SaO2: 96   HR: 109    MA Comments:  Discontinued walk test after less than 1 minute, as O2 dropped to 88.

## 2020-12-16 NOTE — PROGRESS NOTES
CC: History of COVID-19, respiratory failure    HISTORY OF THE PRESENT ILLNESS: Patient is a 70 y.o. female. This pleasant patient is here today for follow-up.    Patient is here today for follow-up.  She was seen approximately 2 weeks ago for hospital discharge follow-up after being hospitalized at [facility] with presumed COVID-19 pneumonia and hypoxic respiratory failure.  She was treated with remdesivir, convalescent plasma and steroids.  However, her Covid test was negative x3 though chest CT consistent with Covid pneumonia.  She was discharged on oxygen about 5 weeks ago.  At her last follow-up visit about 2 weeks ago, she was on 5 to 6 L.  She has not attempted to try to wean down on her oxygen.  She does note that they moved her oxygen down to 4 L today though in office here, and oxygen sat is at 98%.    Patient notes that she continues to feel better each day.  She still experiences significant desaturation with exertion, down into the low 80s.  She tries to get up and move around quite a bit at home to work her lungs.  She notes that she has not made any progress with her incentive spirometer.    Allergies: Aspirin, Cipro xr, Diclofenac, and Metformin    Current Outpatient Medications Ordered in Epic   Medication Sig Dispense Refill   • ASPIRIN 81 PO Take  by mouth.     • telmisartan-hydrochlorothiazide (MICARDIS HCT) 40-12.5 MG per tablet Take 1 Tab by mouth every day. 90 Tab 3   • simvastatin (ZOCOR) 40 MG Tab TAKE ONE TABLET BY MOUTH ONCE DAILY IN THE EVENING 90 Tab 3   • VITAMIN D, ERGOCALCIFEROL, PO Take  by mouth.       No current Epic-ordered facility-administered medications on file.        Past Medical History:   Diagnosis Date   • Anesthesia     post of nausea   • Arthritis    • Breast cancer (HCC) 2/10/2017   • Dental disorder     permanent bridge   • Diabetes (HCC)    • Diverticulosis of colon 11/4/2011   • HTN (hypertension) 3/14/2013   • Hypertension    • Pain     right shoulder down to thumb  "  • Post hysterectomy menopause 11/4/2011       Past Surgical History:   Procedure Laterality Date   • SHOULDER ARTHROSCOPY W/ ROTATOR CUFF REPAIR  2/9/2012    Performed by JENNY LIMON at SUR[facility]   • SHOULDER DECOMPRESSION ARTHROSCOPIC  2/9/2012    Performed by JENNY LIMON at [facility]   • CLAVICLE DISTAL EXCISION  2/9/2012    Performed by JENNY LIMON at [facility]   • BUNIONECTOMY  2009    bilateral   • ABDOMINAL HYSTERECTOMY TOTAL  2001    endometriosis   • CHOLECYSTECTOMY  1999    laparoscopy   • MASTECTOMY     • TONSILLECTOMY  as child       Social History     Tobacco Use   • Smoking status: Never Smoker   • Smokeless tobacco: Never Used   • Tobacco comment: avoid any and all tobacco products   Substance Use Topics   • Alcohol use: No     Alcohol/week: 0.0 oz     Comment: once a year   • Drug use: No       Social History     Social History Narrative   • Not on file       Family History   Problem Relation Age of Onset   • Stroke Mother    • Lung Disease Father         pneumonia   • Hyperlipidemia Father        ROS:     - Constitutional: Negative for fever, chills, unexpected weight change, and fatigue/generalized weakness.     - HEENT: Negative for headaches, vision changes, hearing changes, ear pain, ear discharge, rhinorrhea, sinus congestion, sore throat, and neck pain.      - Respiratory: Positive for ongoing cough and dyspnea.      - Cardiovascular: Negative for chest pain, palpitations, orthopnea, PND, and bilateral lower extremity edema.     Exam: /60 (BP Location: Right arm, Patient Position: Sitting, BP Cuff Size: Adult)   Pulse 99   Temp 36.6 °C (97.9 °F) (Temporal)   Ht 1.626 m (5' 4\")   Wt 83.9 kg (185 lb)   SpO2 98%  Body mass index is 31.76 kg/m².    General: Well appearing, NAD  Pulmonary: Bibasilar crackles noted, left greater than right normal effort. No rales, ronchi, or wheezing.  On 4 L nasal cannula.  Cardiovascular: Regular " rate and rhythm without murmur, rubs or gallop.   Skin: Warm and dry.  No obvious lesions.  Musculoskeletal:  No extremity cyanosis, clubbing, or edema.  Psych: Normal mood and affect. Alert and oriented. Judgment and insight is normal.    Please note that this dictation was created using voice recognition software. I have made every reasonable attempt to correct obvious errors, but I expect that there are errors of grammar and possibly content that I did not discover before finalizing the note.      Assessment/Plan  Tena was seen today for shortness of breath.    Diagnoses and all orders for this visit:    Suspected COVID-19 virus infection  Acute respiratory failure with hypoxia (HCC)  Patient with continued hypoxia after likely COVID-19 pneumonia.  She does overall seem to be improving, though has not weaned off her oxygen.  I have not yet received the records from [place] which I would like to review.  We have requested them again.  If she has not yet had a cardiac echo, I would want to get that done.  May consider repeat CT chest as well.  Will request records and go from there.  We did do a 6-minute walk test that she immediately failed in the event that she is going to need oxygen in a more long term basis.    Follow-up in about 1 month.    Nahed Waddell,   [facility]

## 2021-01-15 DIAGNOSIS — Z23 NEED FOR VACCINATION: ICD-10-CM

## 2021-01-27 ENCOUNTER — TELEMEDICINE (OUTPATIENT)
Dept: MEDICAL GROUP | Facility: PHYSICIAN GROUP | Age: 71
End: 2021-01-27
Payer: MEDICARE

## 2021-01-27 VITALS
OXYGEN SATURATION: 97 % | DIASTOLIC BLOOD PRESSURE: 70 MMHG | TEMPERATURE: 98.2 F | WEIGHT: 190 LBS | HEART RATE: 85 BPM | BODY MASS INDEX: 32.44 KG/M2 | SYSTOLIC BLOOD PRESSURE: 126 MMHG | HEIGHT: 64 IN

## 2021-01-27 DIAGNOSIS — E11.9 TYPE 2 DIABETES MELLITUS WITHOUT COMPLICATION, WITHOUT LONG-TERM CURRENT USE OF INSULIN (HCC): ICD-10-CM

## 2021-01-27 DIAGNOSIS — L65.9 HAIR LOSS: ICD-10-CM

## 2021-01-27 DIAGNOSIS — R09.02 HYPOXIA: ICD-10-CM

## 2021-01-27 DIAGNOSIS — Z20.822 SUSPECTED COVID-19 VIRUS INFECTION: ICD-10-CM

## 2021-01-27 DIAGNOSIS — I10 ESSENTIAL HYPERTENSION: ICD-10-CM

## 2021-01-27 DIAGNOSIS — E78.00 HYPERCHOLESTEREMIA: ICD-10-CM

## 2021-01-27 PROCEDURE — 99214 OFFICE O/P EST MOD 30 MIN: CPT | Mod: 95,CR | Performed by: FAMILY MEDICINE

## 2021-01-27 RX ORDER — ATORVASTATIN CALCIUM 40 MG/1
40 TABLET, FILM COATED ORAL DAILY
Qty: 90 TAB | Refills: 3 | Status: SHIPPED | OUTPATIENT
Start: 2021-01-27 | End: 2021-04-27

## 2021-01-27 ASSESSMENT — PATIENT HEALTH QUESTIONNAIRE - PHQ9: CLINICAL INTERPRETATION OF PHQ2 SCORE: 0

## 2021-01-27 NOTE — PROGRESS NOTES
Virtual Visit: Established Patient   This visit was conducted via Roamer using secure and encrypted videoconferencing technology. The patient was in a private location in the state of [place].    The patient's identity was confirmed and verbal consent was obtained for this virtual visit.    Subjective:   CC:   Chief Complaint   Patient presents with   • Follow-Up     [facility] [phone]   • Hair Loss   • Patient Question     Covid Vaccine / O2 Changes       Tena Damon is a 70 y.o. female presenting for evaluation and management of:    Patient is here today for 1 month follow-up.  She was hospitalized for 3 weeks in November for presumed COVID-19 pneumonia and was treated as such with remdesivir, convalescent plasma and steroids.  She never did end up having a positive COVID-19 test.    At her last visit about a month ago, patient was continuing to have significant oxygen desaturation with exertion into the upper 70s and needed to rest in order to recover her oxygen saturation.  She was on 5 to 6 L at that time.  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    She is actually down to 2-1/2 L at this time.  Hoping to go down to 2 L if I feel that is appropriate.  She otherwise states that she feels really well.  She is wondering if she should see a pulmonologist.  Given that she had no respiratory or lung issues prior to her infection and remains on oxygen almost 3 months later.    She does note that she has had some hair loss began in the past several weeks.    ROS   Denies any recent fevers or chills. No nausea or vomiting. No chest pains.    Allergies   Allergen Reactions   • Aspirin      Intolerance     • Cipro Xr Hives   • Diclofenac    • Metformin Rash     Erythematous rash       Current medicines (including changes today)  Current Outpatient Medications   Medication Sig Dispense Refill   • atorvastatin (LIPITOR) 40 MG Tab Take 1 Tab by mouth every day. 90 Tab 3   •  "ASPIRIN 81 PO Take  by mouth.     • telmisartan-hydrochlorothiazide (MICARDIS HCT) 40-12.5 MG per tablet Take 1 Tab by mouth every day. 90 Tab 3   • VITAMIN D, ERGOCALCIFEROL, PO Take  by mouth.       No current facility-administered medications for this visit.        Patient Active Problem List    Diagnosis Date Noted   • Trochanteric bursitis of right hip 10/01/2019   • Use of anastrozole (Arimidex) 01/11/2019   • Seasonal allergic rhinitis 09/27/2018   • Chronic right-sided low back pain with right-sided sciatica 11/08/2017   • Primary osteoarthritis involving multiple joints 11/03/2017   • History of breast cancer 02/10/2017   • Obesity (BMI 30-39.9) 02/10/2017   • Type 2 diabetes mellitus without complication, without long-term current use of insulin (HCC) 04/16/2015   • Atrophic vaginitis 07/25/2013   • Essential hypertension 03/14/2013   • Hypercholesteremia 04/10/2012   • Diverticulosis of colon 11/04/2011   • Post hysterectomy menopause 11/04/2011       Family History   Problem Relation Age of Onset   • Stroke Mother    • Lung Disease Father         pneumonia   • Hyperlipidemia Father        She  has a past medical history of Anesthesia, Arthritis, Breast cancer (HCC) (2/10/2017), Dental disorder, Diabetes ([facility]), Diverticulosis of colon (11/4/2011), HTN (hypertension) (3/14/2013), Hypertension, Pain, and Post hysterectomy menopause (11/4/2011).  She  has a past surgical history that includes cholecystectomy (1999); abdominal hysterectomy total (2001); bunionectomy (2009); tonsillectomy (as child); shoulder arthroscopy w/ rotator cuff repair (2/9/2012); shoulder decompression arthroscopic (2/9/2012); clavicle distal excision (2/9/2012); and mastectomy.       Objective:   /70 (BP Location: Left arm, Patient Position: Sitting, BP Cuff Size: Adult)   Pulse 85   Temp 36.8 °C (98.2 °F) (Temporal)   Ht 1.626 m (5' 4\")   Wt 86.2 kg (190 lb)   SpO2 97%   BMI 32.61 kg/m²     Physical " Exam:  Constitutional: Alert, no distress, well-groomed.  Skin: No rashes in visible areas.  Eye: Round. Conjunctiva clear, lids normal. No icterus.   ENMT: Lips pink without lesions, good dentition, moist mucous membranes. Phonation normal.  Neck: No masses, no thyromegaly. Moves freely without pain.  Respiratory: Unlabored respiratory effort, no cough or audible wheeze  Psych: Alert and oriented x3, normal affect and mood.       Assessment and Plan:   The following treatment plan was discussed:     1. Suspected COVID-19 virus infection  2. Hypoxia  Patient with presumed COVID-19 infection though never positive.  I am going to get some lab work and check her antibody at this time.  I think it is very reasonable to get a referral to pulmonology at this time given that she still has not weaned off the oxygen.  However, she has done remarkably well since her last visit and wean down from 5 to 6 L to 2-1/2.  I think it is fine for her to continue to wean as tolerated.  - REFERRAL TO PULMONARY AND SLEEP MEDICINE  - SARS CoV-2 Ab, Total; Future    3. Hypercholesteremia  Chronic issue, reports that simvastatin is not too expensive to afford.  We will go ahead and switch to atorvastatin.  Due for lipid profile.  - atorvastatin (LIPITOR) 40 MG Tab; Take 1 Tab by mouth every day.  Dispense: 90 Tab; Refill: 3  - Lipid Profile; Future    4. Essential hypertension  Chronic, well controlled on current medications.    5. Type 2 diabetes mellitus without complication, without long-term current use of insulin (HCC)  Chronic issue, due for A1c.  Diet controlled.  - HEMOGLOBIN A1C; Future    6. Hair loss  Discussed with patient that I have seen several patients lose hair after severe infections including COVID-19.  We will go ahead and check labs as below, but likely related to stress of severe infection.  - CBC WITH DIFFERENTIAL; Future  - Comp Metabolic Panel; Future  - TSH WITH REFLEX TO FT4; Future        Follow-up: Return in  about 2 months (around 3/27/2021).

## 2021-02-03 ENCOUNTER — HOSPITAL ENCOUNTER (OUTPATIENT)
Dept: RADIOLOGY | Facility: MEDICAL CENTER | Age: 71
End: 2021-02-03
Payer: MEDICARE

## 2021-02-04 ENCOUNTER — OFFICE VISIT (OUTPATIENT)
Dept: SLEEP MEDICINE | Facility: MEDICAL CENTER | Age: 71
End: 2021-02-04
Payer: MEDICARE

## 2021-02-04 VITALS
TEMPERATURE: 97.7 F | DIASTOLIC BLOOD PRESSURE: 64 MMHG | RESPIRATION RATE: 18 BRPM | HEIGHT: 63 IN | BODY MASS INDEX: 32.96 KG/M2 | WEIGHT: 186 LBS | OXYGEN SATURATION: 99 % | SYSTOLIC BLOOD PRESSURE: 134 MMHG | HEART RATE: 83 BPM

## 2021-02-04 DIAGNOSIS — J84.9 INTERSTITIAL PULMONARY DISEASE (HCC): ICD-10-CM

## 2021-02-04 DIAGNOSIS — Z85.3 HISTORY OF BREAST CANCER: ICD-10-CM

## 2021-02-04 DIAGNOSIS — J30.2 SEASONAL ALLERGIC RHINITIS, UNSPECIFIED TRIGGER: ICD-10-CM

## 2021-02-04 DIAGNOSIS — E66.9 CLASS 1 OBESITY WITH BODY MASS INDEX (BMI) OF 32.0 TO 32.9 IN ADULT, UNSPECIFIED OBESITY TYPE, UNSPECIFIED WHETHER SERIOUS COMORBIDITY PRESENT: ICD-10-CM

## 2021-02-04 DIAGNOSIS — J96.01 ACUTE RESPIRATORY FAILURE WITH HYPOXIA (HCC): ICD-10-CM

## 2021-02-04 PROCEDURE — 94761 N-INVAS EAR/PLS OXIMETRY MLT: CPT | Performed by: INTERNAL MEDICINE

## 2021-02-04 PROCEDURE — 99204 OFFICE O/P NEW MOD 45 MIN: CPT | Performed by: INTERNAL MEDICINE

## 2021-02-04 SDOH — HEALTH STABILITY: MENTAL HEALTH: HOW OFTEN DO YOU HAVE A DRINK CONTAINING ALCOHOL?: MONTHLY OR LESS

## 2021-02-04 SDOH — HEALTH STABILITY: MENTAL HEALTH: HOW OFTEN DO YOU HAVE 6 OR MORE DRINKS ON ONE OCCASION?: NEVER

## 2021-02-04 SDOH — HEALTH STABILITY: MENTAL HEALTH: HOW MANY STANDARD DRINKS CONTAINING ALCOHOL DO YOU HAVE ON A TYPICAL DAY?: 1 OR 2

## 2021-02-04 ASSESSMENT — ENCOUNTER SYMPTOMS
EYE DISCHARGE: 0
BACK PAIN: 0
PHOTOPHOBIA: 0
CLAUDICATION: 0
NAUSEA: 0
FALLS: 0
SORE THROAT: 0
SPUTUM PRODUCTION: 0
EYE PAIN: 0
HEARTBURN: 0
FEVER: 0
COUGH: 1
DOUBLE VISION: 0
ORTHOPNEA: 0
EYE REDNESS: 0
STRIDOR: 0
SINUS PAIN: 0
CHILLS: 0
HEADACHES: 0
DIARRHEA: 0
NECK PAIN: 0
PALPITATIONS: 0
TREMORS: 0
WHEEZING: 0
MYALGIAS: 0
DEPRESSION: 0
HEMOPTYSIS: 0
WEIGHT LOSS: 0
VOMITING: 0
DIZZINESS: 0
BLURRED VISION: 0
ABDOMINAL PAIN: 0
CONSTIPATION: 0
FOCAL WEAKNESS: 0
SHORTNESS OF BREATH: 1
WEAKNESS: 0
SPEECH CHANGE: 0
DIAPHORESIS: 0
PND: 0

## 2021-02-04 NOTE — PROCEDURES
Multi-Ox Readings  Multi Ox #1 Room air   O2 sat % at rest 93   O2 sat % on exertion 84   O2 sat average on exertion     Multi Ox #2 2 LPM   O2 sat % at rest 97   O2 sat % on exertion 94   O2 sat average on exertion       Oxygen Use 2   Oxygen Frequency 24/7   Duration of need     Is the patient mobile within the home?     CPAP Use?     BIPAP Use?     Servo Titration

## 2021-02-04 NOTE — PROGRESS NOTES
Chief Complaint   Patient presents with   • Establish Care     referral 1/27/2021 KENDALL Waddell DO    • Results     [facility] CTA Chest 11/11/20, CXR 11/10/2020       HPI: This patient is a 70 y.o. female presenting for evaluation of hospital follow-up for pneumonia complicated by acute hypoxic respiratory failure.  The patient's past medical history is significant for metabolic syndrome with prediabetes, environmental allergies and breast cancer status post bilateral mastectomy with no chemotherapy or radiation but on Arimidex.  She is a lifelong non-smoker.  She is retired from work as a .  No family history of autoimmune disease.  The patient was admitted to [facility] on October 31 with fever, hypoxia, cough and body aches.  She had bilateral infiltrates on chest x-ray concerning for coronavirus pneumonia but was tested on 3 occasions for virus via PCR all of which were negative.  She required high flow oxygen up to 5 to 6 L and was treated with remdesivir, convalescent serum and steroids.  She was ultimately discharged on supplemental oxygen at 4 L/min and has had slow taper and her oxygen over time.  She was ruled out for pulmonary embolism during her hospital stay and echo done during her stay which was essentially normal.  She presents today to [facility] for follow-up.  She is on 2 L/min saturating 99% and she is able to walk for up to 58 feet at home without supplemental oxygen and dropping only to 89% based on home monitoring.  She has persistent, mild dry cough.  No chest pain.  No edema.  No wheezing.  No persistent fevers.  She has labs ordered to evaluate for coronavirus antibodies.     Past Medical History:   Diagnosis Date   • Anesthesia     post of nausea   • Arthritis    • Breast cancer (HCC) 2/10/2017   • Dental disorder     permanent bridge   • Diabetes (HCC)    • Diverticulosis of colon 11/4/2011   • HTN (hypertension) 3/14/2013   • Hypertension    • Pain     right  shoulder down to thumb   • Post hysterectomy menopause 11/4/2011       Social History     Socioeconomic History   • Marital status:      Spouse name: Not on file   • Number of children: Not on file   • Years of education: Not on file   • Highest education level: Not on file   Occupational History   • Not on file   Social Needs   • Financial resource strain: Not on file   • Food insecurity     Worry: Not on file     Inability: Not on file   • Transportation needs     Medical: Not on file     Non-medical: Not on file   Tobacco Use   • Smoking status: Never Smoker   • Smokeless tobacco: Never Used   • Tobacco comment: avoid any and all tobacco products   Substance and Sexual Activity   • Alcohol use: Yes     Alcohol/week: 0.0 oz     Frequency: Monthly or less     Drinks per session: 1 or 2     Binge frequency: Never     Comment: once a year   • Drug use: No   • Sexual activity: Not Currently     Partners: Male     Comment: . Work at [employer]   • Physical activity     Days per week: Not on file     Minutes per session: Not on file   • Stress: Not on file   Relationships   • Social connections     Talks on phone: Not on file     Gets together: Not on file     Attends [employer] service: Not on file     Active member of club or organization: Not on file     Attends meetings of clubs or organizations: Not on file     Relationship status: Not on file   • Intimate partner violence     Fear of current or ex partner: Not on file     Emotionally abused: Not on file     Physically abused: Not on file     Forced sexual activity: Not on file   Other Topics Concern   • Not on file   Social History Narrative   • Not on file       Family History   Problem Relation Age of Onset   • Stroke Mother    • Lung Disease Father         pneumonia   • Hyperlipidemia Father        Current Outpatient Medications on File Prior to Visit   Medication Sig Dispense Refill   • atorvastatin (LIPITOR) 40 MG Tab Take 1 Tab by mouth  "every day. 90 Tab 3   • telmisartan-hydrochlorothiazide (MICARDIS HCT) 40-12.5 MG per tablet Take 1 Tab by mouth every day. 90 Tab 3   • ASPIRIN 81 PO Take  by mouth.     • VITAMIN D, ERGOCALCIFEROL, PO Take  by mouth.       No current facility-administered medications on file prior to visit.        Allergies: Aspirin, Cipro xr, Diclofenac, and Metformin    ROS:   Review of Systems   Constitutional: Negative for chills, diaphoresis, fever, malaise/fatigue and weight loss.   HENT: Negative for congestion, ear discharge, ear pain, hearing loss, nosebleeds, sinus pain, sore throat and tinnitus.    Eyes: Negative for blurred vision, double vision, photophobia, pain, discharge and redness.   Respiratory: Positive for cough and shortness of breath. Negative for hemoptysis, sputum production, wheezing and stridor.    Cardiovascular: Negative for chest pain, palpitations, orthopnea, claudication, leg swelling and PND.   Gastrointestinal: Negative for abdominal pain, constipation, diarrhea, heartburn, nausea and vomiting.   Genitourinary: Negative for dysuria and urgency.   Musculoskeletal: Negative for back pain, falls, joint pain, myalgias and neck pain.   Skin: Negative for itching and rash.   Neurological: Negative for dizziness, tremors, speech change, focal weakness, weakness and headaches.   Endo/Heme/Allergies: Negative for environmental allergies.   Psychiatric/Behavioral: Negative for depression.       /64 (BP Location: Right arm, Patient Position: Sitting, BP Cuff Size: Adult)   Pulse 83   Temp 36.5 °C (97.7 °F) (Temporal)   Resp 18   Ht 1.6 m (5' 3\")   Wt 84.4 kg (186 lb)   SpO2 99%     Physical Exam:  Physical Exam  Constitutional:       General: She is not in acute distress.     Appearance: Normal appearance. She is well-developed. She is obese.   HENT:      Head: Normocephalic and atraumatic.      Right Ear: External ear normal.      Left Ear: External ear normal.      Nose: Nose normal. No " congestion.      Mouth/Throat:      Mouth: Mucous membranes are moist.      Pharynx: Oropharynx is clear. No oropharyngeal exudate.   Eyes:      General: No scleral icterus.     Extraocular Movements: Extraocular movements intact.      Conjunctiva/sclera: Conjunctivae normal.      Pupils: Pupils are equal, round, and reactive to light.   Neck:      Musculoskeletal: Neck supple.      Vascular: No JVD.      Trachea: No tracheal deviation.   Cardiovascular:      Rate and Rhythm: Normal rate and regular rhythm.      Heart sounds: Normal heart sounds. No murmur. No friction rub. No gallop.    Pulmonary:      Effort: Pulmonary effort is normal. No accessory muscle usage or respiratory distress.      Breath sounds: No wheezing or rales.      Comments: Dry inspiratory crackles b/l lung bases  Abdominal:      General: There is no distension.      Palpations: Abdomen is soft.      Tenderness: There is no abdominal tenderness.   Musculoskeletal: Normal range of motion.         General: No tenderness or deformity.      Right lower leg: No edema.      Left lower leg: No edema.   Lymphadenopathy:      Cervical: No cervical adenopathy.   Skin:     General: Skin is warm and dry.      Findings: No rash.      Nails: There is no clubbing.     Neurological:      Mental Status: She is alert and oriented to person, place, and time.      Cranial Nerves: No cranial nerve deficit.      Gait: Gait normal.   Psychiatric:         Mood and Affect: Mood normal.         Behavior: Behavior normal.         PFTs as reviewed by me personally: none    Imaging as reviewed by me personally: as per HPI    Assessment:  1. Interstitial pulmonary disease (HCC)  CT-CHEST, HIGH RESOLUTION LUNG   2. Acute respiratory failure with hypoxia (HCC)  Multiple Oximetry    CT-CHEST, HIGH RESOLUTION LUNG    PULMONARY FUNCTION TESTS -Test requested: Complete Pulmonary Function Test    Exercise Test for Bronchospasm / 6-Minute Walk   3. History of breast cancer     4.  Seasonal allergic rhinitis, unspecified trigger     5. Class 1 obesity with body mass index (BMI) of 32.0 to 32.9 in adult, unspecified obesity type, unspecified whether serious comorbidity present         Plan:  1.  Patient has exam findings concerning for interstitial process.  This was not evident on CT abdomen pelvis from March 2018.  Suspect that is related to her acute infection, possible scarring.  We will order high-resolution CT chest.  2.  This was acute and in the setting of infection highly suggestive of coronavirus although PCR testing was negative on multiple occasions.  Antibody testing is pending.  She did receive evidence-based therapy for presumed coronavirus.  Oxygen needs are improving based on 6-minute walk test today although patient did continue to desaturate 84% on room air.  This was corrected with 2 L/min.  We will continue this and obtain full pulmonary function testing, 6-minute walk test and high-resolution CT chest as per above.  Follow-up in 6 weeks.  3.  On Arimidex.  She never had chemotherapy or XRT.  4.  Currently well controlled on over-the-counter medications.  5.  Discussed with patient increased risk for obesity related pulmonary complications.  Encouraged healthy lifestyle habits.    Return in about 6 weeks (around 3/18/2021) for okay to take a CRC spot.

## 2021-02-18 ENCOUNTER — APPOINTMENT (OUTPATIENT)
Dept: HEMATOLOGY ONCOLOGY | Facility: MEDICAL CENTER | Age: 71
End: 2021-02-18
Payer: MEDICARE

## 2021-02-19 ENCOUNTER — HOSPITAL ENCOUNTER (OUTPATIENT)
Dept: LAB | Facility: MEDICAL CENTER | Age: 71
End: 2021-02-19
Attending: FAMILY MEDICINE
Payer: MEDICARE

## 2021-02-19 DIAGNOSIS — L65.9 HAIR LOSS: ICD-10-CM

## 2021-02-19 DIAGNOSIS — Z20.822 SUSPECTED COVID-19 VIRUS INFECTION: ICD-10-CM

## 2021-02-19 DIAGNOSIS — E78.00 HYPERCHOLESTEREMIA: ICD-10-CM

## 2021-02-19 DIAGNOSIS — E11.9 TYPE 2 DIABETES MELLITUS WITHOUT COMPLICATION, WITHOUT LONG-TERM CURRENT USE OF INSULIN (HCC): ICD-10-CM

## 2021-02-19 LAB
ALBUMIN SERPL BCP-MCNC: 4.3 G/DL (ref 3.2–4.9)
ALBUMIN/GLOB SERPL: 1.4 G/DL
ALP SERPL-CCNC: 102 U/L (ref 30–99)
ALT SERPL-CCNC: 18 U/L (ref 2–50)
ANION GAP SERPL CALC-SCNC: 11 MMOL/L (ref 7–16)
AST SERPL-CCNC: 17 U/L (ref 12–45)
BASOPHILS # BLD AUTO: 0.4 % (ref 0–1.8)
BASOPHILS # BLD: 0.06 K/UL (ref 0–0.12)
BILIRUB SERPL-MCNC: 0.5 MG/DL (ref 0.1–1.5)
BUN SERPL-MCNC: 19 MG/DL (ref 8–22)
CALCIUM SERPL-MCNC: 9.8 MG/DL (ref 8.5–10.5)
CHLORIDE SERPL-SCNC: 98 MMOL/L (ref 96–112)
CHOLEST SERPL-MCNC: 142 MG/DL (ref 100–199)
CO2 SERPL-SCNC: 27 MMOL/L (ref 20–33)
CREAT SERPL-MCNC: 0.77 MG/DL (ref 0.5–1.4)
EOSINOPHIL # BLD AUTO: 0.16 K/UL (ref 0–0.51)
EOSINOPHIL NFR BLD: 1.1 % (ref 0–6.9)
ERYTHROCYTE [DISTWIDTH] IN BLOOD BY AUTOMATED COUNT: 45.4 FL (ref 35.9–50)
EST. AVERAGE GLUCOSE BLD GHB EST-MCNC: 157 MG/DL
FASTING STATUS PATIENT QL REPORTED: NORMAL
GLOBULIN SER CALC-MCNC: 3.1 G/DL (ref 1.9–3.5)
GLUCOSE SERPL-MCNC: 152 MG/DL (ref 65–99)
HBA1C MFR BLD: 7.1 % (ref 0–5.6)
HCT VFR BLD AUTO: 41.5 % (ref 37–47)
HDLC SERPL-MCNC: 45 MG/DL
HGB BLD-MCNC: 13.2 G/DL (ref 12–16)
IMM GRANULOCYTES # BLD AUTO: 0.06 K/UL (ref 0–0.11)
IMM GRANULOCYTES NFR BLD AUTO: 0.4 % (ref 0–0.9)
LDLC SERPL CALC-MCNC: 74 MG/DL
LYMPHOCYTES # BLD AUTO: 1.44 K/UL (ref 1–4.8)
LYMPHOCYTES NFR BLD: 10.2 % (ref 22–41)
MCH RBC QN AUTO: 29.8 PG (ref 27–33)
MCHC RBC AUTO-ENTMCNC: 31.8 G/DL (ref 33.6–35)
MCV RBC AUTO: 93.7 FL (ref 81.4–97.8)
MONOCYTES # BLD AUTO: 0.88 K/UL (ref 0–0.85)
MONOCYTES NFR BLD AUTO: 6.3 % (ref 0–13.4)
NEUTROPHILS # BLD AUTO: 11.45 K/UL (ref 2–7.15)
NEUTROPHILS NFR BLD: 81.6 % (ref 44–72)
NRBC # BLD AUTO: 0 K/UL
NRBC BLD-RTO: 0 /100 WBC
PLATELET # BLD AUTO: 328 K/UL (ref 164–446)
PMV BLD AUTO: 11.3 FL (ref 9–12.9)
POTASSIUM SERPL-SCNC: 4.1 MMOL/L (ref 3.6–5.5)
PROT SERPL-MCNC: 7.4 G/DL (ref 6–8.2)
RBC # BLD AUTO: 4.43 M/UL (ref 4.2–5.4)
SARS-COV-2 AB SERPL QL IA: REACTIVE
SODIUM SERPL-SCNC: 136 MMOL/L (ref 135–145)
TRIGL SERPL-MCNC: 117 MG/DL (ref 0–149)
TSH SERPL DL<=0.005 MIU/L-ACNC: 1.53 UIU/ML (ref 0.38–5.33)
WBC # BLD AUTO: 14.1 K/UL (ref 4.8–10.8)

## 2021-02-19 PROCEDURE — 83036 HEMOGLOBIN GLYCOSYLATED A1C: CPT | Mod: GA

## 2021-02-19 PROCEDURE — 80053 COMPREHEN METABOLIC PANEL: CPT

## 2021-02-19 PROCEDURE — 85025 COMPLETE CBC W/AUTO DIFF WBC: CPT

## 2021-02-19 PROCEDURE — 84443 ASSAY THYROID STIM HORMONE: CPT

## 2021-02-19 PROCEDURE — 80061 LIPID PANEL: CPT

## 2021-02-19 PROCEDURE — 86769 SARS-COV-2 COVID-19 ANTIBODY: CPT

## 2021-02-19 PROCEDURE — 36415 COLL VENOUS BLD VENIPUNCTURE: CPT | Mod: GA

## 2021-02-24 ENCOUNTER — OFFICE VISIT (OUTPATIENT)
Dept: HEMATOLOGY ONCOLOGY | Facility: MEDICAL CENTER | Age: 71
End: 2021-02-24
Payer: MEDICARE

## 2021-02-24 VITALS
BODY MASS INDEX: 31.53 KG/M2 | HEART RATE: 100 BPM | TEMPERATURE: 98.6 F | HEIGHT: 65 IN | WEIGHT: 189.26 LBS | OXYGEN SATURATION: 92 % | DIASTOLIC BLOOD PRESSURE: 58 MMHG | SYSTOLIC BLOOD PRESSURE: 132 MMHG

## 2021-02-24 DIAGNOSIS — Z85.3 HISTORY OF BREAST CANCER: ICD-10-CM

## 2021-02-24 DIAGNOSIS — Z92.21 HISTORY OF AROMATASE INHIBITOR THERAPY: ICD-10-CM

## 2021-02-24 DIAGNOSIS — Z91.89 AT HIGH RISK FOR OSTEOPOROSIS: ICD-10-CM

## 2021-02-24 DIAGNOSIS — Z78.0 ASYMPTOMATIC MENOPAUSAL STATE: ICD-10-CM

## 2021-02-24 PROCEDURE — 99213 OFFICE O/P EST LOW 20 MIN: CPT | Performed by: NURSE PRACTITIONER

## 2021-02-24 ASSESSMENT — ENCOUNTER SYMPTOMS
PALPITATIONS: 0
COUGH: 0
DIZZINESS: 0
WEIGHT LOSS: 0
SHORTNESS OF BREATH: 1
VOMITING: 0
HEADACHES: 0
FEVER: 0
MYALGIAS: 0
NAUSEA: 0
CHILLS: 0
CONSTIPATION: 0
DIARRHEA: 0

## 2021-02-24 ASSESSMENT — PAIN SCALES - GENERAL: PAINLEVEL: NO PAIN

## 2021-02-24 ASSESSMENT — FIBROSIS 4 INDEX: FIB4 SCORE: 0.86

## 2021-02-24 NOTE — PROGRESS NOTES
Subjective:      Tena Damon is a 70 y.o. female who presents for 6 month Follow-Up for breast cancer.         HPI    Patient seen today in follow-up for bilateral breast cancer.  She presents unaccompanied for today's visit.     Cancer History  Patient was diagnosed with two- stage IA primary left sided breast cancers and right-sided DCIS. She is status post bilateral mastectomy in October 26, 2015, with completion of 5 years on Arimidex. Previous patient of Dr. Gifford.     Left medial showed 0.5 cm moderately differentiated invasive ductal carcinoma, margins positive medial, grade 2, ER 99%, CT 97%, Ki67 26%, HER2 by IHC [id number].    Left medial/lateral showed invasive ductal carcinoma measuring about 0.7 cm, grade 2 moderate differentiated, margins negative,distance from closest margin 0.3 cm from superior and inferior.  [id number]. ER 99%, CT 100%, Her-2 was not completed, margins clear, 2 lymph nodes negative.    Established at [facility] Feb 2019, and per Dr. Tinoco Oncotype DX was requested on 11/3/2015 and not sent.  Dr. Tinoco did discuss with patient at initial visit in Feb 2019 to send out an Oncotype however patient refused.  She was started on anastrozole 1 mg p.o. daily on December 3, 2015, with recommendation to complete 5 years of treatment. Patient did discontinue Arimidex December 4, 2020.     Last DEXA scan completed on 3/27/2019 shows bone mineral density within normal limits.  Patient had been recommended to take vitamin D and calcium supplements while on aromatase inhibitor, however she did refuse.     Interval History  Clinically from a breast cancer perspective patient is doing very well.  She did confirm that she stopped her Arimidex on December 4, 2020 and has been doing well.  She never really experienced any significant hot flashes or myalgias, but she states she does not experience any of that at this time.  He does have some generalized tenderness at times to the left chest wall  from her mastectomy surgical scar, but it is not worsening.  She denies any masses or new adenopathy noted in bilateral axilla.  She does note some mild swelling in the right axilla intermittently at times but does resolve.    Patient is slowly recovering from a significant hospitalization at [facility] in October/November 2020.  Despite 3 negative Covid tests by PCR, patient was treated as if she did have Covid pneumonia.  Most recent antibody testing for Covid did come back reactive.  Patient with significant respiratory issues and is still on at home oxygen at times.  She recently established with pulmonary who is monitoring her closely.  Both patient and  have recently received dose #1 of the Covid vaccine.    Allergies   Allergen Reactions   • Aspirin      Intolerance     • Cipro Xr Hives   • Diclofenac    • Metformin Rash     Erythematous rash     Current Outpatient Medications on File Prior to Visit   Medication Sig Dispense Refill   • atorvastatin (LIPITOR) 40 MG Tab Take 1 Tab by mouth every day. 90 Tab 3   • ASPIRIN 81 PO Take  by mouth.     • telmisartan-hydrochlorothiazide (MICARDIS HCT) 40-12.5 MG per tablet Take 1 Tab by mouth every day. 90 Tab 3   • VITAMIN D, ERGOCALCIFEROL, PO Take  by mouth.       No current facility-administered medications on file prior to visit.       Review of Systems   Constitutional: Positive for malaise/fatigue. Negative for chills, fever and weight loss.   Respiratory: Positive for shortness of breath (mostly with exertion ). Negative for cough.         On home O2 when needed - most with activity. In office O2 RA = 92%.   Cardiovascular: Negative for chest pain and palpitations.   Gastrointestinal: Negative for constipation, diarrhea, nausea and vomiting.   Genitourinary: Negative for dysuria.   Musculoskeletal: Negative for myalgias.   Neurological: Negative for dizziness and headaches.          Objective:     /58   Pulse 100   Temp 37 °C (98.6 °F)  "(Temporal)   Ht 1.651 m (5' 5\")   Wt 85.9 kg (189 lb 4.2 oz)   SpO2 92%   BMI 31.50 kg/m²      Physical Exam  Vitals reviewed.   Constitutional:       General: She is not in acute distress.     Appearance: Normal appearance. She is not diaphoretic.   HENT:      Head: Normocephalic and atraumatic.      Mouth/Throat:      Mouth: Mucous membranes are moist.      Pharynx: Oropharynx is clear. No oropharyngeal exudate or posterior oropharyngeal erythema.   Cardiovascular:      Rate and Rhythm: Normal rate and regular rhythm.      Pulses: Normal pulses.      Heart sounds: Normal heart sounds. No murmur. No friction rub. No gallop.    Pulmonary:      Effort: Pulmonary effort is normal. No respiratory distress.      Breath sounds: Normal breath sounds. No wheezing.   Chest:      Chest wall: Tenderness (mild at surgical site on the left) present. No mass, lacerations, deformity, swelling, crepitus or edema. There is no dullness to percussion.      Breasts:         Right: Absent.         Left: Absent.       Abdominal:      General: Bowel sounds are normal. There is no distension.      Palpations: Abdomen is soft.      Tenderness: There is no abdominal tenderness.   Musculoskeletal:         General: No swelling or tenderness.   Lymphadenopathy:      Head:      Right side of head: No submental, submandibular, tonsillar, preauricular, posterior auricular or occipital adenopathy.      Left side of head: No submental, submandibular, tonsillar, preauricular, posterior auricular or occipital adenopathy.      Cervical: No cervical adenopathy.      Right cervical: No superficial, deep or posterior cervical adenopathy.     Left cervical: No superficial, deep or posterior cervical adenopathy.      Upper Body:      Right upper body: No supraclavicular or axillary adenopathy.      Left upper body: No supraclavicular or axillary adenopathy.   Skin:     General: Skin is warm and dry.   Neurological:      Mental Status: She is alert and " oriented to person, place, and time.   Psychiatric:         Mood and Affect: Mood normal.         Behavior: Behavior normal.            Assessment/Plan:        1. History of breast cancer  DS-BONE DENSITY STUDY (DEXA)   2. At high risk for osteoporosis  DS-BONE DENSITY STUDY (DEXA)   3. History of aromatase inhibitor therapy  DS-BONE DENSITY STUDY (DEXA)   4. Asymptomatic menopausal state   DS-BONE DENSITY STUDY (DEXA)       1. Patient was diagnosed with two- stage IA primary left sided breast cancers and right-sided DCIS. She is status post bilateral mastectomy in October 26, 2015. Patient recently completed 5 years of aromatase inhibitor employing Arimidex on December 4, 2020.  She is doing very well.  We will continue to monitor patient annually.  She will be due for follow-up visit in 1 year, or sooner if needed.    There is no need for mammograms as patient is status post bilateral mastectomies.    2.  Patient is at high risk for osteoporosis due to 5 years on aromatase inhibitor.  Her last bone mineral density was within normal limits on 2/27/2019.  She is due for a bone mineral density at this time as she recently completed her 5 years of treatment.  Patient had refused supplement use of vitamin D and calcium despite discussion and education on the importance with her increased risk for osteoporosis being on the aromatase inhibitor.  I will contact patient with results of the bone mineral density test.    3.  We will continue to monitor patient and have her follow-up in the clinic in 1 year or sooner if needed.  She is to continue to follow-up with her PCP and pulmonologist for all other clinical concerns.

## 2021-02-26 ENCOUNTER — HOSPITAL ENCOUNTER (OUTPATIENT)
Dept: RADIOLOGY | Facility: MEDICAL CENTER | Age: 71
End: 2021-02-26
Attending: INTERNAL MEDICINE
Payer: MEDICARE

## 2021-02-26 DIAGNOSIS — J96.01 ACUTE RESPIRATORY FAILURE WITH HYPOXIA (HCC): ICD-10-CM

## 2021-02-26 DIAGNOSIS — J84.9 INTERSTITIAL PULMONARY DISEASE (HCC): ICD-10-CM

## 2021-02-26 PROCEDURE — 71250 CT THORAX DX C-: CPT | Mod: MG

## 2021-03-02 ENCOUNTER — OFFICE VISIT (OUTPATIENT)
Dept: MEDICAL GROUP | Facility: PHYSICIAN GROUP | Age: 71
End: 2021-03-02
Payer: MEDICARE

## 2021-03-02 VITALS
HEART RATE: 92 BPM | WEIGHT: 185 LBS | TEMPERATURE: 98 F | DIASTOLIC BLOOD PRESSURE: 60 MMHG | HEIGHT: 65 IN | OXYGEN SATURATION: 91 % | SYSTOLIC BLOOD PRESSURE: 132 MMHG | BODY MASS INDEX: 30.82 KG/M2

## 2021-03-02 DIAGNOSIS — Z86.16 HISTORY OF 2019 NOVEL CORONAVIRUS DISEASE (COVID-19): ICD-10-CM

## 2021-03-02 DIAGNOSIS — E11.9 TYPE 2 DIABETES MELLITUS WITHOUT COMPLICATION, WITHOUT LONG-TERM CURRENT USE OF INSULIN (HCC): ICD-10-CM

## 2021-03-02 PROCEDURE — 99214 OFFICE O/P EST MOD 30 MIN: CPT | Performed by: FAMILY MEDICINE

## 2021-03-02 RX ORDER — GLIPIZIDE 5 MG/1
2.5 TABLET ORAL 2 TIMES DAILY
Qty: 90 TABLET | Refills: 3 | Status: SHIPPED | OUTPATIENT
Start: 2021-03-02 | End: 2021-07-08 | Stop reason: SDUPTHER

## 2021-03-02 RX ORDER — LANCETS 30 GAUGE
EACH MISCELLANEOUS
Qty: 100 EACH | Refills: 11 | Status: SHIPPED | OUTPATIENT
Start: 2021-03-02 | End: 2021-06-25

## 2021-03-02 RX ORDER — GLUCOSAMINE HCL/CHONDROITIN SU 500-400 MG
CAPSULE ORAL
Qty: 100 EACH | Refills: 11 | Status: SHIPPED | OUTPATIENT
Start: 2021-03-02 | End: 2021-06-25

## 2021-03-02 ASSESSMENT — FIBROSIS 4 INDEX: FIB4 SCORE: 0.87

## 2021-03-02 NOTE — PROGRESS NOTES
CC: Diabetes, history of COVID-19    HISTORY OF THE PRESENT ILLNESS: Patient is a 71 y.o. female.     Patient is here for follow-up on lab work and history of COVID-19 infection.    Since her last visit, patient has been able to wean down on her oxygen to 1.5 L, only with activity.  She is not using her oxygen when she is sitting around at home doing nothing.  She feels like her fatigue levels have improved.  She has since seen pulmonology and had chest CT.  She is wanting to discuss her chest CT results today.    She is also concerned about the recent results of her blood work showing hemoglobin A1c of 7.1 whereas typically she has been in the mid sixes.  She feels she may need to be on a medication at this time whereas typically she was dietary controlled.  She states that she has never been on diabetes medication before.  However, in reviewing her allergy list it looks like she has tried Metformin before and developed a rash.  She is open to trialing something else.    Allergies: Aspirin, Cipro xr, Diclofenac, and Metformin    Current Outpatient Medications Ordered in Epic   Medication Sig Dispense Refill   • glipiZIDE (GLUCOTROL) 5 MG Tab Take 0.5 Tablets by mouth 2 times a day. 90 tablet 3   • Blood Glucose Meter Kit Test blood sugar as recommended by provider. 1 Kit 0   • Blood Glucose Test Strips Use one strip to test blood sugar once daily early morning before first meal. 100 Each 11   • Lancets Use one  lancet to test blood sugar once daily early morning before first meal. 100 Each 11   • Alcohol Swabs Wipe site with prep pad prior to checking sugars. 100 Each 11   • atorvastatin (LIPITOR) 40 MG Tab Take 1 Tab by mouth every day. 90 Tab 3   • ASPIRIN 81 PO Take  by mouth.     • telmisartan-hydrochlorothiazide (MICARDIS HCT) 40-12.5 MG per tablet Take 1 Tab by mouth every day. 90 Tab 3   • VITAMIN D, ERGOCALCIFEROL, PO Take  by mouth.       No current Epic-ordered facility-administered medications on file.  "      Past Medical History:   Diagnosis Date   • Anesthesia     post of nausea   • Arthritis    • Breast cancer (HCC) 2/10/2017   • Dental disorder     permanent bridge   • Diabetes (HCC)    • Diverticulosis of colon 11/4/2011   • HTN (hypertension) 3/14/2013   • Hypertension    • Pain     right shoulder down to thumb   • Post hysterectomy menopause 11/4/2011       Past Surgical History:   Procedure Laterality Date   • SHOULDER ARTHROSCOPY W/ ROTATOR CUFF REPAIR  2/9/2012    Performed by JENNY LIMON at [facility]   • SHOULDER DECOMPRESSION ARTHROSCOPIC  2/9/2012    Performed by JENNY LIMON at [facility]   • CLAVICLE DISTAL EXCISION  2/9/2012    Performed by JENNY LIMON at [facility]   • BUNIONECTOMY  2009    bilateral   • ABDOMINAL HYSTERECTOMY TOTAL  2001    endometriosis   • CHOLECYSTECTOMY  1999    laparoscopy   • MASTECTOMY     • TONSILLECTOMY  as child       Social History     Tobacco Use   • Smoking status: Never Smoker   • Smokeless tobacco: Never Used   • Tobacco comment: avoid any and all tobacco products   Substance Use Topics   • Alcohol use: Yes     Alcohol/week: 0.0 oz     Comment: once a year   • Drug use: No       Social History     Social History Narrative   • Not on file       Family History   Problem Relation Age of Onset   • Stroke Mother    • Lung Disease Father         pneumonia   • Hyperlipidemia Father        ROS:   Denies fever, chest pain, shortness of breath      Labs: Labs reviewed from February 19, 2021 and questions answered patient.  Imaging: Chest CT reviewed from February 26, 2021.    Exam: /60 (BP Location: Right arm, Patient Position: Sitting, BP Cuff Size: Adult)   Pulse 92   Temp 36.7 °C (98 °F) (Temporal)   Ht 1.651 m (5' 5\")   Wt 83.9 kg (185 lb)   SpO2 91%  Body mass index is 30.79 kg/m².    General: Well appearing, NAD  Pulmonary: Bibasilar crackles noted.  Cardiovascular: Regular rate and rhythm without murmur, " rubs or gallop.   Skin: Warm and dry.  No obvious lesions.  Musculoskeletal:  No extremity cyanosis, clubbing, or edema.  Psych: Normal mood and affect. Alert and oriented. Judgment and insight is normal.    Please note that this dictation was created using voice recognition software. I have made every reasonable attempt to correct obvious errors, but I expect that there are errors of grammar and possibly content that I did not discover before finalizing the note.      Assessment/Plan  Tena was seen today for results and follow-up.    Diagnoses and all orders for this visit:    Type 2 diabetes mellitus without complication, without long-term current use of insulin (HCC)  Chronic issue, above goal with A1c of 7.1.  Patient is apparently not tolerated Metformin in the past, but does desire to start a medication for her diabetes at this time or as previously she has been diet controlled.  We discussed glipizide at a very low dose.  I did discuss side effects including potential for hypoglycemia which I highly doubt at such a low dose.  Patient became very anxious and we had an extended discussion about this.  Reassurance provided.  Signs and symptoms of hypoglycemia discussed.  I did send in a prescription for new glucometer as hers is very outdated.  Again I do not think she is going to get hypoglycemia to any significant degree on 2.5 mg of glipizide twice daily.  -     glipiZIDE (GLUCOTROL) 5 MG Tab; Take 0.5 Tablets by mouth 2 times a day.  -     Blood Glucose Meter Kit; Test blood sugar as recommended by provider.  -     Blood Glucose Test Strips; Use one strip to test blood sugar once daily early morning before first meal.  -     Lancets; Use one  lancet to test blood sugar once daily early morning before first meal.  -     Alcohol Swabs; Wipe site with prep pad prior to checking sugars.    History of 2019 novel coronavirus disease (COVID-19)  Patient had severe COVID-19 pneumonia requiring hospitalization with  associated respiratory failure.  She continues to struggle with recovery and remains on oxygen with activity.  She has followed up with pulmonology.  In spite of multiple negative swabs when she was hospitalized, her recent antibody test came back as positive.  She had numerous questions about her CT scan which we did review today.  Overall it looks like she has residual findings of pneumonitis/resolving pneumonia which was discussed with her today.  She has questions about whether or not she may develop scar tissue which I told her I am not sure I can answer at this time.  She does have follow-up with pulmonology in a couple of months.  Recommend continued oxygen wean as tolerated and physical activity as tolerated to help with lung function.    Follow-up in about 3 months or sooner if needed.      Nahed Waddell, DO  [facility]

## 2021-03-22 ENCOUNTER — TELEPHONE (OUTPATIENT)
Dept: HEMATOLOGY ONCOLOGY | Facility: MEDICAL CENTER | Age: 71
End: 2021-03-22

## 2021-03-22 ENCOUNTER — HOSPITAL ENCOUNTER (OUTPATIENT)
Dept: RADIOLOGY | Facility: MEDICAL CENTER | Age: 71
End: 2021-03-22
Attending: NURSE PRACTITIONER
Payer: MEDICARE

## 2021-03-22 DIAGNOSIS — Z91.89 AT HIGH RISK FOR OSTEOPOROSIS: ICD-10-CM

## 2021-03-22 DIAGNOSIS — Z78.0 ASYMPTOMATIC MENOPAUSAL STATE: ICD-10-CM

## 2021-03-22 DIAGNOSIS — Z85.3 HISTORY OF BREAST CANCER: ICD-10-CM

## 2021-03-22 DIAGNOSIS — Z92.21 HISTORY OF AROMATASE INHIBITOR THERAPY: ICD-10-CM

## 2021-03-22 PROCEDURE — 77080 DXA BONE DENSITY AXIAL: CPT

## 2021-03-22 NOTE — TELEPHONE ENCOUNTER
Patient recently completed DEXA scan.  The scan shows bone mineral density to be within normal limits of the lumbar spine and normal left femur.  There is some decreased percentage in the femoral region.  Patient recently completed 5 years of treatment for her history of breast cancer with the use of an aromatase inhibitor putting her at an increased risk for osteoporosis.  She recently discontinued that medication approximately 3 months ago.  I did contact the patient with regards to the findings of her DEXA scan.  Did recommend that she continue vitamin D and calcium supplements.  Will have her follow-up with her PCP for continued monitoring for osteoporosis now that patient has discontinued aromatase inhibitor.  She was very pleased with the results.  She is scheduled to follow-up with our clinic for her history of breast cancer in 1 year which is February 2022, or sooner if needed.        DS-BONE DENSITY STUDY (DEXA)    Result Date: 3/22/2021  3/22/2021 9:16 AM HISTORY/REASON FOR EXAM:  Postmenopausal, hysterectomy, previous AI use for breast cancer. TECHNIQUE/EXAM DESCRIPTION AND NUMBER OF VIEWS: Dual x-ray bone densitometry was performed from the L1 through L4 levels and from the proximal left femur utilizing the GE Prodigy unit. COMPARISON:   Bone densitometry scan 2/27/2019 FINDINGS: The mean bone mineral density for the lumbar spine is 1.397 g/cm2, which corresponds to a T score of 1.8 and a Z score of 2.9. The proximal left femur has a mean bone mineral density of 0.956 g/cm2, with a T score of -0.4 and a Z score of 0.7. When compared with the most recent study dated 2/27/2019, there has been a 0.4% increase in the bone mineral density of the lumbar spine and a 4.6% decrease in the bone mineral density of the left femur.     According to the World Health Organization classification, bone mineral density of this patient is normal in the lumbar spine and normal in the left femur. Percentage decrease in bone  mineral density in the femoral region is statistically significant. 10-year Probability of Fracture: Major Osteoporotic     8.0% Hip     0.7% Population      [place] () Based on left femur neck BMD INTERPRETING LOCATION:  [address] [place] [address], [id number]

## 2021-03-25 ENCOUNTER — NON-PROVIDER VISIT (OUTPATIENT)
Dept: SLEEP MEDICINE | Facility: MEDICAL CENTER | Age: 71
End: 2021-03-25
Attending: INTERNAL MEDICINE
Payer: MEDICARE

## 2021-03-25 VITALS — BODY MASS INDEX: 31.32 KG/M2 | WEIGHT: 188 LBS | HEIGHT: 65 IN

## 2021-03-25 DIAGNOSIS — J96.01 ACUTE RESPIRATORY FAILURE WITH HYPOXIA (HCC): ICD-10-CM

## 2021-03-25 ASSESSMENT — 6 MINUTE WALK TEST (6MWT)
PERCEIVED FATIGUE AT 2 MIN: 0
PERCEIVED BREATHLESSNESS AT 6 MIN: 0.5
PERCEIVED FATIGUE AT 1 MIN: 0
TOTAL REST TIME: 0
HEART RATE AT 5 MIN: 114
BLOOD PRESSURE AT 1 MIN: 122/70
PERCEIVED BREATHLESSNESS AT 1 MIN: 0.5
PERCEIVED FATIGUE AT 4 MIN: 0
PERCENT OF NORMAL WALKED: 89
O2 SAT PERCENT ROOM AIR: 95
PERCEIVED FATIGUE AT 1 MIN: 0
PERCEIVED FATIGUE AT 3 MIN: 0
SAO2 AT 4 MIN: 88
SAO2 AT 2 MIN: 88
HEART RATE AT 1 MIN: 112
PERCEIVED FATIGUE AT 5 MIN: 0
PERCEIVED BREATHLESSNESS AT 5 MIN: 0.5
PERCEIVED BREATHLESSNESS AT 4 MIN: 0.5
NUMBER OF RESTS: 0
PERCEIVED FATIGUE AT 2 MIN: 0
HEART RATE AT 2 MIN: 113
PERCEIVED BREATHLESSNESS AT 3 MIN: 0.5
SAO2 AT 2 MIN: 93
SITTING BLOOD PRESSURE: 122/70
PERCEIVED BREATHLESSNESS AT 2 MIN: 0
HEART RATE AT 3 MIN: 116
HEART RATE AT 4 MIN: 116
SAO2 AT 1 MIN: 90
PERCEIVED BREATHLESSNESS AT 1 MIN: 0.5
SAO2 AT 3 MIN: 88
BLOOD PRESSURE: RIGHT ARM
SAO2 AT 5 MIN: 86
SAO2 AT 1 MIN: 94
HEART RATE AT 6 MIN: 123
PERCEIVED FATIGUE AT 6 MIN: 0
HEART RATE AT 1 MIN: 122
PERCEIVED BREATHLESSNESS AT 2 MIN: 0.5
HEART RATE: 81
SAO2 AT 6 MIN: 93
HEART RATE AT 2 MIN: 118

## 2021-03-25 ASSESSMENT — FIBROSIS 4 INDEX: FIB4 SCORE: 0.87

## 2021-03-25 NOTE — PROCEDURES
Test  started on Room Air in minute 5 added 02 to 1.5 lpm pulse and patient finished test on 1.5 lpm 02 pulse.    Interpretation:  The patient walked 1200 feet or 89% predicted.  Desaturations noted to 86% on room air after 5 minutes.  Oxygen titrated to 1.5 L/min.    Recommendations:  Supplemental oxygen at 1.5 L/min with exertion.

## 2021-03-25 NOTE — PROCEDURES
Technician: Kassie Rowley RRT   Good/Fair patient effort & Good cooperation. FAIR EFFORTS ON PRE/POST FVC. BEST EFFORT REPORTED FOR POST FVC. PATIENT HAD CHEST PAINS NEAR END OF TESTING. The results of this test meet the ATS/ERS standards for acceptability & reproducibility.  Test was performed on the Bantr Body Plethysmograph-Elite DX system.  Predicted equations for Spirometry are GLI-2012, ITS for lung volumes, and GLI-2017 for DLCO.  The DLCO was uncorrected for Hgb.  A bronchodilator of Ventolin HFA -2puffs via spacer administered.  DLCO performed during dilation period. IVC is less than 90%.    Interpretation;   Mild restrictive ventilatory defect with total lung capacity: 64%.  Mild diffusion impairment with DLCO: 69%.  No significant bronchodilator response.

## 2021-03-26 PROCEDURE — 94618 PULMONARY STRESS TESTING: CPT | Performed by: INTERNAL MEDICINE

## 2021-03-26 PROCEDURE — 94726 PLETHYSMOGRAPHY LUNG VOLUMES: CPT | Performed by: INTERNAL MEDICINE

## 2021-03-26 PROCEDURE — 94729 DIFFUSING CAPACITY: CPT | Performed by: INTERNAL MEDICINE

## 2021-03-26 PROCEDURE — 94060 EVALUATION OF WHEEZING: CPT | Performed by: INTERNAL MEDICINE

## 2021-04-16 DIAGNOSIS — I10 ESSENTIAL HYPERTENSION: ICD-10-CM

## 2021-04-16 DIAGNOSIS — E78.00 HYPERCHOLESTEREMIA: ICD-10-CM

## 2021-04-19 RX ORDER — SIMVASTATIN 40 MG
TABLET ORAL
Qty: 90 TABLET | Refills: 2 | Status: SHIPPED | OUTPATIENT
Start: 2021-04-19 | End: 2021-07-08 | Stop reason: SDUPTHER

## 2021-04-19 RX ORDER — TELMISARTAN AND HYDROCHLORTHIAZIDE 40; 12.5 MG/1; MG/1
TABLET ORAL
Qty: 90 TABLET | Refills: 1 | Status: SHIPPED | OUTPATIENT
Start: 2021-04-19 | End: 2021-07-13 | Stop reason: SDUPTHER

## 2021-04-19 NOTE — TELEPHONE ENCOUNTER
Pt has had OV within the 12 month protocol and lipid panel is current. 9 month supply sent to pharmacy of chol meds and 6 months on BP med.   Lab Results   Component Value Date/Time    CHOLSTRLTOT 142 02/19/2021 08:14 AM    LDL 74 02/19/2021 08:14 AM    HDL 45 02/19/2021 08:14 AM    TRIGLYCERIDE 117 02/19/2021 08:14 AM       Lab Results   Component Value Date/Time    SODIUM 136 02/19/2021 08:14 AM    POTASSIUM 4.1 02/19/2021 08:14 AM    CHLORIDE 98 02/19/2021 08:14 AM    CO2 27 02/19/2021 08:14 AM    GLUCOSE 152 (H) 02/19/2021 08:14 AM    BUN 19 02/19/2021 08:14 AM    CREATININE 0.77 02/19/2021 08:14 AM    CREATININE 0.86 12/01/2010 12:00 AM    BUNCREATRAT 18 06/26/2020 04:28 AM    BUNCREATRAT 27 12/01/2010 12:00 AM    GLOMRATE >59 12/01/2010 12:00 AM     Lab Results   Component Value Date/Time    ALKPHOSPHAT 102 (H) 02/19/2021 08:14 AM    ASTSGOT 17 02/19/2021 08:14 AM    ALTSGPT 18 02/19/2021 08:14 AM    TBILIRUBIN 0.5 02/19/2021 08:14 AM

## 2021-04-21 ENCOUNTER — OFFICE VISIT (OUTPATIENT)
Dept: URGENT CARE | Facility: PHYSICIAN GROUP | Age: 71
End: 2021-04-21
Payer: MEDICARE

## 2021-04-21 VITALS
HEIGHT: 63 IN | OXYGEN SATURATION: 94 % | BODY MASS INDEX: 33.66 KG/M2 | DIASTOLIC BLOOD PRESSURE: 62 MMHG | HEART RATE: 85 BPM | RESPIRATION RATE: 16 BRPM | SYSTOLIC BLOOD PRESSURE: 118 MMHG | TEMPERATURE: 98 F | WEIGHT: 190 LBS

## 2021-04-21 DIAGNOSIS — R04.0 ACUTE ANTERIOR EPISTAXIS: ICD-10-CM

## 2021-04-21 DIAGNOSIS — J30.9 ALLERGIC RHINITIS, UNSPECIFIED SEASONALITY, UNSPECIFIED TRIGGER: ICD-10-CM

## 2021-04-21 PROCEDURE — 99214 OFFICE O/P EST MOD 30 MIN: CPT | Performed by: FAMILY MEDICINE

## 2021-04-21 RX ORDER — BACITRACIN ZINC AND POLYMYXIN B SULFATE 500; 1000 [USP'U]/G; [USP'U]/G
OINTMENT TOPICAL ONCE
OUTPATIENT
Start: 2021-04-21 | End: 2021-04-22

## 2021-04-21 RX ORDER — CETIRIZINE HYDROCHLORIDE 10 MG/1
10 TABLET ORAL DAILY
Qty: 30 TABLET | Refills: 1 | Status: SHIPPED | OUTPATIENT
Start: 2021-04-21 | End: 2021-06-25

## 2021-04-21 ASSESSMENT — ENCOUNTER SYMPTOMS
SHORTNESS OF BREATH: 0
COUGH: 1
DIZZINESS: 0
SORE THROAT: 0
FEVER: 0
CHILLS: 0
VOMITING: 0
MYALGIAS: 0
NAUSEA: 0

## 2021-04-21 ASSESSMENT — FIBROSIS 4 INDEX: FIB4 SCORE: 0.87

## 2021-04-21 NOTE — PROGRESS NOTES
Subjective:   Tean Damon is a 71 y.o. female who presents for Cough (Cough, fatigue and nose bleeds x 1 day)        Cough  Pertinent negatives include no chills, fever, myalgias, rash, sore throat or shortness of breath. History of diabetes mellitus   Epistaxis   The bleeding has been from the left nare. This is a new problem. The current episode started yesterday. The problem occurs every few hours. The problem has been rapidly improving. The bleeding is associated with dry air (Complains of posterior nasal drainage, intermittent rhinorrhea with community wide environmental allergen exposure). She has tried nothing for the symptoms. History of diabetes mellitus     PMH:  has a past medical history of Anesthesia, Arthritis, Breast cancer (HCC) (2/10/2017), Dental disorder, Diabetes (HCC), Diverticulosis of colon (11/4/2011), HTN (hypertension) (3/14/2013), Hypertension, Pain, and Post hysterectomy menopause (11/4/2011).  MEDS:   Current Outpatient Medications:   •  cetirizine (ZYRTEC) 10 MG Tab, Take 1 tablet by mouth every day., Disp: 30 tablet, Rfl: 1  •  telmisartan-hydrochlorothiazide (MICARDIS HCT) 40-12.5 MG per tablet, TAKE 1 TABLET BY MOUTH EVERY DAY, Disp: 90 tablet, Rfl: 1  •  simvastatin (ZOCOR) 40 MG Tab, TAKE 1 TABLET BY MOUTH EVERY DAY IN THE EVENING, Disp: 90 tablet, Rfl: 2  •  glipiZIDE (GLUCOTROL) 5 MG Tab, Take 0.5 Tablets by mouth 2 times a day., Disp: 90 tablet, Rfl: 3  •  Blood Glucose Meter Kit, Test blood sugar as recommended by provider., Disp: 1 Kit, Rfl: 0  •  Blood Glucose Test Strips, Use one strip to test blood sugar once daily early morning before first meal., Disp: 100 Each, Rfl: 11  •  Lancets, Use one  lancet to test blood sugar once daily early morning before first meal., Disp: 100 Each, Rfl: 11  •  Alcohol Swabs, Wipe site with prep pad prior to checking sugars., Disp: 100 Each, Rfl: 11  •  atorvastatin (LIPITOR) 40 MG Tab, Take 1 Tab by mouth every day., Disp: 90  "Tab, Rfl: 3  •  ASPIRIN 81 PO, Take  by mouth., Disp: , Rfl:   •  VITAMIN D, ERGOCALCIFEROL, PO, Take  by mouth., Disp: , Rfl:     Current Facility-Administered Medications:   •  bacitracin-polymyxin b (POLYSPORIN) 500-55111 UNIT/GM ointment, , Topical, Once, Raymon Lubin M.D.  ALLERGIES:   Allergies   Allergen Reactions   • Aspirin      Intolerance     • Cipro Xr Hives   • Diclofenac    • Metformin Rash     Erythematous rash     SURGHX:   Past Surgical History:   Procedure Laterality Date   • SHOULDER ARTHROSCOPY W/ ROTATOR CUFF REPAIR  2/9/2012    Performed by JENNY LIMON at [facility]   • SHOULDER DECOMPRESSION ARTHROSCOPIC  2/9/2012    Performed by JENNY LIMON at [facility]   • CLAVICLE DISTAL EXCISION  2/9/2012    Performed by JENNY LIMON at [facility]   • BUNIONECTOMY  2009    bilateral   • ABDOMINAL HYSTERECTOMY TOTAL  2001    endometriosis   • CHOLECYSTECTOMY  1999    laparoscopy   • MASTECTOMY     • TONSILLECTOMY  as child     SOCHX:  reports that she has never smoked. She has never used smokeless tobacco. She reports current alcohol use. She reports that she does not use drugs.  FH:   Family History   Problem Relation Age of Onset   • Stroke Mother    • Lung Disease Father         pneumonia   • Hyperlipidemia Father      Review of Systems   Constitutional: Negative for chills and fever.   HENT: Positive for nosebleeds. Negative for sore throat.    Respiratory: Positive for cough. Negative for shortness of breath.    Gastrointestinal: Negative for nausea and vomiting.   Genitourinary: Negative for dysuria.   Musculoskeletal: Negative for myalgias.   Skin: Negative for rash.   Neurological: Negative for dizziness.        Objective:   /62   Pulse 85   Temp 36.7 °C (98 °F)   Resp 16   Ht 1.6 m (5' 3\")   Wt 86.2 kg (190 lb)   SpO2 94%   BMI 33.66 kg/m²   Physical Exam  Vitals and nursing note reviewed.   Constitutional:       General: She is " not in acute distress.     Appearance: She is well-developed.   HENT:      Head: Normocephalic and atraumatic.      Right Ear: External ear normal.      Left Ear: External ear normal.      Nose: Mucosal edema (Mucosal friability mucosa with evidence of recent bleeding, no active bleeding, no eschar, no lesion noted), congestion and rhinorrhea present.      Right Turbinates: Swollen.      Left Turbinates: Swollen.      Mouth/Throat:      Mouth: Mucous membranes are moist.   Eyes:      Conjunctiva/sclera: Conjunctivae normal.   Cardiovascular:      Rate and Rhythm: Normal rate.   Pulmonary:      Effort: Pulmonary effort is normal. No respiratory distress.      Breath sounds: Normal breath sounds. No wheezing or rhonchi.   Abdominal:      General: There is no distension.   Musculoskeletal:         General: Normal range of motion.   Skin:     General: Skin is warm and dry.   Neurological:      General: No focal deficit present.      Mental Status: She is alert and oriented to person, place, and time. Mental status is at baseline.      Gait: Gait (gait at baseline) normal.   Psychiatric:         Judgment: Judgment normal.           Assessment/Plan:   1. Acute anterior epistaxis  - bacitracin-polymyxin b (POLYSPORIN) [id number] UNIT/GM ointment    2. Allergic rhinitis, unspecified seasonality, unspecified trigger  - cetirizine (ZYRTEC) 10 MG Tab; Take 1 tablet by mouth every day.  Dispense: 30 tablet; Refill: 1        Medical Decision Making/Course:  In the course of preparing for this visit with review of the pertinent past medical history, recent and past clinic visits, current medications, and in the further course of obtaining the current history pertinent to the clinic visit today, performing an exam and evaluation including application to the left naris of antibiotic ointment with reexamination and noting of no active epistaxis, ordering and independently evaluating labs, tests, and/or procedures, prescribing any  recommended new medications as noted above, providing any pertinent counseling and education and recommending further coordination of care, at least 30 minutes of total time were spent during this encounter.      Discussed close monitoring, return precautions, and supportive measures of maintaining adequate fluid hydration and caloric intake, relative rest and symptom management as needed for pain and/or fever.    Differential diagnosis, natural history, supportive care, and indications for immediate follow-up discussed.     Advised the patient to follow-up with the primary care physician for recheck, reevaluation, and consideration of further management.    Please note that this dictation was created using voice recognition software. I have worked with consultants from the vendor as well as technical experts from [employer] to optimize the interface. I have made every reasonable attempt to correct obvious errors, but I expect that there are errors of grammar and possibly content that I did not discover before finalizing the note.

## 2021-04-21 NOTE — PATIENT INSTRUCTIONS
Nosebleed, Adult  A nosebleed is when blood comes out of the nose. Nosebleeds are common. Usually, they are not a sign of a serious condition.  Nosebleeds can happen if a small blood vessel in your nose starts to bleed or if the lining of your nose (mucous membrane) cracks. They are commonly caused by:  · Allergies.  · Colds.  · Picking your nose.  · Blowing your nose too hard.  · An injury from sticking an object into your nose or getting hit in the nose.  · Dry or cold air.  Less common causes of nosebleeds include:  · Toxic fumes.  · Something abnormal in the nose or in the air-filled spaces in the bones of the face (sinuses).  · Growths in the nose, such as polyps.  · Medicines or conditions that cause blood to clot slowly.  · Certain illnesses or procedures that irritate or dry out the nasal passages.  Follow these instructions at home:  When you have a nosebleed:    · Sit down and tilt your head slightly forward.  · Use a clean towel or tissue to pinch your nostrils under the bony part of your nose. After 10 minutes, let go of your nose and see if bleeding starts again. Do not release pressure before that time. If there is still bleeding, repeat the pinching and holding for 10 minutes until the bleeding stops.  · Do not place tissues or gauze in the nose to stop bleeding.  · Avoid lying down and avoid tilting your head backward. That may make blood collect in the throat and cause gagging or coughing.  · Use a nasal spray decongestant to help with a nosebleed as told by your health care provider.  · Do not use petroleum jelly or mineral oil in your nose. It can drip into your lungs.  After a nosebleed:  · Avoid blowing your nose or sniffing for a number of hours.  · Avoid straining, lifting, or bending at the waist for several days. You may resume other normal activities as you are able.  · Use saline spray or a humidifier as told by your health care provider.  · Aspirin and blood thinners make bleeding more  likely. If you are prescribed these medicines and you suffer from nosebleeds:  ? Ask your health care provider if you should stop taking the medicines or if you should adjust the dose.  ? Do not stop taking medicines that your health care provider has recommended unless told by your health care provider.  · If your nosebleed was caused by dry mucous membranes, use over-the-counter saline nasal spray or gel. This will keep the mucous membranes moist and allow them to heal. If you must use a lubricant:  ? Choose one that is water-soluble.  ? Use only as much as you need and use it only as often as needed.  ? Do not lie down until several hours after you use it.  Contact a health care provider if:  · You have a fever.  · You get nosebleeds often or more often than usual.  · You bruise very easily.  · You have a nosebleed from having something stuck in your nose.  · You have bleeding in your mouth.  · You vomit or cough up brown material.  · You have a nosebleed after you start a new medicine.  Get help right away if:  · You have a nosebleed after a fall or a head injury.  · Your nosebleed does not go away after 20 minutes.  · You feel dizzy or weak.  · You have unusual bleeding from other parts of your body.  · You have unusual bruising on other parts of your body.  · You become sweaty.  · You vomit blood.  This information is not intended to replace advice given to you by your health care provider. Make sure you discuss any questions you have with your health care provider.  Document Released: 09/27/2006 Document Revised: 03/19/2019 Document Reviewed: 07/04/2017  Elsevier Patient Education © 2020 [employer]

## 2021-04-27 ENCOUNTER — OFFICE VISIT (OUTPATIENT)
Dept: SLEEP MEDICINE | Facility: MEDICAL CENTER | Age: 71
End: 2021-04-27
Payer: MEDICARE

## 2021-04-27 VITALS
SYSTOLIC BLOOD PRESSURE: 124 MMHG | RESPIRATION RATE: 18 BRPM | WEIGHT: 188 LBS | BODY MASS INDEX: 33.3 KG/M2 | HEART RATE: 98 BPM | DIASTOLIC BLOOD PRESSURE: 60 MMHG | TEMPERATURE: 97.9 F | OXYGEN SATURATION: 92 %

## 2021-04-27 DIAGNOSIS — U07.1 COVID-19 VIRUS INFECTION: ICD-10-CM

## 2021-04-27 DIAGNOSIS — J96.91 RESPIRATORY FAILURE WITH HYPOXIA, UNSPECIFIED CHRONICITY (HCC): ICD-10-CM

## 2021-04-27 DIAGNOSIS — R05.9 COUGH: ICD-10-CM

## 2021-04-27 DIAGNOSIS — R91.8 OTHER NONSPECIFIC ABNORMAL FINDING OF LUNG FIELD: ICD-10-CM

## 2021-04-27 PROCEDURE — 99214 OFFICE O/P EST MOD 30 MIN: CPT | Performed by: INTERNAL MEDICINE

## 2021-04-27 RX ORDER — ALBUTEROL SULFATE 90 UG/1
2 AEROSOL, METERED RESPIRATORY (INHALATION) EVERY 6 HOURS PRN
Qty: 8.5 G | Refills: 3 | Status: SHIPPED | OUTPATIENT
Start: 2021-04-27 | End: 2021-12-30

## 2021-04-27 ASSESSMENT — ENCOUNTER SYMPTOMS
DIARRHEA: 0
BACK PAIN: 0
SINUS PAIN: 0
FEVER: 0
TREMORS: 0
EYE PAIN: 0
NAUSEA: 0
HEARTBURN: 0
CHILLS: 0
HEADACHES: 0
ORTHOPNEA: 0
EYE DISCHARGE: 0
CONSTIPATION: 0
STRIDOR: 0
COUGH: 1
MYALGIAS: 0
PND: 0
SHORTNESS OF BREATH: 0
SORE THROAT: 0
CLAUDICATION: 0
SPUTUM PRODUCTION: 0
SPEECH CHANGE: 0
DEPRESSION: 0
WEIGHT LOSS: 0
HEMOPTYSIS: 0
FALLS: 0
WHEEZING: 0
PALPITATIONS: 0
BLURRED VISION: 0
DIAPHORESIS: 0
WEAKNESS: 0
EYE REDNESS: 0
FOCAL WEAKNESS: 0
DOUBLE VISION: 0
VOMITING: 0
PHOTOPHOBIA: 0
ABDOMINAL PAIN: 0
NECK PAIN: 0
DIZZINESS: 0

## 2021-04-27 ASSESSMENT — FIBROSIS 4 INDEX: FIB4 SCORE: 0.87

## 2021-04-27 NOTE — PROGRESS NOTES
Chief Complaint   Patient presents with   • Follow-Up     ILD last seen 2/4/21    • Results     PFT, 6MW 3/25/21          HPI: This patient is a 71 y.o. female whom is followed in our clinic for acute respiratory failure after admission for presumed COVID pna but PCR negative last seen by me on 2/4/21. The patient's past medical history is significant for metabolic syndrome with prediabetes, environmental allergies and breast cancer status post bilateral mastectomy with no chemotherapy or radiation but on Arimidex.  She is a lifelong non-smoker. Pt was admitted to [facility] on October 31 with fever, hypoxia, cough and body aches.  She had bilateral infiltrates on chest x-ray concerning for coronavirus pneumonia but was tested on 3 occasions for virus via PCR all of which were negative.  She required high flow oxygen up to 5 to 6 L and was treated with remdesivir, convalescent serum and steroids.  She was ultimately discharged on supplemental oxygen at 4 L/min and has had slow taper and her oxygen over time.  She was ruled out for pulmonary embolism during her hospital stay and echo done during her stay which was essentially normal. I saw her for f/u and to establish are on 2/4/21 at which point she was requiring less O2 at 2LPM with activity. She did report dry cough was was working to increase activity. Today she feels much improved, is using oxygen only with prolonged activity or working outdoors.  Cough continues and can be severe, remains dry.  No clear triggers.  Pulmonary function testing from March 25 shows restrictive defect with FVC of 1.67 L or 67% predicted and total lung capacity at 64% predicted with a DLCO at 69% predicted.  She was able to ambulate 1200 feet or 89% predicted on 6-minute walk test and required 1.5 L to maintain saturations greater than 89%.  High-resolution CT chest shows no clear evidence of fibrosis but rather persistent bibasilar infiltrates with groundglass  opacities that have improved since prior imaging.  Of note the patient did have coronavirus antibody testing on February 19 which was 1 day after her first dose of Covid vaccine.  These were elevated suggesting that she actually did have infection in the past.    Past Medical History:   Diagnosis Date   • Anesthesia     post of nausea   • Arthritis    • Breast cancer (HCC) 2/10/2017   • Dental disorder     permanent bridge   • Diabetes (HCC)    • Diverticulosis of colon 11/4/2011   • HTN (hypertension) 3/14/2013   • Hypertension    • Pain     right shoulder down to thumb   • Post hysterectomy menopause 11/4/2011       Social History     Socioeconomic History   • Marital status:      Spouse name: Not on file   • Number of children: Not on file   • Years of education: Not on file   • Highest education level: Not on file   Occupational History   • Not on file   Tobacco Use   • Smoking status: Never Smoker   • Smokeless tobacco: Never Used   • Tobacco comment: avoid any and all tobacco products   Substance and Sexual Activity   • Alcohol use: Yes     Alcohol/week: 0.0 oz     Comment: once a year   • Drug use: No   • Sexual activity: Not Currently     Partners: Male     Comment: . Work at [employer]   Other Topics Concern   • Not on file   Social History Narrative   • Not on file     Social Determinants of Health     Financial Resource Strain:    • Difficulty of Paying Living Expenses:    Food Insecurity:    • Worried About Running Out of Food in the Last Year:    • Ran Out of Food in the Last Year:    Transportation Needs:    • Lack of Transportation (Medical):    • Lack of Transportation (Non-Medical):    Physical Activity:    • Days of Exercise per Week:    • Minutes of Exercise per Session:    Stress:    • Feeling of Stress :    Social Connections:    • Frequency of Communication with Friends and Family:    • Frequency of Social Gatherings with Friends and Family:    • Attends [employer] Services:    •  Active Member of Clubs or Organizations:    • Attends Club or Organization Meetings:    • Marital Status:    Intimate Partner Violence:    • Fear of Current or Ex-Partner:    • Emotionally Abused:    • Physically Abused:    • Sexually Abused:        Family History   Problem Relation Age of Onset   • Stroke Mother    • Lung Disease Father         pneumonia   • Hyperlipidemia Father        Current Outpatient Medications on File Prior to Visit   Medication Sig Dispense Refill   • cetirizine (ZYRTEC) 10 MG Tab Take 1 tablet by mouth every day. 30 tablet 1   • telmisartan-hydrochlorothiazide (MICARDIS HCT) 40-12.5 MG per tablet TAKE 1 TABLET BY MOUTH EVERY DAY 90 tablet 1   • simvastatin (ZOCOR) 40 MG Tab TAKE 1 TABLET BY MOUTH EVERY DAY IN THE EVENING 90 tablet 2   • glipiZIDE (GLUCOTROL) 5 MG Tab Take 0.5 Tablets by mouth 2 times a day. (Patient taking differently: Take 5 mg by mouth every day.) 90 tablet 3   • ASPIRIN 81 PO Take  by mouth.     • VITAMIN D, ERGOCALCIFEROL, PO Take  by mouth.     • Blood Glucose Meter Kit Test blood sugar as recommended by provider. 1 Kit 0   • Blood Glucose Test Strips Use one strip to test blood sugar once daily early morning before first meal. 100 Each 11   • Lancets Use one  lancet to test blood sugar once daily early morning before first meal. 100 Each 11   • Alcohol Swabs Wipe site with prep pad prior to checking sugars. 100 Each 11   • atorvastatin (LIPITOR) 40 MG Tab Take 1 Tab by mouth every day. (Patient not taking: Reported on 4/27/2021) 90 Tab 3     No current facility-administered medications on file prior to visit.       Aspirin, Cipro xr, Diclofenac, and Metformin      ROS:   Review of Systems   Constitutional: Negative for chills, diaphoresis, fever, malaise/fatigue and weight loss.   HENT: Negative for congestion, ear discharge, ear pain, hearing loss, nosebleeds, sinus pain, sore throat and tinnitus.    Eyes: Negative for blurred vision, double vision, photophobia,  pain, discharge and redness.   Respiratory: Positive for cough. Negative for hemoptysis, sputum production, shortness of breath, wheezing and stridor.    Cardiovascular: Negative for chest pain, palpitations, orthopnea, claudication, leg swelling and PND.   Gastrointestinal: Negative for abdominal pain, constipation, diarrhea, heartburn, nausea and vomiting.   Genitourinary: Negative for dysuria and urgency.   Musculoskeletal: Negative for back pain, falls, joint pain, myalgias and neck pain.   Skin: Negative for itching and rash.   Neurological: Negative for dizziness, tremors, speech change, focal weakness, weakness and headaches.   Endo/Heme/Allergies: Negative for environmental allergies.   Psychiatric/Behavioral: Negative for depression.       /60 (BP Location: Right arm, Patient Position: Sitting, BP Cuff Size: Adult)   Pulse 98   Temp 36.6 °C (97.9 °F) (Temporal)   Resp 18   Wt 85.3 kg (188 lb)   SpO2 92%   Physical Exam  Vitals reviewed.   Constitutional:       General: She is not in acute distress.     Appearance: Normal appearance. She is well-developed. She is obese.   HENT:      Head: Normocephalic and atraumatic.      Right Ear: External ear normal.      Left Ear: External ear normal.      Nose: Nose normal. No congestion.      Mouth/Throat:      Mouth: Mucous membranes are moist.      Pharynx: Oropharynx is clear. No oropharyngeal exudate.   Eyes:      General: No scleral icterus.     Conjunctiva/sclera: Conjunctivae normal.      Pupils: Pupils are equal, round, and reactive to light.   Neck:      Vascular: No JVD.      Trachea: No tracheal deviation.   Cardiovascular:      Rate and Rhythm: Normal rate and regular rhythm.      Heart sounds: Normal heart sounds. No murmur. No friction rub. No gallop.    Pulmonary:      Effort: Pulmonary effort is normal. No accessory muscle usage or respiratory distress.      Breath sounds: Normal breath sounds. No wheezing or rales.   Abdominal:       General: There is no distension.      Palpations: Abdomen is soft.      Tenderness: There is no abdominal tenderness.   Musculoskeletal:         General: No tenderness or deformity. Normal range of motion.      Cervical back: Normal range of motion and neck supple.      Right lower leg: No edema.      Left lower leg: No edema.   Lymphadenopathy:      Cervical: No cervical adenopathy.   Skin:     General: Skin is warm and dry.      Findings: No rash.      Nails: There is no clubbing.   Neurological:      Mental Status: She is alert and oriented to person, place, and time.      Cranial Nerves: No cranial nerve deficit.      Gait: Gait normal.   Psychiatric:         Mood and Affect: Mood normal.         Behavior: Behavior normal.         PFTs as reviewed by me personally: As per HPI    Imaging as reviewed by me personally: As per HPI    Assessment:  1. Cough  Fluticasone Furoate-Vilanterol (BREO ELLIPTA) 100-25 MCG/INH AEROSOL POWDER, BREATH ACTIVATED    albuterol 108 (90 Base) MCG/ACT Aero Soln inhalation aerosol   2. COVID-19 virus infection  DME Other   3. Respiratory failure with hypoxia, unspecified chronicity (HCC)  Exercise Test for Bronchospasm / 6-Minute Walk   4. Other nonspecific abnormal finding of lung field   Exercise Test for Bronchospasm / 6-Minute Walk       Plan:  1.  This is persistent following her infection and I suspect some postinfectious reactive airways disease.  She was given a rescue inhaler as well as 2-week trial of Breo 200.  If the inhaled corticosteroids work we can continue this.  2.  I highly suspect that her initial infection was related to Covid given antibody positivity despite being drawn only 1 day after her first injection with the vaccine.  We will treat her as such.  She is slowly improving and I am hopeful that she will have a full recovery but at this point we do not know.  Continue observational monitoring.  She will follow-up with me in 3 to 4 months with repeat 6-minute  walk test.  3.  This was acute and has improved.  Imaging also has improved.  I cannot say for sure if she will have chronic lung disease related to this but I am hopeful she will have a full recovery.  I did add humidification to her oxygen and we will continue at 1-1/2 L with activity and at night.  Activity was encouraged.  If recovery remains slow at follow-up we can consider referral to pulmonary rehab.  4.  This is related to abnormal CT imaging which has improved but remains abnormal.  I suspect slow improvement.  No crackles on exam or clear evidence of fibrosis on CT.  Continue best supportive care.  6-minute walk at follow-up.  Return in about 4 months (around 8/27/2021) for 6 minute walk.

## 2021-06-15 ENCOUNTER — APPOINTMENT (OUTPATIENT)
Dept: RADIOLOGY | Facility: MEDICAL CENTER | Age: 71
End: 2021-06-15
Attending: EMERGENCY MEDICINE
Payer: MEDICARE

## 2021-06-15 ENCOUNTER — OFFICE VISIT (OUTPATIENT)
Dept: MEDICAL GROUP | Facility: PHYSICIAN GROUP | Age: 71
End: 2021-06-15
Payer: MEDICARE

## 2021-06-15 ENCOUNTER — HOSPITAL ENCOUNTER (OUTPATIENT)
Facility: MEDICAL CENTER | Age: 71
End: 2021-06-16
Attending: EMERGENCY MEDICINE | Admitting: HOSPITALIST
Payer: MEDICARE

## 2021-06-15 VITALS
HEART RATE: 88 BPM | WEIGHT: 182 LBS | HEIGHT: 63 IN | TEMPERATURE: 98.2 F | BODY MASS INDEX: 32.25 KG/M2 | SYSTOLIC BLOOD PRESSURE: 122 MMHG | OXYGEN SATURATION: 94 % | DIASTOLIC BLOOD PRESSURE: 58 MMHG

## 2021-06-15 DIAGNOSIS — K29.00 ACUTE GASTRITIS WITHOUT HEMORRHAGE, UNSPECIFIED GASTRITIS TYPE: ICD-10-CM

## 2021-06-15 DIAGNOSIS — K92.1 MELANOTIC STOOLS: ICD-10-CM

## 2021-06-15 DIAGNOSIS — R10.13 EPIGASTRIC PAIN: ICD-10-CM

## 2021-06-15 DIAGNOSIS — R10.10 PAIN OF UPPER ABDOMEN: ICD-10-CM

## 2021-06-15 DIAGNOSIS — R07.89 OTHER CHEST PAIN: ICD-10-CM

## 2021-06-15 DIAGNOSIS — K92.1 MELENA: ICD-10-CM

## 2021-06-15 DIAGNOSIS — K20.90 ESOPHAGITIS: ICD-10-CM

## 2021-06-15 DIAGNOSIS — E11.9 TYPE 2 DIABETES MELLITUS WITHOUT COMPLICATION, WITHOUT LONG-TERM CURRENT USE OF INSULIN (HCC): ICD-10-CM

## 2021-06-15 PROBLEM — R07.9 CHEST PAIN: Status: ACTIVE | Noted: 2021-06-15

## 2021-06-15 PROBLEM — K92.2 GI BLEED: Status: ACTIVE | Noted: 2021-06-15

## 2021-06-15 PROBLEM — D72.829 LEUKOCYTOSIS: Status: ACTIVE | Noted: 2021-06-15

## 2021-06-15 LAB
ALBUMIN SERPL BCP-MCNC: 4.3 G/DL (ref 3.2–4.9)
ALBUMIN/GLOB SERPL: 1.2 G/DL
ALP SERPL-CCNC: 113 U/L (ref 30–99)
ALT SERPL-CCNC: 25 U/L (ref 2–50)
ANION GAP SERPL CALC-SCNC: 12 MMOL/L (ref 7–16)
APPEARANCE UR: CLEAR
AST SERPL-CCNC: 20 U/L (ref 12–45)
BACTERIA #/AREA URNS HPF: NEGATIVE /HPF
BASOPHILS # BLD AUTO: 0.5 % (ref 0–1.8)
BASOPHILS # BLD: 0.07 K/UL (ref 0–0.12)
BILIRUB SERPL-MCNC: 0.5 MG/DL (ref 0.1–1.5)
BILIRUB UR QL STRIP.AUTO: NEGATIVE
BUN SERPL-MCNC: 16 MG/DL (ref 8–22)
CALCIUM SERPL-MCNC: 9.7 MG/DL (ref 8.5–10.5)
CHLORIDE SERPL-SCNC: 103 MMOL/L (ref 96–112)
CO2 SERPL-SCNC: 25 MMOL/L (ref 20–33)
COLOR UR: YELLOW
CREAT SERPL-MCNC: 0.82 MG/DL (ref 0.5–1.4)
EKG IMPRESSION: NORMAL
EOSINOPHIL # BLD AUTO: 0.23 K/UL (ref 0–0.51)
EOSINOPHIL NFR BLD: 1.8 % (ref 0–6.9)
EPI CELLS #/AREA URNS HPF: NEGATIVE /HPF
ERYTHROCYTE [DISTWIDTH] IN BLOOD BY AUTOMATED COUNT: 45.5 FL (ref 35.9–50)
EST. AVERAGE GLUCOSE BLD GHB EST-MCNC: 146 MG/DL
GLOBULIN SER CALC-MCNC: 3.5 G/DL (ref 1.9–3.5)
GLUCOSE BLD-MCNC: 181 MG/DL (ref 65–99)
GLUCOSE SERPL-MCNC: 119 MG/DL (ref 65–99)
GLUCOSE UR STRIP.AUTO-MCNC: NEGATIVE MG/DL
HBA1C MFR BLD: 6.7 % (ref 4–5.6)
HCT VFR BLD AUTO: 44.8 % (ref 37–47)
HGB BLD-MCNC: 14.3 G/DL (ref 12–16)
HYALINE CASTS #/AREA URNS LPF: NORMAL /LPF
IMM GRANULOCYTES # BLD AUTO: 0.05 K/UL (ref 0–0.11)
IMM GRANULOCYTES NFR BLD AUTO: 0.4 % (ref 0–0.9)
KETONES UR STRIP.AUTO-MCNC: NEGATIVE MG/DL
LEUKOCYTE ESTERASE UR QL STRIP.AUTO: ABNORMAL
LIPASE SERPL-CCNC: 11 U/L (ref 11–82)
LYMPHOCYTES # BLD AUTO: 3.26 K/UL (ref 1–4.8)
LYMPHOCYTES NFR BLD: 25.3 % (ref 22–41)
MCH RBC QN AUTO: 29.6 PG (ref 27–33)
MCHC RBC AUTO-ENTMCNC: 31.9 G/DL (ref 33.6–35)
MCV RBC AUTO: 92.8 FL (ref 81.4–97.8)
MICRO URNS: ABNORMAL
MONOCYTES # BLD AUTO: 1.07 K/UL (ref 0–0.85)
MONOCYTES NFR BLD AUTO: 8.3 % (ref 0–13.4)
NEUTROPHILS # BLD AUTO: 8.21 K/UL (ref 2–7.15)
NEUTROPHILS NFR BLD: 63.7 % (ref 44–72)
NITRITE UR QL STRIP.AUTO: NEGATIVE
NRBC # BLD AUTO: 0 K/UL
NRBC BLD-RTO: 0 /100 WBC
PH UR STRIP.AUTO: 5.5 [PH] (ref 5–8)
PLATELET # BLD AUTO: 330 K/UL (ref 164–446)
PMV BLD AUTO: 11 FL (ref 9–12.9)
POTASSIUM SERPL-SCNC: 4 MMOL/L (ref 3.6–5.5)
PROT SERPL-MCNC: 7.8 G/DL (ref 6–8.2)
PROT UR QL STRIP: NEGATIVE MG/DL
RBC # BLD AUTO: 4.83 M/UL (ref 4.2–5.4)
RBC # URNS HPF: NORMAL /HPF
RBC UR QL AUTO: NEGATIVE
SODIUM SERPL-SCNC: 140 MMOL/L (ref 135–145)
SP GR UR STRIP.AUTO: 1.01
TROPONIN T SERPL-MCNC: 9 NG/L (ref 6–19)
UROBILINOGEN UR STRIP.AUTO-MCNC: 0.2 MG/DL
WBC # BLD AUTO: 12.9 K/UL (ref 4.8–10.8)
WBC #/AREA URNS HPF: NORMAL /HPF

## 2021-06-15 PROCEDURE — A9270 NON-COVERED ITEM OR SERVICE: HCPCS | Performed by: EMERGENCY MEDICINE

## 2021-06-15 PROCEDURE — 99214 OFFICE O/P EST MOD 30 MIN: CPT | Performed by: FAMILY MEDICINE

## 2021-06-15 PROCEDURE — 74177 CT ABD & PELVIS W/CONTRAST: CPT | Mod: ME

## 2021-06-15 PROCEDURE — U0005 INFEC AGEN DETEC AMPLI PROBE: HCPCS

## 2021-06-15 PROCEDURE — U0003 INFECTIOUS AGENT DETECTION BY NUCLEIC ACID (DNA OR RNA); SEVERE ACUTE RESPIRATORY SYNDROME CORONAVIRUS 2 (SARS-COV-2) (CORONAVIRUS DISEASE [COVID-19]), AMPLIFIED PROBE TECHNIQUE, MAKING USE OF HIGH THROUGHPUT TECHNOLOGIES AS DESCRIBED BY CMS-2020-01-R: HCPCS

## 2021-06-15 PROCEDURE — 81001 URINALYSIS AUTO W/SCOPE: CPT

## 2021-06-15 PROCEDURE — 71045 X-RAY EXAM CHEST 1 VIEW: CPT

## 2021-06-15 PROCEDURE — 83690 ASSAY OF LIPASE: CPT

## 2021-06-15 PROCEDURE — 87040 BLOOD CULTURE FOR BACTERIA: CPT

## 2021-06-15 PROCEDURE — 83036 HEMOGLOBIN GLYCOSYLATED A1C: CPT

## 2021-06-15 PROCEDURE — G0378 HOSPITAL OBSERVATION PER HR: HCPCS

## 2021-06-15 PROCEDURE — 96372 THER/PROPH/DIAG INJ SC/IM: CPT

## 2021-06-15 PROCEDURE — 85025 COMPLETE CBC W/AUTO DIFF WBC: CPT

## 2021-06-15 PROCEDURE — 700102 HCHG RX REV CODE 250 W/ 637 OVERRIDE(OP): Performed by: EMERGENCY MEDICINE

## 2021-06-15 PROCEDURE — A9270 NON-COVERED ITEM OR SERVICE: HCPCS | Performed by: HOSPITALIST

## 2021-06-15 PROCEDURE — 96365 THER/PROPH/DIAG IV INF INIT: CPT

## 2021-06-15 PROCEDURE — 700105 HCHG RX REV CODE 258: Performed by: HOSPITALIST

## 2021-06-15 PROCEDURE — 93005 ELECTROCARDIOGRAM TRACING: CPT | Performed by: EMERGENCY MEDICINE

## 2021-06-15 PROCEDURE — 80053 COMPREHEN METABOLIC PANEL: CPT

## 2021-06-15 PROCEDURE — 99220 PR INITIAL OBSERVATION CARE,LEVL III: CPT | Performed by: HOSPITALIST

## 2021-06-15 PROCEDURE — 700117 HCHG RX CONTRAST REV CODE 255: Performed by: EMERGENCY MEDICINE

## 2021-06-15 PROCEDURE — 99285 EMERGENCY DEPT VISIT HI MDM: CPT

## 2021-06-15 PROCEDURE — 82962 GLUCOSE BLOOD TEST: CPT

## 2021-06-15 PROCEDURE — 84484 ASSAY OF TROPONIN QUANT: CPT

## 2021-06-15 PROCEDURE — 700111 HCHG RX REV CODE 636 W/ 250 OVERRIDE (IP): Performed by: HOSPITALIST

## 2021-06-15 PROCEDURE — 700102 HCHG RX REV CODE 250 W/ 637 OVERRIDE(OP): Performed by: HOSPITALIST

## 2021-06-15 RX ORDER — PANTOPRAZOLE SODIUM 40 MG/10ML
40 INJECTION, POWDER, LYOPHILIZED, FOR SOLUTION INTRAVENOUS 2 TIMES DAILY
Status: DISCONTINUED | OUTPATIENT
Start: 2021-06-15 | End: 2021-06-15

## 2021-06-15 RX ORDER — HYDROCHLOROTHIAZIDE 12.5 MG/1
12.5 TABLET ORAL
Status: DISCONTINUED | OUTPATIENT
Start: 2021-06-16 | End: 2021-06-16 | Stop reason: HOSPADM

## 2021-06-15 RX ORDER — INSULIN LISPRO 100 [IU]/ML
0.1 INJECTION, SOLUTION INTRAVENOUS; SUBCUTANEOUS
Status: DISCONTINUED | OUTPATIENT
Start: 2021-06-16 | End: 2021-06-16 | Stop reason: HOSPADM

## 2021-06-15 RX ORDER — ENALAPRILAT 1.25 MG/ML
1.25 INJECTION INTRAVENOUS EVERY 6 HOURS PRN
Status: DISCONTINUED | OUTPATIENT
Start: 2021-06-15 | End: 2021-06-16 | Stop reason: HOSPADM

## 2021-06-15 RX ORDER — TELMISARTAN AND HYDROCHLORTHIAZIDE 40; 12.5 MG/1; MG/1
1 TABLET ORAL EVERY EVENING
Status: DISCONTINUED | OUTPATIENT
Start: 2021-06-15 | End: 2021-06-15

## 2021-06-15 RX ORDER — OMEPRAZOLE 20 MG/1
20 CAPSULE, DELAYED RELEASE ORAL 2 TIMES DAILY
Status: DISCONTINUED | OUTPATIENT
Start: 2021-06-15 | End: 2021-06-16 | Stop reason: HOSPADM

## 2021-06-15 RX ORDER — AMOXICILLIN 250 MG
2 CAPSULE ORAL 2 TIMES DAILY
Status: DISCONTINUED | OUTPATIENT
Start: 2021-06-15 | End: 2021-06-16 | Stop reason: HOSPADM

## 2021-06-15 RX ORDER — DEXTROSE MONOHYDRATE 25 G/50ML
50 INJECTION, SOLUTION INTRAVENOUS
Status: DISCONTINUED | OUTPATIENT
Start: 2021-06-15 | End: 2021-06-16 | Stop reason: HOSPADM

## 2021-06-15 RX ORDER — POLYETHYLENE GLYCOL 3350 17 G/17G
1 POWDER, FOR SOLUTION ORAL
Status: DISCONTINUED | OUTPATIENT
Start: 2021-06-15 | End: 2021-06-16 | Stop reason: HOSPADM

## 2021-06-15 RX ORDER — PANTOPRAZOLE SODIUM 40 MG/1
40 TABLET, DELAYED RELEASE ORAL DAILY
Qty: 90 TABLET | Refills: 0 | Status: SHIPPED | OUTPATIENT
Start: 2021-06-15 | End: 2021-06-15

## 2021-06-15 RX ORDER — TELMISARTAN 40 MG/1
40 TABLET ORAL
Status: DISCONTINUED | OUTPATIENT
Start: 2021-06-16 | End: 2021-06-16 | Stop reason: HOSPADM

## 2021-06-15 RX ORDER — BISACODYL 10 MG
10 SUPPOSITORY, RECTAL RECTAL
Status: DISCONTINUED | OUTPATIENT
Start: 2021-06-15 | End: 2021-06-16 | Stop reason: HOSPADM

## 2021-06-15 RX ORDER — ALBUTEROL SULFATE 90 UG/1
2 AEROSOL, METERED RESPIRATORY (INHALATION) EVERY 6 HOURS PRN
Status: DISCONTINUED | OUTPATIENT
Start: 2021-06-15 | End: 2021-06-16 | Stop reason: HOSPADM

## 2021-06-15 RX ORDER — LABETALOL HYDROCHLORIDE 5 MG/ML
10 INJECTION, SOLUTION INTRAVENOUS EVERY 4 HOURS PRN
Status: DISCONTINUED | OUTPATIENT
Start: 2021-06-15 | End: 2021-06-16 | Stop reason: HOSPADM

## 2021-06-15 RX ORDER — INSULIN LISPRO 100 [IU]/ML
1-6 INJECTION, SOLUTION INTRAVENOUS; SUBCUTANEOUS
Status: DISCONTINUED | OUTPATIENT
Start: 2021-06-15 | End: 2021-06-16 | Stop reason: HOSPADM

## 2021-06-15 RX ADMIN — IOHEXOL 100 ML: 350 INJECTION, SOLUTION INTRAVENOUS at 15:23

## 2021-06-15 RX ADMIN — SODIUM CHLORIDE 3 G: 900 INJECTION INTRAVENOUS at 21:00

## 2021-06-15 RX ADMIN — OMEPRAZOLE 20 MG: 20 CAPSULE, DELAYED RELEASE ORAL at 17:37

## 2021-06-15 RX ADMIN — DOCUSATE SODIUM 50 MG AND SENNOSIDES 8.6 MG 2 TABLET: 8.6; 5 TABLET, FILM COATED ORAL at 17:37

## 2021-06-15 RX ADMIN — INSULIN LISPRO 1 UNITS: 100 INJECTION, SOLUTION INTRAVENOUS; SUBCUTANEOUS at 20:59

## 2021-06-15 RX ADMIN — LIDOCAINE HYDROCHLORIDE 15 ML: 20 SOLUTION OROPHARYNGEAL at 15:52

## 2021-06-15 ASSESSMENT — LIFESTYLE VARIABLES
EVER FELT BAD OR GUILTY ABOUT YOUR DRINKING: NO
EVER HAD A DRINK FIRST THING IN THE MORNING TO STEADY YOUR NERVES TO GET RID OF A HANGOVER: NO
HAVE YOU EVER FELT YOU SHOULD CUT DOWN ON YOUR DRINKING: NO
ON A TYPICAL DAY WHEN YOU DRINK ALCOHOL HOW MANY DRINKS DO YOU HAVE: 0
TOTAL SCORE: 0
HOW MANY TIMES IN THE PAST YEAR HAVE YOU HAD 5 OR MORE DRINKS IN A DAY: 0
HAVE PEOPLE ANNOYED YOU BY CRITICIZING YOUR DRINKING: NO
TOTAL SCORE: 0
AVERAGE NUMBER OF DAYS PER WEEK YOU HAVE A DRINK CONTAINING ALCOHOL: 0
ALCOHOL_USE: NO
TOTAL SCORE: 0
CONSUMPTION TOTAL: NEGATIVE

## 2021-06-15 ASSESSMENT — HEART SCORE
RISK FACTORS: 1-2 RISK FACTORS
HISTORY: MODERATELY SUSPICIOUS
ECG: NORMAL
AGE: 65+
HEART SCORE: 4
TROPONIN: LESS THAN OR EQUAL TO NORMAL LIMIT

## 2021-06-15 ASSESSMENT — PATIENT HEALTH QUESTIONNAIRE - PHQ9
1. LITTLE INTEREST OR PLEASURE IN DOING THINGS: NOT AT ALL
SUM OF ALL RESPONSES TO PHQ9 QUESTIONS 1 AND 2: 0
2. FEELING DOWN, DEPRESSED, IRRITABLE, OR HOPELESS: NOT AT ALL

## 2021-06-15 ASSESSMENT — PAIN DESCRIPTION - PAIN TYPE
TYPE: ACUTE PAIN
TYPE: ACUTE PAIN

## 2021-06-15 ASSESSMENT — FIBROSIS 4 INDEX
FIB4 SCORE: 0.87
FIB4 SCORE: 0.87

## 2021-06-15 NOTE — ED NOTES
Assisted patient with bedpan to void. Patient currently neurologically intact and has no complaints. Blood pressure continues to be low.

## 2021-06-15 NOTE — ED NOTES
Patient rechecked, gave patient a pillow she is waiting for admit order and md to talk with her.   She is requesting something for pain, rn aware

## 2021-06-15 NOTE — PROGRESS NOTES
CC: Epigastric pain, melena    HISTORY OF THE PRESENT ILLNESS: Patient is a 71 y.o. female.     Patient is here initially for diabetes follow-up, though she notes that she woke up yesterday with quite severe epigastric pain.  She does endorse feeling like she needs to throw up but has not had any vomiting.  She notes that this morning she had black stools.  She is in quite a lot of pain in the office today.  She is never had any history of ulcers or GI bleeds.  She does take aspirin intermittently, but not daily.    Allergies: Aspirin, Cipro xr, Diclofenac, and Metformin    Current Outpatient Medications Ordered in Epic   Medication Sig Dispense Refill   • albuterol 108 (90 Base) MCG/ACT Aero Soln inhalation aerosol Inhale 2 Puffs every 6 hours as needed for Shortness of Breath (cough). 8.5 g 3   • Fluticasone Furoate-Vilanterol (BREO ELLIPTA) 200-25 MCG/INH AEROSOL POWDER, BREATH ACTIVATED Inhale 1 Puff every day. Rinse mouth after each use. 1 Each 0   • cetirizine (ZYRTEC) 10 MG Tab Take 1 tablet by mouth every day. 30 tablet 1   • telmisartan-hydrochlorothiazide (MICARDIS HCT) 40-12.5 MG per tablet TAKE 1 TABLET BY MOUTH EVERY DAY 90 tablet 1   • simvastatin (ZOCOR) 40 MG Tab TAKE 1 TABLET BY MOUTH EVERY DAY IN THE EVENING 90 tablet 2   • glipiZIDE (GLUCOTROL) 5 MG Tab Take 0.5 Tablets by mouth 2 times a day. (Patient taking differently: Take 5 mg by mouth every day.) 90 tablet 3   • Blood Glucose Meter Kit Test blood sugar as recommended by provider. 1 Kit 0   • Blood Glucose Test Strips Use one strip to test blood sugar once daily early morning before first meal. 100 Each 11   • Lancets Use one  lancet to test blood sugar once daily early morning before first meal. 100 Each 11   • Alcohol Swabs Wipe site with prep pad prior to checking sugars. 100 Each 11   • ASPIRIN 81 PO Take  by mouth.     • VITAMIN D, ERGOCALCIFEROL, PO Take  by mouth.       No current Epic-ordered facility-administered medications on file.  "      Past Medical History:   Diagnosis Date   • Anesthesia     post of nausea   • Arthritis    • Breast cancer (HCC) 2/10/2017   • Dental disorder     permanent bridge   • Diabetes (HCC)    • Diverticulosis of colon 11/4/2011   • HTN (hypertension) 3/14/2013   • Hypertension    • Pain     right shoulder down to thumb   • Post hysterectomy menopause 11/4/2011       Past Surgical History:   Procedure Laterality Date   • SHOULDER ARTHROSCOPY W/ ROTATOR CUFF REPAIR  2/9/2012    Performed by JENNY LIMON at [facility]   • SHOULDER DECOMPRESSION ARTHROSCOPIC  2/9/2012    Performed by JENNY LIMON at [facility]   • CLAVICLE DISTAL EXCISION  2/9/2012    Performed by JENNY LIMON at [facility]   • BUNIONECTOMY  2009    bilateral   • ABDOMINAL HYSTERECTOMY TOTAL  2001    endometriosis   • CHOLECYSTECTOMY  1999    laparoscopy   • MASTECTOMY     • TONSILLECTOMY  as child       Social History     Tobacco Use   • Smoking status: Never Smoker   • Smokeless tobacco: Never Used   • Tobacco comment: avoid any and all tobacco products   Vaping Use   • Vaping Use: Never used   Substance Use Topics   • Alcohol use: Yes     Alcohol/week: 0.0 oz     Comment: once a year   • Drug use: No       Social History     Social History Narrative   • Not on file       Family History   Problem Relation Age of Onset   • Stroke Mother    • Lung Disease Father         pneumonia   • Hyperlipidemia Father        ROS:   See HPI.  Other systems reviewed and are negative.    Exam: /58 (BP Location: Right arm, Patient Position: Sitting, BP Cuff Size: Adult)   Pulse 88   Temp 36.8 °C (98.2 °F) (Temporal)   Ht 1.6 m (5' 3\")   Wt 82.6 kg (182 lb)   SpO2 94%  Body mass index is 32.24 kg/m².    General: Uncomfortable appearing  Pulmonary: Clear to ausculation.  Normal effort. No rales, ronchi, or wheezing.  Cardiovascular: Regular rate and rhythm without murmur, rubs or gallop.   Abdomen: She has " significant epigastric firmness and tenderness to palpation.  She is generally tender else where throughout the abdomen.. Normal bowel sounds. Liver and spleen are not palpable. No rebound or guarding  Skin: Warm and dry.  No obvious lesions.  Musculoskeletal:  No extremity cyanosis, clubbing, or edema.  Psych: Normal mood and affect. Alert and oriented. Judgment and insight is normal.    Please note that this dictation was created using voice recognition software. I have made every reasonable attempt to correct obvious errors, but I expect that there are errors of grammar and possibly content that I did not discover before finalizing the note.      Assessment/Plan  Tena was seen today for diabetes follow-up and gastrophageal reflux.    Diagnoses and all orders for this visit:    Epigastric pain  Melena  Acute issues.  Initially I thought it might be appropriate to work-up outpatient, but due to her level of discomfort in office today in addition to her abdominal exam, I do recommend that she proceed to urgency department.  She agrees to this plan.  I have given the charge nurse at [facility] ED a call is a heads up that she is coming.  She is going to arrive by personal vehicle with  driving.    Type 2 diabetes mellitus without complication, without long-term current use of insulin (HCC)  Chronic issue, last A1c 7.1.  She is due for recheck, but she can certainly get this done after we take care of her acute issue as above.  -     HEMOGLOBIN A1C; Future    Follow-up after hospital discharge.    Nahed Waddell DO  [facility]

## 2021-06-15 NOTE — ED TRIAGE NOTES
Chief Complaint   Patient presents with   • Epigastric Pain     took pepto bismol yesterday, black stools, indigestion

## 2021-06-15 NOTE — ED PROVIDER NOTES
ED Provider Note    CHIEF COMPLAINT  Chief Complaint   Patient presents with   • Epigastric Pain     took pepto bismol yesterday, black stools, indigestion       HPI  Tena Romo is a 71 y.o. female who presents to the emergency department complaining of epigastric pain, chest pain, nausea, and black stools.  The patient was seen and evaluated by her doctor today and sent in for further evaluation.  She states that she is having pain in her epigastrium and her chest she describes as a burning pain.  It is worsened by exertion.  She has mild associated nausea.  There is no associated shortness of breath there is no tearing pain radiation to the back.  Nothing specifically makes this better or worse.  She did take some Pepto-Bismol without relief and she had some black stools.  Is not sure if the black stools have from before after the Pepto-Bismol.  She believes probably related to before.  She denies any diarrhea.  She has been constipated patient has no fevers or chills.  She denies any other aggravating relieving factors or associated complaints.    REVIEW OF SYSTEMS  See HPI for further details. All other systems are negative.    PAST MEDICAL HISTORY  Past Medical History:   Diagnosis Date   • Anesthesia     post of nausea   • Arthritis    • Breast cancer (HCC) 2/10/2017   • Dental disorder     permanent bridge   • Diabetes (HCC)    • Diverticulosis of colon 11/4/2011   • HTN (hypertension) 3/14/2013   • Hypertension    • Pain     right shoulder down to thumb   • Post hysterectomy menopause 11/4/2011       FAMILY HISTORY  Family History   Problem Relation Age of Onset   • Stroke Mother    • Lung Disease Father         pneumonia   • Hyperlipidemia Father        SOCIAL HISTORY  Social History     Socioeconomic History   • Marital status:      Spouse name: Not on file   • Number of children: Not on file   • Years of education: Not on file   • Highest education level: Not on file   Occupational  History   • Not on file   Tobacco Use   • Smoking status: Never Smoker   • Smokeless tobacco: Never Used   • Tobacco comment: avoid any and all tobacco products   Vaping Use   • Vaping Use: Never used   Substance and Sexual Activity   • Alcohol use: Yes     Alcohol/week: 0.0 oz     Comment: once a year   • Drug use: No   • Sexual activity: Not Currently     Partners: Male     Comment: . Work at [employer]   Other Topics Concern   • Not on file   Social History Narrative   • Not on file     Social Determinants of Health     Financial Resource Strain:    • Difficulty of Paying Living Expenses:    Food Insecurity:    • Worried About Running Out of Food in the Last Year:    • Ran Out of Food in the Last Year:    Transportation Needs:    • Lack of Transportation (Medical):    • Lack of Transportation (Non-Medical):    Physical Activity:    • Days of Exercise per Week:    • Minutes of Exercise per Session:    Stress:    • Feeling of Stress :    Social Connections:    • Frequency of Communication with Friends and Family:    • Frequency of Social Gatherings with Friends and Family:    • Attends Hinduism Services:    • Active Member of Clubs or Organizations:    • Attends Club or Organization Meetings:    • Marital Status:    Intimate Partner Violence:    • Fear of Current or Ex-Partner:    • Emotionally Abused:    • Physically Abused:    • Sexually Abused:        SURGICAL HISTORY  Past Surgical History:   Procedure Laterality Date   • SHOULDER ARTHROSCOPY W/ ROTATOR CUFF REPAIR  2/9/2012    Performed by JENNY LIMON at [facility]   • SHOULDER DECOMPRESSION ARTHROSCOPIC  2/9/2012    Performed by JENNY LIMON at SURGERY [facility]   • CLAVICLE DISTAL EXCISION  2/9/2012    Performed by JENNY LIMON at [facility]   • BUNIONECTOMY  2009    bilateral   • ABDOMINAL HYSTERECTOMY TOTAL  2001    endometriosis   • CHOLECYSTECTOMY  1999    laparoscopy   • MASTECTOMY     • TONSILLECTOMY   "as child       CURRENT MEDICATIONS  Home Medications    **Home medications have not yet been reviewed for this encounter**         ALLERGIES  Allergies   Allergen Reactions   • Aspirin      Intolerance     • Cipro Xr Hives   • Diclofenac    • Metformin Rash     Erythematous rash       PHYSICAL EXAM  VITAL SIGNS: /67   Pulse 80   Temp 36.8 °C (98.3 °F) (Temporal)   Resp (!) 21   Ht 1.6 m (5' 3\")   Wt 85.2 kg (187 lb 13.3 oz)   SpO2 96%   BMI 33.27 kg/m²    Constitutional:  HENT: Normocephalic, Atraumatic, Bilateral external ears normal, Oropharynx moist, No oral exudates, Nose normal.   Eyes: PERRL, EOMI, Conjunctiva normal, No discharge.   Neck: Normal range of motion,   Cardiovascular: Normal heart rate, Normal rhythm, No murmurs,  Thorax & Lungs: Normal breath sounds, No respiratory distress  Abdomen: Bowel sounds normal, Soft, No tenderness,  Rectal performed RN: Dark, guaiac positive stool.  Back: No tenderness, No CVA tenderness.   Musculoskeletal: Good range of motion in all major joints  Neurologic: Alert , No focal deficits noted.   Psychiatric: Affect normal    Results for orders placed or performed during the hospital encounter of 06/15/21   CBC WITH DIFFERENTIAL   Result Value Ref Range    WBC 12.9 (H) 4.8 - 10.8 K/uL    RBC 4.83 4.20 - 5.40 M/uL    Hemoglobin 14.3 12.0 - 16.0 g/dL    Hematocrit 44.8 37.0 - 47.0 %    MCV 92.8 81.4 - 97.8 fL    MCH 29.6 27.0 - 33.0 pg    MCHC 31.9 (L) 33.6 - 35.0 g/dL    RDW 45.5 35.9 - 50.0 fL    Platelet Count 330 164 - 446 K/uL    MPV 11.0 9.0 - 12.9 fL    Neutrophils-Polys 63.70 44.00 - 72.00 %    Lymphocytes 25.30 22.00 - 41.00 %    Monocytes 8.30 0.00 - 13.40 %    Eosinophils 1.80 0.00 - 6.90 %    Basophils 0.50 0.00 - 1.80 %    Immature Granulocytes 0.40 0.00 - 0.90 %    Nucleated RBC 0.00 /100 WBC    Neutrophils (Absolute) 8.21 (H) 2.00 - 7.15 K/uL    Lymphs (Absolute) 3.26 1.00 - 4.80 K/uL    Monos (Absolute) 1.07 (H) 0.00 - 0.85 K/uL    Eos " (Absolute) 0.23 0.00 - 0.51 K/uL    Baso (Absolute) 0.07 0.00 - 0.12 K/uL    Immature Granulocytes (abs) 0.05 0.00 - 0.11 K/uL    NRBC (Absolute) 0.00 K/uL   COMP METABOLIC PANEL   Result Value Ref Range    Sodium 140 135 - 145 mmol/L    Potassium 4.0 3.6 - 5.5 mmol/L    Chloride 103 96 - 112 mmol/L    Co2 25 20 - 33 mmol/L    Anion Gap 12.0 7.0 - 16.0    Glucose 119 (H) 65 - 99 mg/dL    Bun 16 8 - 22 mg/dL    Creatinine 0.82 0.50 - 1.40 mg/dL    Calcium 9.7 8.5 - 10.5 mg/dL    AST(SGOT) 20 12 - 45 U/L    ALT(SGPT) 25 2 - 50 U/L    Alkaline Phosphatase 113 (H) 30 - 99 U/L    Total Bilirubin 0.5 0.1 - 1.5 mg/dL    Albumin 4.3 3.2 - 4.9 g/dL    Total Protein 7.8 6.0 - 8.2 g/dL    Globulin 3.5 1.9 - 3.5 g/dL    A-G Ratio 1.2 g/dL   LIPASE   Result Value Ref Range    Lipase 11 11 - 82 U/L   URINALYSIS    Specimen: Urine   Result Value Ref Range    Color Yellow     Character Clear     Specific Gravity 1.013 <1.035    Ph 5.5 5.0 - 8.0    Glucose Negative Negative mg/dL    Ketones Negative Negative mg/dL    Protein Negative Negative mg/dL    Bilirubin Negative Negative    Urobilinogen, Urine 0.2 Negative    Nitrite Negative Negative    Leukocyte Esterase Trace (A) Negative    Occult Blood Negative Negative    Micro Urine Req Microscopic    URINE MICROSCOPIC (W/UA)   Result Value Ref Range    WBC 0-2 /hpf    RBC 0-2 /hpf    Bacteria Negative None /hpf    Epithelial Cells Negative /hpf    Hyaline Cast 0-2 /lpf   ESTIMATED GFR   Result Value Ref Range    GFR If African American >60 >60 mL/min/1.73 m 2    GFR If Non African American >60 >60 mL/min/1.73 m 2   TROPONIN   Result Value Ref Range    Troponin T 9 6 - 19 ng/L   EKG (NOW)   Result Value Ref Range    Report       [facility] Emergency Dept.    Test Date:  2021-06-15  Pt Name:    GREY STANLEY             Department: ER  MRN:        [id number]                      Room:       RD 02  Gender:     Female                       Technician:  [id number]  :        1950                   Requested By:ISRAEL SILVESTRE  Order #:    [id number]                    Reading MD: ISRAEL SILVESTRE. [facility]    Measurements  Intervals                                Axis  Rate:       72                           P:          38  OK:         164                          QRS:        -5  QRSD:       84                           T:          4  QT:         388  QTc:        425    Interpretive Statements  SINUS RHYTHM  ATRIAL PREMATURE COMPLEX  PROBABLE LEFT ATRIAL ABNORMALITY  LEFT VENTRICULAR HYPERTROPHY  No previous ECG available for comparison  Electronically Signed On 6- 14:08:55 PDT by ISRAEL SILVESTRE. [facility]          RADIOLOGY/PROCEDURES  CT-ABDOMEN-PELVIS WITH   Final Result         1. No acute inflammatory change identified in the abdomen or pelvis.      2. Sigmoid diverticulosis.      3. Groundglass and interstitial opacities in bilateral lung bases, likely interstitial pneumonitis.      4. Small hiatal hernia. Mild esophagitis.      DX-CHEST-PORTABLE (1 VIEW)   Final Result         1. No acute cardiopulmonary abnormalities are identified.            COURSE & MEDICAL DECISION MAKING  Pertinent Labs & Imaging studies reviewed. (See chart for details)      Patient comes in presenting with epigastric abdominal discomfort as well as some chest pain.  Differential diagnosis includes cardiac chest pain, ACS esophagitis, pneumonia, pneumothorax, dissection, gastritis, GI bleed, hiatal hernia less likely pancreatitis or recurrent biliary stone.    Patient has had a prior cholecystectomy.    The patient is worked up with labs and imaging.  So far her cardiac work-up is been unremarkable she has a normal EKG and negative troponin after a day of pain.  She does have diabetes high blood pressure in her chest is moderately concerning for ACS.  This gives her a heart score of 4.  I think he likely needs at least a second troponin.      The patient has epigastric  discomfort.  She was seen by her doctor I reviewed her doctor's notes she is concerned about her abdominal tenderness and dark stools.  The patient has been on Pepto-Bismol.  She is unsure if the dark stools started before she thinks she had a couple of days of dark stools and only has taken 1 dose of Pepto on the second day.    She is not anemic.  She did have a particular elevated BUN but her guaiac is positive.  At this point her CT gastroesophagitis and some evidence of a hiatal hernia.  This may be the cause of her black stools and her discomfort.  The patient will be given a GI cocktail.  She requires hospitalist for continued work-up and treatment of her melena and possibly a second troponin.    She has no peritonitis.  Discussed the case with the hospitalist and care transfer at that time.      FINAL IMPRESSION  1. Other chest pain     2. Pain of upper abdomen     3. Melanotic stools     4. Esophagitis                  Electronically signed by: Noah Mckinney M.D., 6/15/2021 2:09 PM

## 2021-06-16 VITALS
BODY MASS INDEX: 33.28 KG/M2 | DIASTOLIC BLOOD PRESSURE: 74 MMHG | RESPIRATION RATE: 20 BRPM | HEART RATE: 70 BPM | TEMPERATURE: 98.7 F | OXYGEN SATURATION: 93 % | HEIGHT: 63 IN | WEIGHT: 187.83 LBS | SYSTOLIC BLOOD PRESSURE: 113 MMHG

## 2021-06-16 PROBLEM — D72.823 LEUKEMOID REACTION: Status: ACTIVE | Noted: 2021-06-15

## 2021-06-16 PROBLEM — D72.823 LEUKEMOID REACTION: Status: RESOLVED | Noted: 2021-06-15 | Resolved: 2021-06-16

## 2021-06-16 PROBLEM — R07.9 CHEST PAIN: Status: RESOLVED | Noted: 2021-06-15 | Resolved: 2021-06-16

## 2021-06-16 PROBLEM — K29.70 GASTRITIS: Status: ACTIVE | Noted: 2021-06-16

## 2021-06-16 LAB
ALBUMIN SERPL BCP-MCNC: 3.7 G/DL (ref 3.2–4.9)
ALBUMIN/GLOB SERPL: 1.2 G/DL
ALP SERPL-CCNC: 96 U/L (ref 30–99)
ALT SERPL-CCNC: 21 U/L (ref 2–50)
ANION GAP SERPL CALC-SCNC: 11 MMOL/L (ref 7–16)
AST SERPL-CCNC: 21 U/L (ref 12–45)
BILIRUB SERPL-MCNC: 0.3 MG/DL (ref 0.1–1.5)
BUN SERPL-MCNC: 16 MG/DL (ref 8–22)
CALCIUM SERPL-MCNC: 8.9 MG/DL (ref 8.5–10.5)
CHLORIDE SERPL-SCNC: 104 MMOL/L (ref 96–112)
CO2 SERPL-SCNC: 24 MMOL/L (ref 20–33)
CREAT SERPL-MCNC: 0.76 MG/DL (ref 0.5–1.4)
ERYTHROCYTE [DISTWIDTH] IN BLOOD BY AUTOMATED COUNT: 45.4 FL (ref 35.9–50)
GLOBULIN SER CALC-MCNC: 3 G/DL (ref 1.9–3.5)
GLUCOSE BLD-MCNC: 118 MG/DL (ref 65–99)
GLUCOSE SERPL-MCNC: 124 MG/DL (ref 65–99)
HCT VFR BLD AUTO: 40.5 % (ref 37–47)
HGB BLD-MCNC: 13.2 G/DL (ref 12–16)
MCH RBC QN AUTO: 29.8 PG (ref 27–33)
MCHC RBC AUTO-ENTMCNC: 32.6 G/DL (ref 33.6–35)
MCV RBC AUTO: 91.4 FL (ref 81.4–97.8)
PLATELET # BLD AUTO: 309 K/UL (ref 164–446)
PMV BLD AUTO: 11.1 FL (ref 9–12.9)
POTASSIUM SERPL-SCNC: 4.2 MMOL/L (ref 3.6–5.5)
PROT SERPL-MCNC: 6.7 G/DL (ref 6–8.2)
RBC # BLD AUTO: 4.43 M/UL (ref 4.2–5.4)
SARS-COV-2 RNA RESP QL NAA+PROBE: NOTDETECTED
SODIUM SERPL-SCNC: 139 MMOL/L (ref 135–145)
SPECIMEN SOURCE: NORMAL
TROPONIN T SERPL-MCNC: 11 NG/L (ref 6–19)
WBC # BLD AUTO: 9.9 K/UL (ref 4.8–10.8)

## 2021-06-16 PROCEDURE — 82962 GLUCOSE BLOOD TEST: CPT

## 2021-06-16 PROCEDURE — 85027 COMPLETE CBC AUTOMATED: CPT

## 2021-06-16 PROCEDURE — 96366 THER/PROPH/DIAG IV INF ADDON: CPT

## 2021-06-16 PROCEDURE — 99217 PR OBSERVATION CARE DISCHARGE: CPT | Performed by: INTERNAL MEDICINE

## 2021-06-16 PROCEDURE — G0378 HOSPITAL OBSERVATION PER HR: HCPCS

## 2021-06-16 PROCEDURE — A9270 NON-COVERED ITEM OR SERVICE: HCPCS | Performed by: HOSPITALIST

## 2021-06-16 PROCEDURE — 80053 COMPREHEN METABOLIC PANEL: CPT

## 2021-06-16 PROCEDURE — 700105 HCHG RX REV CODE 258: Performed by: HOSPITALIST

## 2021-06-16 PROCEDURE — 700102 HCHG RX REV CODE 250 W/ 637 OVERRIDE(OP): Performed by: HOSPITALIST

## 2021-06-16 PROCEDURE — 700111 HCHG RX REV CODE 636 W/ 250 OVERRIDE (IP): Performed by: HOSPITALIST

## 2021-06-16 RX ORDER — ALUMINA, MAGNESIA, AND SIMETHICONE 2400; 2400; 240 MG/30ML; MG/30ML; MG/30ML
10 SUSPENSION ORAL 4 TIMES DAILY PRN
Qty: 560 ML | Refills: 1 | Status: SHIPPED | OUTPATIENT
Start: 2021-06-16 | End: 2021-06-25

## 2021-06-16 RX ORDER — OMEPRAZOLE 20 MG/1
20 CAPSULE, DELAYED RELEASE ORAL 2 TIMES DAILY
Qty: 60 CAPSULE | Refills: 1 | Status: SHIPPED | OUTPATIENT
Start: 2021-06-16 | End: 2021-08-24

## 2021-06-16 RX ADMIN — HYDROCHLOROTHIAZIDE 12.5 MG: 12.5 TABLET ORAL at 05:37

## 2021-06-16 RX ADMIN — OMEPRAZOLE 20 MG: 20 CAPSULE, DELAYED RELEASE ORAL at 05:37

## 2021-06-16 RX ADMIN — TELMISARTAN 40 MG: 40 TABLET ORAL at 05:37

## 2021-06-16 RX ADMIN — SODIUM CHLORIDE 3 G: 900 INJECTION INTRAVENOUS at 02:00

## 2021-06-16 RX ADMIN — SODIUM CHLORIDE 3 G: 900 INJECTION INTRAVENOUS at 09:01

## 2021-06-16 ASSESSMENT — PATIENT HEALTH QUESTIONNAIRE - PHQ9
2. FEELING DOWN, DEPRESSED, IRRITABLE, OR HOPELESS: NOT AT ALL
1. LITTLE INTEREST OR PLEASURE IN DOING THINGS: NOT AT ALL
SUM OF ALL RESPONSES TO PHQ9 QUESTIONS 1 AND 2: 0

## 2021-06-16 NOTE — ASSESSMENT & PLAN NOTE
Given the partial resolution with GI cocktail I do suspect this is related to her GI symptoms, EKG unremarkable, initial cardiac troponins unremarkable.  I will check serial troponins out of abundance of caution, I will hold empiric aspirin based on her GI bleed.

## 2021-06-16 NOTE — PROGRESS NOTES
Monitor summary per monitor tech report:    SINUS Rhythm rate 94-64  NONE ectopy  .16/.08/.38

## 2021-06-16 NOTE — DISCHARGE SUMMARY
Discharge Summary    CHIEF COMPLAINT ON ADMISSION  Chief Complaint   Patient presents with   • Epigastric Pain     took pepto bismol yesterday, black stools, indigestion       Reason for Admission  Sent by MD; Dark Stools     Admission Date  6/15/2021    CODE STATUS  Full Code    HPI & HOSPITAL COURSE  This is a 71 y.o. female with diabetes, and hypertension, admitted in the CDU for throbbing, burning, and aching lower chest and epigastric abdominal discomfort, with associated dyspepsia, and worse with eating particularly with spicy foods, coffee, chocolates.  She self-treated with Pepto-Bismol with improvement in symptoms, and had some dark black stools this morning.  Guaiac was positive per ER evaluation, but her hemoglobin has been stable.  Electrolytes and renal function were normal.  WBC 12,900, which quickly normalized the following day.  Alkaline phosphatase 113, with normal transaminases and bilirubin.  Troponin was negative.  EKG showed no dynamic ischemic changes. She is started on empiric PPI, along with GI cocktail, with significant improvement and subsequent resolution of her symptoms.  Her troponins remained negative, and she had no arrhythmias on cardiac monitoring.    Her symptoms are felt to be related to gastritis, GERD, and dyspepsia.  She had a low HEART score = 3, with low probability of cardiac cause of her symptoms with risk of MACE of only 1.9%. There was no indication to pursue further hospital stress testing, and can be followed closely as outpatient.    She remained hemodynamically stable.  As mentioned, her symptoms have resolved, and she is significantly feeling better.  She is counseled extensively regarding foods that can exacerbate her dyspepsia/gastritis, and she is being prescribed twice daily Prilosec, along with as needed Maalox.    I have personally seen and examined the patient on the day of discharge. With her clinical improvement, she was deemed ready to discharge from the  hospital as she did not have any further hospitalization needs. Patient felt comfortable going home. The discharge plan was discussed with the patient, with which she was agreeable to.     Therefore, she is discharged in good and stable condition to home with close outpatient follow-up.      Discharge Date  6/16/2021      FOLLOW UP ITEMS POST DISCHARGE  -She will be continued on Prilosec 20 mg twice daily, along with as needed Maalox.  She is counseled extensively regarding foods that can exacerbate her dyspepsia/gastritis.  She will follow up with her PCP.  If symptoms persist after 1-2 2 weeks course of Prilosec, may need referral to outpatient gastroenterology for EGD evaluation.  - counseled to seek immediate medical attention, or return to the ED for recurrent or worsening symptoms.      DISCHARGE DIAGNOSES  Principal Problem:    Gastritis, GERD and dyspepsia POA: Yes  Active Problems:    Hypercholesteremia POA: Yes    Essential hypertension POA: Yes    Type 2 diabetes mellitus without complication, without long-term current use of insulin (HCC) POA: Yes    Obesity (BMI 30-39.9) POA: Yes  Resolved Problems:    Chest pain due to GERD/gastritis POA: Yes    Leukemoid reaction POA: Yes      FOLLOW UP  Future Appointments   Date Time Provider Department Center   8/25/2021 10:45 AM SPIROMETRY/6MW [facility] None   9/1/2021 10:10 AM Laura Chen M.D. [facility] None   2/28/2022  9:30 AM JERILYN Shaffer [facility] None     No follow-up provider specified.    MEDICATIONS ON DISCHARGE     Medication List      START taking these medications      Instructions   mag hydrox-al hydrox-simeth 400-400-40 MG/5ML Susp  Commonly known as: Maalox Advanced Max St   Take 10 mL by mouth 4 times a day as needed (abdominal pain, dyspepsia).  Dose: 10 mL     omeprazole 20 MG delayed-release capsule  Commonly known as: PRILOSEC   Take 1 capsule by mouth 2 times a day.  Dose: 20 mg        CHANGE how you take these medications      Instructions    glipiZIDE 5 MG Tabs  What changed:   · how much to take  · when to take this  Commonly known as: GLUCOTROL   Take 0.5 Tablets by mouth 2 times a day.  Dose: 2.5 mg     simvastatin 40 MG Tabs  What changed:   · how much to take  · how to take this  · when to take this  · additional instructions  Commonly known as: ZOCOR   TAKE 1 TABLET BY MOUTH EVERY DAY IN THE EVENING     telmisartan-hydrochlorothiazide 40-12.5 MG per tablet  What changed: when to take this  Commonly known as: MICARDIS HCT   TAKE 1 TABLET BY MOUTH EVERY DAY        CONTINUE taking these medications      Instructions   albuterol 108 (90 Base) MCG/ACT Aers inhalation aerosol   Inhale 2 Puffs every 6 hours as needed for Shortness of Breath (cough).  Dose: 2 Puff     Alcohol Swabs   Doctor's comments: Per formulary preference. ICD-10 code: E11.9 Controlled type 2 Diabetes Mellitus  Wipe site with prep pad prior to checking sugars.     * Blood Glucose Meter Kit   Doctor's comments: Or per formulary preference. ICD-10 code: E11.9 Controlled type 2 Diabetes Mellitus  Test blood sugar as recommended by provider.     * Blood Glucose Test Strips   Doctor's comments: Or per formulary preference. ICD-10 code: E11.9 Controlled type 2 Diabetes Mellitus  Use one strip to test blood sugar once daily early morning before first meal.     Breo Ellipta 200-25 MCG/INH Aepb  Generic drug: Fluticasone Furoate-Vilanterol   Inhale 1 Puff every day. Rinse mouth after each use.  Dose: 1 Puff     cetirizine 10 MG Tabs  Commonly known as: ZYRTEC   Take 1 tablet by mouth every day.  Dose: 10 mg     Lancets   Doctor's comments: Or per formulary preference. ICD-10 code: E11.9 Controlled type 2 Diabetes Mellitus  Use one  lancet to test blood sugar once daily early morning before first meal.         * This list has 2 medication(s) that are the same as other medications prescribed for you. Read the directions carefully, and ask your doctor or other care provider to review them with  you.                Allergies  Allergies   Allergen Reactions   • Aspirin      Intolerance     • Cipro Xr Hives   • Diclofenac    • Metformin Rash     Erythematous rash       DIET  Orders Placed This Encounter   Procedures   • Diet Order Diet: Regular     Standing Status:   Standing     Number of Occurrences:   1     Order Specific Question:   Diet:     Answer:   Regular [1]       ACTIVITY  As tolerated.  Weight bearing as tolerated    CONSULTATIONS  none    PROCEDURES  none    LABORATORY  Lab Results   Component Value Date    SODIUM 139 06/16/2021    POTASSIUM 4.2 06/16/2021    CHLORIDE 104 06/16/2021    CO2 24 06/16/2021    GLUCOSE 124 (H) 06/16/2021    BUN 16 06/16/2021    CREATININE 0.76 06/16/2021    CREATININE 0.86 12/01/2010    GLOMRATE >59 12/01/2010        Lab Results   Component Value Date    WBC 9.9 06/16/2021    HEMOGLOBIN 13.2 06/16/2021    HEMATOCRIT 40.5 06/16/2021    PLATELETCT 309 06/16/2021        Total time of the discharge process = 35 minutes.

## 2021-06-16 NOTE — CARE PLAN
Problem: Pain - Standard  Goal: Alleviation of pain or a reduction in pain to the patient’s comfort goal  Outcome: Met     Problem: Knowledge Deficit - Standard  Goal: Patient and family/care givers will demonstrate understanding of plan of care, disease process/condition, diagnostic tests and medications  Outcome: Met

## 2021-06-16 NOTE — ASSESSMENT & PLAN NOTE
Guaiac positive however initial labs are unremarkable, these will be rechecked and GI can be consulted if there is a marked drop in hemoglobin.  Recent use of Pepto-Bismol may be causing a component of the dark stools.  Otherwise I believe empiric management with parenteral PPI most appropriate at this juncture.  Would benefit from a referral to GI as an outpatient with oral PPI therapy if H&H remains stable.

## 2021-06-16 NOTE — PROGRESS NOTES
Assumed pt care at 0700. Received report from Siomara ORTIZ. A&O x4. Pt denies pain at this time.   Updated on POC, communication board updated. Bed locked and in lowest position. Call light and belongings within reach. Non-skid socks in place. Needs met, will continue to monitor.

## 2021-06-16 NOTE — CARE PLAN
The patient is Stable - Low risk of patient condition declining or worsening    Shift Goals  Clinical Goals: Admit education  Patient Goals: Pain managment    Progress made toward(s) clinical / shift goals:  no pain    Patient is not progressing towards the following goals:

## 2021-06-16 NOTE — ED NOTES
Bedside report given to ANGEL Escobedo. Patient currently AOx4, vital signs stable. Patient transported via wheelchair on cardiac monitor.

## 2021-06-16 NOTE — H&P
[facility] Medicine History & Physical Note    Date of Service  6/15/2021    Primary Care Physician  Nahed Waddell D.O.    Consultants  [facility]    Code Status  Full Code    Chief Complaint  Chief Complaint   Patient presents with   • Epigastric Pain     took pepto bismol yesterday, black stools, indigestion       History of Presenting Illness  Patient is a delightful 71-year-old woman who presented to the emergency department for evaluation of both chest and abdomen discomfort.  She describes the nature of the discomfort as being of a throbbing and aching sensation.  No priors.  She describes the discomfort to be somewhat palpable in nature when evaluated by her PCP earlier today.  She self treated this discomfort with Pepto-Bismol at 7:15 last night, and had some rather dark black stools this morning.  Guaiac positive per ER MD evaluation.  She was provided with a GI cocktail approximately 5 minutes prior to my arrival with some interval resolution of her discomfort.  Priors are none.  Acuity is acute.  Severity is severe.  Timing is irrelevant.  Alleviating factors include GI cocktail.  Aggravating factors include palpitation.  Onset is abrupt.  Nature per above.     Review of Systems  All systems reviewed and negative except as noted per above and as follows,  Patient uses 1 L of oxygen at night.  Tells me that she had Covid last November, treated at [facility] for 18 days, treated with remdesivir.    Past Medical History   has a past medical history of Anesthesia, Arthritis, Breast cancer (HCC) (2/10/2017), Dental disorder, Diabetes (HCC), Diverticulosis of colon (11/4/2011), HTN (hypertension) (3/14/2013), Hypertension, Pain, and Post hysterectomy menopause (11/4/2011).    Surgical History   has a past surgical history that includes cholecystectomy (1999); abdominal hysterectomy total (2001); bunionectomy (2009); tonsillectomy (as child); shoulder arthroscopy w/ rotator cuff repair (2/9/2012); shoulder  decompression arthroscopic (2/9/2012); clavicle distal excision (2/9/2012); and mastectomy.     Family History  family history includes Hyperlipidemia in her father; Lung Disease in her father; Stroke in her mother.     Social History   reports that she has never smoked. She has never used smokeless tobacco. She reports current alcohol use. She reports that she does not use drugs.    Allergies  Allergies   Allergen Reactions   • Aspirin      Intolerance     • Cipro Xr Hives   • Diclofenac    • Metformin Rash     Erythematous rash       Medications  Prior to Admission Medications   Prescriptions Last Dose Informant Patient Reported? Taking?   Alcohol Swabs Not Taking at Unknown time  No No   Sig: Wipe site with prep pad prior to checking sugars.   Patient not taking: Reported on 6/15/2021   Blood Glucose Meter Kit Not Taking at Unknown time  No No   Sig: Test blood sugar as recommended by provider.   Patient not taking: Reported on 6/15/2021   Blood Glucose Test Strips Not Taking at Unknown time  No No   Sig: Use one strip to test blood sugar once daily early morning before first meal.   Patient not taking: Reported on 6/15/2021   Fluticasone Furoate-Vilanterol (BREO ELLIPTA) 200-25 MCG/INH AEROSOL POWDER, BREATH ACTIVATED Not Taking at Unknown time  No No   Sig: Inhale 1 Puff every day. Rinse mouth after each use.   Patient not taking: Reported on 6/15/2021   Lancets Not Taking at Unknown time  No No   Sig: Use one  lancet to test blood sugar once daily early morning before first meal.   Patient not taking: Reported on 6/15/2021   albuterol 108 (90 Base) MCG/ACT Aero Soln inhalation aerosol unknown at Unknown time  No No   Sig: Inhale 2 Puffs every 6 hours as needed for Shortness of Breath (cough).   cetirizine (ZYRTEC) 10 MG Tab Not Taking at Unknown time  No No   Sig: Take 1 tablet by mouth every day.   Patient not taking: Reported on 6/15/2021   glipiZIDE (GLUCOTROL) 5 MG Tab 6/13/2021 at am  No No   Sig: Take  0.5 Tablets by mouth 2 times a day.   Patient taking differently: Take 5 mg by mouth every day.   simvastatin (ZOCOR) 40 MG Tab 6/13/2021 at Unknown time  No No   Sig: TAKE 1 TABLET BY MOUTH EVERY DAY IN THE EVENING   Patient taking differently: Take 40 mg by mouth every evening.   telmisartan-hydrochlorothiazide (MICARDIS HCT) 40-12.5 MG per tablet 6/13/2021 at Unknown time  No No   Sig: TAKE 1 TABLET BY MOUTH EVERY DAY   Patient taking differently: Take 1 tablet by mouth every evening.      Facility-Administered Medications: None       Physical Exam  Temp:  [36.8 °C (98.3 °F)-36.9 °C (98.4 °F)] 36.9 °C (98.4 °F)  Pulse:  [70-94] 84  Resp:  [14-26] 16  BP: (142-189)/(60-84) 142/60  SpO2:  [94 %-100 %] 100 %    General: No acute distress  HEENT atraumatic, normocephalic, pupils equal round reactive to light  Neck: No JVD  Chest: Respirations are unlabored  Cardiac: Physiologic S1 and S2  Abdomen: Soft, diffusely tender, nondistended  Extremities: Without clubbing, cyanosis or edema  Neuro: Cranial nerves II through XII are grossly intact.  Psych: No anxiety, judgement intact.          Laboratory:  Recent Labs     06/15/21  1233   WBC 12.9*   RBC 4.83   HEMOGLOBIN 14.3   HEMATOCRIT 44.8   MCV 92.8   MCH 29.6   MCHC 31.9*   RDW 45.5   PLATELETCT 330   MPV 11.0     Recent Labs     06/15/21  1233   SODIUM 140   POTASSIUM 4.0   CHLORIDE 103   CO2 25   GLUCOSE 119*   BUN 16   CREATININE 0.82   CALCIUM 9.7     Recent Labs     06/15/21  1233   ALTSGPT 25   ASTSGOT 20   ALKPHOSPHAT 113*   TBILIRUBIN 0.5   LIPASE 11   GLUCOSE 119*         No results for input(s): NTPROBNP in the last 72 hours.      Recent Labs     06/15/21  1233   TROPONINT 9       Imaging:  CT-ABDOMEN-PELVIS WITH   Final Result         1. No acute inflammatory change identified in the abdomen or pelvis.      2. Sigmoid diverticulosis.      3. Groundglass and interstitial opacities in bilateral lung bases, likely interstitial pneumonitis.      4. Small hiatal  hernia. Mild esophagitis.      DX-CHEST-PORTABLE (1 VIEW)   Final Result         1. No acute cardiopulmonary abnormalities are identified.        Cardiology:  1.  Twelve-lead EKG discloses a normal sinus rhythm at 72 ventricular beats per minute.  QTc 425 ms.  Axis is normal.  No ST segment elevations or deviations concerning for ischemia.  T waves upright throughout the precordial leads excepting V1 which is physiologic.  QRS complexes do not appear to meet criteria for LVH per my eye, though this is noted on automated read.  Of note, read as per my interpretation of images pending the benefit of cardiology at the time of this dictation.    Assessment/Plan:  I anticipate this patient is appropriate for observation status at this time.    GI bleed  Assessment & Plan  Guaiac positive however initial labs are unremarkable, these will be rechecked and GI can be consulted if there is a marked drop in hemoglobin.  Recent use of Pepto-Bismol may be causing a component of the dark stools.  Otherwise I believe empiric management with parenteral PPI most appropriate at this juncture.  Would benefit from a referral to GI as an outpatient with oral PPI therapy if H&H remains stable.    Leukocytosis  Assessment & Plan  Etiology not obvious however she does have some interstitial lung findings on CT scan of the abdomen.  This may be a sequela of her prior viral COVID infection though given her elevated white count I think it would be prudent to obtain sputum cultures, blood cultures and start empiric antibiotics thereafter.    Chest pain  Assessment & Plan  Given the partial resolution with GI cocktail I do suspect this is related to her GI symptoms, EKG unremarkable, initial cardiac troponins unremarkable.  I will check serial troponins out of abundance of caution, I will hold empiric aspirin based on her GI bleed.    Type 2 diabetes mellitus without complication, without long-term current use of insulin (HCC)- (present on  admission)  Assessment & Plan  I will check an add on hemoglobin A1c, initiate a basal, prandial, correctional insulin scale.  Hold home medications.  These can likely be resumed safely upon discharge.  Of note, insulins were sharply dose reduced from power plan standards as I feel that she will not need aggressive interventions, these can be increased daily as needed to achieve goal glucose .

## 2021-06-16 NOTE — PROGRESS NOTES
Pt DC'd to home. IV removed, discharge instructions provided to patient, pt verbalizes understanding. Pt states all questions have been answered. Copy of discharge paperwork provided to pt, signed copy in chart. Pt states all belongings in possession. Pt escorted off unit by RN without incident.

## 2021-06-16 NOTE — ASSESSMENT & PLAN NOTE
Etiology not obvious however she does have some interstitial lung findings on CT scan of the abdomen.  This may be a sequela of her prior viral COVID infection though given her elevated white count I think it would be prudent to obtain sputum cultures, blood cultures and start empiric antibiotics thereafter.

## 2021-06-16 NOTE — ASSESSMENT & PLAN NOTE
I will check an add on hemoglobin A1c, initiate a basal, prandial, correctional insulin scale.  Hold home medications.  These can likely be resumed safely upon discharge.  Of note, insulins were sharply dose reduced from power plan standards as I feel that she will not need aggressive interventions, these can be increased daily as needed to achieve goal glucose .

## 2021-06-16 NOTE — DISCHARGE INSTRUCTIONS
Discharge Instructions    Discharged to home by car with relative. Discharged via wheelchair, hospital escort: Yes.  Special equipment needed: Not Applicable    Be sure to schedule a follow-up appointment with your primary care doctor or any specialists as instructed.     Discharge Plan:   Diet Plan: Discussed  Activity Level: Discussed  Confirmed Follow up Appointment: Appointment Scheduled  Confirmed Symptoms Management: Discussed  Medication Reconciliation Updated: Yes    I understand that a diet low in cholesterol, fat, and sodium is recommended for good health. Unless I have been given specific instructions below for another diet, I accept this instruction as my diet prescription.   Other diet: Heart healthy    Special Instructions: None    · Is patient discharged on Warfarin / Coumadin?   No     Depression / Suicide Risk    As you are discharged from this [facility] facility, it is important to learn how to keep safe from harming yourself.    Recognize the warning signs:  · Abrupt changes in personality, positive or negative- including increase in energy   · Giving away possessions  · Change in eating patterns- significant weight changes-  positive or negative  · Change in sleeping patterns- unable to sleep or sleeping all the time   · Unwillingness or inability to communicate  · Depression  · Unusual sadness, discouragement and loneliness  · Talk of wanting to die  · Neglect of personal appearance   · Rebelliousness- reckless behavior  · Withdrawal from people/activities they love  · Confusion- inability to concentrate     If you or a loved one observes any of these behaviors or has concerns about self-harm, here's what you can do:  · Talk about it- your feelings and reasons for harming yourself  · Remove any means that you might use to hurt yourself (examples: pills, rope, extension cords, firearm)  · Get professional help from the community (Mental Health, Substance Abuse, psychological counseling)  · Do  not be alone:Call your Safe Contact- someone whom you trust who will be there for you.  · Call your local CRISIS HOTLINE 421-4149 or 104-169-4192  · Call your local Children's Mobile Crisis Response Team [place] (769) 957-5013 or www.[employer]  · Call the toll free National Suicide Prevention Hotlines   · National Suicide Prevention Lifeline 859-115-UTEN (7033)  · National UTOPY Line Network 800-SUICIDE (290-4816)

## 2021-06-20 LAB
BACTERIA BLD CULT: NORMAL
BACTERIA BLD CULT: NORMAL
SIGNIFICANT IND 70042: NORMAL
SIGNIFICANT IND 70042: NORMAL
SITE SITE: NORMAL
SITE SITE: NORMAL
SOURCE SOURCE: NORMAL
SOURCE SOURCE: NORMAL

## 2021-06-25 ENCOUNTER — OFFICE VISIT (OUTPATIENT)
Dept: MEDICAL GROUP | Facility: PHYSICIAN GROUP | Age: 71
End: 2021-06-25
Payer: MEDICARE

## 2021-06-25 VITALS
SYSTOLIC BLOOD PRESSURE: 122 MMHG | BODY MASS INDEX: 32.25 KG/M2 | HEIGHT: 63 IN | HEART RATE: 72 BPM | DIASTOLIC BLOOD PRESSURE: 60 MMHG | WEIGHT: 182 LBS | TEMPERATURE: 98.4 F | OXYGEN SATURATION: 95 %

## 2021-06-25 DIAGNOSIS — K29.00 ACUTE GASTRITIS WITHOUT HEMORRHAGE, UNSPECIFIED GASTRITIS TYPE: ICD-10-CM

## 2021-06-25 DIAGNOSIS — E11.9 TYPE 2 DIABETES MELLITUS WITHOUT COMPLICATION, WITHOUT LONG-TERM CURRENT USE OF INSULIN (HCC): ICD-10-CM

## 2021-06-25 DIAGNOSIS — Z09 HOSPITAL DISCHARGE FOLLOW-UP: ICD-10-CM

## 2021-06-25 DIAGNOSIS — K92.1 BLACK STOOL: ICD-10-CM

## 2021-06-25 PROCEDURE — 99214 OFFICE O/P EST MOD 30 MIN: CPT | Performed by: FAMILY MEDICINE

## 2021-06-25 ASSESSMENT — FIBROSIS 4 INDEX: FIB4 SCORE: 1.05

## 2021-06-25 NOTE — PROGRESS NOTES
CC: Hospital discharge follow-up    HISTORY OF THE PRESENT ILLNESS: Patient is a 71 y.o. female.     Patient was recently hospitalized from [facility] through June 16, 2021 for severe epigastric pain and dark stools.  Ultimately symptoms were felt to be related to gastritis and GERD.  CBC was within normal limits.  She was occult blood positive with her stool.  She was put on omeprazole, discharged with recommendation to follow-up outpatient with GI.  She notes that her symptoms have improved on the omeprazole though she is only taking it once every other day.  She does not have a GI referral.    She also is wondering if the glipizide is actually doing anything for her diabetes.  It was started about 4 months ago in the setting of an A1c of 7.1.  A1c is now 6.7, improving.    Allergies: Aspirin, Cipro xr, Diclofenac, and Metformin    Current Outpatient Medications Ordered in Epic   Medication Sig Dispense Refill   • omeprazole (PRILOSEC) 20 MG delayed-release capsule Take 1 capsule by mouth 2 times a day. 60 capsule 1   • albuterol 108 (90 Base) MCG/ACT Aero Soln inhalation aerosol Inhale 2 Puffs every 6 hours as needed for Shortness of Breath (cough). 8.5 g 3   • telmisartan-hydrochlorothiazide (MICARDIS HCT) 40-12.5 MG per tablet TAKE 1 TABLET BY MOUTH EVERY DAY (Patient taking differently: Take 1 tablet by mouth every evening.) 90 tablet 1   • simvastatin (ZOCOR) 40 MG Tab TAKE 1 TABLET BY MOUTH EVERY DAY IN THE EVENING (Patient taking differently: Take 40 mg by mouth every evening.) 90 tablet 2   • glipiZIDE (GLUCOTROL) 5 MG Tab Take 0.5 Tablets by mouth 2 times a day. (Patient taking differently: Take 5 mg by mouth every day.) 90 tablet 3     No current Epic-ordered facility-administered medications on file.       Past Medical History:   Diagnosis Date   • Anesthesia     post of nausea   • Arthritis    • Breast cancer (HCC) 2/10/2017   • Dental disorder     permanent bridge   • Diabetes (HCC)    • Diverticulosis  "of colon 11/4/2011   • HTN (hypertension) 3/14/2013   • Hypertension    • Pain     right shoulder down to thumb   • Post hysterectomy menopause 11/4/2011       Past Surgical History:   Procedure Laterality Date   • SHOULDER ARTHROSCOPY W/ ROTATOR CUFF REPAIR  2/9/2012    Performed by JENNY LIMON at [facility] ORS   • SHOULDER DECOMPRESSION ARTHROSCOPIC  2/9/2012    Performed by JENNY LIMON at [facility] ORS   • CLAVICLE DISTAL EXCISION  2/9/2012    Performed by JENNY LIMON at [facility] ORS   • BUNIONECTOMY  2009    bilateral   • ABDOMINAL HYSTERECTOMY TOTAL  2001    endometriosis   • CHOLECYSTECTOMY  1999    laparoscopy   • MASTECTOMY     • TONSILLECTOMY  as child       Social History     Tobacco Use   • Smoking status: Never Smoker   • Smokeless tobacco: Never Used   • Tobacco comment: avoid any and all tobacco products   Vaping Use   • Vaping Use: Never used   Substance Use Topics   • Alcohol use: Yes     Alcohol/week: 0.0 oz     Comment: once a year   • Drug use: No       Social History     Social History Narrative   • Not on file       Family History   Problem Relation Age of Onset   • Stroke Mother    • Lung Disease Father         pneumonia   • Hyperlipidemia Father        ROS:   Denies fever, chest pain, shortness of breath      Labs: Labs reviewed from [facility] 15 and June 16, 2021.    Exam: /60 (BP Location: Left arm, Patient Position: Sitting, BP Cuff Size: Adult)   Pulse 72   Temp 36.9 °C (98.4 °F) (Temporal)   Ht 1.6 m (5' 3\")   Wt 82.6 kg (182 lb)   SpO2 95%  Body mass index is 32.24 kg/m².    General: Well appearing, NAD  Pulmonary: Clear to ausculation.  Normal effort. No rales, ronchi, or wheezing.  Cardiovascular: Regular rate and rhythm without murmur, rubs or gallop.   Abdomen: Soft, nontender, nondistended. Normal bowel sounds. Liver and spleen are not palpable. No rebound or guarding  Skin: Warm and dry.  No obvious lesions.  Musculoskeletal:  No " extremity cyanosis, clubbing, or edema.  Psych: Normal mood and affect. Alert and oriented. Judgment and insight is normal.    Please note that this dictation was created using voice recognition software. I have made every reasonable attempt to correct obvious errors, but I expect that there are errors of grammar and possibly content that I did not discover before finalizing the note.      Assessment/Plan  Tena was seen today for hospital follow-up.    Diagnoses and all orders for this visit:    Hospital discharge follow-up  Acute gastritis without hemorrhage, unspecified gastritis type  Black stool  Patient is feeling much better since hospitalization but has yet to schedule follow-up with GI.  I have gone ahead and place that referral for her.  In the meantime I do recommend that she take the omeprazole daily at least until she can be evaluated by GI.  -     REFERRAL TO GASTROENTEROLOGY    Type 2 diabetes mellitus without complication, without long-term current use of insulin (HCC)  Chronic issue, now well controlled on glipizide which I recommend continuing.    Follow-up in about 4 months or sooner if needed.    Nahed Waddell, DO  [facility]

## 2021-07-08 DIAGNOSIS — E78.00 HYPERCHOLESTEREMIA: ICD-10-CM

## 2021-07-08 DIAGNOSIS — E11.9 TYPE 2 DIABETES MELLITUS WITHOUT COMPLICATION, WITHOUT LONG-TERM CURRENT USE OF INSULIN (HCC): ICD-10-CM

## 2021-07-09 RX ORDER — SIMVASTATIN 40 MG
TABLET ORAL
Qty: 90 TABLET | Refills: 1 | Status: SHIPPED | OUTPATIENT
Start: 2021-07-09 | End: 2021-07-13 | Stop reason: SDUPTHER

## 2021-07-09 RX ORDER — GLIPIZIDE 5 MG/1
2.5 TABLET ORAL 2 TIMES DAILY
Qty: 90 TABLET | Refills: 1 | Status: SHIPPED | OUTPATIENT
Start: 2021-07-09 | End: 2021-07-13 | Stop reason: SDUPTHER

## 2021-07-09 NOTE — TELEPHONE ENCOUNTER
Patient has recently been seen by PCP within the last 6 months per protocol (6/21). Will refill medications for 6 months.  Lab Results   Component Value Date/Time    HBA1C 6.7 (H) 06/15/2021 06:57 PM      Lab Results   Component Value Date/Time    MALBCRT see below 04/18/2014 07:20 AM    MICROALBUR <0.5 04/18/2014 07:20 AM    MICRALB 15.4 06/26/2020 04:28 AM      Lab Results   Component Value Date/Time    ALKPHOSPHAT 96 06/16/2021 03:19 AM    ASTSGOT 21 06/16/2021 03:19 AM    ALTSGPT 21 06/16/2021 03:19 AM    TBILIRUBIN 0.3 06/16/2021 03:19 AM

## 2021-07-13 DIAGNOSIS — I10 ESSENTIAL HYPERTENSION: ICD-10-CM

## 2021-07-13 DIAGNOSIS — E78.00 HYPERCHOLESTEREMIA: ICD-10-CM

## 2021-07-13 DIAGNOSIS — E11.9 TYPE 2 DIABETES MELLITUS WITHOUT COMPLICATION, WITHOUT LONG-TERM CURRENT USE OF INSULIN (HCC): ICD-10-CM

## 2021-07-13 RX ORDER — GLIPIZIDE 5 MG/1
2.5 TABLET ORAL 2 TIMES DAILY
Qty: 90 TABLET | Refills: 3 | Status: SHIPPED | OUTPATIENT
Start: 2021-07-13 | End: 2021-12-30

## 2021-07-13 RX ORDER — TELMISARTAN AND HYDROCHLORTHIAZIDE 40; 12.5 MG/1; MG/1
1 TABLET ORAL
Qty: 90 TABLET | Refills: 3 | Status: SHIPPED | OUTPATIENT
Start: 2021-07-13 | End: 2022-03-25 | Stop reason: SDUPTHER

## 2021-07-13 RX ORDER — LANCETS 30 GAUGE
EACH MISCELLANEOUS
Qty: 300 EACH | Refills: 11 | Status: SHIPPED | OUTPATIENT
Start: 2021-07-13 | End: 2022-06-20

## 2021-07-13 RX ORDER — SIMVASTATIN 40 MG
TABLET ORAL
Qty: 90 TABLET | Refills: 3 | Status: SHIPPED | OUTPATIENT
Start: 2021-07-13 | End: 2022-09-13 | Stop reason: SDUPTHER

## 2021-08-23 ENCOUNTER — TELEPHONE (OUTPATIENT)
Dept: MEDICAL GROUP | Facility: PHYSICIAN GROUP | Age: 71
End: 2021-08-23

## 2021-08-23 NOTE — TELEPHONE ENCOUNTER
Patient requesting a referral to ortho. Her left knee gave out on her while she was picking apples in her yard on Thursday, she states that it is quite swollen, and if has difficulty bearing weight, but the pain is not overly significant.

## 2021-08-24 ENCOUNTER — OFFICE VISIT (OUTPATIENT)
Dept: MEDICAL GROUP | Facility: PHYSICIAN GROUP | Age: 71
End: 2021-08-24
Payer: MEDICARE

## 2021-08-24 ENCOUNTER — APPOINTMENT (OUTPATIENT)
Dept: RADIOLOGY | Facility: IMAGING CENTER | Age: 71
End: 2021-08-24
Attending: STUDENT IN AN ORGANIZED HEALTH CARE EDUCATION/TRAINING PROGRAM
Payer: MEDICARE

## 2021-08-24 VITALS
HEART RATE: 82 BPM | SYSTOLIC BLOOD PRESSURE: 118 MMHG | HEIGHT: 63 IN | TEMPERATURE: 98.3 F | WEIGHT: 188.2 LBS | BODY MASS INDEX: 33.35 KG/M2 | DIASTOLIC BLOOD PRESSURE: 52 MMHG | OXYGEN SATURATION: 97 %

## 2021-08-24 DIAGNOSIS — M25.562 ACUTE PAIN OF LEFT KNEE: ICD-10-CM

## 2021-08-24 PROBLEM — M70.61 TROCHANTERIC BURSITIS OF RIGHT HIP: Status: RESOLVED | Noted: 2019-10-01 | Resolved: 2021-08-24

## 2021-08-24 PROCEDURE — 99213 OFFICE O/P EST LOW 20 MIN: CPT | Performed by: STUDENT IN AN ORGANIZED HEALTH CARE EDUCATION/TRAINING PROGRAM

## 2021-08-24 PROCEDURE — 73562 X-RAY EXAM OF KNEE 3: CPT | Mod: TC,LT | Performed by: STUDENT IN AN ORGANIZED HEALTH CARE EDUCATION/TRAINING PROGRAM

## 2021-08-24 RX ORDER — SODIUM, POTASSIUM,MAG SULFATES 17.5-3.13G
SOLUTION, RECONSTITUTED, ORAL ORAL
COMMUNITY
Start: 2021-08-18 | End: 2021-09-03

## 2021-08-24 ASSESSMENT — FIBROSIS 4 INDEX: FIB4 SCORE: 1.05

## 2021-08-24 NOTE — PROGRESS NOTES
Subjective:     CC: left knee pain x 5 days    HPI:   Tena is a pleasant patient of Dr. Finch who presents today with left knee pain since last Thursday.    Patient states she was picking apples for approximately 1 hour and was bending over with an .  Afterward she developed left knee pain with some swelling.  Denies any trauma.  She reports prior history of knee pain in the past and states she had a CSI which was effective previously (record review shows left knee CSI in 2017).  She denies any weakness, numbness or tingling.  Tried topical lidocaine, BenGay and took Aleve once.  Aleve did help, but only for about 4 hours.  She reports to have constipation when taking any NSAIDs, is allergic to diclofenac.  She has not tried ice or Tylenol.  She reports a history of gastritis, scheduled for an endoscopy/colonoscopy September 9.  Denies any history of gastric bleeding.    Current Outpatient Medications Ordered in Epic   Medication Sig Dispense Refill   • glipiZIDE (GLUCOTROL) 5 MG Tab Take 0.5 Tablets by mouth 2 times a day. 90 tablet 3   • simvastatin (ZOCOR) 40 MG Tab TAKE 1 TABLET BY MOUTH EVERY DAY IN THE EVENING 90 tablet 3   • telmisartan-hydrochlorothiazide (MICARDIS HCT) 40-12.5 MG per tablet Take 1 tablet by mouth every day. 90 tablet 3   • Blood Glucose Test Strips Use one strip to test blood sugar once daily early morning before first meal. 300 Strip 11   • Lancets Use one  lancet to test blood sugar once daily early morning before first meal. 300 Each 11   • albuterol 108 (90 Base) MCG/ACT Aero Soln inhalation aerosol Inhale 2 Puffs every 6 hours as needed for Shortness of Breath (cough). (Patient not taking: Reported on 8/24/2021) 8.5 g 3     No current [facility]-ordered facility-administered medications on file.     ROS:  See HPI    Objective:     Exam:  /52 (BP Location: Right arm, Patient Position: Sitting, BP Cuff Size: Adult)   Pulse 82   Temp 36.8 °C (98.3 °F) (Temporal)   Ht  "1.6 m (5' 3\")   Wt 85.4 kg (188 lb 3.2 oz)   SpO2 97%   BMI 33.34 kg/m²  Body mass index is 33.34 kg/m².    Physical Exam:  Constitutional: Well-developed and well-nourished. No acute distress.   Skin: Skin is warm and dry. No rash noted.  Head: Atraumatic without lesions.  Eyes: Conjunctivae and extraocular motions are normal. Pupils are equal, round. No scleral icterus.   Mouth/Throat: Wearing mask  Neck: Supple, trachea midline.  Lungs: Normal inspiratory effort  Extremities: No cyanosis, clubbing, erythema.  Musculoskeletal: Bilateral knee ROM normal, but with crepitus with passive ROM on the left. No joint effusion the left knee with medial joint line ttp, otherwise none. No laxity, negative Roni.  Neurological: Alert and oriented x 3. No gross/focal deficits, normal gait with strength 5/5/ in LE.  Psychiatric:  Behavior, mood, and affect are appropriate.    Assessment & Plan:     71 y.o. female with the following -     1. Acute pain of left knee  Acute issue. Hx of reported OA (however no knee imaging, so obtained on left today) and exacerbation of osteoarthritis suspected.  Had great benefit from CSI in 2017 so patient interested in another injection.  She is limited in her NSAID use although it was somewhat effective given history of gastritis and also side effect of constipation when taking, so she prefers to trial ice and Tylenol over-the-counter (can take 500 to 1000 mg every 8 hours, max 3000 mg/day).  Provided with home physical therapy, may consider formal physical therapy if still not improved.  Booked appointment for injection with available provider who performs the procedure.  ____  Mychart message sent with x-ray results, mild OA.  - DX-KNEE 3 VIEWS LEFT; Future    I spent a total of 20 minutes with record review, exam, communication with the patient, communication with other providers (Dr. Waddell), and documentation of this encounter.    Return if symptoms worsen or fail to " improve.    Please note that this dictation was created using voice recognition software. I have made every reasonable attempt to correct obvious errors, but I expect that there are errors of grammar and possibly content that I did not discover before finalizing the note.

## 2021-08-25 ENCOUNTER — NON-PROVIDER VISIT (OUTPATIENT)
Dept: SLEEP MEDICINE | Facility: MEDICAL CENTER | Age: 71
End: 2021-08-25
Attending: INTERNAL MEDICINE
Payer: MEDICARE

## 2021-08-25 VITALS — HEIGHT: 63 IN | BODY MASS INDEX: 33.31 KG/M2 | WEIGHT: 188 LBS

## 2021-08-25 DIAGNOSIS — R91.8 OTHER NONSPECIFIC ABNORMAL FINDING OF LUNG FIELD: ICD-10-CM

## 2021-08-25 DIAGNOSIS — J96.91 RESPIRATORY FAILURE WITH HYPOXIA, UNSPECIFIED CHRONICITY (HCC): ICD-10-CM

## 2021-08-25 PROCEDURE — 94618 PULMONARY STRESS TESTING: CPT | Performed by: INTERNAL MEDICINE

## 2021-08-25 ASSESSMENT — 6 MINUTE WALK TEST (6MWT)
PERCEIVED FATIGUE AT 5 MIN: 3.5
SAO2 AT 1 MIN: 91
SAO2 AT 4 MIN: 91
SAO2 AT 5 MIN: 90
PERCEIVED FATIGUE AT 6 MIN: 3.5
BLOOD PRESSURE: LEFT ARM
SAO2 AT 1 MIN: 91
PERCEIVED BREATHLESSNESS AT 6 MIN: 3.5
HEART RATE AT 6 MIN: 116
PERCEIVED FATIGUE AT 1 MIN: 3
SITTING BLOOD PRESSURE: 120/75
HEART RATE AT 3 MIN: 117
PERCEIVED BREATHLESSNESS AT 3 MIN: 3
PERCEIVED FATIGUE AT 3 MIN: 3
PERCEIVED FATIGUE AT 2 MIN: 2.5
BLOOD PRESSURE AT 1 MIN: 120/72
O2 SAT PERCENT ROOM AIR: 95
SAO2 AT 3 MIN: 90
PERCEIVED FATIGUE AT 4 MIN: 3
PERCEIVED BREATHLESSNESS AT 2 MIN: 3
PERCEIVED BREATHLESSNESS AT 2 MIN: 2
PERCEIVED BREATHLESSNESS AT 5 MIN: 3.5
PERCENT OF NORMAL WALKED: 21
PERCEIVED FATIGUE AT 2 MIN: 3
SAO2 AT 2 MIN: 90
PERCEIVED BREATHLESSNESS AT 1 MIN: 3
HEART RATE AT 2 MIN: 95
HEART RATE AT 1 MIN: 108
HEART RATE AT 2 MIN: 115
SAO2 AT 6 MIN: 89
PERCEIVED BREATHLESSNESS AT 4 MIN: 3
HEART RATE: 93
HEART RATE AT 4 MIN: 115
HEART RATE AT 1 MIN: 109
SAO2 AT 2 MIN: 95
HEART RATE AT 5 MIN: 118
PERCEIVED BREATHLESSNESS AT 1 MIN: 2

## 2021-08-25 ASSESSMENT — FIBROSIS 4 INDEX: FIB4 SCORE: 1.05

## 2021-08-25 NOTE — PROCEDURES
Interpretation;  There is abnormal desaturation from 95% at rest on room air to a eboni of 89% at 6 minutes although patient did not require the addition of supplemental oxygen.  Distance walked is 840 feet are 21% predicted.    No clear indication for supplemental oxygen although SPO2 was borderline and exercise tolerance is significantly reduced.

## 2021-08-27 ENCOUNTER — HOSPITAL ENCOUNTER (OUTPATIENT)
Facility: MEDICAL CENTER | Age: 71
End: 2021-08-27
Attending: OBSTETRICS & GYNECOLOGY
Payer: MEDICARE

## 2021-08-27 ENCOUNTER — GYNECOLOGY VISIT (OUTPATIENT)
Dept: OBGYN | Facility: CLINIC | Age: 71
End: 2021-08-27
Payer: MEDICARE

## 2021-08-27 VITALS
WEIGHT: 187 LBS | BODY MASS INDEX: 36.71 KG/M2 | HEIGHT: 60 IN | SYSTOLIC BLOOD PRESSURE: 167 MMHG | DIASTOLIC BLOOD PRESSURE: 77 MMHG

## 2021-08-27 DIAGNOSIS — N90.89 VULVAR MASS: ICD-10-CM

## 2021-08-27 DIAGNOSIS — N90.89 VULVAR LESION: ICD-10-CM

## 2021-08-27 LAB — PATHOLOGY CONSULT NOTE: NORMAL

## 2021-08-27 PROCEDURE — 88305 TISSUE EXAM BY PATHOLOGIST: CPT

## 2021-08-27 PROCEDURE — 99213 OFFICE O/P EST LOW 20 MIN: CPT | Mod: 25 | Performed by: OBSTETRICS & GYNECOLOGY

## 2021-08-27 PROCEDURE — 56605 BIOPSY OF VULVA/PERINEUM: CPT | Performed by: OBSTETRICS & GYNECOLOGY

## 2021-08-27 PROCEDURE — 88304 TISSUE EXAM BY PATHOLOGIST: CPT

## 2021-08-27 ASSESSMENT — FIBROSIS 4 INDEX: FIB4 SCORE: 1.05

## 2021-08-27 NOTE — NON-PROVIDER
Pt here c/o vulvar cyst   Phone: [phone]  Pharmacy verified   Pt reports some discoloration now, denies drainage

## 2021-08-27 NOTE — PROGRESS NOTES
Subjective     Tena Romo is a 71 y.o. female who presents for Gynecologic Exam (Cyst on )            HPI patient is a 31-year-old G1, P0 who presents today for gynecologic evaluation with complaints of left vulvar mass and pain.  Mass has been present for a year and has gotten bigger mass is causing discomfort.  She states she was diagnosed with multiple inclusion cysts of the vulva before and had one biopsy previously.  She states she has been using topical lidocaine cream but mass has gotten bigger steadily like possible excision today.  She denies any vaginal bleeding or discharge  Reports no changes in bowel or bladder functions    ROS all organ systems are reviewed and were negative except for complaints in HPI           Objective     BP (!) 167/77 (BP Location: Left arm, Patient Position: Sitting, BP Cuff Size: Adult) Comment: Pt takes BP medication QHS  Ht 1.524 m (5')   Wt 84.8 kg (187 lb)   BMI 36.52 kg/m²      Physical Exam  Vitals and nursing note reviewed. Exam conducted with a chaperone present.   Constitutional:       General: She is not in acute distress.     Appearance: Normal appearance. She is obese. She is not toxic-appearing.   HENT:      Head: Normocephalic and atraumatic.   Cardiovascular:      Rate and Rhythm: Normal rate and regular rhythm.      Pulses: Normal pulses.      Heart sounds: Normal heart sounds.   Pulmonary:      Effort: Pulmonary effort is normal.      Breath sounds: Normal breath sounds.   Genitourinary:     Labia:         Right: No rash, tenderness, lesion or injury.         Left: Tenderness and lesion present. No rash or injury.           Comments: Atrophic external genitalia noted.  Multiple small inclusion type cyst noted on both sides.  3 cm x 3 cm fluctuant cyst noted on the left vulva  Musculoskeletal:         General: Normal range of motion.      Cervical back: Normal range of motion and neck supple. No rigidity.      Right lower leg: No edema.       Left lower leg: No edema.   Lymphadenopathy:      Cervical: No cervical adenopathy.      Lower Body: No right inguinal adenopathy. No left inguinal adenopathy.   Skin:     General: Skin is warm and dry.      Coloration: Skin is not jaundiced.      Findings: No bruising.   Neurological:      General: No focal deficit present.      Mental Status: She is alert and oriented to person, place, and time.      Cranial Nerves: No cranial nerve deficit.      Motor: No weakness.   Psychiatric:         Mood and Affect: Mood normal.         Behavior: Behavior normal.         Thought Content: Thought content normal.         Judgment: Judgment normal.            Discussion:  We discussed fluctuant cyst on left vulva and we discussed treatment options including observation versus excision and patient requested excision today.  Procedure was explained and informed consents were obtained.  Wound care was discussed.                Assessment & Plan        1. Vulvar mass  71-year-old with 3 cm x 3 cm fluctuant cyst of left vulva.  Cyst was excised today under lidocaine anesthesia.  Patient tolerated procedure well.  Wound care and sitz baths discussed  Patient to follow-up in 1 week for reassessment    2. Vulvar lesion  Patient has multiple small bilateral vulvar inclusion cysts.  Findings were discussed  - Consent for all Surgical, Special Diagnostic or Therapeutic Procedures

## 2021-08-27 NOTE — PROCEDURES
Procedure: Excision of left vulvar mass    Informed consents were obtained  Patient was placed in lithotomy position  Left vulva was prepped with Betadine  4 mL of lidocaine 1% with epinephrine was infiltrated into and around the mass  #11 blade scalpel was made to incise the skin over the mass with copious amount of caseous material expressed.  The mass edges were grasped with pickups and dissected off the vulva and sent to pathology.  Incision was irrigated  Skin was reapproximated with 3-0 Vicryl suture in a running fashion with hemostasis noted  Patient tolerated procedure well  Wound care discussed  Patient follow-up in 1 week for reassessment

## 2021-09-01 ENCOUNTER — SLEEP CENTER VISIT (OUTPATIENT)
Dept: SLEEP MEDICINE | Facility: MEDICAL CENTER | Age: 71
End: 2021-09-01
Payer: MEDICARE

## 2021-09-01 VITALS
RESPIRATION RATE: 18 BRPM | TEMPERATURE: 97.7 F | HEART RATE: 83 BPM | HEIGHT: 60 IN | DIASTOLIC BLOOD PRESSURE: 60 MMHG | OXYGEN SATURATION: 96 % | BODY MASS INDEX: 36.71 KG/M2 | SYSTOLIC BLOOD PRESSURE: 124 MMHG | WEIGHT: 187 LBS

## 2021-09-01 DIAGNOSIS — E66.9 CLASS 2 OBESITY WITH BODY MASS INDEX (BMI) OF 36.0 TO 36.9 IN ADULT, UNSPECIFIED OBESITY TYPE, UNSPECIFIED WHETHER SERIOUS COMORBIDITY PRESENT: ICD-10-CM

## 2021-09-01 DIAGNOSIS — U07.1 COVID-19 VIRUS INFECTION: ICD-10-CM

## 2021-09-01 DIAGNOSIS — R94.2 ABNORMAL RESULTS OF PULMONARY FUNCTION STUDIES: ICD-10-CM

## 2021-09-01 DIAGNOSIS — J96.91 RESPIRATORY FAILURE WITH HYPOXIA, UNSPECIFIED CHRONICITY (HCC): ICD-10-CM

## 2021-09-01 PROCEDURE — 99214 OFFICE O/P EST MOD 30 MIN: CPT | Performed by: INTERNAL MEDICINE

## 2021-09-01 ASSESSMENT — ENCOUNTER SYMPTOMS
TREMORS: 0
COUGH: 0
SPEECH CHANGE: 0
EYE DISCHARGE: 0
DIARRHEA: 0
SINUS PAIN: 0
FALLS: 0
CHILLS: 0
BLURRED VISION: 0
BACK PAIN: 0
EYE PAIN: 0
NECK PAIN: 0
ORTHOPNEA: 0
STRIDOR: 0
PALPITATIONS: 0
MYALGIAS: 0
HEARTBURN: 0
HEADACHES: 0
DOUBLE VISION: 0
CLAUDICATION: 0
DEPRESSION: 0
WEIGHT LOSS: 0
SPUTUM PRODUCTION: 0
EYE REDNESS: 0
ABDOMINAL PAIN: 0
PND: 0
SORE THROAT: 0
DIAPHORESIS: 0
DIZZINESS: 0
WHEEZING: 0
SHORTNESS OF BREATH: 0
PHOTOPHOBIA: 0
FOCAL WEAKNESS: 0
NAUSEA: 0
CONSTIPATION: 0
VOMITING: 0
WEAKNESS: 0
HEMOPTYSIS: 0
FEVER: 0

## 2021-09-01 ASSESSMENT — FIBROSIS 4 INDEX: FIB4 SCORE: 1.05

## 2021-09-03 ENCOUNTER — OFFICE VISIT (OUTPATIENT)
Dept: MEDICAL GROUP | Facility: PHYSICIAN GROUP | Age: 71
End: 2021-09-03
Payer: MEDICARE

## 2021-09-03 VITALS
HEART RATE: 87 BPM | HEIGHT: 60 IN | DIASTOLIC BLOOD PRESSURE: 56 MMHG | WEIGHT: 183 LBS | SYSTOLIC BLOOD PRESSURE: 114 MMHG | BODY MASS INDEX: 35.93 KG/M2 | OXYGEN SATURATION: 96 % | TEMPERATURE: 97.6 F

## 2021-09-03 DIAGNOSIS — M17.12 PRIMARY OSTEOARTHRITIS OF LEFT KNEE: ICD-10-CM

## 2021-09-03 PROCEDURE — 20610 DRAIN/INJ JOINT/BURSA W/O US: CPT | Performed by: FAMILY MEDICINE

## 2021-09-03 ASSESSMENT — FIBROSIS 4 INDEX: FIB4 SCORE: 1.05

## 2021-09-03 NOTE — PROGRESS NOTES
DIAGNOSIS: Left knee pain, osteoarthritis    INDICATIONS:  same    Risks of benefits of the injection were discussed, verbal informed consent obtained.  The area was sterilely prepped with Betadine.  A 27 guage needle was inserted into the inferomedial aspect of the knee. 4 cc of 1% lidocaine and 1 cc of kenalog 40 was then injected into the joint  and the needle was removed and the area cleansed and dressed.  No complications.  Aftercare discussed.    Nahed Waddell D.O.

## 2021-09-28 ENCOUNTER — GYNECOLOGY VISIT (OUTPATIENT)
Dept: OBGYN | Facility: CLINIC | Age: 71
End: 2021-09-28
Payer: MEDICARE

## 2021-09-28 VITALS — WEIGHT: 186 LBS | BODY MASS INDEX: 36.33 KG/M2 | SYSTOLIC BLOOD PRESSURE: 148 MMHG | DIASTOLIC BLOOD PRESSURE: 78 MMHG

## 2021-09-28 DIAGNOSIS — Z09 POSTOP CHECK: ICD-10-CM

## 2021-09-28 PROCEDURE — 99024 POSTOP FOLLOW-UP VISIT: CPT | Performed by: OBSTETRICS & GYNECOLOGY

## 2021-09-28 ASSESSMENT — FIBROSIS 4 INDEX: FIB4 SCORE: 1.05

## 2021-09-28 NOTE — PROGRESS NOTES
Subjective     Tena Romo is a 71 y.o. female who presents for Follow-Up            HPI patient is a 71-year-old who presents today for postop check status post excision of left-sided vulvar mass a month ago.  She reports the area is healed and all the stitches fell off.  She is happy with procedure and has no complaints or concerns at this point.  Reports no vulvar discomfort    ROS all organ systems are reviewed and are currently negative           Objective     /78 (BP Location: Left arm, Patient Position: Sitting, BP Cuff Size: Adult)   Wt 84.4 kg (186 lb)   BMI 36.33 kg/m²      Physical Exam  Vitals and nursing note reviewed. Exam conducted with a chaperone present.   Constitutional:       General: She is not in acute distress.     Appearance: Normal appearance.   Genitourinary:     General: Normal vulva.      Vagina: No vaginal discharge.       Neurological:      Mental Status: She is alert.   Psychiatric:         Mood and Affect: Mood normal.         Behavior: Behavior normal.         Thought Content: Thought content normal.         Judgment: Judgment normal.            Pathology report was reviewed and is benign consistent with epidermal inclusion cyst                Assessment & Plan        1. Postop check  Patient presents today for postop check status post excision of vulvar mass in office 4 weeks ago.  Area is healed.  Pathology report is reviewed and is benign    2.  Precautions and plan of care were reviewed.  Patient to follow-up for any gynecologic problems and for annual gynecologic exam.

## 2021-09-28 NOTE — NON-PROVIDER
Patient here for a GYN follow up.   Last seen on 8/27/21  pharmacy verified.  Patient phone #:[phone]

## 2021-10-08 ENCOUNTER — HOSPITAL ENCOUNTER (OUTPATIENT)
Facility: MEDICAL CENTER | Age: 71
End: 2021-10-08
Attending: NURSE PRACTITIONER
Payer: MEDICARE

## 2021-10-08 ENCOUNTER — OFFICE VISIT (OUTPATIENT)
Dept: MEDICAL GROUP | Facility: PHYSICIAN GROUP | Age: 71
End: 2021-10-08
Payer: MEDICARE

## 2021-10-08 VITALS
OXYGEN SATURATION: 95 % | BODY MASS INDEX: 33.13 KG/M2 | DIASTOLIC BLOOD PRESSURE: 60 MMHG | HEART RATE: 70 BPM | WEIGHT: 187 LBS | TEMPERATURE: 98.3 F | SYSTOLIC BLOOD PRESSURE: 118 MMHG | RESPIRATION RATE: 12 BRPM | HEIGHT: 63 IN

## 2021-10-08 DIAGNOSIS — N30.01 ACUTE CYSTITIS WITH HEMATURIA: ICD-10-CM

## 2021-10-08 DIAGNOSIS — R35.0 FREQUENCY OF URINATION: ICD-10-CM

## 2021-10-08 DIAGNOSIS — R30.0 DYSURIA: ICD-10-CM

## 2021-10-08 LAB
APPEARANCE UR: CLEAR
BILIRUB UR STRIP-MCNC: NEGATIVE MG/DL
COLOR UR AUTO: YELLOW
GLUCOSE UR STRIP.AUTO-MCNC: NEGATIVE MG/DL
KETONES UR STRIP.AUTO-MCNC: NEGATIVE MG/DL
LEUKOCYTE ESTERASE UR QL STRIP.AUTO: NORMAL
NITRITE UR QL STRIP.AUTO: NEGATIVE
PH UR STRIP.AUTO: 5.5 [PH] (ref 5–8)
PROT UR QL STRIP: 100 MG/DL
RBC UR QL AUTO: NORMAL
SP GR UR STRIP.AUTO: 1.03
UROBILINOGEN UR STRIP-MCNC: 0.2 MG/DL

## 2021-10-08 PROCEDURE — 81002 URINALYSIS NONAUTO W/O SCOPE: CPT | Performed by: NURSE PRACTITIONER

## 2021-10-08 PROCEDURE — 87086 URINE CULTURE/COLONY COUNT: CPT

## 2021-10-08 PROCEDURE — 99214 OFFICE O/P EST MOD 30 MIN: CPT | Performed by: NURSE PRACTITIONER

## 2021-10-08 RX ORDER — OMEPRAZOLE 40 MG/1
40 CAPSULE, DELAYED RELEASE ORAL
COMMUNITY
Start: 2021-09-30

## 2021-10-08 RX ORDER — NITROFURANTOIN 25; 75 MG/1; MG/1
100 CAPSULE ORAL EVERY 12 HOURS
Qty: 10 CAPSULE | Refills: 0 | Status: SHIPPED | OUTPATIENT
Start: 2021-10-08 | End: 2021-10-13

## 2021-10-08 ASSESSMENT — FIBROSIS 4 INDEX: FIB4 SCORE: 1.05

## 2021-10-08 NOTE — PROGRESS NOTES
"  Chief Complaint   Patient presents with   • UTI     frequency      HPI:  Symptom onset: 9/1/2021 - worsen over the last 7 days; reports burning worsened over the last 1 week as well  Current symptoms: Painful burning, urgent, frequent voids. No blood noted in urine.  Since onset symptoms are: Unchanged  Treatments tried: OTC antispasm med.  Associated symptoms: Negative for fever, flank pain, nausea and vomiting, vaginal discharge, pelvic pain.  History is positive for frequent UTI.     ROS:  Denies fever, chills, vomiting.  Positive for lower abdominal pain.     OBJECTIVE:  /60 (BP Location: Left arm, Patient Position: Sitting, BP Cuff Size: Adult)   Pulse 70   Temp 36.8 °C (98.3 °F) (Temporal)   Ht 1.6 m (5' 3\")   Wt 84.8 kg (187 lb)   SpO2 95%   Gen: Alert, NAD.  Chest: Lungs clear to auscultation, CV RRR.  Abdomen: Soft, tender in suprapubic region. No CVAT. Normal bowel sounds.     Lab Results   Component Value Date    POCCOLOR yellow 10/08/2021    POCAPPEAR clear 10/08/2021    POCLEUKEST small 10/08/2021    POCNITRITE negative 10/08/2021    POCUROBILIGE 0.2 10/08/2021    POCPROTEIN 100 10/08/2021    POCURPH 5.5 10/08/2021    POCBLOOD small 10/08/2021    POCSPGRV 1.030 10/08/2021    POCKETONES negative 10/08/2021    POCBILIRUBIN negative 10/08/2021    POCGLUCUA negative 10/08/2021       ASSESSMENT/PLAN:  1. Dysuria  POCT Urinalysis    URINE CULTURE(NEW)   2. Frequency of urination  POCT Urinalysis   3. Acute cystitis with hematuria  nitrofurantoin (MACROBID) 100 MG Cap    URINE CULTURE(NEW)     1. Abnormal urine dipstick in office. Urine sent for culture. Start antibiotics.  2. Provided education to drink plenty of fluids, wipe front to back every void and bowel movement.   3. Return to clinic if symptoms not improving within 3-4 days or in case of vomiting, fever, increasing pain.  "

## 2021-10-11 LAB
BACTERIA UR CULT: NORMAL
SIGNIFICANT IND 70042: NORMAL
SITE SITE: NORMAL
SOURCE SOURCE: NORMAL

## 2021-10-13 ENCOUNTER — HOME STUDY (OUTPATIENT)
Dept: SLEEP MEDICINE | Facility: MEDICAL CENTER | Age: 71
End: 2021-10-13
Attending: INTERNAL MEDICINE
Payer: MEDICARE

## 2021-10-13 VITALS — WEIGHT: 186 LBS | BODY MASS INDEX: 32.95 KG/M2

## 2021-10-13 DIAGNOSIS — R94.2 ABNORMAL RESULTS OF PULMONARY FUNCTION STUDIES: ICD-10-CM

## 2021-10-13 DIAGNOSIS — J96.91 RESPIRATORY FAILURE WITH HYPOXIA, UNSPECIFIED CHRONICITY (HCC): ICD-10-CM

## 2021-10-13 DIAGNOSIS — U07.1 COVID-19 VIRUS INFECTION: ICD-10-CM

## 2021-10-13 PROCEDURE — 94060 EVALUATION OF WHEEZING: CPT | Performed by: INTERNAL MEDICINE

## 2021-10-13 PROCEDURE — 94762 N-INVAS EAR/PLS OXIMTRY CONT: CPT | Performed by: INTERNAL MEDICINE

## 2021-10-13 PROCEDURE — 94729 DIFFUSING CAPACITY: CPT | Performed by: INTERNAL MEDICINE

## 2021-10-13 PROCEDURE — 94726 PLETHYSMOGRAPHY LUNG VOLUMES: CPT | Performed by: INTERNAL MEDICINE

## 2021-10-13 ASSESSMENT — FIBROSIS 4 INDEX: FIB4 SCORE: 1.05

## 2021-10-13 ASSESSMENT — PULMONARY FUNCTION TESTS
FVC_PREDICTED: 2.92
FEV1_LLN: 1.88
FVC_LLN: 2.44
FEV1_PREDICTED: 2.25
FEV1/FVC_PERCENT_PREDICTED: 122
FEV1/FVC_PERCENT_CHANGE: 0
FEV1/FVC_PERCENT_PREDICTED: 113
FVC: 2.1
FEV1/FVC_PERCENT_PREDICTED: 115
FEV1/FVC: 95
FVC: 1.97
FEV1: 1.87
FEV1_PERCENT_PREDICTED: 82
FVC_PERCENT_PREDICTED: 71
FEV1: 1.86
FEV1/FVC: 88
FEV1_PERCENT_PREDICTED: 82
FVC_PERCENT_PREDICTED: 67
FEV1/FVC_PERCENT_LLN: 65
FEV1/FVC_PERCENT_PREDICTED: 122
FEV1/FVC_PERCENT_PREDICTED: 77
FEV1/FVC_PERCENT_CHANGE: -6
FEV1/FVC: 95
FEV1_PERCENT_CHANGE: 6
FEV1_PERCENT_CHANGE: 0
FEV1/FVC_PREDICTED: 78
FEV1/FVC: 88.57

## 2021-10-13 NOTE — PROCEDURES
Technician: TODD Contreras    Technician Comment:  Good patient effort & cooperation.  Except for the FVCs, The results of this test meet the ATS/ERS standards for acceptability & reproducibility.  FVCs were variable due to cough. Best effort reported. Test was performed on the Right Relevance Body Plethysmograph-Elite DX system.  Predicted values were GLI-2012 for spirometry, GLI-2017 for DLCO, ITS for Lung Volumes.  The DLCO was uncorrected for Hgb.  A bronchodilator of Ventolin HFA -2puffs via spacer administered.  DLCO performed during dilation period.    Interpretation:  Baseline spirometry shows 1/FVC ratio of 85 and FVC of 1.97 L or 67% predicted.  There is trend toward bronchodilator response but does not meet ATS criteria.  Total lung capacity is reduced at 3.56 L 68% predicted.  Diffusion capacity is preserved at 18.5 mL/min/mmHg or 95% predicted.  Pulmonary function testing shows restrictive defect with preserved DLCO.  This could be early interstitial lung disease versus other extrapulmonary restriction given normal DLCO.  Correlate clinically and with imaging.

## 2021-10-15 NOTE — PROCEDURES
This is an open oximetry study performed on October 13, 2021 for a duration of 8 hours and 32 minutes on room air.    A/P acute was 90%.  Tessalon titration of 88% was 25 minutes.  There is clustered desaturations suggesting sleep disordered breathing.  Consider formal sleep.

## 2021-11-01 ENCOUNTER — HOSPITAL ENCOUNTER (OUTPATIENT)
Facility: MEDICAL CENTER | Age: 71
End: 2021-11-01
Attending: FAMILY MEDICINE
Payer: MEDICARE

## 2021-11-01 ENCOUNTER — OFFICE VISIT (OUTPATIENT)
Dept: MEDICAL GROUP | Facility: PHYSICIAN GROUP | Age: 71
End: 2021-11-01
Payer: MEDICARE

## 2021-11-01 VITALS
DIASTOLIC BLOOD PRESSURE: 68 MMHG | OXYGEN SATURATION: 98 % | WEIGHT: 183 LBS | TEMPERATURE: 97.7 F | HEART RATE: 72 BPM | SYSTOLIC BLOOD PRESSURE: 112 MMHG | BODY MASS INDEX: 32.43 KG/M2 | HEIGHT: 63 IN

## 2021-11-01 DIAGNOSIS — E78.00 HYPERCHOLESTEREMIA: ICD-10-CM

## 2021-11-01 DIAGNOSIS — E11.9 TYPE 2 DIABETES MELLITUS WITHOUT COMPLICATION, WITHOUT LONG-TERM CURRENT USE OF INSULIN (HCC): ICD-10-CM

## 2021-11-01 DIAGNOSIS — K31.7 GASTRIC POLYP: ICD-10-CM

## 2021-11-01 DIAGNOSIS — Z91.89 AT HIGH RISK FOR OSTEOPOROSIS: ICD-10-CM

## 2021-11-01 DIAGNOSIS — N39.46 MIXED INCONTINENCE URGE AND STRESS: ICD-10-CM

## 2021-11-01 DIAGNOSIS — N39.0 RECURRENT UTI: ICD-10-CM

## 2021-11-01 DIAGNOSIS — Z78.0 POSTMENOPAUSAL: ICD-10-CM

## 2021-11-01 PROCEDURE — 87086 URINE CULTURE/COLONY COUNT: CPT

## 2021-11-01 PROCEDURE — 99214 OFFICE O/P EST MOD 30 MIN: CPT | Performed by: FAMILY MEDICINE

## 2021-11-01 RX ORDER — OXYBUTYNIN CHLORIDE 5 MG/1
5 TABLET, EXTENDED RELEASE ORAL DAILY
Qty: 90 TABLET | Refills: 3 | Status: SHIPPED | OUTPATIENT
Start: 2021-11-01 | End: 2021-12-30

## 2021-11-01 ASSESSMENT — FIBROSIS 4 INDEX: FIB4 SCORE: 1.05

## 2021-11-01 NOTE — PROGRESS NOTES
CC: Incontinence    HISTORY OF THE PRESENT ILLNESS: Patient is a 71 y.o. female.     Patient is here today to follow-up on recent colonoscopy/endoscopy results.  She notes she has follow-up with gastroenterology tomorrow as well to go over the results but she is concerned about the fact that the results showed she had a gastric polyp.  The result did show no dysplasia or metaplasia as well.  She also was noted to have esophagitis, gastric inflammation and underwent esophageal dilation.    She also notes concern for urinary incontinence.  She has both urge incontinence and stress incontinence.  Urge incontinence seems to be her most pressing concern though.  She also has had a couple of urinary tract infections this past year.  Most recent urine culture showed mixed enteric ava, possibly contaminated.    Allergies: Aspirin, Cipro xr, Diclofenac, and Metformin    Current Outpatient Medications Ordered in Epic   Medication Sig Dispense Refill   • oxybutynin SR (DITROPAN-XL) 5 MG TABLET SR 24 HR Take 1 Tablet by mouth every day. 90 Tablet 3   • omeprazole (PRILOSEC) 40 MG delayed-release capsule Take 40 mg by mouth every day.     • glipiZIDE (GLUCOTROL) 5 MG Tab Take 0.5 Tablets by mouth 2 times a day. 90 tablet 3   • simvastatin (ZOCOR) 40 MG Tab TAKE 1 TABLET BY MOUTH EVERY DAY IN THE EVENING 90 tablet 3   • telmisartan-hydrochlorothiazide (MICARDIS HCT) 40-12.5 MG per tablet Take 1 tablet by mouth every day. 90 tablet 3   • Blood Glucose Test Strips Use one strip to test blood sugar once daily early morning before first meal. 300 Strip 11   • Lancets Use one  lancet to test blood sugar once daily early morning before first meal. 300 Each 11   • albuterol 108 (90 Base) MCG/ACT Aero Soln inhalation aerosol Inhale 2 Puffs every 6 hours as needed for Shortness of Breath (cough). 8.5 g 3     No current [facility]-ordered facility-administered medications on file.       Past Medical History:   Diagnosis Date   • Anesthesia      "post of nausea   • Arthritis    • Breast cancer (HCC) 2/10/2017   • Dental disorder     permanent bridge   • Diabetes (HCC)    • Diverticulosis of colon 11/4/2011   • HTN (hypertension) 3/14/2013   • Hypertension    • Pain     right shoulder down to thumb   • Post hysterectomy menopause 11/4/2011   • Trochanteric bursitis of right hip 10/1/2019       Past Surgical History:   Procedure Laterality Date   • SHOULDER ARTHROSCOPY W/ ROTATOR CUFF REPAIR  2/9/2012    Performed by JENNY LIMON at SURGERY [facility] ORS   • SHOULDER DECOMPRESSION ARTHROSCOPIC  2/9/2012    Performed by JENNY LIMON at [facility] ORS   • CLAVICLE DISTAL EXCISION  2/9/2012    Performed by JENNY LIMON at [facility] ORS   • BUNIONECTOMY  2009    bilateral   • ABDOMINAL HYSTERECTOMY TOTAL  2001    endometriosis   • CHOLECYSTECTOMY  1999    laparoscopy   • MASTECTOMY     • TONSILLECTOMY  as child       Social History     Tobacco Use   • Smoking status: Never Smoker   • Smokeless tobacco: Never Used   • Tobacco comment: avoid any and all tobacco products   Vaping Use   • Vaping Use: Never used   Substance Use Topics   • Alcohol use: Yes     Alcohol/week: 0.0 oz     Comment: once a year   • Drug use: No       Social History     Social History Narrative   • Not on file       Family History   Problem Relation Age of Onset   • Stroke Mother    • Lung Disease Father         pneumonia   • Hyperlipidemia Father        ROS:   Denies fever, chest pain, shortness of breath      Labs: Urinalysis and culture reviewed from October 8, 2021.    Exam: /68 (BP Location: Left arm, Patient Position: Sitting, BP Cuff Size: Adult)   Pulse 72   Temp 36.5 °C (97.7 °F) (Temporal)   Ht 1.6 m (5' 3\")   Wt 83 kg (183 lb)   SpO2 98%  Body mass index is 32.42 kg/m².    General: Well appearing, NAD  Pulmonary: Clear to ausculation.  Normal effort. No rales, ronchi, or wheezing.  Cardiovascular: Regular rate and rhythm without murmur, " rubs or gallop.   Skin: Warm and dry.  No obvious lesions.  Musculoskeletal:  No extremity cyanosis, clubbing, or edema.  Psych: Normal mood and affect. Alert and oriented. Judgment and insight is normal.    Please note that this dictation was created using voice recognition software. I have made every reasonable attempt to correct obvious errors, but I expect that there are errors of grammar and possibly content that I did not discover before finalizing the note.      Assessment/Plan  Tena was seen today for lab results and enuresis.    Diagnoses and all orders for this visit:    Gastric polyp  Recent results of endoscopy/colonoscopy reviewed with patient (scanned into chart under media).  Suspect gastric polyp related to chronic inflammation due to acid reflux.  She is now on omeprazole.  She does also have follow-up tomorrow with GI.    Recurrent UTI  Mixed incontinence urge and stress  Patient would like to trial medication for her urge incontinence.  We will go ahead and start low-dose oxybutynin.  Side effects such as dry mouth discussed.  Given that her last urine culture showed mixed enteric ava, I would like to go ahead and repeat this if she does continue to have intermittent symptoms of UTI.  -     URINE CULTURE(NEW); Future  -     oxybutynin SR (DITROPAN-XL) 5 MG TABLET SR 24 HR; Take 1 Tablet by mouth every day.    Hypercholesteremia  Chronic issue, on simvastatin.  Due for lab work prior to next visit.  -     Lipid Profile; Future    Type 2 diabetes mellitus without complication, without long-term current use of insulin (HCC)  Chronic issue, very well controlled on low-dose glipizide only.  Will recheck labs prior to next visit.  -     Lipid Profile; Future  -     HEMOGLOBIN A1C; Future  -     Comp Metabolic Panel; Future    Postmenopausal  At high risk for osteoporosis  -     DS-BONE DENSITY STUDY (DEXA); Future      Follow-up in about 2 months for lab review or sooner if needed.    Nahed  DO [place]  [facility]

## 2021-11-04 LAB
BACTERIA UR CULT: NORMAL
SIGNIFICANT IND 70042: NORMAL
SITE SITE: NORMAL
SOURCE SOURCE: NORMAL

## 2021-12-08 ENCOUNTER — HOSPITAL ENCOUNTER (OUTPATIENT)
Dept: LAB | Facility: MEDICAL CENTER | Age: 71
End: 2021-12-08
Attending: FAMILY MEDICINE
Payer: MEDICARE

## 2021-12-08 DIAGNOSIS — E11.9 TYPE 2 DIABETES MELLITUS WITHOUT COMPLICATION, WITHOUT LONG-TERM CURRENT USE OF INSULIN (HCC): ICD-10-CM

## 2021-12-08 DIAGNOSIS — E78.00 HYPERCHOLESTEREMIA: ICD-10-CM

## 2021-12-08 LAB
ALBUMIN SERPL BCP-MCNC: 4.4 G/DL (ref 3.2–4.9)
ALBUMIN/GLOB SERPL: 1.7 G/DL
ALP SERPL-CCNC: 100 U/L (ref 30–99)
ALT SERPL-CCNC: 16 U/L (ref 2–50)
ANION GAP SERPL CALC-SCNC: 12 MMOL/L (ref 7–16)
AST SERPL-CCNC: 20 U/L (ref 12–45)
BILIRUB SERPL-MCNC: 0.5 MG/DL (ref 0.1–1.5)
BUN SERPL-MCNC: 19 MG/DL (ref 8–22)
CALCIUM SERPL-MCNC: 9.1 MG/DL (ref 8.5–10.5)
CHLORIDE SERPL-SCNC: 104 MMOL/L (ref 96–112)
CHOLEST SERPL-MCNC: 137 MG/DL (ref 100–199)
CO2 SERPL-SCNC: 26 MMOL/L (ref 20–33)
CREAT SERPL-MCNC: 0.85 MG/DL (ref 0.5–1.4)
EST. AVERAGE GLUCOSE BLD GHB EST-MCNC: 148 MG/DL
FASTING STATUS PATIENT QL REPORTED: NORMAL
GLOBULIN SER CALC-MCNC: 2.6 G/DL (ref 1.9–3.5)
GLUCOSE SERPL-MCNC: 142 MG/DL (ref 65–99)
HBA1C MFR BLD: 6.8 % (ref 4–5.6)
HDLC SERPL-MCNC: 42 MG/DL
LDLC SERPL CALC-MCNC: 71 MG/DL
POTASSIUM SERPL-SCNC: 3.7 MMOL/L (ref 3.6–5.5)
PROT SERPL-MCNC: 7 G/DL (ref 6–8.2)
SODIUM SERPL-SCNC: 142 MMOL/L (ref 135–145)
TRIGL SERPL-MCNC: 118 MG/DL (ref 0–149)

## 2021-12-08 PROCEDURE — 36415 COLL VENOUS BLD VENIPUNCTURE: CPT

## 2021-12-08 PROCEDURE — 83036 HEMOGLOBIN GLYCOSYLATED A1C: CPT | Mod: GA

## 2021-12-08 PROCEDURE — 80061 LIPID PANEL: CPT

## 2021-12-08 PROCEDURE — 80053 COMPREHEN METABOLIC PANEL: CPT

## 2021-12-30 ENCOUNTER — APPOINTMENT (OUTPATIENT)
Dept: URGENT CARE | Facility: PHYSICIAN GROUP | Age: 71
End: 2021-12-30
Payer: MEDICARE

## 2021-12-30 ENCOUNTER — APPOINTMENT (OUTPATIENT)
Dept: RADIOLOGY | Facility: MEDICAL CENTER | Age: 71
End: 2021-12-30
Attending: EMERGENCY MEDICINE
Payer: MEDICARE

## 2021-12-30 ENCOUNTER — HOSPITAL ENCOUNTER (EMERGENCY)
Facility: MEDICAL CENTER | Age: 71
End: 2021-12-30
Attending: EMERGENCY MEDICINE
Payer: MEDICARE

## 2021-12-30 VITALS
DIASTOLIC BLOOD PRESSURE: 89 MMHG | HEIGHT: 63 IN | HEART RATE: 88 BPM | BODY MASS INDEX: 34.1 KG/M2 | OXYGEN SATURATION: 98 % | SYSTOLIC BLOOD PRESSURE: 129 MMHG | WEIGHT: 192.46 LBS | RESPIRATION RATE: 18 BRPM | TEMPERATURE: 98.1 F

## 2021-12-30 DIAGNOSIS — S52.531A CLOSED COLLES' FRACTURE OF RIGHT RADIUS, INITIAL ENCOUNTER: ICD-10-CM

## 2021-12-30 PROCEDURE — 700101 HCHG RX REV CODE 250: Performed by: EMERGENCY MEDICINE

## 2021-12-30 PROCEDURE — 73090 X-RAY EXAM OF FOREARM: CPT | Mod: RT

## 2021-12-30 PROCEDURE — 73100 X-RAY EXAM OF WRIST: CPT | Mod: RT

## 2021-12-30 PROCEDURE — 29125 APPL SHORT ARM SPLINT STATIC: CPT

## 2021-12-30 PROCEDURE — 302874 HCHG BANDAGE ACE 2 OR 3""

## 2021-12-30 PROCEDURE — 700102 HCHG RX REV CODE 250 W/ 637 OVERRIDE(OP): Performed by: EMERGENCY MEDICINE

## 2021-12-30 PROCEDURE — 25605 CLTX DST RDL FX/EPHYS SEP W/: CPT

## 2021-12-30 PROCEDURE — 700111 HCHG RX REV CODE 636 W/ 250 OVERRIDE (IP)

## 2021-12-30 PROCEDURE — A9270 NON-COVERED ITEM OR SERVICE: HCPCS | Performed by: EMERGENCY MEDICINE

## 2021-12-30 PROCEDURE — 99285 EMERGENCY DEPT VISIT HI MDM: CPT

## 2021-12-30 RX ORDER — LIDOCAINE HYDROCHLORIDE AND EPINEPHRINE BITARTRATE 20; .01 MG/ML; MG/ML
10 INJECTION, SOLUTION SUBCUTANEOUS ONCE
Status: DISCONTINUED | OUTPATIENT
Start: 2021-12-30 | End: 2021-12-30

## 2021-12-30 RX ORDER — GLIPIZIDE 5 MG/1
5 TABLET ORAL DAILY
Status: SHIPPED | COMMUNITY
End: 2022-04-01 | Stop reason: SDUPTHER

## 2021-12-30 RX ORDER — OXYCODONE HYDROCHLORIDE AND ACETAMINOPHEN 5; 325 MG/1; MG/1
1 TABLET ORAL ONCE
Status: COMPLETED | OUTPATIENT
Start: 2021-12-30 | End: 2021-12-30

## 2021-12-30 RX ORDER — OXYCODONE HYDROCHLORIDE AND ACETAMINOPHEN 5; 325 MG/1; MG/1
1 TABLET ORAL EVERY 8 HOURS PRN
Qty: 10 TABLET | Refills: 0 | Status: SHIPPED | OUTPATIENT
Start: 2021-12-30 | End: 2022-01-02

## 2021-12-30 RX ORDER — LIDOCAINE HYDROCHLORIDE AND EPINEPHRINE 10; 10 MG/ML; UG/ML
10 INJECTION, SOLUTION INFILTRATION; PERINEURAL ONCE
Status: COMPLETED | OUTPATIENT
Start: 2021-12-30 | End: 2021-12-30

## 2021-12-30 RX ORDER — PROPOFOL 10 MG/ML
INJECTION, EMULSION INTRAVENOUS
Status: COMPLETED
Start: 2021-12-30 | End: 2021-12-30

## 2021-12-30 RX ADMIN — PROPOFOL: 10 INJECTION, EMULSION INTRAVENOUS at 15:19

## 2021-12-30 RX ADMIN — LIDOCAINE HYDROCHLORIDE AND EPINEPHRINE 10 ML: 10; 10 INJECTION, SOLUTION INFILTRATION; PERINEURAL at 13:30

## 2021-12-30 RX ADMIN — OXYCODONE HYDROCHLORIDE AND ACETAMINOPHEN 1 TABLET: 5; 325 TABLET ORAL at 13:08

## 2021-12-30 ASSESSMENT — LIFESTYLE VARIABLES
EVER HAD A DRINK FIRST THING IN THE MORNING TO STEADY YOUR NERVES TO GET RID OF A HANGOVER: NO
HAVE YOU EVER FELT YOU SHOULD CUT DOWN ON YOUR DRINKING: NO
DO YOU DRINK ALCOHOL: NO
CONSUMPTION TOTAL: INCOMPLETE
EVER FELT BAD OR GUILTY ABOUT YOUR DRINKING: NO
HAVE PEOPLE ANNOYED YOU BY CRITICIZING YOUR DRINKING: NO
DOES PATIENT WANT TO STOP DRINKING: NO
TOTAL SCORE: 0

## 2021-12-30 ASSESSMENT — PAIN SCALES - WONG BAKER
WONGBAKER_NUMERICALRESPONSE: DOESN'T HURT AT ALL

## 2021-12-30 ASSESSMENT — FIBROSIS 4 INDEX: FIB4 SCORE: 1.15

## 2021-12-30 NOTE — ED NOTES
Pt placed on 3lpm NC, 1LNS bolus started prior to procedure. Pt educated on procedure, consent signed at this time. Pt has undergone conscious sedation previously without any issues. A+Ox4, VSS. Family at bedside.

## 2021-12-30 NOTE — ED NOTES
Pt denies any pain at this time, conscious sedation successful, splint in place. VSS. A+Ox4, family at bedside.

## 2021-12-30 NOTE — ED TRIAGE NOTES
Chief Complaint   Patient presents with   • GLF     slipped on ice, fell onto right arm and hurt right wrist. pt is on blood thinners but did not hit head.      Pt ambulatory to triage for above complaint. Pt reports right hand and wrist pain. Denies any other complaints.

## 2021-12-30 NOTE — ED NOTES
Med Rec completed per patient and per patient's pharmacy earlene  Allergies reviewed  No ORAL antibiotics in last 30 days    Glipizide: Patient takes 2.5 or 5 mg daily randomly

## 2021-12-31 NOTE — ED NOTES
**delayed entry**sugar tong splint applied after ERP reduced fracture.  ERP checked splint after application

## 2021-12-31 NOTE — ED NOTES
Sling placed on pt per order, pt educated on proper placement of the sling, demonstrated understanding verbally.

## 2022-01-11 ENCOUNTER — OFFICE VISIT (OUTPATIENT)
Dept: MEDICAL GROUP | Facility: PHYSICIAN GROUP | Age: 72
End: 2022-01-11
Payer: MEDICARE

## 2022-01-11 VITALS
TEMPERATURE: 97.8 F | WEIGHT: 189 LBS | BODY MASS INDEX: 33.49 KG/M2 | HEART RATE: 83 BPM | HEIGHT: 63 IN | SYSTOLIC BLOOD PRESSURE: 110 MMHG | DIASTOLIC BLOOD PRESSURE: 64 MMHG | OXYGEN SATURATION: 98 %

## 2022-01-11 DIAGNOSIS — I10 ESSENTIAL HYPERTENSION: ICD-10-CM

## 2022-01-11 DIAGNOSIS — S62.101D CLOSED FRACTURE OF RIGHT WRIST WITH ROUTINE HEALING, SUBSEQUENT ENCOUNTER: ICD-10-CM

## 2022-01-11 DIAGNOSIS — R32 URINARY INCONTINENCE, UNSPECIFIED TYPE: ICD-10-CM

## 2022-01-11 DIAGNOSIS — E78.00 HYPERCHOLESTEREMIA: ICD-10-CM

## 2022-01-11 DIAGNOSIS — E11.9 TYPE 2 DIABETES MELLITUS WITHOUT COMPLICATION, WITHOUT LONG-TERM CURRENT USE OF INSULIN (HCC): ICD-10-CM

## 2022-01-11 PROCEDURE — 99214 OFFICE O/P EST MOD 30 MIN: CPT | Performed by: FAMILY MEDICINE

## 2022-01-11 ASSESSMENT — FIBROSIS 4 INDEX: FIB4 SCORE: 1.15

## 2022-01-11 ASSESSMENT — PATIENT HEALTH QUESTIONNAIRE - PHQ9: CLINICAL INTERPRETATION OF PHQ2 SCORE: 0

## 2022-01-12 NOTE — PROGRESS NOTES
CC: Lab review    HISTORY OF THE PRESENT ILLNESS: Patient is a 71 y.o. female.     Patient is here today to follow-up on chronic health issues including urinary incontinence, hypertension, high cholesterol and diabetes. She recently also sustained a right wrist fracture as well.    Allergies: Aspirin, Cipro xr, Diclofenac, and Metformin    Current Outpatient Medications Ordered in Epic   Medication Sig Dispense Refill   • glipiZIDE (GLUCOTROL) 5 MG Tab Take 2.5-5 mg by mouth every day. Patient takes 2.5 or 5 mg daily randomly     • multivitamin (THERAGRAN) Tab Take 1 Tablet by mouth every day.     • Lidocaine HCl (LIDO-K EX) Apply 1 Application topically 1 time a day as needed (for pain). Apply vaginally     • omeprazole (PRILOSEC) 40 MG delayed-release capsule Take 40 mg by mouth every day.     • simvastatin (ZOCOR) 40 MG Tab TAKE 1 TABLET BY MOUTH EVERY DAY IN THE EVENING 90 tablet 3   • telmisartan-hydrochlorothiazide (MICARDIS HCT) 40-12.5 MG per tablet Take 1 tablet by mouth every day. 90 tablet 3   • Blood Glucose Test Strips Use one strip to test blood sugar once daily early morning before first meal. 300 Strip 11   • Lancets Use one  lancet to test blood sugar once daily early morning before first meal. 300 Each 11     No current [facility]-ordered facility-administered medications on file.       Past Medical History:   Diagnosis Date   • Anesthesia     post of nausea   • Arthritis    • Breast cancer (HCC) 2/10/2017   • Dental disorder     permanent bridge   • Diabetes (HCC)    • Diverticulosis of colon 11/4/2011   • HTN (hypertension) 3/14/2013   • Hypertension    • Pain     right shoulder down to thumb   • Post hysterectomy menopause 11/4/2011   • Trochanteric bursitis of right hip 10/1/2019       Past Surgical History:   Procedure Laterality Date   • SHOULDER ARTHROSCOPY W/ ROTATOR CUFF REPAIR  2/9/2012    Performed by JENNY LIMON at [facility]   • SHOULDER DECOMPRESSION ARTHROSCOPIC  2/9/2012  "   Performed by JENNY LIMON at SURGERY [facility] ORS   • CLAVICLE DISTAL EXCISION  2/9/2012    Performed by JENNY LIMON at [facility] ORS   • BUNIONECTOMY  2009    bilateral   • ABDOMINAL HYSTERECTOMY TOTAL  2001    endometriosis   • CHOLECYSTECTOMY  1999    laparoscopy   • MASTECTOMY     • TONSILLECTOMY  as child       Social History     Tobacco Use   • Smoking status: Never Smoker   • Smokeless tobacco: Never Used   • Tobacco comment: avoid any and all tobacco products   Vaping Use   • Vaping Use: Never used   Substance Use Topics   • Alcohol use: Yes     Alcohol/week: 0.0 oz     Comment: once a year   • Drug use: No       Social History     Social History Narrative   • Not on file       Family History   Problem Relation Age of Onset   • Stroke Mother    • Lung Disease Father         pneumonia   • Hyperlipidemia Father            Labs: Labs reviewed from December 8, 2021.  Imaging: Wrist x-ray reviewed from December 30, 2021 and January 3, 2022.    Exam: /64 (BP Location: Left arm, Patient Position: Sitting, BP Cuff Size: Large adult)   Pulse 83   Temp 36.6 °C (97.8 °F) (Temporal)   Ht 1.6 m (5' 3\")   Wt 85.7 kg (189 lb)   SpO2 98%  Body mass index is 33.48 kg/m².    General: Well appearing, NAD  Pulmonary: Clear to ausculation.  Normal effort. No rales, ronchi, or wheezing.  Cardiovascular: Regular rate and rhythm without murmur, rubs or gallop.   Musculoskeletal: Right forearm currently immobilized in hard cast  Psych: Normal mood and affect. Alert and oriented. Judgment and insight is normal.    Please note that this dictation was created using voice recognition software. I have made every reasonable attempt to correct obvious errors, but I expect that there are errors of grammar and possibly content that I did not discover before finalizing the note.      Assessment/Plan  Tena was seen today for lab results.    Diagnoses and all orders for this visit:    Urinary incontinence, " unspecified type  Patient was started on oxybutynin at her last visit due to urge incontinence. She reports that she read something about it causing dementia and no longer wants to use it. I did discuss that it does not cause dementia, but sometimes can be associated with confusion or loss of balance so it is used with caution in people her age. She does not desire to use it anymore which I think is very reasonable. She does have a history of bladder repair with mesh about 10 years ago. She would like to see a urologist at this time. Referral has been placed.  -     Referral to Urology    Essential hypertension  Noted to be well controlled during today's visit. Continue current medications.    Hypercholesteremia  Chronic issue, on statin. In reviewing recent labs, LDL 71 which is at goal for concurrent diabetes. Continue statin.    Type 2 diabetes mellitus without complication, without long-term current use of insulin (HCC)  Last A1c 6.8. Continue glipizide only.    Closed fracture of right wrist with routine healing, subsequent encounter  Unfortunately, patient sustained a fall and a wrist fracture on December 30. She is currently following with Dr. Chiu to see if she may need internal fixation. She reports intermittent use of Aleve for pain control.    Follow-up in about 4 months or sooner if needed.    Nahed Waddell,   [facility]

## 2022-02-28 ENCOUNTER — OFFICE VISIT (OUTPATIENT)
Dept: HEMATOLOGY ONCOLOGY | Facility: MEDICAL CENTER | Age: 72
End: 2022-02-28
Payer: MEDICARE

## 2022-02-28 VITALS
BODY MASS INDEX: 33.75 KG/M2 | OXYGEN SATURATION: 95 % | WEIGHT: 190.48 LBS | HEIGHT: 63 IN | SYSTOLIC BLOOD PRESSURE: 135 MMHG | DIASTOLIC BLOOD PRESSURE: 89 MMHG | TEMPERATURE: 98.7 F | HEART RATE: 76 BPM | RESPIRATION RATE: 18 BRPM

## 2022-02-28 DIAGNOSIS — Z92.21 HISTORY OF AROMATASE INHIBITOR THERAPY: ICD-10-CM

## 2022-02-28 DIAGNOSIS — Z85.3 HISTORY OF BREAST CANCER: ICD-10-CM

## 2022-02-28 PROCEDURE — 99214 OFFICE O/P EST MOD 30 MIN: CPT | Performed by: NURSE PRACTITIONER

## 2022-02-28 RX ORDER — ACETAMINOPHEN 500 MG
500-1000 TABLET ORAL EVERY 6 HOURS PRN
COMMUNITY

## 2022-02-28 RX ORDER — IBUPROFEN 200 MG
200 TABLET ORAL EVERY 6 HOURS PRN
COMMUNITY
End: 2022-03-25

## 2022-02-28 RX ORDER — MIRABEGRON 25 MG/1
TABLET, FILM COATED, EXTENDED RELEASE ORAL
COMMUNITY
End: 2022-03-25

## 2022-02-28 ASSESSMENT — ENCOUNTER SYMPTOMS
CONSTIPATION: 0
PALPITATIONS: 0
CHILLS: 0
COUGH: 0
HEARTBURN: 1
DIAPHORESIS: 0
DIARRHEA: 0
FEVER: 0
VOMITING: 0
WEIGHT LOSS: 0
NAUSEA: 0
DIZZINESS: 0
SHORTNESS OF BREATH: 0
HEADACHES: 0
MYALGIAS: 0

## 2022-02-28 ASSESSMENT — PAIN SCALES - GENERAL: PAINLEVEL: 5=MODERATE PAIN

## 2022-02-28 ASSESSMENT — FIBROSIS 4 INDEX: FIB4 SCORE: 1.17

## 2022-02-28 NOTE — PROGRESS NOTES
Subjective     Tena Romo is a 72 y.o. female who presents with Breast Cancer (1 Yr fv)         HPI    Patient seen today in follow-up for bilateral breast cancer.  She presents unaccompanied for today's visit.     Breast Cancer History  Patient was diagnosed with two- stage IA primary left sided breast cancers (left medial and left medial/lateral locations) and right-sided DCIS. She is status post bilateral mastectomy in October 26, 2015, with completion of 5 years on Arimidex. Previous patient of Dr. Gifford.     #1-Left medial showed 0.5 cm moderately differentiated invasive ductal carcinoma, margins positive medial, grade 2, ER 99%, OK 97%, Ki67 26%, HER2 by IHC [id number].     #2-Left medial/lateral showed invasive ductal carcinoma measuring about 0.7 cm, grade 2 moderate differentiated, margins negative,distance from closest margin 0.3 cm from superior and inferior.  [id number]. ER 99%, OK 100%, Her-2 was not completed, margins clear, 2 lymph nodes negative.     Established at [facility] Feb 2019, and per Dr. Tinoco Oncotype DX was requested on 11/3/2015 and not sent.  Dr. Tinoco did discuss with patient at initial visit in Feb 2019 to send out an Oncotype however patient refused.  She was started on anastrozole 1 mg p.o. daily on December 3, 2015, with recommendation to complete 5 years of treatment. Patient did discontinue Arimidex December 4, 2020.     Last DEXA scan completed on 3/22/2021 shows bone mineral density within normal limits.  Patient had been recommended to take vitamin D and calcium supplements while on aromatase inhibitor.     Interval History  Patient seen today for annual visit.  Patient she is doing well.  She does note some intermittent pain under the right axilla area as well as some chest pain in that anterior chest from surgery.  These pains are very intermittent.  She also notes some heartburn when she takes an antacid before she eats with positive results.  Patient also noted  to have incontinence of urine and she has been seen by various providers and on medication with minimal relief.  She does use depends as needed.  She did fall back in December, slipped on the ice and broke her right wrist and is in a brace at this time.  Otherwise no other concerns.    Allergies   Allergen Reactions   • Aspirin      Intolerance     • Cipro Xr Hives   • Diclofenac    • Metformin Rash     Erythematous rash     Current Outpatient Medications on File Prior to Visit   Medication Sig Dispense Refill   • acetaminophen (TYLENOL) 500 MG Tab Take 500-1,000 mg by mouth every 6 hours as needed.     • ibuprofen (MOTRIN) 200 MG Tab Take 200 mg by mouth every 6 hours as needed.     • Mirabegron ER (MYRBETRIQ) 25 MG TABLET SR 24 HR Take  by mouth.     • glipiZIDE (GLUCOTROL) 5 MG Tab Take 2.5-5 mg by mouth every day. Patient takes 2.5 or 5 mg daily randomly     • multivitamin (THERAGRAN) Tab Take 1 Tablet by mouth every day.     • omeprazole (PRILOSEC) 40 MG delayed-release capsule Take 40 mg by mouth every day.     • simvastatin (ZOCOR) 40 MG Tab TAKE 1 TABLET BY MOUTH EVERY DAY IN THE EVENING 90 tablet 3   • telmisartan-hydrochlorothiazide (MICARDIS HCT) 40-12.5 MG per tablet Take 1 tablet by mouth every day. 90 tablet 3   • Blood Glucose Test Strips Use one strip to test blood sugar once daily early morning before first meal. 300 Strip 11   • Lancets Use one  lancet to test blood sugar once daily early morning before first meal. 300 Each 11     No current facility-administered medications on file prior to visit.         Review of Systems   Constitutional: Negative for chills, diaphoresis, fever, malaise/fatigue and weight loss.   Respiratory: Negative for cough and shortness of breath.    Cardiovascular: Positive for chest pain (some anterior chest pain since surgery). Negative for palpitations.   Gastrointestinal: Positive for heartburn (anti-acid before she eats with positive results). Negative for  "constipation, diarrhea, nausea and vomiting.   Genitourinary: Negative for dysuria.        Incontinence - on medication for this from PCP   Musculoskeletal: Positive for joint pain (broke right wrist - in a brace now). Negative for myalgias.   Neurological: Negative for dizziness and headaches.              Objective     /89   Pulse 76   Temp 37.1 °C (98.7 °F) (Temporal)   Resp 18   Ht 1.6 m (5' 3\")   Wt 86.4 kg (190 lb 7.6 oz)   SpO2 95%   BMI 33.74 kg/m²      Physical Exam  Vitals reviewed.   Constitutional:       General: She is not in acute distress.     Appearance: Normal appearance. She is not diaphoretic.   HENT:      Head: Normocephalic and atraumatic.   Cardiovascular:      Rate and Rhythm: Normal rate and regular rhythm.      Heart sounds: Normal heart sounds. No murmur heard.    No friction rub. No gallop.   Pulmonary:      Effort: Pulmonary effort is normal. No respiratory distress.      Breath sounds: Normal breath sounds. No wheezing.   Chest:   Breasts:      Right: No axillary adenopathy or supraclavicular adenopathy.      Left: No axillary adenopathy or supraclavicular adenopathy.       Abdominal:      General: Bowel sounds are normal. There is no distension.      Palpations: Abdomen is soft.      Tenderness: There is no abdominal tenderness.   Musculoskeletal:         General: Tenderness (right wrist) present. Normal range of motion.   Lymphadenopathy:      Head:      Right side of head: No submental, submandibular, tonsillar, preauricular, posterior auricular or occipital adenopathy.      Left side of head: No submental, submandibular, tonsillar, preauricular, posterior auricular or occipital adenopathy.      Cervical: No cervical adenopathy.      Right cervical: No superficial, deep or posterior cervical adenopathy.     Left cervical: No superficial, deep or posterior cervical adenopathy.      Upper Body:      Right upper body: No supraclavicular or axillary adenopathy.      Left " upper body: No supraclavicular or axillary adenopathy.   Skin:     General: Skin is warm and dry.   Neurological:      Mental Status: She is alert and oriented to person, place, and time.   Psychiatric:         Mood and Affect: Mood normal.         Behavior: Behavior normal.             DS-BONE DENSITY STUDY (DEXA)  03/22/21    IMPRESSION:     According to the World Health Organization classification, bone mineral density of this patient is normal in the lumbar spine and normal in the left femur. Percentage decrease in bone mineral density in the femoral region is statistically significant.              Assessment & Plan        1. History of breast cancer     2. History of aromatase inhibitor therapy         1. Patient was diagnosed with two- stage IA primary left sided breast cancers  (left medial and left medial/lateral locations) and right-sided DCIS. She is status post bilateral mastectomy in October 26, 2015. Patient completed 5 years of aromatase inhibitor employing Arimidex 12/3/2015 - 12/4/2020.  She is doing very well.  We will continue to monitor patient annually.  She will be due for follow-up visit in 1 year, or sooner if needed.     There is no need for mammograms as patient is status post bilateral mastectomies.    2.  Patient was at an increased risk for osteoporosis due to 5 years of aromatase inhibitor use.  Most recent DEXA scan in March 2021 shows normal bone mineral density in both lumbar spine and left femur.  We will defer to patient's PCP at this time to continue to monitor patient's risk for osteoporosis as she is no longer on aromatase inhibitor. Per patient PCP scheduled follow up imaging for this year. She is continuing to take Vitamin D and calcium supplements.         Please note that this dictation was created using voice recognition software. I have made every reasonable attempt to correct obvious errors, but I expect that there are errors of grammar and possibly content that I did not  discover before finalizing the note.

## 2022-03-02 ENCOUNTER — OFFICE VISIT (OUTPATIENT)
Dept: SLEEP MEDICINE | Facility: MEDICAL CENTER | Age: 72
End: 2022-03-02
Payer: MEDICARE

## 2022-03-02 VITALS
SYSTOLIC BLOOD PRESSURE: 132 MMHG | BODY MASS INDEX: 32.6 KG/M2 | HEART RATE: 77 BPM | WEIGHT: 184 LBS | OXYGEN SATURATION: 97 % | TEMPERATURE: 97.9 F | HEIGHT: 63 IN | DIASTOLIC BLOOD PRESSURE: 62 MMHG | RESPIRATION RATE: 16 BRPM

## 2022-03-02 DIAGNOSIS — U07.1 COVID-19 VIRUS INFECTION: ICD-10-CM

## 2022-03-02 DIAGNOSIS — J96.91 RESPIRATORY FAILURE WITH HYPOXIA, UNSPECIFIED CHRONICITY (HCC): ICD-10-CM

## 2022-03-02 PROCEDURE — 99214 OFFICE O/P EST MOD 30 MIN: CPT | Performed by: INTERNAL MEDICINE

## 2022-03-02 ASSESSMENT — ENCOUNTER SYMPTOMS
CLAUDICATION: 0
BACK PAIN: 0
DOUBLE VISION: 0
NAUSEA: 0
BLURRED VISION: 0
SPUTUM PRODUCTION: 0
EYE PAIN: 0
CONSTIPATION: 0
WHEEZING: 0
ORTHOPNEA: 0
TREMORS: 0
SORE THROAT: 0
SINUS PAIN: 0
PHOTOPHOBIA: 0
DEPRESSION: 0
EYE REDNESS: 0
DIAPHORESIS: 0
DIZZINESS: 0
NECK PAIN: 0
FALLS: 0
COUGH: 0
STRIDOR: 0
HEADACHES: 0
EYE DISCHARGE: 0
FOCAL WEAKNESS: 0
SHORTNESS OF BREATH: 0
PALPITATIONS: 0
FEVER: 0
PND: 0
HEARTBURN: 0
MYALGIAS: 0
WEAKNESS: 0
VOMITING: 0
SPEECH CHANGE: 0
HEMOPTYSIS: 0
ABDOMINAL PAIN: 0
DIARRHEA: 0
CHILLS: 0
WEIGHT LOSS: 0

## 2022-03-02 ASSESSMENT — FIBROSIS 4 INDEX: FIB4 SCORE: 1.17

## 2022-03-02 NOTE — PROGRESS NOTES
Chief Complaint   Patient presents with   • Follow-Up     Last seen 9/1/21   • Results     PFT, OPO 10/13/21         HPI: This patient is a 72 y.o. female whom is followed in our clinic for acute hypoxic respiratory failure in the setting of acute infection presumed covid last seen by me on 9/1/21.The patient's past medical history is significant for metabolic syndrome with prediabetes, environmental allergies and breast cancer status post bilateral mastectomy with no chemotherapy or radiation but on Arimidex.  She is a lifelong non-smoker. Pt was admitted to [facility] on October 31, 2020 with fever, hypoxia, cough and body aches.  She had bilateral infiltrates on chest x-ray concerning for coronavirus pneumonia but was tested on 3 occasions for virus via PCR all of which were negative.  She required high flow oxygen up to 5 to 6 L and was treated with remdesivir, convalescent serum and steroids.  She was ultimately discharged on supplemental oxygen at 4 L/min and has had slow taper and her oxygen over time.  She was ruled out for pulmonary embolism during her hospital stay and echo done during her stay which was essentially normal. I saw her for f/u and to establish are on 2/4/21 at which point she was requiring less O2 at 2LPM with activity. Pulmonary function testing from March 25 showed restrictive defect with FVC of 1.67 L or 67% predicted and total lung capacity at 64% predicted with a DLCO at 69% predicted.    She still required supplemental oxygen with activity at that time to 1.5 L.  HRCT chest showed no clear evidence of fibrosis but persistent bibasilar infiltrate with groundglass opacities that had improved from prior.  Patient has since been vaccinated for Covid.  She underwent 6-minute walk test on August 25 during which she did not drop below 89%.  Following her last clinic visit, the patient continued to improve.  Overnight oximetry in October showed persistent hypoxia but she has  since stopped using supplemental oxygen at night.  She denies cough or shortness of breath.  Updated pulmonary function testing also in October showed improvement in FVC from March from 1.67 L to 2.1 L, total lung capacity from 3.34 L to 3.56 L and DLCO from 13.9 mL/min/mmHg to 18.5 which is within normal limits.    Past Medical History:   Diagnosis Date   • Anesthesia     post of nausea   • Arthritis    • Breast cancer (HCC) 2/10/2017   • Dental disorder     permanent bridge   • Diabetes (HCC)    • Diverticulosis of colon 11/4/2011   • HTN (hypertension) 3/14/2013   • Hypertension    • Pain     right shoulder down to thumb   • Post hysterectomy menopause 11/4/2011   • Trochanteric bursitis of right hip 10/1/2019       Social History     Socioeconomic History   • Marital status:      Spouse name: Not on file   • Number of children: Not on file   • Years of education: Not on file   • Highest education level: Not on file   Occupational History   • Not on file   Tobacco Use   • Smoking status: Never Smoker   • Smokeless tobacco: Never Used   • Tobacco comment: avoid any and all tobacco products   Vaping Use   • Vaping Use: Never used   Substance and Sexual Activity   • Alcohol use: Yes     Alcohol/week: 0.0 oz     Comment: once a year   • Drug use: No   • Sexual activity: Not Currently     Partners: Male     Comment: . Work at [employer]   Other Topics Concern   • Not on file   Social History Narrative   • Not on file     Social Determinants of Health     Financial Resource Strain: Not on file   Food Insecurity: Not on file   Transportation Needs: Not on file   Physical Activity: Not on file   Stress: Not on file   Social Connections: Not on file   Intimate Partner Violence: Not on file   Housing Stability: Not on file       Family History   Problem Relation Age of Onset   • Stroke Mother    • Lung Disease Father         pneumonia   • Hyperlipidemia Father        Current Outpatient Medications on File Prior  to Visit   Medication Sig Dispense Refill   • acetaminophen (TYLENOL) 500 MG Tab Take 500-1,000 mg by mouth every 6 hours as needed.     • ibuprofen (MOTRIN) 200 MG Tab Take 200 mg by mouth every 6 hours as needed.     • Mirabegron ER (MYRBETRIQ) 25 MG TABLET SR 24 HR Take  by mouth.     • glipiZIDE (GLUCOTROL) 5 MG Tab Take 2.5-5 mg by mouth every day. Patient takes 2.5 or 5 mg daily randomly     • multivitamin (THERAGRAN) Tab Take 1 Tablet by mouth every day.     • omeprazole (PRILOSEC) 40 MG delayed-release capsule Take 40 mg by mouth every day.     • simvastatin (ZOCOR) 40 MG Tab TAKE 1 TABLET BY MOUTH EVERY DAY IN THE EVENING 90 tablet 3   • telmisartan-hydrochlorothiazide (MICARDIS HCT) 40-12.5 MG per tablet Take 1 tablet by mouth every day. 90 tablet 3   • Blood Glucose Test Strips Use one strip to test blood sugar once daily early morning before first meal. 300 Strip 11   • Lancets Use one  lancet to test blood sugar once daily early morning before first meal. 300 Each 11     No current facility-administered medications on file prior to visit.       Cipro xr, Aspirin, Diclofenac, and Metformin      ROS:   Review of Systems   Constitutional: Negative for chills, diaphoresis, fever, malaise/fatigue and weight loss.   HENT: Negative for congestion, ear discharge, ear pain, hearing loss, nosebleeds, sinus pain, sore throat and tinnitus.    Eyes: Negative for blurred vision, double vision, photophobia, pain, discharge and redness.   Respiratory: Negative for cough, hemoptysis, sputum production, shortness of breath, wheezing and stridor.    Cardiovascular: Negative for chest pain, palpitations, orthopnea, claudication, leg swelling and PND.   Gastrointestinal: Negative for abdominal pain, constipation, diarrhea, heartburn, nausea and vomiting.   Genitourinary: Negative for dysuria and urgency.   Musculoskeletal: Negative for back pain, falls, joint pain, myalgias and neck pain.   Skin: Negative for itching and  "rash.   Neurological: Negative for dizziness, tremors, speech change, focal weakness, weakness and headaches.   Endo/Heme/Allergies: Negative for environmental allergies.   Psychiatric/Behavioral: Negative for depression.       /62 (BP Location: Left arm, Patient Position: Sitting, BP Cuff Size: Adult)   Pulse 77   Temp 36.6 °C (97.9 °F) (Temporal)   Resp 16   Ht 1.6 m (5' 3\")   Wt 83.5 kg (184 lb)   SpO2 97%   Physical Exam  Vitals reviewed.   Constitutional:       General: She is not in acute distress.     Appearance: Normal appearance. She is well-developed. She is obese.   HENT:      Head: Normocephalic and atraumatic.      Right Ear: External ear normal.      Left Ear: External ear normal.      Nose: Nose normal. No congestion.      Mouth/Throat:      Mouth: Mucous membranes are moist.      Pharynx: Oropharynx is clear. No oropharyngeal exudate.   Eyes:      General: No scleral icterus.     Extraocular Movements: Extraocular movements intact.      Conjunctiva/sclera: Conjunctivae normal.      Pupils: Pupils are equal, round, and reactive to light.   Neck:      Vascular: No JVD.      Trachea: No tracheal deviation.   Cardiovascular:      Rate and Rhythm: Normal rate and regular rhythm.      Heart sounds: Normal heart sounds. No murmur heard.    No friction rub. No gallop.   Pulmonary:      Effort: Pulmonary effort is normal. No accessory muscle usage or respiratory distress.      Breath sounds: Normal breath sounds. No wheezing or rales.   Abdominal:      General: There is no distension.      Palpations: Abdomen is soft.      Tenderness: There is no abdominal tenderness.   Musculoskeletal:         General: No tenderness or deformity. Normal range of motion.      Cervical back: Normal range of motion and neck supple.      Right lower leg: No edema.      Left lower leg: No edema.   Lymphadenopathy:      Cervical: No cervical adenopathy.   Skin:     General: Skin is warm and dry.      Findings: No rash. "      Nails: There is no clubbing.   Neurological:      Mental Status: She is alert and oriented to person, place, and time.      Cranial Nerves: No cranial nerve deficit.      Gait: Gait normal.   Psychiatric:         Mood and Affect: Mood normal.         Behavior: Behavior normal.          PFTs as reviewed by me personally: as per HPI    Imaging as reviewed by me personally:  As per hPI    Assessment:  1. Respiratory failure with hypoxia, unspecified chronicity (HCC)  DME Other    PULMONARY FUNCTION TESTS -Test requested: Complete Pulmonary Function Test    DX-CHEST-2 VIEWS   2. COVID-19 virus infection  PULMONARY FUNCTION TESTS -Test requested: Complete Pulmonary Function Test    DX-CHEST-2 VIEWS       Plan:  1.  Secondary to acute inflammatory process presumed Covid.  Overall patient has continued to improve both clinically and radiographically.  Okay to stop supplemental oxygen at night.  We will see the patient back in 1 year with repeat pulmonary function testing and chest x-ray.  2.  Presumed but not confirmed.  She was treated for this and has continued to improve slowly which is consistent with the course of her most post Covid pneumonia as I am seeing.  She is now fully vaccinated.  Return in about 1 year (around 3/2/2023) for CXR and PFT at time of f/u.

## 2022-03-25 ENCOUNTER — OFFICE VISIT (OUTPATIENT)
Dept: URGENT CARE | Facility: PHYSICIAN GROUP | Age: 72
End: 2022-03-25
Payer: MEDICARE

## 2022-03-25 VITALS
BODY MASS INDEX: 32.6 KG/M2 | DIASTOLIC BLOOD PRESSURE: 68 MMHG | SYSTOLIC BLOOD PRESSURE: 136 MMHG | RESPIRATION RATE: 16 BRPM | HEIGHT: 63 IN | OXYGEN SATURATION: 94 % | WEIGHT: 184 LBS | TEMPERATURE: 98.1 F | HEART RATE: 85 BPM

## 2022-03-25 DIAGNOSIS — Z76.0 ENCOUNTER FOR MEDICATION REFILL: ICD-10-CM

## 2022-03-25 DIAGNOSIS — I10 ESSENTIAL HYPERTENSION: ICD-10-CM

## 2022-03-25 PROCEDURE — 99212 OFFICE O/P EST SF 10 MIN: CPT

## 2022-03-25 RX ORDER — TELMISARTAN AND HYDROCHLORTHIAZIDE 40; 12.5 MG/1; MG/1
1 TABLET ORAL
Qty: 90 TABLET | Refills: 3 | Status: SHIPPED | OUTPATIENT
Start: 2022-03-25 | End: 2022-09-13 | Stop reason: SDUPTHER

## 2022-03-25 ASSESSMENT — ENCOUNTER SYMPTOMS
FEVER: 0
GASTROINTESTINAL NEGATIVE: 1
PSYCHIATRIC NEGATIVE: 1
HEADACHES: 0
EYES NEGATIVE: 1
CARDIOVASCULAR NEGATIVE: 1
RESPIRATORY NEGATIVE: 1
CHILLS: 0
MUSCULOSKELETAL NEGATIVE: 1

## 2022-03-25 ASSESSMENT — FIBROSIS 4 INDEX: FIB4 SCORE: 1.17

## 2022-03-25 NOTE — PROGRESS NOTES
Subjective     Tena Maura Romo is a 72 y.o. female who presents with Medication Refill (Medication refill needed for Telmisartan )        HPI     Patient is here today for refill of her BP medication telmisartan. She reports she has 4 tablets left. She used to see Dr. Waddell and has not established with a new provider.  She states she has called to establish with another provider, could not find one that is close.  Patient reports compliance with her BP medication, as well as stress of her medications.  Patient denies any chest pain, shortness of breath, dizziness.    PMH:  has a past medical history of Anesthesia, Arthritis, Breast cancer (HCC) (2/10/2017), Dental disorder, Diabetes (HCC), Diverticulosis of colon (11/4/2011), HTN (hypertension) (3/14/2013), Hypertension, Pain, Post hysterectomy menopause (11/4/2011), and Trochanteric bursitis of right hip (10/1/2019).  MEDS:   Current Outpatient Medications:   •  telmisartan-hydrochlorothiazide (MICARDIS HCT) 40-12.5 MG per tablet, Take 1 Tablet by mouth every day., Disp: 90 Tablet, Rfl: 3  •  acetaminophen (TYLENOL) 500 MG Tab, Take 500-1,000 mg by mouth every 6 hours as needed., Disp: , Rfl:   •  ibuprofen (MOTRIN) 200 MG Tab, Take 200 mg by mouth every 6 hours as needed., Disp: , Rfl:   •  Mirabegron ER (MYRBETRIQ) 25 MG TABLET SR 24 HR, Take  by mouth., Disp: , Rfl:   •  glipiZIDE (GLUCOTROL) 5 MG Tab, Take 2.5-5 mg by mouth every day. Patient takes 2.5 or 5 mg daily randomly, Disp: , Rfl:   •  multivitamin (THERAGRAN) Tab, Take 1 Tablet by mouth every day., Disp: , Rfl:   •  omeprazole (PRILOSEC) 40 MG delayed-release capsule, Take 40 mg by mouth every day., Disp: , Rfl:   •  simvastatin (ZOCOR) 40 MG Tab, TAKE 1 TABLET BY MOUTH EVERY DAY IN THE EVENING, Disp: 90 tablet, Rfl: 3  •  Blood Glucose Test Strips, Use one strip to test blood sugar once daily early morning before first meal., Disp: 300 Strip, Rfl: 11  •  Lancets, Use one  lancet to test  "blood sugar once daily early morning before first meal., Disp: 300 Each, Rfl: 11  ALLERGIES:   Allergies   Allergen Reactions   • Cipro Xr Hives   • Aspirin      Intolerance    Other reaction(s): Other   • Diclofenac Rash   • Metformin Rash     Erythematous rash     SURGHX:   Past Surgical History:   Procedure Laterality Date   • SHOULDER ARTHROSCOPY W/ ROTATOR CUFF REPAIR  2/9/2012    Performed by JENNY LIMON at [facility] ORS   • SHOULDER DECOMPRESSION ARTHROSCOPIC  2/9/2012    Performed by JENNY LIMON at [facility] ORS   • CLAVICLE DISTAL EXCISION  2/9/2012    Performed by JENNY LIMON at [facility] ORS   • BUNIONECTOMY  2009    bilateral   • ABDOMINAL HYSTERECTOMY TOTAL  2001    endometriosis   • CHOLECYSTECTOMY  1999    laparoscopy   • MASTECTOMY Bilateral    • TONSILLECTOMY  as child     SOCHX:  reports that she has never smoked. She has never used smokeless tobacco. She reports current alcohol use. She reports that she does not use drugs.  FH: Reviewed with patient, not pertinent to this visit.     Review of Systems   Constitutional: Negative for chills, fever and malaise/fatigue.   HENT: Negative.    Eyes: Negative.    Respiratory: Negative.    Cardiovascular: Negative.    Gastrointestinal: Negative.    Genitourinary: Negative.    Musculoskeletal: Negative.    Skin: Negative.    Neurological: Negative for headaches.   Psychiatric/Behavioral: Negative.           Objective     /68 (BP Location: Right arm, Patient Position: Sitting, BP Cuff Size: Adult)   Pulse 85   Temp 36.7 °C (98.1 °F) (Temporal)   Resp 16   Ht 1.6 m (5' 3\")   Wt 83.5 kg (184 lb)   SpO2 94%   BMI 32.59 kg/m²      Physical Exam  Constitutional:       Appearance: Normal appearance.   HENT:      Head: Normocephalic.      Nose: Nose normal.   Eyes:      Extraocular Movements: Extraocular movements intact.   Cardiovascular:      Rate and Rhythm: Normal rate and regular rhythm.      Pulses: " Normal pulses.      Heart sounds: Normal heart sounds.   Pulmonary:      Effort: Pulmonary effort is normal.      Breath sounds: Normal breath sounds.   Musculoskeletal:         General: Normal range of motion.      Cervical back: Normal range of motion.   Skin:     General: Skin is warm.   Neurological:      General: No focal deficit present.      Mental Status: She is alert.   Psychiatric:         Mood and Affect: Mood normal.         Behavior: Behavior normal.         Assessment & Plan     1. Essential hypertension    - telmisartan-hydrochlorothiazide (MICARDIS HCT) 40-12.5 MG per tablet; Take 1 Tablet by mouth every day.  Dispense: 90 Tablet; Refill: 3    2. Encounter for medication refill    - Referral back to [facility] PCP    Patient is here today for refill of her Micardis.  She was seeing Dr. Waddell, but unable to establish with a different provider when she left.  Her medication is refilled for 30 days.  Recommended calling around to establish with a new provider.

## 2022-03-26 ENCOUNTER — TELEPHONE (OUTPATIENT)
Dept: SCHEDULING | Facility: IMAGING CENTER | Age: 72
End: 2022-03-26

## 2022-04-01 ENCOUNTER — HOSPITAL ENCOUNTER (OUTPATIENT)
Facility: MEDICAL CENTER | Age: 72
End: 2022-04-01
Attending: STUDENT IN AN ORGANIZED HEALTH CARE EDUCATION/TRAINING PROGRAM
Payer: MEDICARE

## 2022-04-01 ENCOUNTER — OFFICE VISIT (OUTPATIENT)
Dept: MEDICAL GROUP | Facility: PHYSICIAN GROUP | Age: 72
End: 2022-04-01
Payer: MEDICARE

## 2022-04-01 VITALS
SYSTOLIC BLOOD PRESSURE: 122 MMHG | OXYGEN SATURATION: 96 % | TEMPERATURE: 98.5 F | HEART RATE: 77 BPM | BODY MASS INDEX: 33.29 KG/M2 | DIASTOLIC BLOOD PRESSURE: 72 MMHG | WEIGHT: 195 LBS | HEIGHT: 64 IN

## 2022-04-01 DIAGNOSIS — E11.69 DYSLIPIDEMIA ASSOCIATED WITH TYPE 2 DIABETES MELLITUS (HCC): ICD-10-CM

## 2022-04-01 DIAGNOSIS — E78.5 DYSLIPIDEMIA ASSOCIATED WITH TYPE 2 DIABETES MELLITUS (HCC): ICD-10-CM

## 2022-04-01 DIAGNOSIS — Z76.89 ENCOUNTER TO ESTABLISH CARE: ICD-10-CM

## 2022-04-01 DIAGNOSIS — E11.9 TYPE 2 DIABETES MELLITUS WITHOUT COMPLICATION, WITHOUT LONG-TERM CURRENT USE OF INSULIN (HCC): ICD-10-CM

## 2022-04-01 DIAGNOSIS — E11.59 HYPERTENSION ASSOCIATED WITH TYPE 2 DIABETES MELLITUS (HCC): ICD-10-CM

## 2022-04-01 DIAGNOSIS — I15.2 HYPERTENSION ASSOCIATED WITH TYPE 2 DIABETES MELLITUS (HCC): ICD-10-CM

## 2022-04-01 PROBLEM — R32 URINARY INCONTINENCE: Status: ACTIVE | Noted: 2022-04-01

## 2022-04-01 PROBLEM — Z79.811 USE OF ANASTROZOLE (ARIMIDEX): Status: RESOLVED | Noted: 2019-01-11 | Resolved: 2022-04-01

## 2022-04-01 PROBLEM — N32.81 OVERACTIVE BLADDER: Status: ACTIVE | Noted: 2022-04-01

## 2022-04-01 LAB
CREAT UR-MCNC: 75.4 MG/DL
MICROALBUMIN UR-MCNC: <1.2 MG/DL
MICROALBUMIN/CREAT UR: NORMAL MG/G (ref 0–30)

## 2022-04-01 PROCEDURE — 82043 UR ALBUMIN QUANTITATIVE: CPT

## 2022-04-01 PROCEDURE — 82570 ASSAY OF URINE CREATININE: CPT

## 2022-04-01 PROCEDURE — 99214 OFFICE O/P EST MOD 30 MIN: CPT | Performed by: STUDENT IN AN ORGANIZED HEALTH CARE EDUCATION/TRAINING PROGRAM

## 2022-04-01 RX ORDER — GLIPIZIDE 5 MG/1
5 TABLET ORAL DAILY
Qty: 90 TABLET | Refills: 1 | Status: SHIPPED | OUTPATIENT
Start: 2022-04-01 | End: 2022-08-24 | Stop reason: SDUPTHER

## 2022-04-01 RX ORDER — MIRABEGRON 25 MG/1
TABLET, FILM COATED, EXTENDED RELEASE ORAL
COMMUNITY
End: 2022-04-01

## 2022-04-01 ASSESSMENT — FIBROSIS 4 INDEX: FIB4 SCORE: 1.17

## 2022-04-01 NOTE — PROGRESS NOTES
Subjective:     Chief Complaint   Patient presents with   • Establish Care     HISTORY OF THE PRESENT ILLNESS: Patient is a 72 y.o. female. This pleasant patient is here today to establish care. His/her prior PCP was Dr. Waddell.    Patient has no active concerns to discuss today. We review her history and last lab work. Pt current followed by CARLYN Cruz with [facility]. Medications have not been effective so she has started peroneal nerve stimulation for her overactive bladder.     Regarding her DM, denies hypoglycemia. Averaged glucose pt reports <150.    Current Outpatient Medications Ordered in Epic   Medication Sig Dispense Refill   • Multiple Vitamin (MULTIVITAMIN ADULT PO) Take  by mouth every day.     • VITAMIN D PO Take  by mouth every day.     • glipiZIDE (GLUCOTROL) 5 MG Tab Take 1 Tablet by mouth every day. 90 Tablet 1   • telmisartan-hydrochlorothiazide (MICARDIS HCT) 40-12.5 MG per tablet Take 1 Tablet by mouth every day. 90 Tablet 3   • acetaminophen (TYLENOL) 500 MG Tab Take 500-1,000 mg by mouth every 6 hours as needed.     • multivitamin (THERAGRAN) Tab Take 1 Tablet by mouth every day.     • omeprazole (PRILOSEC) 40 MG delayed-release capsule Take 40 mg by mouth every day.     • simvastatin (ZOCOR) 40 MG Tab TAKE 1 TABLET BY MOUTH EVERY DAY IN THE EVENING 90 tablet 3   • Blood Glucose Test Strips Use one strip to test blood sugar once daily early morning before first meal. 300 Strip 11   • Lancets Use one  lancet to test blood sugar once daily early morning before first meal. 300 Each 11     No current [facility]-ordered facility-administered medications on file.     ROS:   Gen: no fevers/chills, no changes in weight  Eyes: no changes in vision  ENT: no sore throat, no hearing loss, no bloody nose  Pulm: no sob, no cough  CV: no chest pain, no palpitations  GI: no nausea/vomiting, no diarrhea  : no dysuria  MSk: no myalgias  Skin: no rash  Neuro: no headaches, no numbness/tingling  Heme/Lymph: no  "easy bruising      Objective:     Exam: /72 (BP Location: Left arm, Patient Position: Sitting, BP Cuff Size: Adult)   Pulse 77   Temp 36.9 °C (98.5 °F) (Temporal)   Ht 1.613 m (5' 3.5\")   Wt 88.5 kg (195 lb)   SpO2 96%  Body mass index is 34 kg/m².    General: Well developed, well nourished in no acute distress.  Head: Normocephalic and atraumatic.  Eyes: Conjunctivae and extraocular motions are normal. Pupils are equal, round. No scleral icterus.   Mouth/Throat: Wearing mask.  Neck: Supple. Thyroid is not grossly enlarged.  Pulmonary: Clear to ausculation.  Normal effort. No rales, ronchi, or wheezing.  Cardiovascular: Regular rate and rhythm without murmur.  Abdomen: Soft, nontender, nondistended. Normal bowel sounds. No HSM.  Neurologic: No gross/focal deficits. Normal gait.   Lymph: No cervical or supraclavicular lymph nodes are palpable  Skin: Warm and dry.  No obvious lesions.  Musculoskeletal: No extremity cyanosis, clubbing, or edema.  Psych: Normal mood and affect. Alert and oriented x3. Judgment and insight is normal.    Labs: Reviewed from 6/15/2021, 12/8/2021    Assessment & Plan:   72 y.o. female with the following -    1. Encounter to establish care  Medical record reviewed and updated with patient.    2. Type 2 diabetes mellitus without complication, without long-term current use of insulin (HCC)  Chronic, well controlled. Reviewed risk of hypoglycemia related to glipizide with patient-doing well so no changes.   - MICROALBUMIN CREAT RATIO URINE; Future  - glipiZIDE (GLUCOTROL) 5 MG Tab; Take 1 Tablet by mouth every day.  Dispense: 90 Tablet; Refill: 1    3. Hypertension associated with type 2 diabetes mellitus (HCC)  Chronic, well controlled. No changes to current treatment.    4. Dyslipidemia associated with type 2 diabetes mellitus (HCC)  Chronic, well controlled. No changes to current treatment.    Return in about 2 months (around 6/8/2022) for Diabetes.    Please note that this " dictation was created using voice recognition software. I have made every reasonable attempt to correct obvious errors, but I expect that there are errors of grammar and possibly content that I did not discover before finalizing the note.

## 2022-04-01 NOTE — PATIENT INSTRUCTIONS
Calcium 1200mg/day (diet or supplement)  Vitamin D3 800-1000IU daily    Ideal blood sugar   Fasting , random <180

## 2022-06-15 ENCOUNTER — HOSPITAL ENCOUNTER (OUTPATIENT)
Facility: MEDICAL CENTER | Age: 72
End: 2022-06-15
Attending: PHYSICIAN ASSISTANT
Payer: MEDICARE

## 2022-06-15 PROCEDURE — 87077 CULTURE AEROBIC IDENTIFY: CPT

## 2022-06-15 PROCEDURE — 87086 URINE CULTURE/COLONY COUNT: CPT

## 2022-06-15 PROCEDURE — 87186 SC STD MICRODIL/AGAR DIL: CPT

## 2022-06-18 LAB
BACTERIA UR CULT: ABNORMAL
BACTERIA UR CULT: ABNORMAL
SIGNIFICANT IND 70042: ABNORMAL
SITE SITE: ABNORMAL
SOURCE SOURCE: ABNORMAL

## 2022-06-20 ENCOUNTER — OFFICE VISIT (OUTPATIENT)
Dept: URGENT CARE | Facility: PHYSICIAN GROUP | Age: 72
End: 2022-06-20
Payer: MEDICARE

## 2022-06-20 VITALS
WEIGHT: 195 LBS | DIASTOLIC BLOOD PRESSURE: 60 MMHG | HEART RATE: 82 BPM | RESPIRATION RATE: 18 BRPM | BODY MASS INDEX: 34.55 KG/M2 | OXYGEN SATURATION: 95 % | SYSTOLIC BLOOD PRESSURE: 128 MMHG | TEMPERATURE: 98.1 F | HEIGHT: 63 IN

## 2022-06-20 DIAGNOSIS — N30.00 ACUTE CYSTITIS WITHOUT HEMATURIA: ICD-10-CM

## 2022-06-20 DIAGNOSIS — N39.41 URGE INCONTINENCE: ICD-10-CM

## 2022-06-20 LAB
APPEARANCE UR: CLEAR
BILIRUB UR STRIP-MCNC: NEGATIVE MG/DL
COLOR UR AUTO: YELLOW
GLUCOSE UR STRIP.AUTO-MCNC: NEGATIVE MG/DL
KETONES UR STRIP.AUTO-MCNC: NEGATIVE MG/DL
LEUKOCYTE ESTERASE UR QL STRIP.AUTO: NORMAL
NITRITE UR QL STRIP.AUTO: NEGATIVE
PH UR STRIP.AUTO: 5.5 [PH] (ref 5–8)
PROT UR QL STRIP: NEGATIVE MG/DL
RBC UR QL AUTO: NORMAL
SP GR UR STRIP.AUTO: 1.01
UROBILINOGEN UR STRIP-MCNC: 0.2 MG/DL

## 2022-06-20 PROCEDURE — 99214 OFFICE O/P EST MOD 30 MIN: CPT

## 2022-06-20 PROCEDURE — 81002 URINALYSIS NONAUTO W/O SCOPE: CPT

## 2022-06-20 RX ORDER — CEFDINIR 300 MG/1
300 CAPSULE ORAL EVERY 12 HOURS
Qty: 10 CAPSULE | Refills: 0 | Status: SHIPPED | OUTPATIENT
Start: 2022-06-20 | End: 2022-06-25

## 2022-06-20 ASSESSMENT — FIBROSIS 4 INDEX: FIB4 SCORE: 1.17

## 2022-06-20 ASSESSMENT — ENCOUNTER SYMPTOMS
FLANK PAIN: 0
CHILLS: 0
FEVER: 0

## 2022-06-20 NOTE — PROGRESS NOTES
Subjective     Tena Romo is a 72 y.o. female who presents with UTI (Urology said pt has a bladder infection last Wednesday )          HPI     Patient is here today as she was instructed by her urologist to get treatment for urinary tract infection.  Patient reports she was seen by [place] [facility] 5 days prior, none and sent for where urinalysis was sent for urinalysis that culture.  Her culture showed E. coli.  Patient reports urgency incontinence but otherwise denies any urinary frequency, dysuria, fever, or recent fever, chills, flank pains, hematuria    Patient's current problem list, medications, and past medical/surgical history were reviewed in Epic.    PMH:  has a past medical history of Anesthesia, Arthritis, Breast cancer (HCC) (2/10/2017), Dental disorder, Diabetes (HCC), Diverticulosis of colon (11/4/2011), HTN (hypertension) (3/14/2013), Hypertension, Pain, Post hysterectomy menopause (11/4/2011), Trochanteric bursitis of right hip (10/1/2019), and Use of anastrozole (Arimidex) (1/11/2019).  MEDS:   Current Outpatient Medications:   •  Ferrous Sulfate (IRON PO), Take  by mouth., Disp: , Rfl:   •  Multiple Vitamin (MULTIVITAMIN ADULT PO), Take  by mouth every day., Disp: , Rfl:   •  VITAMIN D PO, Take  by mouth every day., Disp: , Rfl:   •  glipiZIDE (GLUCOTROL) 5 MG Tab, Take 1 Tablet by mouth every day., Disp: 90 Tablet, Rfl: 1  •  telmisartan-hydrochlorothiazide (MICARDIS HCT) 40-12.5 MG per tablet, Take 1 Tablet by mouth every day., Disp: 90 Tablet, Rfl: 3  •  acetaminophen (TYLENOL) 500 MG Tab, Take 500-1,000 mg by mouth every 6 hours as needed., Disp: , Rfl:   •  multivitamin (THERAGRAN) Tab, Take 1 Tablet by mouth every day., Disp: , Rfl:   •  omeprazole (PRILOSEC) 40 MG delayed-release capsule, Take 40 mg by mouth every day., Disp: , Rfl:   •  simvastatin (ZOCOR) 40 MG Tab, TAKE 1 TABLET BY MOUTH EVERY DAY IN THE EVENING, Disp: 90 tablet, Rfl: 3  •  Blood Glucose Test Strips, Use one  "strip to test blood sugar once daily early morning before first meal., Disp: 300 Strip, Rfl: 11  ALLERGIES:   Allergies   Allergen Reactions   • Cipro Xr Hives   • Aspirin      Intolerance    Other reaction(s): Other   • Diclofenac Rash   • Metformin Rash     Erythematous rash     SURGHX:   Past Surgical History:   Procedure Laterality Date   • SHOULDER ARTHROSCOPY W/ ROTATOR CUFF REPAIR  2/9/2012    Performed by JENNY LIMON at [facility]   • SHOULDER DECOMPRESSION ARTHROSCOPIC  2/9/2012    Performed by JENNY LIMON at [facility]   • CLAVICLE DISTAL EXCISION  2/9/2012    Performed by JENNY LIMON at [facility]   • BUNIONECTOMY  2009    bilateral   • ABDOMINAL HYSTERECTOMY TOTAL  2001    endometriosis   • CHOLECYSTECTOMY  1999    laparoscopy   • MASTECTOMY Bilateral    • TONSILLECTOMY  as child     SOCHX:  reports that she has never smoked. She has never used smokeless tobacco. She reports current alcohol use. She reports that she does not use drugs.  FH: Reviewed with patient, not pertinent to this visit.     Review of Systems   Constitutional: Negative for chills and fever.   Genitourinary: Negative for dysuria, flank pain, frequency, hematuria and urgency.              Objective     /60   Pulse 82   Temp 36.7 °C (98.1 °F) (Temporal)   Resp 18   Ht 1.6 m (5' 3\")   Wt 88.5 kg (195 lb)   SpO2 95%   BMI 34.54 kg/m²      Physical Exam  Constitutional:       Appearance: Normal appearance.   HENT:      Head: Normocephalic.      Nose: Nose normal.   Eyes:      Extraocular Movements: Extraocular movements intact.   Cardiovascular:      Rate and Rhythm: Normal rate and regular rhythm.      Pulses: Normal pulses.      Heart sounds: Normal heart sounds.   Pulmonary:      Effort: Pulmonary effort is normal.      Breath sounds: Normal breath sounds.   Abdominal:      Palpations: Abdomen is soft.      Tenderness: There is no right CVA tenderness or left CVA " tenderness.   Musculoskeletal:         General: Normal range of motion.      Cervical back: Normal range of motion.   Skin:     General: Skin is warm and dry.   Neurological:      General: No focal deficit present.      Mental Status: She is alert.   Psychiatric:         Mood and Affect: Mood normal.         Behavior: Behavior normal.         Judgment: Judgment normal.       Urinalysis results:    Leukocyte esterase; blood-trace; glucose, bilirubin, ketone, protein, nitrite-negative        Assessment & Plan       1. Acute cystitis without hematuria    - POCT Urinalysis  - cefdinir (OMNICEF) 300 MG Cap; Take 1 Capsule by mouth every 12 hours for 5 days.  Dispense: 10 Capsule; Refill: 0    2. Urge incontinence    - POCT Urinalysis    Patient's presentation is consistent with acute cystitis.  She is prescribed cefdinir twice daily for 5 days.  Educated patient on prevention of UTI infection- wiping from front to back, increasing oral fluid intake, frequent urinary emptying. Discussed treatment plan with patient, she is agreeable and verbalized understanding.  Educated patient on signs and symptoms watch out for, when to return to the clinic or go to the ER.      Electronically Signed by JOE Flores

## 2022-08-12 ENCOUNTER — HOSPITAL ENCOUNTER (OUTPATIENT)
Dept: LAB | Facility: MEDICAL CENTER | Age: 72
End: 2022-08-12
Attending: UROLOGY
Payer: MEDICARE

## 2022-08-12 PROCEDURE — 87086 URINE CULTURE/COLONY COUNT: CPT

## 2022-08-12 PROCEDURE — 87077 CULTURE AEROBIC IDENTIFY: CPT

## 2022-08-14 LAB
BACTERIA UR CULT: NORMAL
SIGNIFICANT IND 70042: NORMAL
SITE SITE: NORMAL
SOURCE SOURCE: NORMAL

## 2022-08-24 DIAGNOSIS — E11.9 TYPE 2 DIABETES MELLITUS WITHOUT COMPLICATION, WITHOUT LONG-TERM CURRENT USE OF INSULIN (HCC): ICD-10-CM

## 2022-08-24 RX ORDER — GLIPIZIDE 5 MG/1
5 TABLET ORAL DAILY
Qty: 90 TABLET | Refills: 0 | Status: SHIPPED | OUTPATIENT
Start: 2022-08-24 | End: 2022-09-13 | Stop reason: SDUPTHER

## 2022-09-13 ENCOUNTER — HOSPITAL ENCOUNTER (OUTPATIENT)
Facility: MEDICAL CENTER | Age: 72
End: 2022-09-13
Attending: STUDENT IN AN ORGANIZED HEALTH CARE EDUCATION/TRAINING PROGRAM
Payer: MEDICARE

## 2022-09-13 ENCOUNTER — OFFICE VISIT (OUTPATIENT)
Dept: MEDICAL GROUP | Facility: PHYSICIAN GROUP | Age: 72
End: 2022-09-13
Payer: MEDICARE

## 2022-09-13 VITALS
SYSTOLIC BLOOD PRESSURE: 124 MMHG | WEIGHT: 196.4 LBS | OXYGEN SATURATION: 97 % | TEMPERATURE: 98 F | HEART RATE: 80 BPM | DIASTOLIC BLOOD PRESSURE: 70 MMHG | BODY MASS INDEX: 33.53 KG/M2 | HEIGHT: 64 IN

## 2022-09-13 DIAGNOSIS — E11.59 HYPERTENSION ASSOCIATED WITH TYPE 2 DIABETES MELLITUS (HCC): ICD-10-CM

## 2022-09-13 DIAGNOSIS — E78.5 DYSLIPIDEMIA ASSOCIATED WITH TYPE 2 DIABETES MELLITUS (HCC): ICD-10-CM

## 2022-09-13 DIAGNOSIS — N39.0 URINARY TRACT INFECTION WITHOUT HEMATURIA, SITE UNSPECIFIED: ICD-10-CM

## 2022-09-13 DIAGNOSIS — R10.30 LOWER ABDOMINAL PAIN: ICD-10-CM

## 2022-09-13 DIAGNOSIS — Z11.59 NEED FOR HEPATITIS C SCREENING TEST: ICD-10-CM

## 2022-09-13 DIAGNOSIS — E11.69 TYPE 2 DIABETES MELLITUS WITH OTHER SPECIFIED COMPLICATION, WITHOUT LONG-TERM CURRENT USE OF INSULIN (HCC): ICD-10-CM

## 2022-09-13 DIAGNOSIS — E11.69 DYSLIPIDEMIA ASSOCIATED WITH TYPE 2 DIABETES MELLITUS (HCC): ICD-10-CM

## 2022-09-13 DIAGNOSIS — I15.2 HYPERTENSION ASSOCIATED WITH TYPE 2 DIABETES MELLITUS (HCC): ICD-10-CM

## 2022-09-13 PROBLEM — N39.41 URGE INCONTINENCE OF URINE: Status: ACTIVE | Noted: 2022-05-24

## 2022-09-13 LAB
APPEARANCE UR: CLEAR
BILIRUB UR STRIP-MCNC: NORMAL MG/DL
COLOR UR AUTO: YELLOW
GLUCOSE UR STRIP.AUTO-MCNC: NORMAL MG/DL
HBA1C MFR BLD: 6.9 % (ref 0–5.6)
INT CON NEG: ABNORMAL
INT CON POS: ABNORMAL
KETONES UR STRIP.AUTO-MCNC: NORMAL MG/DL
LEUKOCYTE ESTERASE UR QL STRIP.AUTO: NORMAL
NITRITE UR QL STRIP.AUTO: NORMAL
PH UR STRIP.AUTO: 5.5 [PH] (ref 5–8)
PROT UR QL STRIP: NORMAL MG/DL
RBC UR QL AUTO: NORMAL
SP GR UR STRIP.AUTO: 1.01
UROBILINOGEN UR STRIP-MCNC: 0.2 MG/DL

## 2022-09-13 PROCEDURE — 87186 SC STD MICRODIL/AGAR DIL: CPT

## 2022-09-13 PROCEDURE — 87086 URINE CULTURE/COLONY COUNT: CPT

## 2022-09-13 PROCEDURE — 83036 HEMOGLOBIN GLYCOSYLATED A1C: CPT | Performed by: STUDENT IN AN ORGANIZED HEALTH CARE EDUCATION/TRAINING PROGRAM

## 2022-09-13 PROCEDURE — 81002 URINALYSIS NONAUTO W/O SCOPE: CPT | Performed by: STUDENT IN AN ORGANIZED HEALTH CARE EDUCATION/TRAINING PROGRAM

## 2022-09-13 PROCEDURE — 87077 CULTURE AEROBIC IDENTIFY: CPT

## 2022-09-13 PROCEDURE — 99214 OFFICE O/P EST MOD 30 MIN: CPT | Performed by: STUDENT IN AN ORGANIZED HEALTH CARE EDUCATION/TRAINING PROGRAM

## 2022-09-13 PROCEDURE — 81001 URINALYSIS AUTO W/SCOPE: CPT

## 2022-09-13 RX ORDER — SIMVASTATIN 40 MG
TABLET ORAL
Qty: 90 TABLET | Refills: 3 | Status: SHIPPED | OUTPATIENT
Start: 2022-09-13 | End: 2023-08-28

## 2022-09-13 RX ORDER — SULFAMETHOXAZOLE AND TRIMETHOPRIM 800; 160 MG/1; MG/1
1 TABLET ORAL 2 TIMES DAILY
COMMUNITY
End: 2022-12-07

## 2022-09-13 RX ORDER — GLIPIZIDE 5 MG/1
5 TABLET ORAL DAILY
Qty: 90 TABLET | Refills: 3 | Status: SHIPPED | OUTPATIENT
Start: 2022-09-13 | End: 2023-11-28

## 2022-09-13 RX ORDER — TELMISARTAN AND HYDROCHLORTHIAZIDE 40; 12.5 MG/1; MG/1
1 TABLET ORAL
Qty: 90 TABLET | Refills: 3 | Status: SHIPPED | OUTPATIENT
Start: 2022-09-13 | End: 2023-03-14

## 2022-09-13 ASSESSMENT — FIBROSIS 4 INDEX: FIB4 SCORE: 1.17

## 2022-09-13 NOTE — PATIENT INSTRUCTIONS
Fasting labs in December    Hepatitis B, shingles and tetanus vaccine can be given at the pharmacy    Ideal glucose ranges: fasting , random (1-2 hours after eating) <180. If glucose <70, we need to adjust your medication to prevent continuing low blood sugar.    Ideal blood pressure <130/80 and at least <140/90.    Schedule retinopathy exam    Typically bactrim is twice a day for 3 days-can ask urology if they would rather you take 7 days since you recently had botox

## 2022-09-13 NOTE — PROGRESS NOTES
Subjective:     Chief Complaint   Patient presents with    Follow-Up     Medication refills and is requesting Shingles Vaccine. Possible Bladder infection     HPI:   Tena presents today with    Type 2 diabetes mellitus with other specified complication (HCC)  This is a chronic condition.  Continues with glipizide 5 mg daily.    Lowest 112. Average 135-145.     Needs prescriptions all refilled.    Last UTI was in July. States she just had botox 2 weeks ago and states that it has been very helpful for her overactive bladder/urinary incontinence which was chronic issues prior.  Denies any frequency or urgency, no fever or chills.  Denies any flank pain.  Denies dysuria or hematuria, but feels there is lower abdominal pressure and recently urine has been more cloudy.  Urology gave her bactrim DS x 7 days but hasn't taken yet. Wants my option if she should take it.     Patient states her spouse has shingles and she is very concerned that she will get it as well.  She is not immunized.  She wants to know if she can receive the shingles vaccine today.    Current Outpatient Medications Ordered in Epic   Medication Sig Dispense Refill    telmisartan-hydrochlorothiazide (MICARDIS HCT) 40-12.5 MG per tablet Take 1 Tablet by mouth every day. 90 Tablet 3    simvastatin (ZOCOR) 40 MG Tab TAKE 1 TABLET BY MOUTH EVERY DAY IN THE EVENING 90 Tablet 3    glipiZIDE (GLUCOTROL) 5 MG Tab Take 1 Tablet by mouth every day. 90 Tablet 3    Multiple Vitamin (MULTIVITAMIN ADULT PO) Take  by mouth every day.      VITAMIN D PO Take  by mouth every day.      acetaminophen (TYLENOL) 500 MG Tab Take 500-1,000 mg by mouth every 6 hours as needed.      omeprazole (PRILOSEC) 40 MG delayed-release capsule Take 40 mg by mouth every day.      Blood Glucose Test Strips Use one strip to test blood sugar once daily early morning before first meal. 300 Strip 11    sulfamethoxazole-trimethoprim (BACTRIM DS) 800-160 MG tablet Take 1 Tablet by mouth 2 times a  "day.      multivitamin (THERAGRAN) Tab Take 1 Tablet by mouth every day.       No current [facility]-ordered facility-administered medications on file.     Health Maintenance: Reviewed with patient-recommend shingles vaccine at the pharmacy.    ROS:  Gen: no fevers/chills, no changes in weight  Eyes: no changes in vision  ENT: no sore throat  Pulm: no sob, no cough  CV: no chest pain, no palpitations  GI: no nausea/vomiting, no constipation  : no dysuria  Skin: no rash  Neuro: no headaches, no numbness/tingling  Heme/Lymph: no easy bruising    Objective:     Exam:  /70 (BP Location: Left arm, Patient Position: Sitting, BP Cuff Size: Adult)   Pulse 80   Temp 36.7 °C (98 °F) (Temporal)   Ht 1.613 m (5' 3.5\")   Wt 89.1 kg (196 lb 6.4 oz)   SpO2 97%   BMI 34.24 kg/m²  Body mass index is 34.24 kg/m².    Physical Exam:  Constitutional: Well-developed and well-nourished. No acute distress.   Skin: Skin is warm and dry. No rash noted.  Head: Atraumatic without lesions.  Eyes: Conjunctivae and extraocular motions are normal. Pupils are equal, round. No scleral icterus.   Mouth/Throat: Wearing mask.  Neck: Supple, trachea midline. Normal range of motion.   Cardiovascular: Regular rate and rhythm, S1 and S2 without murmur, rubs, or gallops.  Lungs: Normal inspiratory effort, CTA bilaterally, no wheezes/rhonchi/rales  Abdomen: Obese. Soft, non tender, and without distention. Active bowel sounds. No rebound, guarding, masses or HSM.  Extremities: No cyanosis, clubbing, erythema, nor edema.  Monofilament testing with a 10 gram force: sensation intact: intact bilaterally  Visual Inspection: Feet without maceration, ulcers, fissures.  Pedal pulses: intact bilaterally  Neurological: Alert and oriented x 3. No gross/focal deficits.  Psychiatric:  Behavior, mood, and affect are appropriate.    Assessment & Plan:     72 y.o. female with the following -     1. Urinary tract infection without hematuria, site unspecified  2. Lower " abdominal pain  This is an acute condition with minimal symptoms as above nitrite positive on point-of-care urine analysis.  She recently had a Botox procedure approximately 2 weeks ago and states that she was prescribed Bactrim to take for 7 days for UTI.  Discussed typically for uncomplicated UTI that courses 3 days and with longer duration especially with the medication she is taking we should consider checking her potassium.  We will send for culture.  Gave return precautions.  Okay to start Bactrim.  - POCT Urinalysis  - URINALYSIS,CULTURE IF INDICATED; Future    3. Type 2 diabetes mellitus with other specified complication, without long-term current use of insulin (HCC)  Chronic, well controlled. Continue current management.   - A1C in 6 months. Discussed ideal glucose ranges.  - Annual labs ordered to complete in 12/2022.  - Retinopathy screening advised.  - POCT  A1C  - Comp Metabolic Panel; Future  - Lipid Profile; Future  - CBC WITH DIFFERENTIAL; Future  - glipiZIDE (GLUCOTROL) 5 MG Tab; Take 1 Tablet by mouth every day.  Dispense: 90 Tablet; Refill: 3    4. Hypertension associated with type 2 diabetes mellitus (HCC)  Chronic, well controlled. No changes to current treatment.  Annual labs due in December.  - telmisartan-hydrochlorothiazide (MICARDIS HCT) 40-12.5 MG per tablet; Take 1 Tablet by mouth every day.  Dispense: 90 Tablet; Refill: 3    5. Dyslipidemia associated with type 2 diabetes mellitus (HCC)  Chronic, well controlled. No changes to current treatment.  Repeat labs due in December.  - simvastatin (ZOCOR) 40 MG Tab; TAKE 1 TABLET BY MOUTH EVERY DAY IN THE EVENING  Dispense: 90 Tablet; Refill: 3    6. Need for hepatitis C screening test  Routine screening per USPTF.  - HEP C VIRUS ANTIBODY; Future    Return in about 3 months (around 12/13/2022), or if symptoms worsen or fail to improve, for Annual Medicare Visit.    Please note that this dictation was created using voice recognition software. I  have made every reasonable attempt to correct obvious errors, but I expect that there are errors of grammar and possibly content that I did not discover before finalizing the note.

## 2022-09-13 NOTE — ASSESSMENT & PLAN NOTE
This is a chronic condition.  Continues with glipizide 5 mg daily.    Lowest 112. Average 135-145.

## 2022-09-14 LAB
APPEARANCE UR: ABNORMAL
BACTERIA #/AREA URNS HPF: ABNORMAL /HPF
BILIRUB UR QL STRIP.AUTO: NEGATIVE
COLOR UR: YELLOW
EPI CELLS #/AREA URNS HPF: NEGATIVE /HPF
GLUCOSE UR STRIP.AUTO-MCNC: NEGATIVE MG/DL
HYALINE CASTS #/AREA URNS LPF: ABNORMAL /LPF
KETONES UR STRIP.AUTO-MCNC: NEGATIVE MG/DL
LEUKOCYTE ESTERASE UR QL STRIP.AUTO: ABNORMAL
MICRO URNS: ABNORMAL
NITRITE UR QL STRIP.AUTO: POSITIVE
PH UR STRIP.AUTO: 5.5 [PH] (ref 5–8)
PROT UR QL STRIP: NEGATIVE MG/DL
RBC # URNS HPF: ABNORMAL /HPF
RBC UR QL AUTO: ABNORMAL
SP GR UR STRIP.AUTO: 1.01
UROBILINOGEN UR STRIP.AUTO-MCNC: 0.2 MG/DL
WBC #/AREA URNS HPF: ABNORMAL /HPF

## 2022-10-17 ENCOUNTER — APPOINTMENT (RX ONLY)
Dept: URBAN - METROPOLITAN AREA CLINIC 4 | Facility: CLINIC | Age: 72
Setting detail: DERMATOLOGY
End: 2022-10-17

## 2022-10-17 DIAGNOSIS — L91.8 OTHER HYPERTROPHIC DISORDERS OF THE SKIN: ICD-10-CM

## 2022-10-17 DIAGNOSIS — L82.1 OTHER SEBORRHEIC KERATOSIS: ICD-10-CM

## 2022-10-17 PROCEDURE — 99202 OFFICE O/P NEW SF 15 MIN: CPT

## 2022-10-17 PROCEDURE — ? ADDITIONAL NOTES

## 2022-10-17 PROCEDURE — ? COUNSELING

## 2022-10-17 PROCEDURE — ? BENIGN DESTRUCTION COSMETIC

## 2022-10-17 ASSESSMENT — LOCATION DETAILED DESCRIPTION DERM
LOCATION DETAILED: LEFT MID TEMPLE
LOCATION DETAILED: LEFT SUPERIOR LATERAL MALAR CHEEK
LOCATION DETAILED: RIGHT DISTAL POSTERIOR UPPER ARM
LOCATION DETAILED: RIGHT SUPERIOR CENTRAL MALAR CHEEK
LOCATION DETAILED: LEFT LATERAL EYEBROW
LOCATION DETAILED: RIGHT CENTRAL MALAR CHEEK
LOCATION DETAILED: RIGHT SUPERIOR LATERAL MALAR CHEEK
LOCATION DETAILED: LEFT INFERIOR TEMPLE
LOCATION DETAILED: LEFT SUPERIOR CENTRAL MALAR CHEEK
LOCATION DETAILED: LEFT CENTRAL EYEBROW
LOCATION DETAILED: RIGHT LATERAL EYEBROW
LOCATION DETAILED: RIGHT INFERIOR TEMPLE

## 2022-10-17 ASSESSMENT — LOCATION SIMPLE DESCRIPTION DERM
LOCATION SIMPLE: RIGHT EYEBROW
LOCATION SIMPLE: LEFT EYEBROW
LOCATION SIMPLE: LEFT TEMPLE
LOCATION SIMPLE: RIGHT TEMPLE
LOCATION SIMPLE: LEFT CHEEK
LOCATION SIMPLE: RIGHT CHEEK
LOCATION SIMPLE: RIGHT POSTERIOR UPPER ARM

## 2022-10-17 ASSESSMENT — LOCATION ZONE DERM
LOCATION ZONE: FACE
LOCATION ZONE: ARM

## 2022-10-17 NOTE — PROCEDURE: ADDITIONAL NOTES
Additional Notes: Patient reports being pre-diabetic, but is checked yearly by her PCP. Patient declines pathology, but understands the associated risks.
Detail Level: Detailed
Render Risk Assessment In Note?: no
Additional Notes: Patient should return for full body skin check.

## 2022-10-17 NOTE — PROCEDURE: BENIGN DESTRUCTION COSMETIC
Consent: The patient's consent was obtained including but not limited to risks of crusting, scabbing, blistering, scarring, darker or lighter pigmentary change, recurrence, incomplete removal and infection.
Post-Care Instructions: I reviewed with the patient in detail post-care instructions. Patient is to wear sunprotection, and avoid picking at any of the treated lesions. Pt may apply Vaseline to crusted or scabbing areas.
Detail Level: Detailed
Anesthesia Volume In Cc: 0.5
Price (Use Numbers Only, No Special Characters Or $): 125.00

## 2022-11-07 ENCOUNTER — PATIENT MESSAGE (OUTPATIENT)
Dept: HEALTH INFORMATION MANAGEMENT | Facility: OTHER | Age: 72
End: 2022-11-07

## 2022-11-16 ENCOUNTER — APPOINTMENT (RX ONLY)
Dept: URBAN - METROPOLITAN AREA CLINIC 4 | Facility: CLINIC | Age: 72
Setting detail: DERMATOLOGY
End: 2022-11-16

## 2022-11-16 DIAGNOSIS — L90.5 SCAR CONDITIONS AND FIBROSIS OF SKIN: ICD-10-CM

## 2022-11-16 DIAGNOSIS — L81.4 OTHER MELANIN HYPERPIGMENTATION: ICD-10-CM

## 2022-11-16 DIAGNOSIS — L82.1 OTHER SEBORRHEIC KERATOSIS: ICD-10-CM

## 2022-11-16 DIAGNOSIS — D22 MELANOCYTIC NEVI: ICD-10-CM

## 2022-11-16 DIAGNOSIS — Z71.89 OTHER SPECIFIED COUNSELING: ICD-10-CM

## 2022-11-16 DIAGNOSIS — D18.0 HEMANGIOMA: ICD-10-CM

## 2022-11-16 PROBLEM — D18.01 HEMANGIOMA OF SKIN AND SUBCUTANEOUS TISSUE: Status: ACTIVE | Noted: 2022-11-16

## 2022-11-16 PROBLEM — D22.5 MELANOCYTIC NEVI OF TRUNK: Status: ACTIVE | Noted: 2022-11-16

## 2022-11-16 PROCEDURE — ? COUNSELING

## 2022-11-16 PROCEDURE — 99213 OFFICE O/P EST LOW 20 MIN: CPT

## 2022-11-16 ASSESSMENT — LOCATION ZONE DERM
LOCATION ZONE: TRUNK
LOCATION ZONE: ARM
LOCATION ZONE: FACE

## 2022-11-16 ASSESSMENT — LOCATION DETAILED DESCRIPTION DERM
LOCATION DETAILED: RIGHT INFERIOR MEDIAL UPPER BACK
LOCATION DETAILED: EPIGASTRIC SKIN
LOCATION DETAILED: RIGHT CENTRAL MALAR CHEEK
LOCATION DETAILED: RIGHT SUPERIOR MEDIAL UPPER BACK
LOCATION DETAILED: UPPER STERNUM
LOCATION DETAILED: MIDDLE STERNUM
LOCATION DETAILED: RIGHT MEDIAL SUPERIOR CHEST
LOCATION DETAILED: RIGHT DISTAL POSTERIOR UPPER ARM
LOCATION DETAILED: RIGHT INFRAMAMMARY CREASE (INNER QUADRANT)
LOCATION DETAILED: RIGHT FOREHEAD
LOCATION DETAILED: LEFT DISTAL POSTERIOR UPPER ARM
LOCATION DETAILED: RIGHT MEDIAL UPPER BACK
LOCATION DETAILED: LEFT LATERAL SUPERIOR CHEST

## 2022-11-16 ASSESSMENT — LOCATION SIMPLE DESCRIPTION DERM
LOCATION SIMPLE: RIGHT UPPER BACK
LOCATION SIMPLE: CHEST
LOCATION SIMPLE: ABDOMEN
LOCATION SIMPLE: LEFT POSTERIOR UPPER ARM
LOCATION SIMPLE: RIGHT FOREHEAD
LOCATION SIMPLE: RIGHT POSTERIOR UPPER ARM
LOCATION SIMPLE: RIGHT CHEEK
LOCATION SIMPLE: RIGHT BREAST

## 2022-12-07 ENCOUNTER — OFFICE VISIT (OUTPATIENT)
Dept: MEDICAL GROUP | Facility: PHYSICIAN GROUP | Age: 72
End: 2022-12-07
Payer: MEDICARE

## 2022-12-07 VITALS
SYSTOLIC BLOOD PRESSURE: 128 MMHG | HEIGHT: 63 IN | TEMPERATURE: 98.3 F | HEART RATE: 88 BPM | DIASTOLIC BLOOD PRESSURE: 70 MMHG | WEIGHT: 195 LBS | BODY MASS INDEX: 34.55 KG/M2 | RESPIRATION RATE: 20 BRPM | OXYGEN SATURATION: 95 %

## 2022-12-07 DIAGNOSIS — I15.2 HYPERTENSION ASSOCIATED WITH TYPE 2 DIABETES MELLITUS (HCC): ICD-10-CM

## 2022-12-07 DIAGNOSIS — K59.00 CONSTIPATION, UNSPECIFIED CONSTIPATION TYPE: ICD-10-CM

## 2022-12-07 DIAGNOSIS — E11.59 HYPERTENSION ASSOCIATED WITH TYPE 2 DIABETES MELLITUS (HCC): ICD-10-CM

## 2022-12-07 DIAGNOSIS — E55.9 VITAMIN D DEFICIENCY: ICD-10-CM

## 2022-12-07 PROBLEM — R94.2 ABNORMAL PULMONARY FUNCTION TEST: Status: ACTIVE | Noted: 2022-12-07

## 2022-12-07 PROCEDURE — 99214 OFFICE O/P EST MOD 30 MIN: CPT | Performed by: STUDENT IN AN ORGANIZED HEALTH CARE EDUCATION/TRAINING PROGRAM

## 2022-12-07 ASSESSMENT — FIBROSIS 4 INDEX: FIB4 SCORE: 1.17

## 2022-12-07 NOTE — PROGRESS NOTES
Subjective:     Chief Complaint   Patient presents with    Medication Refill     Micardis     Follow-Up     HPI:   Tena presents today for follow up.    Medication list reviewed.  Happy to report that she is followed by urology and had Botox with great improvement in her chronic urinary symptoms.  She does report continued constipation (not firm but sometimes difficult and will not have a BM for a few days) and wonders if Metamucil would be helpful.  Denies any abdominal pain or blood in the stool.    Patient has not completed blood work but wondering if we can add on vitamin D to her next labs.    Current Outpatient Medications Ordered in Epic   Medication Sig Dispense Refill    telmisartan-hydrochlorothiazide (MICARDIS HCT) 40-12.5 MG per tablet Take 1 Tablet by mouth every day. 90 Tablet 3    simvastatin (ZOCOR) 40 MG Tab TAKE 1 TABLET BY MOUTH EVERY DAY IN THE EVENING 90 Tablet 3    glipiZIDE (GLUCOTROL) 5 MG Tab Take 1 Tablet by mouth every day. 90 Tablet 3    Multiple Vitamin (MULTIVITAMIN ADULT PO) Take  by mouth every day.      VITAMIN D PO Take  by mouth every day.      acetaminophen (TYLENOL) 500 MG Tab Take 500-1,000 mg by mouth every 6 hours as needed.      multivitamin (THERAGRAN) Tab Take 1 Tablet by mouth every day.      omeprazole (PRILOSEC) 40 MG delayed-release capsule Take 40 mg by mouth every day.      Blood Glucose Test Strips Use one strip to test blood sugar once daily early morning before first meal. 300 Strip 11     No current [facility]-ordered facility-administered medications on file.     ROS:  Gen: no fevers/chills, no changes in weight  Eyes: no changes in vision  ENT: no sore throat  Pulm: no sob, cough in the mornings  CV: no chest pain, no palpitations  GI: no nausea/vomiting, no diarrhea  : no dysuria  MSk: no myalgias  Skin: no rash  Neuro: no headaches, no numbness/tingling  Heme/Lymph: no easy bruising    Objective:     Exam:  /70   Pulse 88   Temp 36.8 °C (98.3 °F)  "(Temporal)   Resp 20   Ht 1.6 m (5' 3\")   Wt 88.5 kg (195 lb)   SpO2 95%   BMI 34.54 kg/m²  Body mass index is 34.54 kg/m².    Physical Exam:  Constitutional: Well-developed and well-nourished. No acute distress.   Skin: Skin is warm and dry. No rash noted.  Head: Atraumatic without lesions.  Eyes: Conjunctivae and extraocular motions are normal. Pupils are equal, round. No scleral icterus.   Mouth/Throat: Wearing mask.  Neck: Supple, trachea midline. Normal range of motion. No thyromegaly present. No lymphadenopathy--cervical or supraclavicular.  Cardiovascular: Regular rate and rhythm, S1 and S2 without murmur, rubs, or gallops.  Lungs: Normal inspiratory effort, CTA bilaterally, no wheezes/rhonchi/rales  Abdomen: Soft, mild general lower (more so on right) and right mid tpp, and without distention. Active bowel sounds. No rebound, guarding, masses or HSM.  Extremities: No cyanosis, clubbing, erythema, nor edema.  Neurological: Alert and oriented x 3. No gross/focal deficits.  Psychiatric:  Behavior, mood, and affect are appropriate.    Labs: Reviewed from 9/13/2022, 12/8/2021    Assessment & Plan:     72 y.o. female with the following -     1. Constipation, unspecified constipation type  Chronic, reviewed colonoscopy and instructions from GI 8/2021. Metamucil ok to try or citrucel power 1 tablespoon in 8oz of water, 30gm of fiber/day, plenty of water and exercise. Check TSH with next labs.  - TSH WITH REFLEX TO FT4; Future    2. Vitamin D deficiency  Resolved, but last checked in 2016 and patient would like to monitor. Added to existing labs.  - VITAMIN D,25 HYDROXY (DEFICIENCY); Future    3. Hypertension associated with type 2 diabetes mellitus (HCC)  Chronic, well controlled. Continue micardis HCT 40-12.5mg daily, already has refills. Discussed ideal ranges and return precautions.    Return in about 4 weeks (around 1/4/2023), or if symptoms worsen or fail to improve, for bowel follow up and labs.    Please " note that this dictation was created using voice recognition software. I have made every reasonable attempt to correct obvious errors, but I expect that there are errors of grammar and possibly content that I did not discover before finalizing the note.

## 2022-12-07 NOTE — PATIENT INSTRUCTIONS
Due for TDaP    Labs from September and thyroid/Vitamin D added today (make sure they do all)    Metamucil ok to try or citrucel power 1 tablespoon in 8oz of water, 30gm of fiber/day, plenty of water and EXERCISE    Ideal glucose ranges: fasting , random (1-2 hours after eating) <180. If glucose <70, we need to adjust your medication to prevent continuing low blood sugar.    Ideal blood pressure <130/80 and at least <140/90.

## 2022-12-27 ENCOUNTER — HOSPITAL ENCOUNTER (OUTPATIENT)
Dept: LAB | Facility: MEDICAL CENTER | Age: 72
End: 2022-12-27
Attending: STUDENT IN AN ORGANIZED HEALTH CARE EDUCATION/TRAINING PROGRAM
Payer: MEDICARE

## 2022-12-27 DIAGNOSIS — Z11.59 NEED FOR HEPATITIS C SCREENING TEST: ICD-10-CM

## 2022-12-27 DIAGNOSIS — E55.9 VITAMIN D DEFICIENCY: ICD-10-CM

## 2022-12-27 DIAGNOSIS — E11.69 TYPE 2 DIABETES MELLITUS WITH OTHER SPECIFIED COMPLICATION, WITHOUT LONG-TERM CURRENT USE OF INSULIN (HCC): ICD-10-CM

## 2022-12-27 DIAGNOSIS — K59.00 CONSTIPATION, UNSPECIFIED CONSTIPATION TYPE: ICD-10-CM

## 2022-12-27 LAB
25(OH)D3 SERPL-MCNC: 56 NG/ML (ref 30–100)
ALBUMIN SERPL BCP-MCNC: 4.3 G/DL (ref 3.2–4.9)
ALBUMIN/GLOB SERPL: 1.4 G/DL
ALP SERPL-CCNC: 92 U/L (ref 30–99)
ALT SERPL-CCNC: 16 U/L (ref 2–50)
ANION GAP SERPL CALC-SCNC: 13 MMOL/L (ref 7–16)
AST SERPL-CCNC: 16 U/L (ref 12–45)
BASOPHILS # BLD AUTO: 0.7 % (ref 0–1.8)
BASOPHILS # BLD: 0.06 K/UL (ref 0–0.12)
BILIRUB SERPL-MCNC: 0.4 MG/DL (ref 0.1–1.5)
BUN SERPL-MCNC: 24 MG/DL (ref 8–22)
CALCIUM ALBUM COR SERPL-MCNC: 9.2 MG/DL (ref 8.5–10.5)
CALCIUM SERPL-MCNC: 9.4 MG/DL (ref 8.5–10.5)
CHLORIDE SERPL-SCNC: 104 MMOL/L (ref 96–112)
CHOLEST SERPL-MCNC: 142 MG/DL (ref 100–199)
CO2 SERPL-SCNC: 24 MMOL/L (ref 20–33)
CREAT SERPL-MCNC: 0.99 MG/DL (ref 0.5–1.4)
EOSINOPHIL # BLD AUTO: 0.54 K/UL (ref 0–0.51)
EOSINOPHIL NFR BLD: 5.9 % (ref 0–6.9)
ERYTHROCYTE [DISTWIDTH] IN BLOOD BY AUTOMATED COUNT: 45.4 FL (ref 35.9–50)
FASTING STATUS PATIENT QL REPORTED: NORMAL
GFR SERPLBLD CREATININE-BSD FMLA CKD-EPI: 60 ML/MIN/1.73 M 2
GLOBULIN SER CALC-MCNC: 3.1 G/DL (ref 1.9–3.5)
GLUCOSE SERPL-MCNC: 145 MG/DL (ref 65–99)
HCT VFR BLD AUTO: 42 % (ref 37–47)
HCV AB SER QL: NORMAL
HDLC SERPL-MCNC: 40 MG/DL
HGB BLD-MCNC: 13.5 G/DL (ref 12–16)
IMM GRANULOCYTES # BLD AUTO: 0.04 K/UL (ref 0–0.11)
IMM GRANULOCYTES NFR BLD AUTO: 0.4 % (ref 0–0.9)
LDLC SERPL CALC-MCNC: 78 MG/DL
LYMPHOCYTES # BLD AUTO: 2.49 K/UL (ref 1–4.8)
LYMPHOCYTES NFR BLD: 27 % (ref 22–41)
MCH RBC QN AUTO: 30.5 PG (ref 27–33)
MCHC RBC AUTO-ENTMCNC: 32.1 G/DL (ref 33.6–35)
MCV RBC AUTO: 95 FL (ref 81.4–97.8)
MONOCYTES # BLD AUTO: 0.77 K/UL (ref 0–0.85)
MONOCYTES NFR BLD AUTO: 8.4 % (ref 0–13.4)
NEUTROPHILS # BLD AUTO: 5.32 K/UL (ref 2–7.15)
NEUTROPHILS NFR BLD: 57.6 % (ref 44–72)
NRBC # BLD AUTO: 0 K/UL
NRBC BLD-RTO: 0 /100 WBC
PLATELET # BLD AUTO: 333 K/UL (ref 164–446)
PMV BLD AUTO: 11.2 FL (ref 9–12.9)
POTASSIUM SERPL-SCNC: 4.1 MMOL/L (ref 3.6–5.5)
PROT SERPL-MCNC: 7.4 G/DL (ref 6–8.2)
RBC # BLD AUTO: 4.42 M/UL (ref 4.2–5.4)
SODIUM SERPL-SCNC: 141 MMOL/L (ref 135–145)
TRIGL SERPL-MCNC: 118 MG/DL (ref 0–149)
TSH SERPL DL<=0.005 MIU/L-ACNC: 2.53 UIU/ML (ref 0.38–5.33)
WBC # BLD AUTO: 9.2 K/UL (ref 4.8–10.8)

## 2022-12-27 PROCEDURE — 86803 HEPATITIS C AB TEST: CPT

## 2022-12-27 PROCEDURE — 80053 COMPREHEN METABOLIC PANEL: CPT

## 2022-12-27 PROCEDURE — 36415 COLL VENOUS BLD VENIPUNCTURE: CPT

## 2022-12-27 PROCEDURE — 85025 COMPLETE CBC W/AUTO DIFF WBC: CPT

## 2022-12-27 PROCEDURE — 84443 ASSAY THYROID STIM HORMONE: CPT

## 2022-12-27 PROCEDURE — 80061 LIPID PANEL: CPT

## 2022-12-27 PROCEDURE — 82306 VITAMIN D 25 HYDROXY: CPT

## 2023-01-05 ENCOUNTER — APPOINTMENT (OUTPATIENT)
Dept: MEDICAL GROUP | Facility: PHYSICIAN GROUP | Age: 73
End: 2023-01-05
Payer: MEDICARE

## 2023-01-18 ENCOUNTER — OFFICE VISIT (OUTPATIENT)
Dept: MEDICAL GROUP | Facility: PHYSICIAN GROUP | Age: 73
End: 2023-01-18
Payer: MEDICARE

## 2023-01-18 VITALS
OXYGEN SATURATION: 93 % | HEIGHT: 63 IN | RESPIRATION RATE: 20 BRPM | TEMPERATURE: 97 F | DIASTOLIC BLOOD PRESSURE: 64 MMHG | SYSTOLIC BLOOD PRESSURE: 118 MMHG | WEIGHT: 194 LBS | HEART RATE: 87 BPM | BODY MASS INDEX: 34.38 KG/M2

## 2023-01-18 DIAGNOSIS — K59.00 CONSTIPATION, UNSPECIFIED CONSTIPATION TYPE: ICD-10-CM

## 2023-01-18 DIAGNOSIS — R06.09 DYSPNEA ON EXERTION: ICD-10-CM

## 2023-01-18 DIAGNOSIS — E11.69 DYSLIPIDEMIA ASSOCIATED WITH TYPE 2 DIABETES MELLITUS (HCC): ICD-10-CM

## 2023-01-18 DIAGNOSIS — R94.2 ABNORMAL PULMONARY FUNCTION TEST: ICD-10-CM

## 2023-01-18 DIAGNOSIS — E11.69 TYPE 2 DIABETES MELLITUS WITH OTHER SPECIFIED COMPLICATION, WITHOUT LONG-TERM CURRENT USE OF INSULIN (HCC): ICD-10-CM

## 2023-01-18 DIAGNOSIS — E78.5 DYSLIPIDEMIA ASSOCIATED WITH TYPE 2 DIABETES MELLITUS (HCC): ICD-10-CM

## 2023-01-18 PROCEDURE — 99214 OFFICE O/P EST MOD 30 MIN: CPT | Performed by: STUDENT IN AN ORGANIZED HEALTH CARE EDUCATION/TRAINING PROGRAM

## 2023-01-18 ASSESSMENT — FIBROSIS 4 INDEX: FIB4 SCORE: 0.86

## 2023-01-18 ASSESSMENT — PATIENT HEALTH QUESTIONNAIRE - PHQ9: CLINICAL INTERPRETATION OF PHQ2 SCORE: 0

## 2023-01-18 NOTE — PATIENT INSTRUCTIONS
Ideal glucose ranges: fasting , random (1-2 hours after eating) <180. If glucose <70, we need to adjust your medication to prevent continuing low blood sugar.    Ideal blood pressure <130/80 and at least <140/90.    Due for tetanus, COVID booster

## 2023-01-18 NOTE — PROGRESS NOTES
Subjective:     Chief Complaint   Patient presents with    Lab Results    GI Problem     HPI:   Tena presents today to discuss lab results and follow-up on GI issues.    Patient reports that constipation improved when she started drinking coffee recently.  Does report that this seems to flareup her heartburn however.  Drinks coffee every few days which seems to help with her constipation.  States that she did trial MiraLAX tablets and took 2 but did not notice a benefit so not currently taking.  Reports an activity and often not a lot of fiber in the diet.  Admits she could be better about hydration.  States that when she does eat things like salad it will 'clean her out'.    Regarding her diabetes her blood sugars varies between a minimum of 107 the other day fasting and as high as 138 after some dietary indiscretion (potatoes with ketchup and waffles with syrup)    Acute on chronic cramping chest/torso pain, lasts a few seconds/minutes. Notes with less hydration. Reports PIMENTEL, chronic and unchanged-notes with lifting things, walking no problems.    Current Outpatient Medications Ordered in Epic   Medication Sig Dispense Refill    telmisartan-hydrochlorothiazide (MICARDIS HCT) 40-12.5 MG per tablet Take 1 Tablet by mouth every day. 90 Tablet 3    simvastatin (ZOCOR) 40 MG Tab TAKE 1 TABLET BY MOUTH EVERY DAY IN THE EVENING 90 Tablet 3    glipiZIDE (GLUCOTROL) 5 MG Tab Take 1 Tablet by mouth every day. 90 Tablet 3    Multiple Vitamin (MULTIVITAMIN ADULT PO) Take  by mouth every day.      acetaminophen (TYLENOL) 500 MG Tab Take 500-1,000 mg by mouth every 6 hours as needed.      multivitamin (THERAGRAN) Tab Take 1 Tablet by mouth every day.      omeprazole (PRILOSEC) 40 MG delayed-release capsule Take 40 mg by mouth every day.      Blood Glucose Test Strips Use one strip to test blood sugar once daily early morning before first meal. 300 Strip 11     No current [facility]-ordered facility-administered medications on file.  "    ROS:  Gen: no fevers/chills, no changes in weight  Eyes: no changes in vision  ENT: no sore throat, no hearing loss, no bloody nose  Pulm: no sob at rest, cough in the morning  CV: no chest pain on exertion, no palpitations  GI: no nausea/vomiting, no diarrhea  MSk: no myalgias  Skin: no rash  Neuro: no headaches  Heme/Lymph: no easy bruising    Objective:     Exam:  /64 (BP Location: Left arm, Patient Position: Sitting, BP Cuff Size: Adult long)   Pulse 87   Temp 36.1 °C (97 °F) (Temporal)   Resp 20   Ht 1.6 m (5' 3\")   Wt 88 kg (194 lb)   SpO2 93%   BMI 34.37 kg/m²  Body mass index is 34.37 kg/m².    Physical Exam:  Constitutional: Well-developed and well-nourished. No acute distress.   Skin: Skin is warm and dry. No rash noted.  Head: Atraumatic without lesions.  Eyes: Conjunctivae and extraocular motions are normal. Pupils are equal, round. No scleral icterus.   Mouth/Throat: Wearing mask.  Neck: Supple, trachea midline.  Cardiovascular: Regular rate and rhythm, S1 and S2 without murmur, rubs, or gallops.  Lungs: Normal inspiratory effort, CTA bilaterally, no wheezes/rhonchi/rales  Extremities: No cyanosis, clubbing, erythema, nor edema.  Musculoskeletal: No back or chest wall ttp.  Neurological: Alert and oriented x 3. No gross/focal deficits.  Psychiatric:  Behavior, mood, and affect are appropriate.    Labs: Reviewed from 12/27/2022  Imaging: Reviewed from 2/26/2021 CT chest  Interpretation:  Baseline spirometry shows 1/FVC ratio of 85 and FVC of 1.97 L or 67% predicted.  There is trend toward bronchodilator response but does not meet ATS criteria.  Total lung capacity is reduced at 3.56 L 68% predicted.  Diffusion capacity is preserved at 18.5 mL/min/mmHg or 95% predicted.  Pulmonary function testing shows restrictive defect with preserved DLCO.  This could be early interstitial lung disease versus other extrapulmonary restriction given normal DLCO.  Correlate clinically and with " imaging.    Assessment & Plan:     72 y.o. female with the following -     1. Constipation, unspecified constipation type  Chronic, improved. Stressed exercise, hydration and fiber in diet (seems most helpful).  Coffee helpful, but patient to be cautious to not flare up GERD.    2. Dyslipidemia associated with type 2 diabetes mellitus (HCC)  Chronic, well controlled. Continue zocor 40mg daily.    3. Type 2 diabetes mellitus with other specified complication, without long-term current use of insulin (HCC)  Chronic, well controlled. Continue glipizide 5mg daily and stressed importance of DM diet. Exercise encouraged.   - A1C in 6 months after last. Discussed ideal glucose ranges.    4. Dyspnea on exertion  5. Abnormal pulmonary function test  Chronic, stable. Encouraged exercise as tolerated. Has PFTs and f/u with pulmonary in the next few months.     Return in about 2 months (around 3/18/2023), or if symptoms worsen or fail to improve, for Diabetes, Annual Medicare Visit.    Please note that this dictation was created using voice recognition software. I have made every reasonable attempt to correct obvious errors, but I expect that there are errors of grammar and possibly content that I did not discover before finalizing the note.

## 2023-02-24 ENCOUNTER — NON-PROVIDER VISIT (OUTPATIENT)
Dept: SLEEP MEDICINE | Facility: MEDICAL CENTER | Age: 73
End: 2023-02-24
Attending: INTERNAL MEDICINE
Payer: MEDICARE

## 2023-02-24 VITALS — WEIGHT: 200.8 LBS | BODY MASS INDEX: 33.45 KG/M2 | HEIGHT: 65 IN

## 2023-02-24 DIAGNOSIS — J96.91 RESPIRATORY FAILURE WITH HYPOXIA, UNSPECIFIED CHRONICITY (HCC): ICD-10-CM

## 2023-02-24 DIAGNOSIS — U07.1 COVID-19 VIRUS INFECTION: ICD-10-CM

## 2023-02-24 PROCEDURE — 94726 PLETHYSMOGRAPHY LUNG VOLUMES: CPT | Performed by: INTERNAL MEDICINE

## 2023-02-24 PROCEDURE — 94060 EVALUATION OF WHEEZING: CPT | Performed by: INTERNAL MEDICINE

## 2023-02-24 PROCEDURE — 94726 PLETHYSMOGRAPHY LUNG VOLUMES: CPT | Mod: 26 | Performed by: INTERNAL MEDICINE

## 2023-02-24 PROCEDURE — 94729 DIFFUSING CAPACITY: CPT | Mod: 26 | Performed by: INTERNAL MEDICINE

## 2023-02-24 PROCEDURE — 94060 EVALUATION OF WHEEZING: CPT | Mod: 26 | Performed by: INTERNAL MEDICINE

## 2023-02-24 ASSESSMENT — PULMONARY FUNCTION TESTS
FEV1: 1.85
FEV1/FVC: 87
FEV1_PREDICTED: 2.22
FEV1_PERCENT_PREDICTED: 76
FVC_PREDICTED: 2.89
FEV1/FVC_PERCENT_PREDICTED: 112
FEV1/FVC_PERCENT_PREDICTED: 103
FEV1/FVC: 87
FEV1/FVC: 80.95
FEV1: 1.7
FEV1/FVC_PERCENT_CHANGE: -7
FVC_LLN: 2.09
FEV1_LLN: 1.61
FEV1/FVC_PREDICTED: 78
FEV1/FVC_PERCENT_PREDICTED: 106
FEV1/FVC_PERCENT_PREDICTED: 77
FEV1/FVC: 81
FVC_PERCENT_PREDICTED: 73
FVC: 2.1
FVC_PERCENT_PREDICTED: 72
FVC: 2.12
FEV1_PERCENT_PREDICTED: 83
FEV1_PERCENT_CHANGE: 0
FEV1_PERCENT_CHANGE: -8
FEV1/FVC_PERCENT_LLN: 64
FEV1/FVC_PERCENT_PREDICTED: 114

## 2023-02-24 ASSESSMENT — FIBROSIS 4 INDEX: FIB4 SCORE: 0.86

## 2023-02-24 NOTE — PROCEDURES
Tech: Della Sosa, RT  Good patient effort & cooperation.  Test was performed on the [employer] Body Plethysmograph- Elite DX system.  The predicted sets used for Spirometry are GLI-2012, for Lung Volumes are ITS, and for DLCO is GLI 2017.  The DLCO was uncorrected for Hb.  A bronchodilator of Ventolin HFA 2 puffs via spacer was administered.  DLCO was performed during dilation period.  Patient states it hurts to blast breath out and continue to blow air out.  During pleth patient stated the pain was back when the valve closed.  Best effort on Pre FVC.    Interpretation  Please note that these PFTs do not meet the ATS criteria for reproducibility or acceptability    1.  There is no obstruction  2.  There is no significant bronchodilator response  3.  There is mild restriction, TLC is 3.70/71%  4.  DLCO is normal  5.  The flow volume loop is difficult to interpret due to lack of reproducibility or acceptability    Consider repeat if clinically indicated    Savannah Garcia, DO   Pulmonary and Critical Care

## 2023-03-01 ENCOUNTER — APPOINTMENT (OUTPATIENT)
Dept: HEMATOLOGY ONCOLOGY | Facility: MEDICAL CENTER | Age: 73
End: 2023-03-01
Payer: MEDICARE

## 2023-03-07 ENCOUNTER — HOSPITAL ENCOUNTER (OUTPATIENT)
Dept: HEMATOLOGY ONCOLOGY | Facility: MEDICAL CENTER | Age: 73
End: 2023-03-07
Attending: INTERNAL MEDICINE
Payer: MEDICARE

## 2023-03-07 VITALS
RESPIRATION RATE: 16 BRPM | TEMPERATURE: 98 F | WEIGHT: 199.19 LBS | SYSTOLIC BLOOD PRESSURE: 120 MMHG | DIASTOLIC BLOOD PRESSURE: 60 MMHG | HEIGHT: 65 IN | HEART RATE: 73 BPM | OXYGEN SATURATION: 96 % | BODY MASS INDEX: 33.19 KG/M2

## 2023-03-07 DIAGNOSIS — Z85.3 HISTORY OF BREAST CANCER: ICD-10-CM

## 2023-03-07 PROCEDURE — 99212 OFFICE O/P EST SF 10 MIN: CPT | Performed by: INTERNAL MEDICINE

## 2023-03-07 PROCEDURE — 99215 OFFICE O/P EST HI 40 MIN: CPT | Performed by: INTERNAL MEDICINE

## 2023-03-07 ASSESSMENT — FIBROSIS 4 INDEX: FIB4 SCORE: 0.88

## 2023-03-07 ASSESSMENT — PAIN SCALES - GENERAL: PAINLEVEL: 5=MODERATE PAIN

## 2023-03-07 NOTE — PROGRESS NOTES
Tena Romo is a 72 y.o. female who presents with Breast Cancer (1 Yr fv)        HPI     Patient seen today in follow-up for bilateral breast cancer.  She presents unaccompanied for today's visit.     Breast Cancer History  Patient was diagnosed with two- stage IA primary left sided breast cancers (left medial and left medial/lateral locations) and right-sided DCIS. She is status post bilateral mastectomy in October 26, 2015, with completion of 5 years on Arimidex. Previous patient of Dr. Gifford.     #1-Left medial showed 0.5 cm moderately differentiated invasive ductal carcinoma, margins positive medial, grade 2, ER 99%, WV 97%, Ki67 26%, HER2 by IHC [id number].     #2-Left medial/lateral showed invasive ductal carcinoma measuring about 0.7 cm, grade 2 moderate differentiated, margins negative,distance from closest margin 0.3 cm from superior and inferior.  [id number]. ER 99%, %, Her-2 was not completed, margins clear, 2 lymph nodes negative.     Established at [facility] Feb 2019, and per Dr. Tinoco Oncotype DX was requested on 11/3/2015 and not sent.  Dr. Tinoco did discuss with patient at initial visit in Feb 2019 to send out an Oncotype however patient refused.  She was started on anastrozole 1 mg p.o. daily on December 3, 2015, with recommendation to complete 5 years of treatment. Patient did discontinue Arimidex December 4, 2020.     Last DEXA scan completed on 3/22/2021 shows bone mineral density within normal limits.  Patient had been recommended to take vitamin D and calcium supplements while on aromatase inhibitor.     Interval History  Patient seen today for annual visit.  Patient she is doing well.  She does note some intermittent pain under the right axilla area as well as some chest pain in that anterior chest from surgery.  These pains are very intermittent.  She also notes some heartburn when she takes an antacid before she eats with positive results.  Patient also noted to have  incontinence of urine and she has been seen by various providers and on medication with minimal relief.  She does use depends as needed.  She did fall back in December, slipped on the ice and broke her right wrist and is in a brace at this time.  Otherwise no other concerns.    Interval history 3/7/2023: Generally done well over the past year.  She has however over the last couple of months noted some sharp pains in the left anterior lateral chest in the ribs without history of trauma.  She has no swelling in this area.  She never received radiation therapy.  She also has dyspnea on exertion which is slowly been getting worse since COVID 2 years ago.           Allergies   Allergen Reactions    Aspirin         Intolerance       Cipro Xr Hives    Diclofenac      Metformin Rash       Erythematous rash             Current Outpatient Medications on File Prior to Visit   Medication Sig Dispense Refill    acetaminophen (TYLENOL) 500 MG Tab Take 500-1,000 mg by mouth every 6 hours as needed.        ibuprofen (MOTRIN) 200 MG Tab Take 200 mg by mouth every 6 hours as needed.        Mirabegron ER (MYRBETRIQ) 25 MG TABLET SR 24 HR Take  by mouth.        glipiZIDE (GLUCOTROL) 5 MG Tab Take 2.5-5 mg by mouth every day. Patient takes 2.5 or 5 mg daily randomly        multivitamin (THERAGRAN) Tab Take 1 Tablet by mouth every day.        omeprazole (PRILOSEC) 40 MG delayed-release capsule Take 40 mg by mouth every day.        simvastatin (ZOCOR) 40 MG Tab TAKE 1 TABLET BY MOUTH EVERY DAY IN THE EVENING 90 tablet 3    telmisartan-hydrochlorothiazide (MICARDIS HCT) 40-12.5 MG per tablet Take 1 tablet by mouth every day. 90 tablet 3    Blood Glucose Test Strips Use one strip to test blood sugar once daily early morning before first meal. 300 Strip 11    Lancets Use one  lancet to test blood sugar once daily early morning before first meal. 300 Each 11      No current facility-administered medications on file prior to visit.           "  Review of Systems   Constitutional: Negative for chills, diaphoresis, fever, malaise/fatigue and weight loss.   Respiratory: Negative for cough and shortness of breath.    Chest positive for left anterior lateral rib pain negative for palpitations.   Gastrointestinal: Positive for heartburn (anti-acid before she eats with positive results). Negative for constipation, diarrhea, nausea and vomiting.   Genitourinary: Negative for dysuria.        Incontinence - on medication for this from PCP   Musculoskeletal: negative. Negative for myalgias.   Neurological: Negative for dizziness and headaches.                     Objective         /89   Pulse 76   Temp 37.1 °C (98.7 °F) (Temporal)   Resp 18   Ht 1.6 m (5' 3\")   Wt 86.4 kg (190 lb 7.6 oz)   SpO2 95%   BMI 33.74 kg/m²       Physical Exam  Vitals reviewed.   Constitutional:       General: She is not in acute distress.     Appearance: Normal appearance. She is not diaphoretic.   HENT:      Head: Normocephalic and atraumatic.   Cardiovascular:      Rate and Rhythm: Normal rate and regular rhythm.      Heart sounds: Normal heart sounds. No murmur heard.    No friction rub. No gallop.   Pulmonary:      Effort: Pulmonary effort is normal. No respiratory distress.      Breath sounds: Normal breath sounds. No wheezing.   Chest:   Breasts:      Lateral mastectomies without nodularity or erythema.  There is a questionable one by one centimeter left supraclavicular lymph node.  No axillary adenopathy is noted.  Abdominal:      General: Bowel sounds are normal. There is no distension.      Palpations: Abdomen is soft.      Tenderness: There is no abdominal tenderness.   Musculoskeletal:         General: There is significant point tenderness over the anterolateral left ribs at around rib 7 or 8.  This causes wincing.  No other skeletal pain to percussion.    Skin:     General: Skin is warm and dry.   Neurological:      Mental Status: She is alert and oriented to " person, place, and time.   Psychiatric:         Mood and Affect: Mood normal.         Behavior: Behavior normal.                DS-BONE DENSITY STUDY (DEXA)  03/22/21     IMPRESSION:     According to the World Health Organization classification, bone mineral density of this patient is normal in the lumbar spine and normal in the left femur. Percentage decrease in bone mineral density in the femoral region is statistically significant.                     Assessment & Plan         1. History of breast cancer      2. History of aromatase inhibitor therapy            1. Patient was diagnosed with two- stage IA primary left sided breast cancers  (left medial and left medial/lateral locations) and right-sided DCIS. She is status post bilateral mastectomy in October 26, 2015. Patient completed 5 years of aromatase inhibitor employing Arimidex 12/3/2015 - 12/4/2020.  Now with new point tender left anterior lateral rib pain and questionable left supraclavicular lymph node..     Plan: Although the risk of recurrence is low it is not 0.  We are going to obtain a bone scan and a CT of the chest.  We will review these studies and make a decision on interval of follow-up subsequently.

## 2023-03-08 NOTE — ADDENDUM NOTE
Encounter addended by: Leoncio Goodson M.D. on: 3/7/2023 4:57 PM   Actions taken: Order list changed, Diagnosis association updated

## 2023-03-13 DIAGNOSIS — Z01.812 BLOOD TESTS PRIOR TO TREATMENT OR PROCEDURE: ICD-10-CM

## 2023-03-14 ENCOUNTER — HOSPITAL ENCOUNTER (OUTPATIENT)
Dept: LAB | Facility: MEDICAL CENTER | Age: 73
End: 2023-03-14
Attending: INTERNAL MEDICINE
Payer: MEDICARE

## 2023-03-14 ENCOUNTER — OFFICE VISIT (OUTPATIENT)
Dept: MEDICAL GROUP | Facility: PHYSICIAN GROUP | Age: 73
End: 2023-03-14
Payer: MEDICARE

## 2023-03-14 VITALS
TEMPERATURE: 98.4 F | OXYGEN SATURATION: 95 % | DIASTOLIC BLOOD PRESSURE: 62 MMHG | RESPIRATION RATE: 20 BRPM | HEIGHT: 63 IN | HEART RATE: 75 BPM | WEIGHT: 201 LBS | SYSTOLIC BLOOD PRESSURE: 136 MMHG | BODY MASS INDEX: 35.61 KG/M2

## 2023-03-14 DIAGNOSIS — R94.2 ABNORMAL PULMONARY FUNCTION TEST: ICD-10-CM

## 2023-03-14 DIAGNOSIS — Z01.812 BLOOD TESTS PRIOR TO TREATMENT OR PROCEDURE: ICD-10-CM

## 2023-03-14 DIAGNOSIS — E11.69 TYPE 2 DIABETES MELLITUS WITH OTHER SPECIFIED COMPLICATION, WITHOUT LONG-TERM CURRENT USE OF INSULIN (HCC): ICD-10-CM

## 2023-03-14 DIAGNOSIS — Z23 NEED FOR VACCINATION: ICD-10-CM

## 2023-03-14 DIAGNOSIS — R06.09 DYSPNEA ON EXERTION: ICD-10-CM

## 2023-03-14 DIAGNOSIS — Z00.00 MEDICARE ANNUAL WELLNESS VISIT, SUBSEQUENT: Primary | ICD-10-CM

## 2023-03-14 DIAGNOSIS — E11.59 HYPERTENSION ASSOCIATED WITH TYPE 2 DIABETES MELLITUS (HCC): ICD-10-CM

## 2023-03-14 DIAGNOSIS — I15.2 HYPERTENSION ASSOCIATED WITH TYPE 2 DIABETES MELLITUS (HCC): ICD-10-CM

## 2023-03-14 LAB
HBA1C MFR BLD: 6.9 % (ref ?–5.8)
POCT INT CON NEG: NEGATIVE
POCT INT CON POS: POSITIVE

## 2023-03-14 PROCEDURE — 83036 HEMOGLOBIN GLYCOSYLATED A1C: CPT | Performed by: STUDENT IN AN ORGANIZED HEALTH CARE EDUCATION/TRAINING PROGRAM

## 2023-03-14 PROCEDURE — 36415 COLL VENOUS BLD VENIPUNCTURE: CPT

## 2023-03-14 PROCEDURE — G0439 PPPS, SUBSEQ VISIT: HCPCS | Performed by: STUDENT IN AN ORGANIZED HEALTH CARE EDUCATION/TRAINING PROGRAM

## 2023-03-14 PROCEDURE — 80048 BASIC METABOLIC PNL TOTAL CA: CPT

## 2023-03-14 PROCEDURE — 90715 TDAP VACCINE 7 YRS/> IM: CPT | Performed by: STUDENT IN AN ORGANIZED HEALTH CARE EDUCATION/TRAINING PROGRAM

## 2023-03-14 PROCEDURE — 90471 IMMUNIZATION ADMIN: CPT | Performed by: STUDENT IN AN ORGANIZED HEALTH CARE EDUCATION/TRAINING PROGRAM

## 2023-03-14 RX ORDER — TELMISARTAN AND HYDROCHLORTHIAZIDE 40; 12.5 MG/1; MG/1
1 TABLET ORAL
Qty: 90 TABLET | Refills: 3 | Status: SHIPPED | OUTPATIENT
Start: 2023-03-14

## 2023-03-14 ASSESSMENT — ENCOUNTER SYMPTOMS: GENERAL WELL-BEING: GOOD

## 2023-03-14 ASSESSMENT — ACTIVITIES OF DAILY LIVING (ADL): BATHING_REQUIRES_ASSISTANCE: 0

## 2023-03-14 ASSESSMENT — PATIENT HEALTH QUESTIONNAIRE - PHQ9: CLINICAL INTERPRETATION OF PHQ2 SCORE: 0

## 2023-03-14 ASSESSMENT — FIBROSIS 4 INDEX: FIB4 SCORE: 0.88

## 2023-03-14 NOTE — PROGRESS NOTES
Chief Complaint   Patient presents with    Medicare Annual Wellness     HPI:  Tena Romo is a 73 y.o. here for Medicare Annual Wellness Visit.    Patient Active Problem List    Diagnosis Date Noted    Dyspnea on exertion 01/18/2023    Constipation 12/07/2022    Abnormal pulmonary function test 12/07/2022    Urinary tract infectious disease 08/30/2022    Urge incontinence of urine 05/24/2022    Overactive bladder 04/01/2022    Urinary incontinence 04/01/2022    Gastritis, GERD and dyspepsia 06/16/2021    History of aromatase inhibitor therapy 02/24/2021    At high risk for osteoporosis 02/24/2021    Seasonal allergic rhinitis 09/27/2018    Chronic right-sided low back pain with right-sided sciatica 11/08/2017    Primary osteoarthritis involving multiple joints 11/03/2017    History of breast cancer 02/10/2017    Obesity (BMI 30-39.9) 02/10/2017    Type 2 diabetes mellitus with other specified complication (HCC) 04/16/2015    Atrophic vaginitis 07/25/2013    Hypertension associated with type 2 diabetes mellitus (HCC) 03/14/2013    Vitamin D deficiency 04/10/2012    Dyslipidemia associated with type 2 diabetes mellitus (HCC) 04/10/2012    Diverticulosis of colon 11/04/2011    Post hysterectomy menopause 11/04/2011     Current Outpatient Medications   Medication Sig Dispense Refill    telmisartan-hydrochlorothiazide (MICARDIS HCT) 40-12.5 MG per tablet Take 1 Tablet by mouth every day. 90 Tablet 3    simvastatin (ZOCOR) 40 MG Tab TAKE 1 TABLET BY MOUTH EVERY DAY IN THE EVENING 90 Tablet 3    glipiZIDE (GLUCOTROL) 5 MG Tab Take 1 Tablet by mouth every day. 90 Tablet 3    Multiple Vitamin (MULTIVITAMIN ADULT PO) Take  by mouth every day.      acetaminophen (TYLENOL) 500 MG Tab Take 500-1,000 mg by mouth every 6 hours as needed.      multivitamin (THERAGRAN) Tab Take 1 Tablet by mouth every day.      omeprazole (PRILOSEC) 40 MG delayed-release capsule Take 40 mg by mouth every day.      Blood Glucose Test  Strips Use one strip to test blood sugar once daily early morning before first meal. 300 Strip 11     No current facility-administered medications for this visit.          Current supplements as per medication list.     Allergies: Cipro xr, Aspirin, Diclofenac, and Metformin    Current social contact/activities: Reads a lot. Watches documentaries. Collects purses and organizes them.      She  reports that she has never smoked. She has never used smokeless tobacco. She reports current alcohol use. She reports that she does not use drugs.  Counseling given: Not Answered  Tobacco comments: avoid any and all tobacco products    ROS:    Gait: Uses no assistive device  Ostomy: No  Other tubes: No  Amputations: No  Chronic oxygen use: No  Last eye exam: 4 years ago.  Denies visual complaints but reports intermittent dry eyes.  Wears hearing aids: No   : Denies any urinary leakage during the last 6 months    Screening:    Depression Screening  Little interest or pleasure in doing things?  0 - not at all  Feeling down, depressed , or hopeless? 0 - not at all  Trouble falling or staying asleep, or sleeping too much?     Feeling tired or having little energy?     Poor appetite or overeating?     Feeling bad about yourself - or that you are a failure or have let yourself or your family down?    Trouble concentrating on things, such as reading the newspaper or watching television?    Moving or speaking so slowly that other people could have noticed.  Or the opposite - being so fidgety or restless that you have been moving around a lot more than usual?     Thoughts that you would be better off dead, or of hurting yourself?     Patient Health Questionnaire Score:      If depressive symptoms identified deferred to follow up visit unless specifically addressed in assessment and plan.    Interpretation of PHQ-9 Total Score   Score Severity   1-4 No Depression   5-9 Mild Depression   10-14 Moderate Depression   15-19 Moderately  Severe Depression   20-27 Severe Depression    Screening for Cognitive Impairment  Three Minute Recall (daughter, heaven, mountain) 2/3   No memory concerns.  Marcio clock face with all 12 numbers and set the hands to show 10 past 11.  Yes    Cognitive concerns identified deferred for follow up unless specifically addressed in assessment and plan.    Fall Risk Assessment  Has the patient had two or more falls in the last year or any fall with injury in the last year?  No.     Safety Assessment  Throw rugs on floor.  No  Handrails on all stairs.  No, no stairs  Good lighting in all hallways.  Yes  Difficulty hearing.  No  Patient counseled about all safety risks that were identified.    Functional Assessment ADLs  Are there any barriers preventing you from cooking for yourself or meeting nutritional needs?  Yes.    loves to cook, cooks for her.  Are there any barriers preventing you from driving safely or obtaining transportation?  No.    Are there any barriers preventing you from using a telephone or calling for help?  No    Are there any barriers preventing you from shopping?  No.    Are there any barriers preventing you from taking care of your own finances?  No, spouse takes care of this.  Are there any barriers preventing you from managing your medications?  No  Are there any barriers preventing you from showering, bathing or dressing yourself? No    Are you currently engaging in any exercise or physical activity?  Yes.    What is your perception of your health? Good    Advance Care Planning  Do you have an Advance Directive, Living Will, Durable Power of , or POLST? Yes    Living Will     is on file    Health Maintenance Summary            Ordered - IMM DTaP/Tdap/Td Vaccine (2 - Td or Tdap) Ordered on 3/14/2023      04/10/2012  Imm Admin: Tdap Vaccine              Ordered - RETINAL SCREENING (Yearly) Ordered on 3/14/2023      04/16/2014  Done - no retinopathy by pt report              Postponed -  IMM INFLUENZA (1) Postponed until 9/13/2023 08/20/2021  Imm Admin: Influenza Vaccine Adult HD    10/14/2020  Imm Admin: Influenza Vaccine Adult HD    10/01/2019  Imm Admin: Influenza Vaccine Adult HD    09/26/2018  Imm Admin: Influenza Vaccine Adult HD    10/06/2017  Imm Admin: Influenza, Unspecified - HISTORICAL DATA    Only the first 5 history entries have been loaded, but more history exists.              Postponed - COVID-19 Vaccine (5 - Booster for Moderna series) Postponed until 9/13/2023 09/07/2022  Imm Admin: PFIZER BELCHER CAP SARS-COV-2 VACCINATION (12+)    10/20/2021  Imm Admin: MODERNA SARS-COV-2 VACCINE (12+)    03/18/2021  Imm Admin: MODERNA SARS-COV-2 VACCINE (12+)    02/18/2021  Imm Admin: MODERNA SARS-COV-2 VACCINE (12+)              A1C SCREENING (Every 6 Months) Next due on 9/14/2023 03/14/2023  POCT  A1C    09/13/2022  POCT  A1C    12/08/2021  HEMOGLOBIN A1C    06/15/2021  HEMOGLOBIN A1C    02/19/2021  HEMOGLOBIN A1C    Only the first 5 history entries have been loaded, but more history exists.              Annual Wellness Visit (Every 366 Days) Next due on 3/14/2024      03/14/2023  Level of Service: ANNUAL WELLNESS VISIT-INCLUDES PPPS SUBSEQUE*    03/14/2023  Visit Dx: Medicare annual wellness visit, subsequent              BONE DENSITY (Every 5 Years) Next due on 3/22/2026      03/22/2021  DS-BONE DENSITY STUDY (DEXA)    02/27/2019  DS-BONE DENSITY STUDY (DEXA)    02/01/2015  Reason not specified    04/19/2012  DS-BONE DENSITY STUDY (DEXA)              COLORECTAL CANCER SCREENING (COLONOSCOPY - Preferred) Next due on 8/16/2031 08/16/2021  REFERRAL TO GI FOR COLONOSCOPY    02/13/2020  COLOGUARD COLON CANCER SCREENING    08/07/2014  AMB REFERRAL TO GI FOR COLONOSCOPY    05/13/2013  OCCULT BLOOD FECES IMMUNOASSAY              IMM PNEUMOCOCCAL VACCINE: 65+ Years (Series Information) Completed      10/01/2019  Imm Admin: Pneumococcal polysaccharide vaccine (PPSV-23)    04/16/2015   Imm Admin: Pneumococcal Conjugate Vaccine (Prevnar/PCV-13)    04/17/2014  Imm Admin: Pneumococcal polysaccharide vaccine (PPSV-23)              HEPATITIS C SCREENING  Completed      12/27/2022  HEP C VIRUS ANTIBODY              IMM HEP B VACCINE (Series Information) Aged Out      No completion history exists for this topic.              IMM MENINGOCOCCAL ACWY VACCINE (Series Information) Aged Out      No completion history exists for this topic.              Discontinued - MAMMOGRAM  Discontinued        Frequency changed to Never automatically (Topic No Longer Applies)    10/06/2015  MA-DIAGNOSTIC DIGITAL MAMMO-UNILAT LEFT    10/01/2015  MA-SCREEN MAMMO W/CAD-BILAT    09/25/2014  MA-SCREENING MAMMOGRAM W/ CAD    09/19/2013  MA-SCREENING MAMMOGRAM W/ CAD    Only the first 5 history entries have been loaded, but more history exists.              Discontinued - IMM ZOSTER VACCINES  Discontinued      01/04/2023  Imm Admin: Zoster Vaccine Recombinant (RZV) (SHINGRIX)                  Patient Care Team:  Eden Domínguez D.O. as PCP - General (Family Medicine)  [employer]  as Respiratory Therapist (DME Supplier)    Social History     Tobacco Use    Smoking status: Never    Smokeless tobacco: Never    Tobacco comments:     avoid any and all tobacco products   Substance Use Topics    Alcohol use: Yes     Alcohol/week: 0.0 oz     Comment: once a year    Drug use: No     Family History   Problem Relation Age of Onset    Stroke Mother     Lung Disease Father         pneumonia    Hyperlipidemia Father      She  has a past medical history of Anesthesia, Arthritis, Breast cancer (HCC) (2/10/2017), Dental disorder, Diabetes (HCC), Diverticulosis of colon (11/4/2011), HTN (hypertension) (3/14/2013), Hypertension, Pain, Post hysterectomy menopause (11/4/2011), Trochanteric bursitis of right hip (10/1/2019), and Use of anastrozole (Arimidex) (1/11/2019).   Past Surgical History:   Procedure Laterality Date    SHOULDER ARTHROSCOPY W/  "ROTATOR CUFF REPAIR  2/9/2012    Performed by JENNY LIMON at SURGERY [facility] ORS    SHOULDER DECOMPRESSION ARTHROSCOPIC  2/9/2012    Performed by JENNY LIMON at SURGERY [facility] ORS    CLAVICLE DISTAL EXCISION  2/9/2012    Performed by JENNY LIMON at SURGERY [facility] ORS    BUNIONECTOMY  2009    bilateral    ABDOMINAL HYSTERECTOMY TOTAL  2001    endometriosis    CHOLECYSTECTOMY  1999    laparoscopy    MASTECTOMY Bilateral     TONSILLECTOMY  as child     Exam:   /62 (BP Location: Left arm, Patient Position: Sitting, BP Cuff Size: Adult)   Pulse 75   Temp 36.9 °C (98.4 °F) (Temporal)   Resp 20   Ht 1.6 m (5' 3\")   Wt 91.2 kg (201 lb)   SpO2 95%  Body mass index is 35.61 kg/m².    Hearing good.    Dentition Wearing mask.  No murmurs rubs or gallops, regular rate and rhythm.  Lungs clear to auscultation without increased work of breathing.  Alert, oriented in no acute distress.  Eye contact is good, speech goal directed, affect calm.    Labs: 12/27/2022    Assessment and Plan. The following treatment and monitoring plan is recommended:      1. Medicare annual wellness visit, subsequent  Services suggested: No services needed at this time  Health Care Screening: Age-appropriate preventive services recommended by USPTF and ACIP covered by Medicare were discussed today. Services ordered if indicated and agreed upon by the patient.  Referrals offered: Community-based lifestyle interventions to reduce health risks and promote self-management and wellness, fall prevention, nutrition, physical activity, tobacco-use cessation, weight loss, and mental health services as per orders if indicated.    Discussion today about general wellness and lifestyle habits:    Prevent falls and reduce trip hazards; Cautioned about securing or removing rugs.  Engage in regular physical activity and social activities     2. Need for vaccination  - Tdap =>8yo IM    3. Type 2 diabetes mellitus with other " specified complication, without long-term current use of insulin (HCC)  Chronic, well controlled. Continue glipizide 5mg daily. A1C in 6m, microalbumin creatinine ratio due in April-declines to complete today so lab ordered to complete at her convenience.  Retinopathy screening due, attempted in clinic.  - POCT  A1C  - MICROALBUMIN CREAT RATIO URINE; Future  - POCT Retinal Eye Exam    4. Dyspnea on exertion  5. Abnormal pulmonary function test  This is a chronic condition, reviewed most recent pulmonary function test which unfortunately is uninterpretable due to lack of reproducibility.  She has a CT chest pending next week with oncology.  Cardiopulmonary exam normal today.  Also has an appointment with pulmonary medicine in May.  Will monitor.    Follow-up: Return in about 3 months (around 6/14/2023), or if symptoms worsen or fail to improve.

## 2023-03-14 NOTE — PATIENT INSTRUCTIONS
Ideal glucose ranges: fasting , random (1-2 hours after eating) <180. If glucose <70, we need to adjust your medication to prevent continuing low blood sugar.    Ideal blood pressure <130/80 and at least <140/90.    COVID vaccine (pharmacy-bivalent)

## 2023-03-15 ENCOUNTER — APPOINTMENT (OUTPATIENT)
Dept: RADIOLOGY | Facility: MEDICAL CENTER | Age: 73
End: 2023-03-15
Attending: INTERNAL MEDICINE
Payer: MEDICARE

## 2023-03-15 LAB
ANION GAP SERPL CALC-SCNC: 13 MMOL/L (ref 7–16)
BUN SERPL-MCNC: 17 MG/DL (ref 8–22)
CALCIUM SERPL-MCNC: 9.5 MG/DL (ref 8.5–10.5)
CHLORIDE SERPL-SCNC: 101 MMOL/L (ref 96–112)
CO2 SERPL-SCNC: 25 MMOL/L (ref 20–33)
CREAT SERPL-MCNC: 0.93 MG/DL (ref 0.5–1.4)
GFR SERPLBLD CREATININE-BSD FMLA CKD-EPI: 65 ML/MIN/1.73 M 2
GLUCOSE SERPL-MCNC: 104 MG/DL (ref 65–99)
POTASSIUM SERPL-SCNC: 3.8 MMOL/L (ref 3.6–5.5)
SODIUM SERPL-SCNC: 139 MMOL/L (ref 135–145)

## 2023-03-17 NOTE — PROGRESS NOTES
Subjective:     Tena Romo is a 68 y.o. female who presents for Diarrhea (C/o diarrhea, vomiting, loss of appetite x4 days. Afebrile  Poss food poisoning, began to vomit approx 1 hr after eating.  Hx of diverticulitus, exacerbated by emesis)  Patient presents to clinic today with complaints of nausea, vomiting, diarrhea, abdominal pain. Patient has history of diverticulum versus in which she says has been bothering her for the last few weeks. Patient was at a buffet and thinks she may have eaten contaminated food 4 days ago. About an hour after eating she began to vomit and have diarrhea. Patient hasn't been unable to tolerate any fluids or solid foods for the past 4 days. Patient denies any fevers, chills. Patient has an appointment scheduled for mid March with a PCP for follow-up of her diverticulosis due to the fact that it has been bothering her for the past few weeks.     Diarrhea    This is a new problem. Episode onset: 4 days. The problem occurs more than 10 times per day. The problem has been unchanged. The stool consistency is described as watery. The patient states that diarrhea awakens her from sleep. Associated symptoms include abdominal pain, a fever and vomiting. Pertinent negatives include no chills or myalgias. Nothing aggravates the symptoms. Risk factors include suspect food intake. She has tried nothing for the symptoms. The treatment provided no relief. There is no history of inflammatory bowel disease. Diverticulosis    Nausea   This is a new problem. Episode onset: 4 days. The problem occurs constantly. The problem has been unchanged. Associated symptoms include abdominal pain, a fever, nausea and vomiting. Pertinent negatives include no chest pain, chills, congestion, myalgias, rash or sore throat. The symptoms are aggravated by eating and drinking. She has tried nothing for the symptoms. The treatment provided no relief.   LUQ Pain   This is a new problem. Episode onset: 4  days. The onset quality is undetermined. The problem occurs constantly. The problem has been unchanged. The pain is located in the LUQ and LLQ. The pain is at a severity of 5/10. The pain is moderate. The quality of the pain is colicky and cramping. The abdominal pain does not radiate. Associated symptoms include diarrhea, a fever, nausea and vomiting. Pertinent negatives include no constipation, hematuria, melena or myalgias. Nothing aggravates the pain. The pain is relieved by nothing. She has tried nothing for the symptoms. The treatment provided no relief. Diverticulosis      Past Medical History:   Diagnosis Date   • Anesthesia     post of nausea   • Arthritis    • Breast cancer (CMS-HCC) 2/10/2017   • Dental disorder     permanent bridge   • Diabetes (CMS-HCC)    • Diverticulosis of colon 11/4/2011   • HTN (hypertension) 3/14/2013   • Hypertension    • Pain     right shoulder down to thumb   • Post hysterectomy menopause 11/4/2011     Past Surgical History:   Procedure Laterality Date   • SHOULDER ARTHROSCOPY W/ ROTATOR CUFF REPAIR  2/9/2012    Performed by JENNY LIMON at [facility]S   • SHOULDER DECOMPRESSION ARTHROSCOPIC  2/9/2012    Performed by JENNY LIMON at [facility] ORS   • CLAVICLE DISTAL EXCISION  2/9/2012    Performed by JENNY LIMON at [facility] ORS   • BUNIONECTOMY  2009    bilateral   • ABDOMINAL HYSTERECTOMY TOTAL  2001    endometriosis   • CHOLECYSTECTOMY  1999    laparoscopy   • MASTECTOMY     • TONSILLECTOMY  as child     Social History     Social History   • Marital status:      Spouse name: N/A   • Number of children: N/A   • Years of education: N/A     Occupational History   • Not on file.     Social History Main Topics   • Smoking status: Never Smoker   • Smokeless tobacco: Never Used      Comment: avoid any and all tobacco products   • Alcohol use 0.0 oz/week      Comment: once a year   • Drug use: No   • Sexual activity: Not Currently  "    Partners: Male      Comment: . Work at [employer]     Other Topics Concern   • Not on file     Social History Narrative   • No narrative on file      Family History   Problem Relation Age of Onset   • Stroke Mother    • Lung Disease Father      pneumonia   • Hyperlipidemia Father     Review of Systems   Constitutional: Positive for fever. Negative for chills.   HENT: Negative for congestion and sore throat.    Eyes: Negative for pain.   Respiratory: Negative for shortness of breath.    Cardiovascular: Negative for chest pain.   Gastrointestinal: Positive for abdominal pain, diarrhea, nausea and vomiting. Negative for constipation and melena.   Genitourinary: Negative for hematuria.   Musculoskeletal: Negative for myalgias.   Skin: Negative for rash.   Neurological: Negative for dizziness.     Allergies   Allergen Reactions   • Aspirin      Intolerance     • Cipro Xr Hives   • Diclofenac    • Metformin Rash     Erythematous rash      Objective:   /60   Pulse 87   Temp 37.9 °C (100.3 °F)   Resp 16   Ht 1.6 m (5' 3\")   Wt 78.5 kg (173 lb)   SpO2 98%   BMI 30.65 kg/m²   Physical Exam   Constitutional: She is oriented to person, place, and time. She appears well-developed and well-nourished. No distress.   HENT:   Head: Normocephalic and atraumatic.   Eyes: Conjunctivae and EOM are normal. Pupils are equal, round, and reactive to light.   Cardiovascular: Normal rate and regular rhythm.    No murmur heard.  Pulmonary/Chest: Effort normal and breath sounds normal. No respiratory distress.   Abdominal: Soft. Bowel sounds are normal. She exhibits no distension. There is no hepatosplenomegaly. There is tenderness in the left upper quadrant. There is no rigidity, no rebound, no guarding, no tenderness at McBurney's point and negative Basurto's sign. Hernia confirmed negative in the ventral area.       Neurological: She is alert and oriented to person, place, and time. She has normal reflexes. No sensory " deficit.   Skin: Skin is warm and dry.   Psychiatric: She has a normal mood and affect.   Vitals reviewed.        Assessment/Plan:   Assessment    1. AGE (acute gastroenteritis)  2. Left upper quadrant pain  3. Diverticulosis of intestine without bleeding, unspecified intestinal tract location  - ondansetron (ZOFRAN ODT) 4 MG TABLET DISPERSIBLE; Take 1 Tab by mouth every 8 hours as needed.  Dispense: 10 Tab; Refill: 0  - ondansetron (ZOFRAN ODT) dispertab 4 mg; Take 1 Tab by mouth Once.  - CT-ABDOMEN-PELVIS W/O; Future  - CBC WITH DIFFERENTIAL; Future  - COMP METABOLIC PANEL; Future    CT results  1.  No renal stone or hydronephrosis.  2.  Normal appendix.  3.  Colonic diverticulosis without evidence for diverticulitis.  4.  No focal mesenteric inflammatory process  Will get CT scan to rule out possible diverticulitis.      1624:   Called and Follow-up with patient about CT resultsadvising CT normal. Advised patient to take Zofran when necessary for nausea. Recommended liquid diet. Advised patient to use Imodium if diarrhea continues. Advised patient to return to clinic if symptoms persist past 7 days.  Advised to continue supportive care with Tylenol and/or ibuprofen for fevers and discomfort. Increased fluids and electrolytes.     Patient given precautionary s/sx that mandate immediate follow up and evaluation in the ED. Advised of risks of not doing so.    DDX, Supportive care, and indications for immediate follow-up discussed with patient.    Instructed to return to clinic or nearest emergency department if we are not available for any change in condition, further concerns, or worsening of symptoms.    The patient demonstrated a good understanding and agreed with the treatment plan.       99

## 2023-03-22 ENCOUNTER — HOSPITAL ENCOUNTER (OUTPATIENT)
Dept: RADIOLOGY | Facility: MEDICAL CENTER | Age: 73
End: 2023-03-22
Attending: INTERNAL MEDICINE
Payer: MEDICARE

## 2023-03-22 DIAGNOSIS — Z85.3 HISTORY OF BREAST CANCER: ICD-10-CM

## 2023-03-22 PROCEDURE — 71260 CT THORAX DX C+: CPT

## 2023-03-22 PROCEDURE — A9503 TC99M MEDRONATE: HCPCS

## 2023-03-22 PROCEDURE — 700117 HCHG RX CONTRAST REV CODE 255: Performed by: INTERNAL MEDICINE

## 2023-03-22 RX ADMIN — IOHEXOL 75 ML: 350 INJECTION, SOLUTION INTRAVENOUS at 08:30

## 2023-04-20 ENCOUNTER — HOSPITAL ENCOUNTER (OUTPATIENT)
Dept: LAB | Facility: MEDICAL CENTER | Age: 73
End: 2023-04-20
Attending: STUDENT IN AN ORGANIZED HEALTH CARE EDUCATION/TRAINING PROGRAM
Payer: MEDICARE

## 2023-04-20 DIAGNOSIS — E11.69 TYPE 2 DIABETES MELLITUS WITH OTHER SPECIFIED COMPLICATION, WITHOUT LONG-TERM CURRENT USE OF INSULIN (HCC): ICD-10-CM

## 2023-04-20 LAB
CREAT UR-MCNC: 121.9 MG/DL
MICROALBUMIN UR-MCNC: <1.2 MG/DL
MICROALBUMIN/CREAT UR: NORMAL MG/G (ref 0–30)

## 2023-04-20 PROCEDURE — 82043 UR ALBUMIN QUANTITATIVE: CPT

## 2023-04-20 PROCEDURE — 82570 ASSAY OF URINE CREATININE: CPT

## 2023-04-26 ENCOUNTER — HOSPITAL ENCOUNTER (OUTPATIENT)
Facility: MEDICAL CENTER | Age: 73
End: 2023-04-26
Attending: UROLOGY
Payer: MEDICARE

## 2023-04-26 PROCEDURE — 87086 URINE CULTURE/COLONY COUNT: CPT

## 2023-04-26 PROCEDURE — 87186 SC STD MICRODIL/AGAR DIL: CPT

## 2023-04-26 PROCEDURE — 87077 CULTURE AEROBIC IDENTIFY: CPT

## 2023-05-02 ENCOUNTER — OFFICE VISIT (OUTPATIENT)
Dept: MEDICAL GROUP | Facility: PHYSICIAN GROUP | Age: 73
End: 2023-05-02
Payer: MEDICARE

## 2023-05-02 VITALS
DIASTOLIC BLOOD PRESSURE: 68 MMHG | BODY MASS INDEX: 33.63 KG/M2 | OXYGEN SATURATION: 95 % | SYSTOLIC BLOOD PRESSURE: 122 MMHG | HEART RATE: 92 BPM | HEIGHT: 64 IN | WEIGHT: 197 LBS | RESPIRATION RATE: 20 BRPM | TEMPERATURE: 97.2 F

## 2023-05-02 DIAGNOSIS — E78.5 DYSLIPIDEMIA ASSOCIATED WITH TYPE 2 DIABETES MELLITUS (HCC): ICD-10-CM

## 2023-05-02 DIAGNOSIS — N39.0 UTI DUE TO EXTENDED-SPECTRUM BETA LACTAMASE (ESBL) PRODUCING ESCHERICHIA COLI: ICD-10-CM

## 2023-05-02 DIAGNOSIS — I70.0 AORTIC ATHEROSCLEROSIS (HCC): ICD-10-CM

## 2023-05-02 DIAGNOSIS — B96.29 UTI DUE TO EXTENDED-SPECTRUM BETA LACTAMASE (ESBL) PRODUCING ESCHERICHIA COLI: ICD-10-CM

## 2023-05-02 DIAGNOSIS — R82.90 ABNORMAL FINDING IN URINE: ICD-10-CM

## 2023-05-02 DIAGNOSIS — E11.69 TYPE 2 DIABETES MELLITUS WITH OTHER SPECIFIED COMPLICATION, WITHOUT LONG-TERM CURRENT USE OF INSULIN (HCC): ICD-10-CM

## 2023-05-02 DIAGNOSIS — Z16.12 UTI DUE TO EXTENDED-SPECTRUM BETA LACTAMASE (ESBL) PRODUCING ESCHERICHIA COLI: ICD-10-CM

## 2023-05-02 DIAGNOSIS — E11.69 DYSLIPIDEMIA ASSOCIATED WITH TYPE 2 DIABETES MELLITUS (HCC): ICD-10-CM

## 2023-05-02 LAB
APPEARANCE UR: CLEAR
BILIRUB UR STRIP-MCNC: NEGATIVE MG/DL
COLOR UR AUTO: YELLOW
GLUCOSE UR STRIP.AUTO-MCNC: NEGATIVE MG/DL
KETONES UR STRIP.AUTO-MCNC: NEGATIVE MG/DL
LEUKOCYTE ESTERASE UR QL STRIP.AUTO: NEGATIVE
NITRITE UR QL STRIP.AUTO: NEGATIVE
PH UR STRIP.AUTO: 5.5 [PH] (ref 5–8)
PROT UR QL STRIP: NEGATIVE MG/DL
RBC UR QL AUTO: NORMAL
SP GR UR STRIP.AUTO: 1
UROBILINOGEN UR STRIP-MCNC: 0.2 MG/DL

## 2023-05-02 PROCEDURE — 99214 OFFICE O/P EST MOD 30 MIN: CPT | Performed by: STUDENT IN AN ORGANIZED HEALTH CARE EDUCATION/TRAINING PROGRAM

## 2023-05-02 PROCEDURE — 81002 URINALYSIS NONAUTO W/O SCOPE: CPT | Performed by: STUDENT IN AN ORGANIZED HEALTH CARE EDUCATION/TRAINING PROGRAM

## 2023-05-02 RX ORDER — SULFAMETHOXAZOLE AND TRIMETHOPRIM 800; 160 MG/1; MG/1
1 TABLET ORAL 2 TIMES DAILY
COMMUNITY
Start: 2023-05-01 | End: 2023-08-08

## 2023-05-02 ASSESSMENT — FIBROSIS 4 INDEX: FIB4 SCORE: 0.88

## 2023-05-02 NOTE — PROGRESS NOTES
Subjective:     Chief Complaint   Patient presents with    Other     Abnormal UA     HPI:   Tena presents today to discuss urine analysis findings.    Patient had an appointment yesterday and provided a urine sample but left without being seen (found out that her dog has cancer so had to leave).      Patient is upset with her urology provider.  States that she went in for a urine sample and had told them that she had already completed a urine sample with her PCP (this was her microalbumin and not a clean-catch).  Patient states that she had a catheterized urine sample taken 1 week ago in preparation for repeat Botox.  Patient does not understand why she needs Botox again, has not discussed this with her urologist.  She has a history of overactive bladder with incontinence which is greatly improved after she had Botox in August of last year.    Patient reports that she received a MyChart message from her urologist stating that her urine was full of bacteria and that she needed to start antibiotics.  She received this email on Friday and was given antibiotics which she only picked up yesterday.  Has taken 2 doses of Bactrim DS so far.  Has new mid to right lower abdominal pain for a few weeks which is quite mild but denies any hematuria, dysuria, odor, urgency, frequency, nausea, vomiting or flank pain.  She does report that her urine is somewhat cloudy in the morning but clears later through the day.    Patient since her last visit has had imaging through oncology/pulmonary and has an appointment coming up with pulmonary medicine.  Happy to report there was no findings on her PET scan.    Current Outpatient Medications Ordered in Epic   Medication Sig Dispense Refill    sulfamethoxazole-trimethoprim (BACTRIM DS) 800-160 MG tablet Take 1 Tablet by mouth 2 times a day.      telmisartan-hydrochlorothiazide (MICARDIS HCT) 40-12.5 MG per tablet TAKE 1 TABLET BY MOUTH EVERY DAY 90 Tablet 3    simvastatin (ZOCOR) 40 MG Tab  "TAKE 1 TABLET BY MOUTH EVERY DAY IN THE EVENING 90 Tablet 3    glipiZIDE (GLUCOTROL) 5 MG Tab Take 1 Tablet by mouth every day. 90 Tablet 3    Multiple Vitamin (MULTIVITAMIN ADULT PO) Take  by mouth every day.      acetaminophen (TYLENOL) 500 MG Tab Take 500-1,000 mg by mouth every 6 hours as needed.      multivitamin (THERAGRAN) Tab Take 1 Tablet by mouth every day.      omeprazole (PRILOSEC) 40 MG delayed-release capsule Take 40 mg by mouth every day.      Blood Glucose Test Strips Use one strip to test blood sugar once daily early morning before first meal. 300 Strip 11     No current [facility]-ordered facility-administered medications on file.     ROS:  Gen: no fevers/chills, no changes in weight  Eyes: no changes in vision  ENT: no sore throat  Pulm: no sob, no cough  CV: no chest pain, no palpitations  GI: no nausea/vomiting, no diarrhea  : no dysuria  MSk: no myalgias  Skin: no rash  Neuro: no headaches  Heme/Lymph: no easy bruising    Objective:     Exam:  /68 (BP Location: Left arm, Patient Position: Sitting, BP Cuff Size: Adult)   Pulse 92   Temp 36.2 °C (97.2 °F) (Temporal)   Resp 20   Ht 1.613 m (5' 3.5\")   Wt 89.4 kg (197 lb)   SpO2 95%   BMI 34.35 kg/m²  Body mass index is 34.35 kg/m².    Physical Exam:  Constitutional: Well-developed and well-nourished. No acute distress.   Skin: Skin is warm and dry. No rash noted.  Head: Atraumatic without lesions.  Eyes: Conjunctivae and extraocular motions are normal. Pupils are equal, round. No scleral icterus.   Nose: Nares patent.  No discharge.  Mouth/Throat: Oropharynx is moist.   Neck: Supple, trachea midline.   Cardiovascular: Regular rate and rhythm, S1 and S2 without murmur, rubs, or gallops.  Lungs: Normal inspiratory effort, CTA bilaterally, no wheezes/rhonchi/rales  Abdomen: Soft, mild suprapubic ttp and without distention. Active bowel sounds. No rebound, guarding, masses or HSM. No CVA ttp.  Extremities: No cyanosis, clubbing, erythema, nor " edema.  Neurological: Alert and oriented x 3. No gross/focal deficits.  Psychiatric:  Behavior, mood, and affect are appropriate.    Labs: Reviewed from 4/20/2023, 4/26/2023  Imaging: Reviewed from CT chest/bone scan 3/22/2023    Assessment & Plan:     73 y.o. female with the following -     1. UTI due to extended-spectrum beta lactamase (ESBL) producing Escherichia coli  2. Abnormal finding in urine  This is an acute condition, urine culture reviewed from 4/26-patient on appropriate antibiotics.  She should continue them for a total of 7 days as prescribed and I recommend that she follow-up with her urologist to discuss the plan of care as it seems that she has a lot of questions.  Gave strict return precautions.    sulfamethoxazole-trimethoprim (BACTRIM DS) 800-160 MG tablet, Take 1 Tablet by mouth 2 times a day., Disp: , Rfl:   - POCT Urinalysis    3. Dyslipidemia associated with type 2 diabetes mellitus (HCC)  4. Aortic atherosclerosis (HCC)  Chronic, controlled. #4 seen on recent CT. Continue medication(s) as below.    simvastatin (ZOCOR) 40 MG Tab, TAKE 1 TABLET BY MOUTH EVERY DAY IN THE EVENING, Disp: 90 Tablet, Rfl: 3    5. Type 2 diabetes mellitus with other specified complication, without long-term current use of insulin (HCC)  Chronic, well controlled. Continue glipizide 5mg daily.  Stressed importance of diabetic diet.  A1C in 6m, microalbumin creatinine ratio reviewed (normal).     Return in about 2 months (around 7/2/2023), or if symptoms worsen or fail to improve, for establish.    Please note that this dictation was created using voice recognition software. I have made every reasonable attempt to correct obvious errors, but I expect that there are errors of grammar and possibly content that I did not discover before finalizing the note.

## 2023-05-09 ENCOUNTER — OFFICE VISIT (OUTPATIENT)
Dept: SLEEP MEDICINE | Facility: MEDICAL CENTER | Age: 73
End: 2023-05-09
Attending: INTERNAL MEDICINE
Payer: MEDICARE

## 2023-05-09 VITALS
HEART RATE: 83 BPM | HEIGHT: 63 IN | SYSTOLIC BLOOD PRESSURE: 116 MMHG | BODY MASS INDEX: 34.73 KG/M2 | WEIGHT: 196 LBS | OXYGEN SATURATION: 97 % | DIASTOLIC BLOOD PRESSURE: 62 MMHG

## 2023-05-09 DIAGNOSIS — J45.20 MILD INTERMITTENT ASTHMA WITHOUT COMPLICATION: ICD-10-CM

## 2023-05-09 DIAGNOSIS — J84.10 CHRONIC FIBROSIS OF LUNG (HCC): Chronic | ICD-10-CM

## 2023-05-09 DIAGNOSIS — E66.9 OBESITY (BMI 30-39.9): ICD-10-CM

## 2023-05-09 DIAGNOSIS — R06.09 DYSPNEA ON EXERTION: ICD-10-CM

## 2023-05-09 PROCEDURE — 99215 OFFICE O/P EST HI 40 MIN: CPT | Performed by: INTERNAL MEDICINE

## 2023-05-09 PROCEDURE — 99212 OFFICE O/P EST SF 10 MIN: CPT | Performed by: INTERNAL MEDICINE

## 2023-05-09 RX ORDER — ALBUTEROL SULFATE 90 UG/1
2 AEROSOL, METERED RESPIRATORY (INHALATION) EVERY 6 HOURS PRN
Qty: 8.5 G | Refills: 6 | Status: SHIPPED | OUTPATIENT
Start: 2023-05-09

## 2023-05-09 ASSESSMENT — FIBROSIS 4 INDEX: FIB4 SCORE: 0.88

## 2023-05-09 NOTE — PROGRESS NOTES
"Pulmonary Consult      Reason for consult: post-COVID lung fibrosis    Chief Complaint:  Chief Complaint   Patient presents with    Follow-Up     Last seen 3/2/22 with Dr. Chen     Results     PFT 2/24/23, CT-Chest 3/22/23     HPI:   Tena Romo is a very pleasant 73 y.o. female who has been followed in the pulmonary clinic since having COVID 19 pneumonia in 2020.She also has a history of breast cancer and obesity. She is a never smoker.     From Dr. Chen's March 2022 note:  \"followed in our clinic for acute hypoxic respiratory failure in the setting of acute infection presumed covid last seen by me on 9/1/21.The patient's past medical history is significant for metabolic syndrome with prediabetes, environmental allergies and breast cancer status post bilateral mastectomy with no chemotherapy or radiation but on Arimidex.  She is a lifelong non-smoker. Pt was admitted to [facility] on October 31, 2020 with fever, hypoxia, cough and body aches.  She had bilateral infiltrates on chest x-ray concerning for coronavirus pneumonia but was tested on 3 occasions for virus via PCR all of which were negative.  She required high flow oxygen up to 5 to 6 L and was treated with remdesivir, convalescent serum and steroids.  She was ultimately discharged on supplemental oxygen at 4 L/min and has had slow taper and her oxygen over time.  She was ruled out for pulmonary embolism during her hospital stay and echo done during her stay which was essentially normal. I saw her for f/u and to establish are on 2/4/21 at which point she was requiring less O2 at 2LPM with activity. Pulmonary function testing from March 25 showed restrictive defect with FVC of 1.67 L or 67% predicted and total lung capacity at 64% predicted with a DLCO at 69% predicted.    She still required supplemental oxygen with activity at that time to 1.5 L.  HRCT chest showed no clear evidence of fibrosis but persistent bibasilar " "infiltrate with groundglass opacities that had improved from prior.  Patient has since been vaccinated for Covid.  She underwent 6-minute walk test on August 25 during which she did not drop below 89%.  Following her last clinic visit, the patient continued to improve.  Overnight oximetry in October showed persistent hypoxia but she has since stopped using supplemental oxygen at night.  She denies cough or shortness of breath.  Updated pulmonary function testing also in October showed improvement in FVC from March from 1.67 L to 2.1 L, total lung capacity from 3.34 L to 3.56 L and DLCO from 13.9 mL/min/mmHg to 18.5 which is within normal limits.\"      she has prior PFTs which have gradually improved since 2021 and now likely represent restriction 2/2 obesity and scarring/fibrosis.     she has 0 ED or Urgent Care visits in the past year for respiratory issues.  she has been hospitalized for respiratory issues - x 1 in 2020 for COVID 19   she has + history of sinus disease.  she has + history of chronic rhinitis/conjuctivitis  she has + history of peripheral eosinophilia >150. Max was 540.     Today: She states that overall she is feeling very good.  She does have occasional coughing fits when she lets the dog into the house or if she laughs.  She has not been on inhalers.  She states that she does breathing techniques when she feels short of breath.  It is her symptoms.    Past Medical History:   Diagnosis Date    Anesthesia     post of nausea    Arthritis     Breast cancer (HCC) 02/10/2017    Chronic fibrosis of lung (HCC) 5/9/2023    Likely 2/2 COVID 19 PNA in 2020    Cough     Dental disorder     permanent bridge    Diabetes (HCC)     Diverticulosis of colon 11/04/2011    HTN (hypertension) 03/14/2013    Hypertension     Pain     right shoulder down to thumb    Post hysterectomy menopause 11/04/2011    Trochanteric bursitis of right hip 10/01/2019    Use of anastrozole (Arimidex) 01/11/2019    Wheezing        Past " Surgical History:   Procedure Laterality Date    SHOULDER ARTHROSCOPY W/ ROTATOR CUFF REPAIR  2/9/2012    Performed by JENNY LIMON at SUR[facility] ORS    SHOULDER DECOMPRESSION ARTHROSCOPIC  2/9/2012    Performed by JENNY LIMON at [facility] ORS    CLAVICLE DISTAL EXCISION  2/9/2012    Performed by JENNY LIMON at [facility] ORS    BUNIONECTOMY  2009    bilateral    ABDOMINAL HYSTERECTOMY TOTAL  2001    endometriosis    CHOLECYSTECTOMY  1999    laparoscopy    MASTECTOMY Bilateral     TONSILLECTOMY  as child       Social History     Socioeconomic History    Marital status:      Spouse name: Not on file    Number of children: Not on file    Years of education: Not on file    Highest education level: Not on file   Occupational History    Not on file   Tobacco Use    Smoking status: Never    Smokeless tobacco: Never    Tobacco comments:     avoid any and all tobacco products   Vaping Use    Vaping Use: Never used   Substance and Sexual Activity    Alcohol use: Yes     Alcohol/week: 0.0 oz     Comment: once a year    Drug use: No    Sexual activity: Not Currently     Partners: Male     Comment: . Work at [email or web address]   Other Topics Concern    Not on file   Social History Narrative    Not on file     Social Determinants of Health     Financial Resource Strain: Not on file   Food Insecurity: Not on file   Transportation Needs: Not on file   Physical Activity: Not on file   Stress: Not on file   Social Connections: Not on file   Intimate Partner Violence: Not on file   Housing Stability: Not on file          Family History   Problem Relation Age of Onset    Stroke Mother     Lung Disease Father         pneumonia    Hyperlipidemia Father        Current Outpatient Medications on File Prior to Visit   Medication Sig Dispense Refill    sulfamethoxazole-trimethoprim (BACTRIM DS) 800-160 MG tablet Take 1 Tablet by mouth 2 times a day.      telmisartan-hydrochlorothiazide (MICARDIS  "HCT) 40-12.5 MG per tablet TAKE 1 TABLET BY MOUTH EVERY DAY 90 Tablet 3    simvastatin (ZOCOR) 40 MG Tab TAKE 1 TABLET BY MOUTH EVERY DAY IN THE EVENING 90 Tablet 3    glipiZIDE (GLUCOTROL) 5 MG Tab Take 1 Tablet by mouth every day. 90 Tablet 3    Multiple Vitamin (MULTIVITAMIN ADULT PO) Take  by mouth every day.      acetaminophen (TYLENOL) 500 MG Tab Take 500-1,000 mg by mouth every 6 hours as needed.      multivitamin (THERAGRAN) Tab Take 1 Tablet by mouth every day.      omeprazole (PRILOSEC) 40 MG delayed-release capsule Take 40 mg by mouth every day.      Blood Glucose Test Strips Use one strip to test blood sugar once daily early morning before first meal. 300 Strip 11     No current facility-administered medications on file prior to visit.       Allergies: Cipro xr, Aspirin, Diclofenac, and Metformin      ROS: A 12 point ROS was performed on intake and during my interview. ROS negative unless specifically noted in HPI.     Vitals:  /62 (BP Location: Left arm, Patient Position: Sitting, BP Cuff Size: Large adult)   Pulse 83   Ht 1.6 m (5' 3\")   Wt 88.9 kg (196 lb)   SpO2 97%     Physical Exam:  Physical Exam  Vitals reviewed.   Constitutional:       General: She is not in acute distress.     Appearance: She is obese. She is not ill-appearing, toxic-appearing or diaphoretic.   Eyes:      General:         Right eye: No discharge.         Left eye: No discharge.      Conjunctiva/sclera: Conjunctivae normal.   Cardiovascular:      Rate and Rhythm: Normal rate.      Pulses: Normal pulses.   Pulmonary:      Effort: Pulmonary effort is normal.      Breath sounds: Normal breath sounds.   Musculoskeletal:      Right lower leg: No edema.      Left lower leg: No edema.   Skin:     General: Skin is warm.      Capillary Refill: Capillary refill takes less than 2 seconds.      Coloration: Skin is not jaundiced.   Neurological:      General: No focal deficit present.      Mental Status: She is alert and oriented " to person, place, and time.   Psychiatric:         Mood and Affect: Mood normal.         Behavior: Behavior normal.       Laboratory Data: I personally reviewed labs including historical lab trends.       PFTs as reviewed by me personally show:        Imaging as reviewed by me personally show:    March 2023: Stable scarring.       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  Additionally, she has a history of peripheral eosinophilia.  Peripheral eosinophilia predates COVID-19 infection.  She does endorse pretty heavy allergies, particularly in October.    Plan/recommendations:  -Trial albuterol prophylactically and as needed.  Instructions given on proper use.  -No need for further spirometry  -She is getting CT scans as part of the post breast cancer surveillance  -She can follow-up annually, sooner if needed.    Total consult time was 40 min. This includes reviewing previous provider notes, personally reviewing previous imaging and spirometry, educating the patient about their disease process, and inhaler education.   __________  Mannie Holguin, DO  Pulmonary and Critical Care Medicine  [facility]    Please note that this dictation was created using voice recognition software. The accuracy of the dictation is limited to the abilities of the software. I have made every reasonable attempt to correct obvious errors, but I expect that there are errors of grammar and possibly content that I did not discover before finalizing the note.

## 2023-06-05 ENCOUNTER — OFFICE VISIT (OUTPATIENT)
Dept: URGENT CARE | Facility: PHYSICIAN GROUP | Age: 73
End: 2023-06-05
Payer: MEDICARE

## 2023-06-05 VITALS
BODY MASS INDEX: 35.44 KG/M2 | SYSTOLIC BLOOD PRESSURE: 116 MMHG | WEIGHT: 200 LBS | HEART RATE: 92 BPM | DIASTOLIC BLOOD PRESSURE: 82 MMHG | OXYGEN SATURATION: 93 % | RESPIRATION RATE: 14 BRPM | HEIGHT: 63 IN | TEMPERATURE: 97.4 F

## 2023-06-05 DIAGNOSIS — M54.32 SCIATICA OF LEFT SIDE: ICD-10-CM

## 2023-06-05 PROCEDURE — 3079F DIAST BP 80-89 MM HG: CPT | Performed by: PHYSICIAN ASSISTANT

## 2023-06-05 PROCEDURE — 99213 OFFICE O/P EST LOW 20 MIN: CPT | Performed by: PHYSICIAN ASSISTANT

## 2023-06-05 PROCEDURE — 3074F SYST BP LT 130 MM HG: CPT | Performed by: PHYSICIAN ASSISTANT

## 2023-06-05 RX ORDER — METHYLPREDNISOLONE 4 MG/1
TABLET ORAL
Qty: 21 TABLET | Refills: 0 | Status: SHIPPED | OUTPATIENT
Start: 2023-06-05 | End: 2023-06-21

## 2023-06-05 ASSESSMENT — ENCOUNTER SYMPTOMS
NUMBNESS: 0
BACK PAIN: 1
PARESTHESIAS: 0
FEVER: 0
WEAKNESS: 0
BOWEL INCONTINENCE: 0
TINGLING: 1
PERIANAL NUMBNESS: 0

## 2023-06-05 ASSESSMENT — FIBROSIS 4 INDEX: FIB4 SCORE: 0.88

## 2023-06-05 NOTE — PROGRESS NOTES
Subjective     Tena Romo is a 73 y.o. female who presents with Hip Pain (L side, x3days )            Back Pain  This is a new problem. Episode onset: x 4-5 days ago. The problem occurs constantly. The problem is unchanged. The pain is present in the sacro-iliac (The patient states the pain is located to her left SI region.). The pain radiates to the left knee (The patient reports radiation of pain down the back of her left lower extremity to the level of the left knee.). The symptoms are aggravated by position and sitting (and certain movements). Associated symptoms include tingling (The patient reports intermittent tingling of the left lower extremity, which she attributes to increased pain.). Pertinent negatives include no bladder incontinence, bowel incontinence, fever, numbness, paresthesias, perianal numbness or weakness. She has tried NSAIDs and heat (and OTC Tylenol and OTC Bengay) for the symptoms.     The patient reports no recent injury and/or trauma to her low back and/or hips.  She denies any recent falls.  The patient reports no prior history of sciatica.    PMH:  has a past medical history of Anesthesia, Arthritis, Breast cancer (HCC) (02/10/2017), Chronic fibrosis of lung (HCC) (5/9/2023), Cough, Dental disorder, Diabetes (HCC), Diverticulosis of colon (11/04/2011), HTN (hypertension) (03/14/2013), Hypertension, Pain, Post hysterectomy menopause (11/04/2011), Trochanteric bursitis of right hip (10/01/2019), Use of anastrozole (Arimidex) (01/11/2019), and Wheezing.    She has no past medical history of Painful breathing, Shortness of breath, or Sputum production.  MEDS:   Current Outpatient Medications:     albuterol 108 (90 Base) MCG/ACT Aero Soln inhalation aerosol, Inhale 2 Puffs every 6 hours as needed for Shortness of Breath., Disp: 8.5 g, Rfl: 6    sulfamethoxazole-trimethoprim (BACTRIM DS) 800-160 MG tablet, Take 1 Tablet by mouth 2 times a day., Disp: , Rfl:      telmisartan-hydrochlorothiazide (MICARDIS HCT) 40-12.5 MG per tablet, TAKE 1 TABLET BY MOUTH EVERY DAY, Disp: 90 Tablet, Rfl: 3    simvastatin (ZOCOR) 40 MG Tab, TAKE 1 TABLET BY MOUTH EVERY DAY IN THE EVENING, Disp: 90 Tablet, Rfl: 3    glipiZIDE (GLUCOTROL) 5 MG Tab, Take 1 Tablet by mouth every day., Disp: 90 Tablet, Rfl: 3    Multiple Vitamin (MULTIVITAMIN ADULT PO), Take  by mouth every day., Disp: , Rfl:     acetaminophen (TYLENOL) 500 MG Tab, Take 500-1,000 mg by mouth every 6 hours as needed., Disp: , Rfl:     multivitamin (THERAGRAN) Tab, Take 1 Tablet by mouth every day., Disp: , Rfl:     omeprazole (PRILOSEC) 40 MG delayed-release capsule, Take 40 mg by mouth every day., Disp: , Rfl:     Blood Glucose Test Strips, Use one strip to test blood sugar once daily early morning before first meal., Disp: 300 Strip, Rfl: 11  ALLERGIES:   Allergies   Allergen Reactions    Cipro Xr Hives    Aspirin      Intolerance    Other reaction(s): Other    Diclofenac Rash    Metformin Rash     Erythematous rash     SURGHX:   Past Surgical History:   Procedure Laterality Date    SHOULDER ARTHROSCOPY W/ ROTATOR CUFF REPAIR  2/9/2012    Performed by JENNY LIMON at [facility] ORS    SHOULDER DECOMPRESSION ARTHROSCOPIC  2/9/2012    Performed by JENNY LIMON at [facility] ORS    CLAVICLE DISTAL EXCISION  2/9/2012    Performed by JENNY LIMON at [facility] ORS    BUNIONECTOMY  2009    bilateral    ABDOMINAL HYSTERECTOMY TOTAL  2001    endometriosis    CHOLECYSTECTOMY  1999    laparoscopy    MASTECTOMY Bilateral     TONSILLECTOMY  as child     SOCHX:  reports that she has never smoked. She has never used smokeless tobacco. She reports current alcohol use. She reports that she does not use drugs.  FH: Family history was reviewed, no pertinent findings to report      Review of Systems   Constitutional:  Negative for fever.   Gastrointestinal:  Negative for bowel incontinence.   Genitourinary:   "Negative for bladder incontinence.   Musculoskeletal:  Positive for back pain.   Neurological:  Positive for tingling (The patient reports intermittent tingling of the left lower extremity, which she attributes to increased pain.). Negative for weakness, numbness and paresthesias.              Objective     /82 (BP Location: Right arm, Patient Position: Sitting, BP Cuff Size: Adult)   Pulse 92   Temp 36.3 °C (97.4 °F) (Temporal)   Resp 14   Ht 1.6 m (5' 3\")   Wt 90.7 kg (200 lb)   SpO2 93%   BMI 35.43 kg/m²      Physical Exam  Constitutional:       General: She is not in acute distress.     Appearance: Normal appearance. She is well-developed. She is not ill-appearing.   HENT:      Head: Normocephalic and atraumatic.      Right Ear: External ear normal.      Left Ear: External ear normal.      Nose: Nose normal.   Eyes:      Extraocular Movements: Extraocular movements intact.      Conjunctiva/sclera: Conjunctivae normal.   Cardiovascular:      Rate and Rhythm: Normal rate.   Pulmonary:      Effort: Pulmonary effort is normal.   Musculoskeletal:      Cervical back: Normal range of motion and neck supple.      Comments:   Lumbar Spine:   Tenderness to the left paraspinal region of the lumbar spine with tenderness extending to the left SI region.  No midline/bony tenderness.  No palpable step-off.  No edema.  No ecchymosis.  No erythema.  No increased warmth.  No rashes/lesions.  Decreased ROM -patient demonstrates limited range of motion secondary to pain  Neurovascular intact distally  Strength 5/5 -equal bilateral lower extremities  Normal gait   Skin:     General: Skin is warm and dry.   Neurological:      Mental Status: She is alert and oriented to person, place, and time.                             Assessment & Plan        1. Sciatica of left side  - methylPREDNISolone (MEDROL DOSEPAK) 4 MG Tablet Therapy Pack; Follow schedule on package instructions.  Dispense: 21 Tablet; Refill: 0    The " patient's presenting symptoms and physical exam findings are consistent with sciatica of the left lower extremity.  We will prescribe patient a Medrol Dosepak for her current symptoms.  The patient is currently not experiencing any red flag signs and symptoms.  Advised the patient to monitor for worsening signs or symptoms.  Recommend OTC medications and supportive care for symptomatic management.  Recommend patient follow-up with PCP.  Discussion precautions with the patient, and she verbalized understanding    Differential diagnoses, supportive care, and indications for immediate follow-up discussed with patient.   Instructed to return to clinic or nearest emergency department for any change in condition, further concerns, or worsening of symptoms.    OTC NSAIDs for pain/discomfort  Alternate ice and heat to the affected area for symptomatic relief  Home stretches as discussed in clinic  Follow-up with PCP  Return to clinic or go to the ED if symptoms worsen or fail to improve, or if the patient should develop worsening/increasing back pain, radiation of pain, numbness, tingling, or weakness to the lower extremities, incontinence of bladder/bowel, paresthesias, difficulty walking, fever/chills, and/or any concerning symptoms.     Discussed plan with the patient, and she agrees to the above.     I personally reviewed prior external notes and test results pertinent to today's visit.  I have independently reviewed and interpreted all diagnostics ordered during this urgent care visit.     Please note that this dictation was created using voice recognition software. I have made every reasonable attempt to correct obvious errors, but I expect that there may be errors of grammar and possibly content that I did not discover before finalizing the note.     This note was electronically signed by Kia Yeager PA-C

## 2023-06-21 ENCOUNTER — OFFICE VISIT (OUTPATIENT)
Dept: MEDICAL GROUP | Facility: PHYSICIAN GROUP | Age: 73
End: 2023-06-21
Payer: MEDICARE

## 2023-06-21 VITALS
TEMPERATURE: 97.8 F | RESPIRATION RATE: 14 BRPM | DIASTOLIC BLOOD PRESSURE: 66 MMHG | HEART RATE: 78 BPM | BODY MASS INDEX: 35.61 KG/M2 | SYSTOLIC BLOOD PRESSURE: 136 MMHG | HEIGHT: 63 IN | OXYGEN SATURATION: 98 % | WEIGHT: 201 LBS

## 2023-06-21 DIAGNOSIS — M54.16 LEFT LUMBAR RADICULOPATHY: ICD-10-CM

## 2023-06-21 PROCEDURE — 99214 OFFICE O/P EST MOD 30 MIN: CPT | Performed by: FAMILY MEDICINE

## 2023-06-21 PROCEDURE — 3078F DIAST BP <80 MM HG: CPT | Performed by: FAMILY MEDICINE

## 2023-06-21 PROCEDURE — 3075F SYST BP GE 130 - 139MM HG: CPT | Performed by: FAMILY MEDICINE

## 2023-06-21 RX ORDER — GABAPENTIN 300 MG/1
300 CAPSULE ORAL 3 TIMES DAILY PRN
Qty: 90 CAPSULE | Refills: 3 | Status: SHIPPED | OUTPATIENT
Start: 2023-06-21 | End: 2023-08-08

## 2023-06-21 RX ORDER — NAPROXEN 500 MG/1
TABLET ORAL
COMMUNITY
Start: 2023-06-13 | End: 2023-07-07

## 2023-06-21 RX ORDER — METHOCARBAMOL 750 MG/1
TABLET, FILM COATED ORAL
COMMUNITY
Start: 2023-06-13

## 2023-06-21 ASSESSMENT — FIBROSIS 4 INDEX: FIB4 SCORE: 0.88

## 2023-06-21 NOTE — PROGRESS NOTES
"CHIEF COMPLAINT / REASON FOR VISIT  Tena Romo is a 73 y.o. female that presents today to [facility] care.    HISTORY OF PRESENT ILLNESS  Seen in . ED on 6/13/2023 for left low back pain radiating to left lower extremity.  Atraumatic.  CT lumbar spine demonstrated multilevel spinal stenosis and neuroforaminal narrowing, recommended further evaluation with MRI.    Left buttock to left lateral calf, aggravated by walking and sleeping flat on back    Tried tylenol, aleve, advil, medrol dosepak, Robaxin. NO alleviation, except a little with tylenol. Also using lidocaine patches  Denies weakness, bowel or bladder changes    Past Medical History  Breast cancer status post double mastectomy  Obesity with alveolar hypoventilation  Mild intermittent asthma  Post COVID pulmonary fibrosis    Past Surgical History:   Procedure Laterality Date    SHOULDER ARTHROSCOPY W/ ROTATOR CUFF REPAIR  2/9/2012    Performed by JENNY LIMON at [facility] ORS    SHOULDER DECOMPRESSION ARTHROSCOPIC  2/9/2012    Performed by JENNY LIMON at [facility] ORS    CLAVICLE DISTAL EXCISION  2/9/2012    Performed by JENNY LIMON at [facility] ORS    BUNIONECTOMY  2009    bilateral    ABDOMINAL HYSTERECTOMY TOTAL  2001    endometriosis    CHOLECYSTECTOMY  1999    laparoscopy    MASTECTOMY Bilateral     TONSILLECTOMY  as child     Social History     Tobacco Use    Smoking status: Never    Smokeless tobacco: Never    Tobacco comments:     avoid any and all tobacco products   Vaping Use    Vaping Use: Never used   Substance Use Topics    Alcohol use: Yes     Alcohol/week: 0.0 oz     Comment: once a year    Drug use: No       OBJECTIVE    /66 (BP Location: Left arm, Patient Position: Sitting, BP Cuff Size: Adult)   Pulse 78   Temp 36.6 °C (97.8 °F) (Temporal)   Resp 14   Ht 1.6 m (5' 3\")   Wt 91.2 kg (201 lb)   SpO2 98%   BMI 35.61 kg/m²  Body mass index is 35.61 kg/m².    PHYSICAL " EXAM  Constitutional: Sitting comfortably, in no acute distress, responds to questions appropriately.  Head: Normocephalic  Back:  No obvious scoliosis or deformity.  Tenderness to palpation of the left posterolateral buttock.  No spinal tenderness.  Straight leg raise negative bilaterally.  Sensation intact to light touch in all lower extremity dermatomes. 5/5 strength bilateral lower extremities.  Antalgic gait favoring right side  Skin: Warm and dry, no rashes or lesions on face or exposed upper extremities    ASSESSMENT & PLAN  1. Left lumbar radiculopathy  Symptoms consistent with left lumbar radiculopathy at approximately L5.  She has no neurologic deficits today. She has multilevel spinal stenosis and neuroforaminal narrowing on CT.  Will obtain MRI for further clarification, and refer for pain management given the severity and debilitating nature of her pain.  We will trial addition of gabapentin.  Follow-up in 1 month  - MR-LUMBAR SPINE-W/O; Future  - Referral to Pain Management

## 2023-06-30 ENCOUNTER — HOSPITAL ENCOUNTER (OUTPATIENT)
Dept: RADIOLOGY | Facility: MEDICAL CENTER | Age: 73
End: 2023-06-30
Attending: FAMILY MEDICINE
Payer: MEDICARE

## 2023-06-30 DIAGNOSIS — M54.16 LEFT LUMBAR RADICULOPATHY: ICD-10-CM

## 2023-06-30 PROCEDURE — 72148 MRI LUMBAR SPINE W/O DYE: CPT

## 2023-07-07 ENCOUNTER — OFFICE VISIT (OUTPATIENT)
Dept: PHYSICAL MEDICINE AND REHAB | Facility: MEDICAL CENTER | Age: 73
End: 2023-07-07
Payer: MEDICARE

## 2023-07-07 ENCOUNTER — TELEPHONE (OUTPATIENT)
Dept: PHYSICAL MEDICINE AND REHAB | Facility: MEDICAL CENTER | Age: 73
End: 2023-07-07

## 2023-07-07 VITALS
DIASTOLIC BLOOD PRESSURE: 76 MMHG | OXYGEN SATURATION: 93 % | HEART RATE: 75 BPM | WEIGHT: 199.74 LBS | SYSTOLIC BLOOD PRESSURE: 138 MMHG | HEIGHT: 63 IN | BODY MASS INDEX: 35.39 KG/M2 | TEMPERATURE: 98 F

## 2023-07-07 DIAGNOSIS — M54.16 ACUTE LUMBAR RADICULOPATHY: Primary | ICD-10-CM

## 2023-07-07 DIAGNOSIS — M48.061 NEUROFORAMINAL STENOSIS OF LUMBAR SPINE: ICD-10-CM

## 2023-07-07 PROCEDURE — 99204 OFFICE O/P NEW MOD 45 MIN: CPT | Performed by: STUDENT IN AN ORGANIZED HEALTH CARE EDUCATION/TRAINING PROGRAM

## 2023-07-07 PROCEDURE — 3075F SYST BP GE 130 - 139MM HG: CPT | Performed by: STUDENT IN AN ORGANIZED HEALTH CARE EDUCATION/TRAINING PROGRAM

## 2023-07-07 PROCEDURE — 1125F AMNT PAIN NOTED PAIN PRSNT: CPT | Performed by: STUDENT IN AN ORGANIZED HEALTH CARE EDUCATION/TRAINING PROGRAM

## 2023-07-07 PROCEDURE — 3078F DIAST BP <80 MM HG: CPT | Performed by: STUDENT IN AN ORGANIZED HEALTH CARE EDUCATION/TRAINING PROGRAM

## 2023-07-07 RX ORDER — LIDOCAINE 50 MG/G
1 PATCH TOPICAL EVERY 24 HOURS
Qty: 30 PATCH | Refills: 7 | Status: SHIPPED | OUTPATIENT
Start: 2023-07-07

## 2023-07-07 RX ORDER — MELOXICAM 7.5 MG/1
7.5 TABLET ORAL 2 TIMES DAILY
Qty: 90 TABLET | Refills: 2 | Status: SHIPPED | OUTPATIENT
Start: 2023-07-07 | End: 2023-08-08

## 2023-07-07 ASSESSMENT — FIBROSIS 4 INDEX: FIB4 SCORE: 0.88

## 2023-07-07 ASSESSMENT — PAIN SCALES - GENERAL: PAINLEVEL: 5=MODERATE PAIN

## 2023-07-07 ASSESSMENT — PATIENT HEALTH QUESTIONNAIRE - PHQ9: CLINICAL INTERPRETATION OF PHQ2 SCORE: 0

## 2023-07-07 NOTE — PROGRESS NOTES
New Patient Note    Interventional Pain and Spine  Physiatry (Physical Medicine and Rehabilitation)     Patient Name: Tena Romo  : 1950  Date of Service: 2023  PCP: Eden Domínguez D.O.  Referring Provider: Manjinder Jade M.D.    Chief Complaint:   Chief Complaint   Patient presents with    New Patient     Left lumbar radiculopathy       HPI  HISTORY FROM INITIAL VISIT (2023):  Tena Romo is a 73 y.o. female who presents today with pain radiating down her left glute down her lateral left leg to her lateral proximal calf x 6 weeks that started with no known inciting event. It has worsened with time and has caused her to walk with an antalgic gait. 1 week ago she started noticing pain at her right glute that feels similar. Denies numbness/tingling, focal leg weakness.    Pain right now is 7/10 on the numeric pain scale. Her pain at its best-worse level during the course of the day is typically 3-8/10, respectively.  Pain worsens with walking, bending forward, bending backwards, side bending or twisting, walking upstairs, and walking downstairs and improves with standing and laying down. Her pain interferes somewhat with ADLs.  She notes significant postural component to her symptoms, typically worse with sitting or bending forward.    The patient has not done physical therapy for this problem    Patient has tried the following medications with varied success (current meds in bold):   Gabapentin - facial swelling, hand swelling  Topical lidocaine-significant improvement  Naproxen- no improvement  Oral steroids - no improvement  Robaxin - no improvement    Therapeutic modalities and interventional therapies to date include:  -No injections    Medical history includes hypertension, type 2 diabetes.    Psychological testing for pain as depression and pain commonly coexist and need to both be treated.     Opioid Risk Score: 0      Interpretation of Opioid Risk Score   Score  0-3 = Low risk of abuse. Do UDS at least once per year.  Score 4-7 = Moderate risk of abuse. Do UDS 1-4 times per year.  Score 8+ = High risk of abuse. Refer to specialist.    PHQ      1/18/2023    10:20 AM 3/14/2023     3:20 PM 7/7/2023     9:40 AM   Depression Screen (PHQ-2/PHQ-9)   PHQ-2 Total Score 0 0 0       Interpretation of PHQ-9 Total Score   Score Severity   1-4 No Depression   5-9 Mild Depression   10-14 Moderate Depression   15-19 Moderately Severe Depression   20-27 Severe Depression      Medical records review:  I reviewed the note from the referring provider Manjinder Jade M.D. including the note dated 6/21/23.    ROS:   Red Flags ROS:   Fever, Chills, Sweats: Denies  Involuntary Weight Loss: Denies  Bladder Incontinence: Denies  Bowel Incontinence: denies  Saddle Anesthesia: Denies    All other systems reviewed and negative.     PMHx:   Past Medical History:   Diagnosis Date    Anesthesia     post of nausea    Arthritis     Breast cancer (HCC) 02/10/2017    Chronic fibrosis of lung (HCC) 5/9/2023    Likely 2/2 COVID 19 PNA in 2020    Cough     Dental disorder     permanent bridge    Diabetes (HCC)     Diverticulosis of colon 11/04/2011    HTN (hypertension) 03/14/2013    Hypertension     Pain     right shoulder down to thumb    Post hysterectomy menopause 11/04/2011    Trochanteric bursitis of right hip 10/01/2019    Use of anastrozole (Arimidex) 01/11/2019    Wheezing        PSHx:   Past Surgical History:   Procedure Laterality Date    SHOULDER ARTHROSCOPY W/ ROTATOR CUFF REPAIR  2/9/2012    Performed by JENNY LIMON at [facility]    SHOULDER DECOMPRESSION ARTHROSCOPIC  2/9/2012    Performed by JENNY LIMON at [facility]    CLAVICLE DISTAL EXCISION  2/9/2012    Performed by JENNY LIMON at [facility] ORS    BUNIONECTOMY  2009    bilateral    ABDOMINAL HYSTERECTOMY TOTAL  2001    endometriosis    CHOLECYSTECTOMY  1999    laparoscopy    MASTECTOMY  "Bilateral     TONSILLECTOMY  as child       Family Hx:   Family History   Problem Relation Age of Onset    Stroke Mother     Lung Disease Father         pneumonia    Hyperlipidemia Father        Social Hx:  Social History     Socioeconomic History    Marital status:      Spouse name: Not on file    Number of children: Not on file    Years of education: Not on file    Highest education level: Not on file   Occupational History    Not on file   Tobacco Use    Smoking status: Never    Smokeless tobacco: Never    Tobacco comments:     avoid any and all tobacco products   Vaping Use    Vaping Use: Never used   Substance and Sexual Activity    Alcohol use: Yes     Alcohol/week: 0.0 oz     Comment: once a year    Drug use: No    Sexual activity: Not Currently     Partners: Male     Comment: . Work at [employer]   Other Topics Concern    Not on file   Social History Narrative    Not on file     Social Determinants of Health     Financial Resource Strain: Not on file   Food Insecurity: Not on file   Transportation Needs: Not on file   Physical Activity: Not on file   Stress: Not on file   Social Connections: Not on file   Intimate Partner Violence: Not on file   Housing Stability: Not on file       Allergies:  Allergies   Allergen Reactions    Cipro Xr Hives    Gabapentin      Pt states she was \"all puffed up\"    Aspirin      Intolerance    Other reaction(s): Other    Diclofenac Rash    Metformin Rash     Erythematous rash       Medications: reviewed on epic.   Outpatient Medications Marked as Taking for the 7/7/23 encounter (Office Visit) with Libertad Dowd M.D.   Medication Sig Dispense Refill    meloxicam (MOBIC) 7.5 MG Tab Take 1 Tablet by mouth 2 times a day. 90 Tablet 2    lidocaine (LIDODERM) 5 % Patch Place 1 Patch on the skin every 24 hours. Apply for 12 hours maximum and then take off for 12 hours. 30 Patch 7    methocarbamol (ROBAXIN) 750 MG Tab       albuterol 108 (90 Base) MCG/ACT Aero Soln " inhalation aerosol Inhale 2 Puffs every 6 hours as needed for Shortness of Breath. 8.5 g 6    sulfamethoxazole-trimethoprim (BACTRIM DS) 800-160 MG tablet Take 1 Tablet by mouth 2 times a day.      telmisartan-hydrochlorothiazide (MICARDIS HCT) 40-12.5 MG per tablet TAKE 1 TABLET BY MOUTH EVERY DAY 90 Tablet 3    simvastatin (ZOCOR) 40 MG Tab TAKE 1 TABLET BY MOUTH EVERY DAY IN THE EVENING 90 Tablet 3    glipiZIDE (GLUCOTROL) 5 MG Tab Take 1 Tablet by mouth every day. 90 Tablet 3    Multiple Vitamin (MULTIVITAMIN ADULT PO) Take  by mouth every day.      acetaminophen (TYLENOL) 500 MG Tab Take 500-1,000 mg by mouth every 6 hours as needed.      multivitamin (THERAGRAN) Tab Take 1 Tablet by mouth every day.      omeprazole (PRILOSEC) 40 MG delayed-release capsule Take 40 mg by mouth every day.      Blood Glucose Test Strips Use one strip to test blood sugar once daily early morning before first meal. 300 Strip 11        Current Outpatient Medications on File Prior to Visit   Medication Sig Dispense Refill    methocarbamol (ROBAXIN) 750 MG Tab       albuterol 108 (90 Base) MCG/ACT Aero Soln inhalation aerosol Inhale 2 Puffs every 6 hours as needed for Shortness of Breath. 8.5 g 6    sulfamethoxazole-trimethoprim (BACTRIM DS) 800-160 MG tablet Take 1 Tablet by mouth 2 times a day.      telmisartan-hydrochlorothiazide (MICARDIS HCT) 40-12.5 MG per tablet TAKE 1 TABLET BY MOUTH EVERY DAY 90 Tablet 3    simvastatin (ZOCOR) 40 MG Tab TAKE 1 TABLET BY MOUTH EVERY DAY IN THE EVENING 90 Tablet 3    glipiZIDE (GLUCOTROL) 5 MG Tab Take 1 Tablet by mouth every day. 90 Tablet 3    Multiple Vitamin (MULTIVITAMIN ADULT PO) Take  by mouth every day.      acetaminophen (TYLENOL) 500 MG Tab Take 500-1,000 mg by mouth every 6 hours as needed.      multivitamin (THERAGRAN) Tab Take 1 Tablet by mouth every day.      omeprazole (PRILOSEC) 40 MG delayed-release capsule Take 40 mg by mouth every day.      Blood Glucose Test Strips Use one  "strip to test blood sugar once daily early morning before first meal. 300 Strip 11    gabapentin (NEURONTIN) 300 MG Cap Take 1 Capsule by mouth 3 times a day as needed (pain). Can increase dose in 300 mg increments every 7 days as needed, to max dose of 900 mg. (Patient not taking: Reported on 7/7/2023) 90 Capsule 3     No current facility-administered medications on file prior to visit.         EXAMINATION     Physical Exam:   /76 (BP Location: Left arm, Patient Position: Sitting, BP Cuff Size: Adult)   Pulse 75   Temp 36.7 °C (98 °F) (Temporal)   Ht 1.6 m (5' 3\")   Wt 90.6 kg (199 lb 11.8 oz)   SpO2 93%     Constitutional:   Body Habitus: Body mass index is 35.38 kg/m².  Cooperation: Fully cooperates with exam  Appearance: Well-groomed, well-nourished.    Eyes: No scleral icterus to suggest severe liver disease, no proptosis to suggest severe hyperthyroidism    ENT -no obvious auditory deficits, no noticeable facial droop     Skin -no rashes or lesions noted     Respiratory-  breathing comfortably on room air, no audible wheezing    Cardiovascular-distal extremities warm and well perfused.  No lower extremity edema is noted.     Gastrointestinal - no obvious abdominal masses, non-distended    Psychiatric- alert and oriented ×3. Normal affect.     Gait - normal gait, no use of ambulatory device, nonantalgic. Heel walking and toe walking intact.    Musculoskeletal and Neuro -     Thoracic/Lumbar Spine/Sacral Spine/Hips   Inspection: No evidence of atrophy in bilateral lower extremities throughout     There is full active range of motion with lumbar extension    Facet loading maneuver negative bilaterally    Palpation:   Tenderness to palpation over the  bilateral posterior lateral glutes . No tenderness to palpation elsewhere in the low back/hips including midline of lumbosacral spine, paraspinal muscles bilaterally, lumbar facets bilaterally spanning approximately L1-L5 levels, sacroiliac joints " bilaterally, PSIS bilaterally, and greater trochanters bilaterally.    Lumbar spine /hip provocative exam maneuvers  Straight leg raise positive on left, negative on right  FADIR test negative bilaterally    SI joint tests  JANNET test negative bilaterally  Thigh thrust test negative bilaterally    Key points for the international standards for neurological classification of spinal cord injury (ISNCSCI) to light touch.   Dermatome R L   L2 2 2   L3 2 2   L4 2 2   L5 2 1   S1 2 2   S2 2 2       Motor Exam Lower Extremities  ? Myotome R L   Hip flexion L2 5 5   Knee extension L3 5 5   Ankle dorsiflexion L4 5 5   Toe extension L5 5 5   Ankle plantarflexion S1 5 5       Reflexes  ?  R L   Patella  2+ 2+   Achilles   2+ 2+     Clonus of the ankle negative bilaterally       MEDICAL DECISION MAKING    Medical records review: see under HPI section.     DATA    Labs: Personally reviewed at today's visit:     Lab Results   Component Value Date/Time    SODIUM 139 03/14/2023 02:18 PM    POTASSIUM 3.8 03/14/2023 02:18 PM    CHLORIDE 101 03/14/2023 02:18 PM    CO2 25 03/14/2023 02:18 PM    ANION 13.0 03/14/2023 02:18 PM    GLUCOSE 104 (H) 03/14/2023 02:18 PM    BUN 17 03/14/2023 02:18 PM    CREATININE 0.93 03/14/2023 02:18 PM    CREATININE 0.86 12/01/2010 12:00 AM    CALCIUM 9.5 03/14/2023 02:18 PM    ASTSGOT 16 12/27/2022 09:12 AM    ALTSGPT 16 12/27/2022 09:12 AM    TBILIRUBIN 0.4 12/27/2022 09:12 AM    ALBUMIN 4.3 12/27/2022 09:12 AM    TOTPROTEIN 7.4 12/27/2022 09:12 AM    GLOBULIN 3.1 12/27/2022 09:12 AM    AGRATIO 1.4 12/27/2022 09:12 AM       No results found for: PROTHROMBTM, INR     Lab Results   Component Value Date/Time    WBC 9.2 12/27/2022 09:12 AM    RBC 4.42 12/27/2022 09:12 AM    HEMOGLOBIN 13.5 12/27/2022 09:12 AM    HEMATOCRIT 42.0 12/27/2022 09:12 AM    MCV 95.0 12/27/2022 09:12 AM    MCH 30.5 12/27/2022 09:12 AM    MCHC 32.1 (L) 12/27/2022 09:12 AM    MPV 11.2 12/27/2022 09:12 AM    NEUTSPOLYS 57.60 12/27/2022  09:12 AM    LYMPHOCYTES 27.00 2022 09:12 AM    MONOCYTES 8.40 2022 09:12 AM    EOSINOPHILS 5.90 2022 09:12 AM    BASOPHILS 0.70 2022 09:12 AM    HYPOCHROMIA 1+ 03/15/2013 06:35 AM        Lab Results   Component Value Date/Time    HBA1C 6.9 (A) 2023 03:43 PM        Imaging:   I personally reviewed following images, these are my reads    MRI lumbar spine 2023  Partially lumbarized S1 vertebrae.  At L4-5 there is severe central canal stenosis and moderate right and mild-moderate left neuroforaminal stenosis.  At L5-S1 there is moderate central canal stenosis and moderate to severe right and mild left neuroforaminal stenosis.  Facet arthropathy most notable at lower lumbar levels. See formal radiology report for further details.      IMAGING radiology reads. I reviewed the following radiology reads                      Results for orders placed during the hospital encounter of 23    MR-LUMBAR SPINE-W/O    Impression  1.  Severe facet hypertrophy greater than degenerative disc disease results in severe central stenosis at L4/5, moderate to severe right foraminal stenosis at L5/S1    2.  L2/3 disc bulge and extrusion causes moderate to severe central stenosis    3.  Lesser degenerative changes at the levels as detailed above    4.  Mild discogenic L5/S1 edema indicating ongoing remodeling    5.  Ambiguous segmentation, careful correlation of levels is required if intervention is planned, as detailed above       Diagnosis  Visit Diagnoses     ICD-10-CM   1. Acute lumbar radiculopathy  M54.16   2. Neuroforaminal stenosis of lumbar spine  M48.061   3. BMI 35.0-35.9,adult  Z68.35         ASSESSMENT AND PLAN:  Tena Romo ( 1950) is a female     Tena was seen today for new patient.    Diagnoses and all orders for this visit:    Acute lumbar radiculopathy  -     Referral to Physical Therapy    Neuroforaminal stenosis of lumbar spine  -     Referral to Physical  Therapy    BMI 35.0-35.9,adult  -     Patient identified as having weight management issue.  Appropriate orders and counseling given.    Other orders  -     meloxicam (MOBIC) 7.5 MG Tab; Take 1 Tablet by mouth 2 times a day.  -     lidocaine (LIDODERM) 5 % Patch; Place 1 Patch on the skin every 24 hours. Apply for 12 hours maximum and then take off for 12 hours.          PLAN  Physical Therapy: I ordered physical therapy to focus on strengthening and stretching as well as a home exercise program.     Diagnostic workup: Personally reviewed at today's visit: MRI lumbar spine 6/30/2023    Medications:   - given the inflammatory component of pain, I will start mobic as above   - Refill topical lidocaine patches today given significant improvement with this and no unwanted side effects  -Avoid gabapentin due to facial swelling, hand swelling  - s/p naproxen, oral steroids, Robaxin with no improvement     Interventions:   -Discussed possible epidural steroid injection.  Patient declined as she would like to pursue physical therapy first which I think is reasonable.  Discussed that if her pain worsens and is severe, interferes with her safety, or impedes progress with PT, I would consider an epidural steroid injection at that point.  She expressed understanding    Follow-up: 3.5-4 months or sooner as needed    Orders Placed This Encounter    Referral to Physical Therapy    Patient identified as having weight management issue.  Appropriate orders and counseling given.    meloxicam (MOBIC) 7.5 MG Tab    lidocaine (LIDODERM) 5 % Patch       Libertad Dowd MD  Interventional Pain and Spine  Physical Medicine and Rehabilitation  [facility]    Manjinder Rodriguez M.D.     The above note documents my personal evaluation of this patient. In addition, I have reviewed and confirmed with the patient and MA the supportive information documented in today's Patient Health Questionnaire and Office Note.     Please note that this  dictation was created using voice recognition software. I have made every reasonable attempt to correct obvious errors, but I expect that there are errors of grammar and possibly content that I did not discover before finalizing the note.

## 2023-07-10 ENCOUNTER — TELEPHONE (OUTPATIENT)
Dept: PHYSICAL MEDICINE AND REHAB | Facility: MEDICAL CENTER | Age: 73
End: 2023-07-10
Payer: MEDICARE

## 2023-07-10 NOTE — TELEPHONE ENCOUNTER
1. Caller Name: Tena Small                          Call Back Number: [phone]    Patient called , said that the physical therapy referral she was given by Dr. Dowd . That   Needs to know what kind of physical therapy to provide her . If you can please fax the instructions. To [phone].         Please call patient if you have any questions         Thank you,       Nanette

## 2023-07-11 NOTE — TELEPHONE ENCOUNTER
"Phone Number Called: [phone]    Call outcome: Left detailed message for patient. Informed to call back with any additional questions.    Message: Called to relay Dr. Dowd's reply to pt question:    \"I recommend that she discontinue meloxicam if she is having these side effects and instead continue with topical lidocaine patches\"  "

## 2023-07-11 NOTE — TELEPHONE ENCOUNTER
Looks like her PT order was authorized to [facility] in [facility]. The PT should know how to treat her based on my referral. Please let me know if there is any specific information that they need from me.

## 2023-07-11 NOTE — TELEPHONE ENCOUNTER
I recommend that she discontinue meloxicam if she is having these side effects and instead continue with topical lidocaine patches

## 2023-07-11 NOTE — TELEPHONE ENCOUNTER
"As we discussed on the phone, Dr. Dowd replied:    \"Looks like her PT order was authorized to [facility] in [facility]. The PT should know how to treat her based on my referral. Please let me know if there is any specific information that they need from me.\"    Pt also complains of new onset cough and itching that she attributes to meloxicam side effects. She says these issues are tolerable.     "

## 2023-07-27 ENCOUNTER — OFFICE VISIT (OUTPATIENT)
Dept: PHYSICAL MEDICINE AND REHAB | Facility: MEDICAL CENTER | Age: 73
End: 2023-07-27
Payer: MEDICARE

## 2023-07-27 VITALS
HEART RATE: 72 BPM | SYSTOLIC BLOOD PRESSURE: 128 MMHG | BODY MASS INDEX: 35.35 KG/M2 | HEIGHT: 63 IN | DIASTOLIC BLOOD PRESSURE: 64 MMHG | WEIGHT: 199.52 LBS | OXYGEN SATURATION: 94 % | TEMPERATURE: 97.3 F

## 2023-07-27 DIAGNOSIS — M48.061 NEUROFORAMINAL STENOSIS OF LUMBAR SPINE: ICD-10-CM

## 2023-07-27 DIAGNOSIS — R20.0 NUMBNESS AND TINGLING OF LEFT LEG: ICD-10-CM

## 2023-07-27 DIAGNOSIS — R29.898 LEFT LEG WEAKNESS: ICD-10-CM

## 2023-07-27 DIAGNOSIS — R20.2 NUMBNESS AND TINGLING OF LEFT LEG: ICD-10-CM

## 2023-07-27 DIAGNOSIS — M54.16 ACUTE LUMBAR RADICULOPATHY: Primary | ICD-10-CM

## 2023-07-27 PROCEDURE — 3074F SYST BP LT 130 MM HG: CPT | Performed by: STUDENT IN AN ORGANIZED HEALTH CARE EDUCATION/TRAINING PROGRAM

## 2023-07-27 PROCEDURE — 99214 OFFICE O/P EST MOD 30 MIN: CPT | Performed by: STUDENT IN AN ORGANIZED HEALTH CARE EDUCATION/TRAINING PROGRAM

## 2023-07-27 PROCEDURE — 3078F DIAST BP <80 MM HG: CPT | Performed by: STUDENT IN AN ORGANIZED HEALTH CARE EDUCATION/TRAINING PROGRAM

## 2023-07-27 ASSESSMENT — PATIENT HEALTH QUESTIONNAIRE - PHQ9
5. POOR APPETITE OR OVEREATING: 0 - NOT AT ALL
CLINICAL INTERPRETATION OF PHQ2 SCORE: 4
SUM OF ALL RESPONSES TO PHQ QUESTIONS 1-9: 7

## 2023-07-27 ASSESSMENT — PAIN SCALES - GENERAL: PAINLEVEL: 7=MODERATE-SEVERE PAIN

## 2023-07-27 ASSESSMENT — FIBROSIS 4 INDEX: FIB4 SCORE: 0.88

## 2023-07-27 NOTE — Clinical Note
Adding this pt to your SP schedule since I don't have any SP spots left. She has worsened left L5 and S1 radiculopathy and I ordered a left L5-S1 and S1 transforaminal epidural steroid injection. Of note she has a partially lumbarized S1 vertebrae on her MRI. Very pleasant patient.

## 2023-07-27 NOTE — PROGRESS NOTES
Follow-up patient Note    Interventional Pain and Spine  Physiatry (Physical Medicine and Rehabilitation)     Patient Name: Tena Romo  : 1950  Date of service: 2023    Chief Complaint:   Chief Complaint   Patient presents with    Follow-Up     Acute lumbar radiculopathy       HISTORY FROM INITIAL VISIT (2023):  Tena Romo is a 73 y.o. female who presents today with pain radiating down her left glute down her lateral left leg to her lateral proximal calf x 6 weeks that started with no known inciting event. It has worsened with time and has caused her to walk with an antalgic gait. 1 week ago she started noticing pain at her right glute that feels similar. Denies numbness/tingling, focal leg weakness.     Pain right now is 7/10 on the numeric pain scale. Her pain at its best-worse level during the course of the day is typically 3-8/10, respectively.  Pain worsens with walking, bending forward, bending backwards, side bending or twisting, walking upstairs, and walking downstairs and improves with standing and laying down. Her pain interferes somewhat with ADLs.  She notes significant postural component to her symptoms, typically worse with sitting or bending forward.     The patient has not done physical therapy for this problem     Patient has tried the following medications with varied success (current meds in bold):   Gabapentin - facial swelling, hand swelling  Topical lidocaine-significant improvement  Naproxen- no improvement  Oral steroids - no improvement  Robaxin - no improvement     Therapeutic modalities and interventional therapies to date include:  -No injections     Medical history includes hypertension, type 2 diabetes.    HPI  Today's visit   Tena Romo ( 1950) is a female with The primary encounter diagnosis was Acute lumbar radiculopathy. Diagnoses of Neuroforaminal stenosis of lumbar spine, Left leg weakness, and Numbness and  tingling of left leg were also pertinent to this visit.    Ongoing low back pain and sciatica, worsened since last visit. Went to ED yesterday and was given dilaudid, zofran, flexeril, prednisone, and topical lidocaine patch and advised to f/u with me for epidural. We had previously discussed an epidural which she had elected to defer in favor of PT as her pain was more tolerable at that time.    Today she notes pain worst at her left glute radiating down her posterolateral left leg to her proximal calf accompanied by numbness and tingling in this area. Pain worsens with bending forward and improves with sitting still. Also endorses weakness in her calf making her feel like she's going to fall. Pain interferes with ADLs and has caused her to fall in the store.     Pain severity 7/10 currently  Pt denies new numbness, tingling, or weakness.    Medical records reviewed by me at today's visit:  ED note 07/26/23 indicating exacerbation of low back pain and sciatica over the last 3 days, advice to get epidural steroid injection as she is scheduled to start PT in several. Weeks. Given Dilaudid 1 mg IM, Zofran 4 mg ODT, Flexeril 10 mg p.o., prednisone 60 mg p.o. as well as a Lidoderm patch with improvement in pain    ROS:   Red Flags ROS:   Fever, Chills, Sweats: Denies  Involuntary Weight Loss: Denies  Bladder Incontinence: Denies  Bowel Incontinence: denies  Saddle Anesthesia: Denies    All other systems reviewed and negative.     PMHx:   Past Medical History:   Diagnosis Date    Anesthesia     post of nausea    Arthritis     Breast cancer (HCC) 02/10/2017    Chronic fibrosis of lung (HCC) 5/9/2023    Likely 2/2 COVID 19 PNA in 2020    Cough     Dental disorder     permanent bridge    Diabetes (HCC)     Diverticulosis of colon 11/04/2011    HTN (hypertension) 03/14/2013    Hypertension     Pain     right shoulder down to thumb    Post hysterectomy menopause 11/04/2011    Trochanteric bursitis of right hip 10/01/2019     "Use of anastrozole (Arimidex) 01/11/2019    Wheezing        PSHx:   Past Surgical History:   Procedure Laterality Date    SHOULDER ARTHROSCOPY W/ ROTATOR CUFF REPAIR  2/9/2012    Performed by JENNY LIMON at SURGERY [facility]    SHOULDER DECOMPRESSION ARTHROSCOPIC  2/9/2012    Performed by JENNY LIMON at SURGERY [facility]    CLAVICLE DISTAL EXCISION  2/9/2012    Performed by JENNY LIMON at SURGERY [facility]    BUNIONECTOMY  2009    bilateral    ABDOMINAL HYSTERECTOMY TOTAL  2001    endometriosis    CHOLECYSTECTOMY  1999    laparoscopy    MASTECTOMY Bilateral     TONSILLECTOMY  as child       Family Hx:   Family History   Problem Relation Age of Onset    Stroke Mother     Lung Disease Father         pneumonia    Hyperlipidemia Father        Social Hx:  Social History     Socioeconomic History    Marital status:      Spouse name: Not on file    Number of children: Not on file    Years of education: Not on file    Highest education level: Not on file   Occupational History    Not on file   Tobacco Use    Smoking status: Never    Smokeless tobacco: Never    Tobacco comments:     avoid any and all tobacco products   Vaping Use    Vaping Use: Never used   Substance and Sexual Activity    Alcohol use: Yes     Alcohol/week: 0.0 oz     Comment: once a year    Drug use: No    Sexual activity: Not Currently     Partners: Male     Comment: . Work at [employer]   Other Topics Concern    Not on file   Social History Narrative    Not on file     Social Determinants of Health     Financial Resource Strain: Not on file   Food Insecurity: Not on file   Transportation Needs: Not on file   Physical Activity: Not on file   Stress: Not on file   Social Connections: Not on file   Intimate Partner Violence: Not on file   Housing Stability: Not on file       Allergies:  Allergies   Allergen Reactions    Cipro Xr Hives    Gabapentin      Pt states she was \"all puffed up\"    Meloxicam Vomiting    " Aspirin      Intolerance    Other reaction(s): Other    Diclofenac Rash    Metformin Rash     Erythematous rash       Medications: reviewed on epic.   Outpatient Medications Marked as Taking for the 7/27/23 encounter (Office Visit) with Libertad Dowd M.D.   Medication Sig Dispense Refill    lidocaine (LIDODERM) 5 % Patch Place 1 Patch on the skin every 24 hours. Apply for 12 hours maximum and then take off for 12 hours. 30 Patch 7    methocarbamol (ROBAXIN) 750 MG Tab       albuterol 108 (90 Base) MCG/ACT Aero Soln inhalation aerosol Inhale 2 Puffs every 6 hours as needed for Shortness of Breath. 8.5 g 6    sulfamethoxazole-trimethoprim (BACTRIM DS) 800-160 MG tablet Take 1 Tablet by mouth 2 times a day.      telmisartan-hydrochlorothiazide (MICARDIS HCT) 40-12.5 MG per tablet TAKE 1 TABLET BY MOUTH EVERY DAY 90 Tablet 3    simvastatin (ZOCOR) 40 MG Tab TAKE 1 TABLET BY MOUTH EVERY DAY IN THE EVENING 90 Tablet 3    glipiZIDE (GLUCOTROL) 5 MG Tab Take 1 Tablet by mouth every day. 90 Tablet 3    Multiple Vitamin (MULTIVITAMIN ADULT PO) Take  by mouth every day.      acetaminophen (TYLENOL) 500 MG Tab Take 500-1,000 mg by mouth every 6 hours as needed.      multivitamin (THERAGRAN) Tab Take 1 Tablet by mouth every day.      omeprazole (PRILOSEC) 40 MG delayed-release capsule Take 40 mg by mouth every day.      Blood Glucose Test Strips Use one strip to test blood sugar once daily early morning before first meal. 300 Strip 11        Current Outpatient Medications on File Prior to Visit   Medication Sig Dispense Refill    lidocaine (LIDODERM) 5 % Patch Place 1 Patch on the skin every 24 hours. Apply for 12 hours maximum and then take off for 12 hours. 30 Patch 7    methocarbamol (ROBAXIN) 750 MG Tab       albuterol 108 (90 Base) MCG/ACT Aero Soln inhalation aerosol Inhale 2 Puffs every 6 hours as needed for Shortness of Breath. 8.5 g 6    sulfamethoxazole-trimethoprim (BACTRIM DS) 800-160 MG tablet Take 1 Tablet by  "mouth 2 times a day.      telmisartan-hydrochlorothiazide (MICARDIS HCT) 40-12.5 MG per tablet TAKE 1 TABLET BY MOUTH EVERY DAY 90 Tablet 3    simvastatin (ZOCOR) 40 MG Tab TAKE 1 TABLET BY MOUTH EVERY DAY IN THE EVENING 90 Tablet 3    glipiZIDE (GLUCOTROL) 5 MG Tab Take 1 Tablet by mouth every day. 90 Tablet 3    Multiple Vitamin (MULTIVITAMIN ADULT PO) Take  by mouth every day.      acetaminophen (TYLENOL) 500 MG Tab Take 500-1,000 mg by mouth every 6 hours as needed.      multivitamin (THERAGRAN) Tab Take 1 Tablet by mouth every day.      omeprazole (PRILOSEC) 40 MG delayed-release capsule Take 40 mg by mouth every day.      Blood Glucose Test Strips Use one strip to test blood sugar once daily early morning before first meal. 300 Strip 11    meloxicam (MOBIC) 7.5 MG Tab Take 1 Tablet by mouth 2 times a day. (Patient not taking: Reported on 7/27/2023) 90 Tablet 2    gabapentin (NEURONTIN) 300 MG Cap Take 1 Capsule by mouth 3 times a day as needed (pain). Can increase dose in 300 mg increments every 7 days as needed, to max dose of 900 mg. 90 Capsule 3     No current facility-administered medications on file prior to visit.         EXAMINATION     Physical Exam:   /64 (BP Location: Right arm, Patient Position: Sitting, BP Cuff Size: Adult)   Pulse 72   Temp 36.3 °C (97.3 °F) (Temporal)   Ht 1.6 m (5' 3\")   Wt 90.5 kg (199 lb 8.3 oz)   SpO2 94%     Constitutional:   Body Habitus: Body mass index is 35.34 kg/m².  Cooperation: Fully cooperates with exam  Appearance: Well-groomed, well-nourished.    Eyes: No scleral icterus to suggest severe liver disease, no proptosis to suggest severe hyperthyroidism    ENT -no obvious auditory deficits, no noticeable facial droop     Skin -no rashes or lesions noted     Respiratory-  breathing comfortably on room air, no audible wheezing    Cardiovascular-distal extremities warm and well perfused.  No lower extremity edema is noted.     Gastrointestinal - no obvious " abdominal masses, non-distended    Psychiatric- alert and oriented ×3. Normal affect.     Gait - normal gait, no use of ambulatory device, nonantalgic. Heel walking and toe walking intact.     Musculoskeletal and Neuro -      Thoracic/Lumbar Spine/Sacral Spine/Hips   Inspection: No evidence of atrophy in bilateral lower extremities throughout      There is full active range of motion with lumbar extension     Facet loading maneuver negative bilaterally     Palpation:   Tenderness to palpation over the  bilateral posterior lateral glutes . No tenderness to palpation elsewhere in the low back/hips including midline of lumbosacral spine, paraspinal muscles bilaterally, lumbar facets bilaterally spanning approximately L1-L5 levels, sacroiliac joints bilaterally, PSIS bilaterally, and greater trochanters bilaterally.     Lumbar spine /hip provocative exam maneuvers  Straight leg raise positive on left, negative on right  FADIR test negative bilaterally     SI joint tests  JANNET test negative bilaterally  Thigh thrust test negative bilaterally     Key points for the international standards for neurological classification of spinal cord injury (ISNCSCI) to light touch.   Dermatome R L   L2 2 2   L3 2 2   L4 2 2   L5 2 1   S1 2 2   S2 2 2         Motor Exam Lower Extremities  ? Myotome R L   Hip flexion L2 5 5   Knee extension L3 5 5   Ankle dorsiflexion L4 5 5   Toe extension L5 5 5   Ankle plantarflexion S1 5 5         Reflexes  ?   R L   Patella   2+ 2+   Achilles    2+ 2+      Clonus of the ankle negative bilaterally       MEDICAL DECISION MAKING    Medical records review: see under HPI section.     DATA    Labs: No new labs available for review since last visit.   Lab Results   Component Value Date/Time    SODIUM 139 03/14/2023 02:18 PM    POTASSIUM 3.8 03/14/2023 02:18 PM    CHLORIDE 101 03/14/2023 02:18 PM    CO2 25 03/14/2023 02:18 PM    ANION 13.0 03/14/2023 02:18 PM    GLUCOSE 104 (H) 03/14/2023 02:18 PM    BUN 17  03/14/2023 02:18 PM    CREATININE 0.93 03/14/2023 02:18 PM    CREATININE 0.86 12/01/2010 12:00 AM    CALCIUM 9.5 03/14/2023 02:18 PM    ASTSGOT 16 12/27/2022 09:12 AM    ALTSGPT 16 12/27/2022 09:12 AM    TBILIRUBIN 0.4 12/27/2022 09:12 AM    ALBUMIN 4.3 12/27/2022 09:12 AM    TOTPROTEIN 7.4 12/27/2022 09:12 AM    GLOBULIN 3.1 12/27/2022 09:12 AM    AGRATIO 1.4 12/27/2022 09:12 AM       No results found for: PROTHROMBTM, INR     Lab Results   Component Value Date/Time    WBC 9.2 12/27/2022 09:12 AM    RBC 4.42 12/27/2022 09:12 AM    HEMOGLOBIN 13.5 12/27/2022 09:12 AM    HEMATOCRIT 42.0 12/27/2022 09:12 AM    MCV 95.0 12/27/2022 09:12 AM    MCH 30.5 12/27/2022 09:12 AM    MCHC 32.1 (L) 12/27/2022 09:12 AM    MPV 11.2 12/27/2022 09:12 AM    NEUTSPOLYS 57.60 12/27/2022 09:12 AM    LYMPHOCYTES 27.00 12/27/2022 09:12 AM    MONOCYTES 8.40 12/27/2022 09:12 AM    EOSINOPHILS 5.90 12/27/2022 09:12 AM    BASOPHILS 0.70 12/27/2022 09:12 AM    HYPOCHROMIA 1+ 03/15/2013 06:35 AM        Lab Results   Component Value Date/Time    HBA1C 6.9 (A) 03/14/2023 03:43 PM        Imaging:   I personally reviewed following images, these are my reads  MRI lumbar spine 6/30/2023  Partially lumbarized S1 vertebrae.  At L4-5 there is severe central canal stenosis and moderate right and mild-moderate left neuroforaminal stenosis.  At L5-S1 there is moderate central canal stenosis and moderate to severe right and mild left neuroforaminal stenosis.  Facet arthropathy most notable at lower lumbar levels. See formal radiology report for further details.        IMAGING radiology reads. I reviewed the following radiology reads                      Results for orders placed during the hospital encounter of 06/30/23     MR-LUMBAR SPINE-W/O     Impression  1.  Severe facet hypertrophy greater than degenerative disc disease results in severe central stenosis at L4/5, moderate to severe right foraminal stenosis at L5/S1     2.  L2/3 disc bulge and extrusion  causes moderate to severe central stenosis     3.  Lesser degenerative changes at the levels as detailed above     4.  Mild discogenic L5/S1 edema indicating ongoing remodeling     5.  Ambiguous segmentation, careful correlation of levels is required if intervention is planned, as detailed above         Diagnosis  Visit Diagnoses     ICD-10-CM   1. Acute lumbar radiculopathy  M54.16   2. Neuroforaminal stenosis of lumbar spine  M48.061   3. Left leg weakness  R29.898   4. Numbness and tingling of left leg  R20.0    R20.2         ASSESSMENT AND PLAN:  Tena Romo (: 1950) is a female with worsened symptoms of lumbar radiculopathy mainly in the left L5 and S1 nerve root distribution.     Tena was seen today for follow-up.    Diagnoses and all orders for this visit:    Acute lumbar radiculopathy  -     Cancel: Referral to Pain Clinic  -     Referral to Pain Clinic    Neuroforaminal stenosis of lumbar spine  -     Cancel: Referral to Pain Clinic  -     Referral to Pain Clinic    Left leg weakness    Numbness and tingling of left leg          PLAN  Physical Therapy: I previously ordered physical therapy to focus on strengthening and stretching as well as a home exercise program.      Diagnostic workup: again personally reviewed at today's visit: MRI lumbar spine 2023     Medications:   - OK to cont flexeril. Was given flexeril and prednisone in the ED with improvement  - topical lidocaine patches given significant improvement with this and no unwanted side effects  - Avoid gabapentinoids due to SE facial swelling, hand swelling  - s/p naproxen, oral steroids, Robaxin with no improvement   - s/p mobic with SE hives     Interventions:   -left L5-S1 and S1 transforaminal epidural steroid injection under fluoroscopic guidance. Discussed that this would likely be done with Dr. Chou for scheduling reasons and she is fine with this plan. She was recently seen in the ED and reportedly given oral  steroids which she is unsure of, as she has not filled her prescriptions yet. Discussed that she should hold any possible oral steroids for 5 days prior to the procedure for safety reasons to minimize the risk of bleeding. The risks, benefits, and alternatives to this procedure were discussed and the patient wishes to proceed with the procedure. Risks include but are not limited to damage to surrounding structures, infection, bleeding, worsening of pain which can be permanent, and weakness which can be permanent. Benefits include pain relief and improved function. Alternatives include not doing the procedure.       Follow-up: 4 weeks after injection    Orders Placed This Encounter    Referral to Pain Clinic       Libertad Dowd MD  Interventional Pain and Spine  Physical Medicine and Rehabilitation  [facility]      The above note documents my personal evaluation of this patient. In addition, I have reviewed and confirmed with the patient and MA the supportive information documented in today's Patient Health Questionnaire and Office Note.     Please note that this dictation was created using voice recognition software. I have made every reasonable attempt to correct obvious errors, but I expect that there are errors of grammar and possibly content that I did not discover before finalizing the note.

## 2023-07-27 NOTE — PATIENT INSTRUCTIONS
Your procedure will be at the [facility] special procedure suite.    [facility] [place] [address]       PRE-PROCEDURE INSTRUCTIONS  You may take your regular medications except:   No Anti-inflammatories 5 days prior to your procedure. Anti-inflammatories include medicines such as  ibuprofen (Motrin, Advil), Excedrin, Naproxen (Aleve, Anaprox, Naprelan, Naprosyn), Celecoxib (Celebrex), Diclofenac (Voltaren-XR tab), and Meloxicam (Mobic).   You can take the remainder of your pain medications as prescribed.   If you are having a diagnostic procedure such as a medial branch block, do not use her pain meds on the day of the procedure  No Glucophage or Metformin 24 hours before your procedure. You may resume next day after your procedure.  Call the physiatry office if you are taking or prescribed anti-biotics within five days of procedure.  Please ask provider if you are taking any new diabetes medication.  CONTINUE TAKING BLOOD PRESSURE MEDICATIONS AS PRESCRIBED.  Pain medications will not be prescribed on the procedure day. Procedural pain medication may be used by your provider   Call your doctor's office performing the procedure if you have a fever, chills, rash or new illness prior to your procedure    Anticoagulation/antiplatelet medications  No Blood thinning medications such as Coumadin, Xarelto, aspirin or Plavix 5 days prior to procedure unless your doctor said to continue these medications. Call your doctor if a new medication is prescribed in this class.     Restrictions for eating before procedure:   If you are getting procedural sedation, then do not eat to for 8 hours prior to procedure appointment time. Do not drink fluids for four hours prior to your procedure time.   If you are not having procedural sedation, then Skip the meal prior to your procedure. If you have a morning procedure then skip breakfast. If you have an afternoon procedure then skip lunch.   You may drink clear liquids  up to 2 hours prior to your procedure  You must have a  the day of procedure to accompany you home.      POST PROCEDURE INSTRUCTIONS   No heavy lifting, strenuous bending or strenuous exercise for 3 days after your procedure.  No hot tubs, baths, swimming for 3 days after your procedure  You can remove the bandage the day after the procedure.  IF YOU RECEIVED A STEROID INJECTION. PLEASE NOTE THAT THERE MAY BE A DELAY FOR THE INJECTION TO START WORKING, THE DELAY MAY BE UP TO TWO WEEKS. IF YOU HAVE DIABETES, PLEASE NOTE THAT YOUR SUGAR LEVELS MAY BE ELEVATED FOR 1-2 DAYS AFTER A STEROID INJECTION.  THE STEROID MAY CAUSE TEMPORARY SYMPTOMS WHICH USUALLY RESOLVE ON THEIR OWN WITHIN 1 TO 2 DAYS INCLUDING FACIAL FLUSHING OR A FEELING OF WARMTH ON THE FACE, TEMPORARY INCREASES IN BLOOD SUGAR, INSOMNIA, INCREASED HUNGER  IF YOU EXPERIENCE PROLONGED WEAKNESS LONGER THAN ONE DAY, BOWEL OR BLADDER INCONTINENCE THEN PLEASE CALL THE PHYSIATRY OFFICE.  Your leg may feel heavy, weak and numb for up to 1-2 days. Be very careful walking.    You may resume normal activities 3 days after procedure.

## 2023-07-27 NOTE — H&P (VIEW-ONLY)
Follow-up patient Note    Interventional Pain and Spine  Physiatry (Physical Medicine and Rehabilitation)     Patient Name: Tena Romo  : 1950  Date of service: 2023    Chief Complaint:   Chief Complaint   Patient presents with    Follow-Up     Acute lumbar radiculopathy       HISTORY FROM INITIAL VISIT (2023):  Tena Romo is a 73 y.o. female who presents today with pain radiating down her left glute down her lateral left leg to her lateral proximal calf x 6 weeks that started with no known inciting event. It has worsened with time and has caused her to walk with an antalgic gait. 1 week ago she started noticing pain at her right glute that feels similar. Denies numbness/tingling, focal leg weakness.     Pain right now is 7/10 on the numeric pain scale. Her pain at its best-worse level during the course of the day is typically 3-8/10, respectively.  Pain worsens with walking, bending forward, bending backwards, side bending or twisting, walking upstairs, and walking downstairs and improves with standing and laying down. Her pain interferes somewhat with ADLs.  She notes significant postural component to her symptoms, typically worse with sitting or bending forward.     The patient has not done physical therapy for this problem     Patient has tried the following medications with varied success (current meds in bold):   Gabapentin - facial swelling, hand swelling  Topical lidocaine-significant improvement  Naproxen- no improvement  Oral steroids - no improvement  Robaxin - no improvement     Therapeutic modalities and interventional therapies to date include:  -No injections     Medical history includes hypertension, type 2 diabetes.    HPI  Today's visit   Tena Romo ( 1950) is a female with The primary encounter diagnosis was Acute lumbar radiculopathy. Diagnoses of Neuroforaminal stenosis of lumbar spine, Left leg weakness, and Numbness and  tingling of left leg were also pertinent to this visit.    Ongoing low back pain and sciatica, worsened since last visit. Went to ED yesterday and was given dilaudid, zofran, flexeril, prednisone, and topical lidocaine patch and advised to f/u with me for epidural. We had previously discussed an epidural which she had elected to defer in favor of PT as her pain was more tolerable at that time.    Today she notes pain worst at her left glute radiating down her posterolateral left leg to her proximal calf accompanied by numbness and tingling in this area. Pain worsens with bending forward and improves with sitting still. Also endorses weakness in her calf making her feel like she's going to fall. Pain interferes with ADLs and has caused her to fall in the store.     Pain severity 7/10 currently  Pt denies new numbness, tingling, or weakness.    Medical records reviewed by me at today's visit:  ED note 07/26/23 indicating exacerbation of low back pain and sciatica over the last 3 days, advice to get epidural steroid injection as she is scheduled to start PT in several. Weeks. Given Dilaudid 1 mg IM, Zofran 4 mg ODT, Flexeril 10 mg p.o., prednisone 60 mg p.o. as well as a Lidoderm patch with improvement in pain    ROS:   Red Flags ROS:   Fever, Chills, Sweats: Denies  Involuntary Weight Loss: Denies  Bladder Incontinence: Denies  Bowel Incontinence: denies  Saddle Anesthesia: Denies    All other systems reviewed and negative.     PMHx:   Past Medical History:   Diagnosis Date    Anesthesia     post of nausea    Arthritis     Breast cancer (HCC) 02/10/2017    Chronic fibrosis of lung (HCC) 5/9/2023    Likely 2/2 COVID 19 PNA in 2020    Cough     Dental disorder     permanent bridge    Diabetes (HCC)     Diverticulosis of colon 11/04/2011    HTN (hypertension) 03/14/2013    Hypertension     Pain     right shoulder down to thumb    Post hysterectomy menopause 11/04/2011    Trochanteric bursitis of right hip 10/01/2019     "Use of anastrozole (Arimidex) 01/11/2019    Wheezing        PSHx:   Past Surgical History:   Procedure Laterality Date    SHOULDER ARTHROSCOPY W/ ROTATOR CUFF REPAIR  2/9/2012    Performed by JENNY LIMON at SURGERY [facility]    SHOULDER DECOMPRESSION ARTHROSCOPIC  2/9/2012    Performed by JENNY LIMON at SURGERY [facility]    CLAVICLE DISTAL EXCISION  2/9/2012    Performed by JENNY LIMON at SURGERY [facility]    BUNIONECTOMY  2009    bilateral    ABDOMINAL HYSTERECTOMY TOTAL  2001    endometriosis    CHOLECYSTECTOMY  1999    laparoscopy    MASTECTOMY Bilateral     TONSILLECTOMY  as child       Family Hx:   Family History   Problem Relation Age of Onset    Stroke Mother     Lung Disease Father         pneumonia    Hyperlipidemia Father        Social Hx:  Social History     Socioeconomic History    Marital status:      Spouse name: Not on file    Number of children: Not on file    Years of education: Not on file    Highest education level: Not on file   Occupational History    Not on file   Tobacco Use    Smoking status: Never    Smokeless tobacco: Never    Tobacco comments:     avoid any and all tobacco products   Vaping Use    Vaping Use: Never used   Substance and Sexual Activity    Alcohol use: Yes     Alcohol/week: 0.0 oz     Comment: once a year    Drug use: No    Sexual activity: Not Currently     Partners: Male     Comment: . Work at [employer]   Other Topics Concern    Not on file   Social History Narrative    Not on file     Social Determinants of Health     Financial Resource Strain: Not on file   Food Insecurity: Not on file   Transportation Needs: Not on file   Physical Activity: Not on file   Stress: Not on file   Social Connections: Not on file   Intimate Partner Violence: Not on file   Housing Stability: Not on file       Allergies:  Allergies   Allergen Reactions    Cipro Xr Hives    Gabapentin      Pt states she was \"all puffed up\"    Meloxicam Vomiting    " Aspirin      Intolerance    Other reaction(s): Other    Diclofenac Rash    Metformin Rash     Erythematous rash       Medications: reviewed on epic.   Outpatient Medications Marked as Taking for the 7/27/23 encounter (Office Visit) with Libertad Dowd M.D.   Medication Sig Dispense Refill    lidocaine (LIDODERM) 5 % Patch Place 1 Patch on the skin every 24 hours. Apply for 12 hours maximum and then take off for 12 hours. 30 Patch 7    methocarbamol (ROBAXIN) 750 MG Tab       albuterol 108 (90 Base) MCG/ACT Aero Soln inhalation aerosol Inhale 2 Puffs every 6 hours as needed for Shortness of Breath. 8.5 g 6    sulfamethoxazole-trimethoprim (BACTRIM DS) 800-160 MG tablet Take 1 Tablet by mouth 2 times a day.      telmisartan-hydrochlorothiazide (MICARDIS HCT) 40-12.5 MG per tablet TAKE 1 TABLET BY MOUTH EVERY DAY 90 Tablet 3    simvastatin (ZOCOR) 40 MG Tab TAKE 1 TABLET BY MOUTH EVERY DAY IN THE EVENING 90 Tablet 3    glipiZIDE (GLUCOTROL) 5 MG Tab Take 1 Tablet by mouth every day. 90 Tablet 3    Multiple Vitamin (MULTIVITAMIN ADULT PO) Take  by mouth every day.      acetaminophen (TYLENOL) 500 MG Tab Take 500-1,000 mg by mouth every 6 hours as needed.      multivitamin (THERAGRAN) Tab Take 1 Tablet by mouth every day.      omeprazole (PRILOSEC) 40 MG delayed-release capsule Take 40 mg by mouth every day.      Blood Glucose Test Strips Use one strip to test blood sugar once daily early morning before first meal. 300 Strip 11        Current Outpatient Medications on File Prior to Visit   Medication Sig Dispense Refill    lidocaine (LIDODERM) 5 % Patch Place 1 Patch on the skin every 24 hours. Apply for 12 hours maximum and then take off for 12 hours. 30 Patch 7    methocarbamol (ROBAXIN) 750 MG Tab       albuterol 108 (90 Base) MCG/ACT Aero Soln inhalation aerosol Inhale 2 Puffs every 6 hours as needed for Shortness of Breath. 8.5 g 6    sulfamethoxazole-trimethoprim (BACTRIM DS) 800-160 MG tablet Take 1 Tablet by  "mouth 2 times a day.      telmisartan-hydrochlorothiazide (MICARDIS HCT) 40-12.5 MG per tablet TAKE 1 TABLET BY MOUTH EVERY DAY 90 Tablet 3    simvastatin (ZOCOR) 40 MG Tab TAKE 1 TABLET BY MOUTH EVERY DAY IN THE EVENING 90 Tablet 3    glipiZIDE (GLUCOTROL) 5 MG Tab Take 1 Tablet by mouth every day. 90 Tablet 3    Multiple Vitamin (MULTIVITAMIN ADULT PO) Take  by mouth every day.      acetaminophen (TYLENOL) 500 MG Tab Take 500-1,000 mg by mouth every 6 hours as needed.      multivitamin (THERAGRAN) Tab Take 1 Tablet by mouth every day.      omeprazole (PRILOSEC) 40 MG delayed-release capsule Take 40 mg by mouth every day.      Blood Glucose Test Strips Use one strip to test blood sugar once daily early morning before first meal. 300 Strip 11    meloxicam (MOBIC) 7.5 MG Tab Take 1 Tablet by mouth 2 times a day. (Patient not taking: Reported on 7/27/2023) 90 Tablet 2    gabapentin (NEURONTIN) 300 MG Cap Take 1 Capsule by mouth 3 times a day as needed (pain). Can increase dose in 300 mg increments every 7 days as needed, to max dose of 900 mg. 90 Capsule 3     No current facility-administered medications on file prior to visit.         EXAMINATION     Physical Exam:   /64 (BP Location: Right arm, Patient Position: Sitting, BP Cuff Size: Adult)   Pulse 72   Temp 36.3 °C (97.3 °F) (Temporal)   Ht 1.6 m (5' 3\")   Wt 90.5 kg (199 lb 8.3 oz)   SpO2 94%     Constitutional:   Body Habitus: Body mass index is 35.34 kg/m².  Cooperation: Fully cooperates with exam  Appearance: Well-groomed, well-nourished.    Eyes: No scleral icterus to suggest severe liver disease, no proptosis to suggest severe hyperthyroidism    ENT -no obvious auditory deficits, no noticeable facial droop     Skin -no rashes or lesions noted     Respiratory-  breathing comfortably on room air, no audible wheezing    Cardiovascular-distal extremities warm and well perfused.  No lower extremity edema is noted.     Gastrointestinal - no obvious " abdominal masses, non-distended    Psychiatric- alert and oriented ×3. Normal affect.     Gait - normal gait, no use of ambulatory device, nonantalgic. Heel walking and toe walking intact.     Musculoskeletal and Neuro -      Thoracic/Lumbar Spine/Sacral Spine/Hips   Inspection: No evidence of atrophy in bilateral lower extremities throughout      There is full active range of motion with lumbar extension     Facet loading maneuver negative bilaterally     Palpation:   Tenderness to palpation over the  bilateral posterior lateral glutes . No tenderness to palpation elsewhere in the low back/hips including midline of lumbosacral spine, paraspinal muscles bilaterally, lumbar facets bilaterally spanning approximately L1-L5 levels, sacroiliac joints bilaterally, PSIS bilaterally, and greater trochanters bilaterally.     Lumbar spine /hip provocative exam maneuvers  Straight leg raise positive on left, negative on right  FADIR test negative bilaterally     SI joint tests  JANNET test negative bilaterally  Thigh thrust test negative bilaterally     Key points for the international standards for neurological classification of spinal cord injury (ISNCSCI) to light touch.   Dermatome R L   L2 2 2   L3 2 2   L4 2 2   L5 2 1   S1 2 2   S2 2 2         Motor Exam Lower Extremities  ? Myotome R L   Hip flexion L2 5 5   Knee extension L3 5 5   Ankle dorsiflexion L4 5 5   Toe extension L5 5 5   Ankle plantarflexion S1 5 5         Reflexes  ?   R L   Patella   2+ 2+   Achilles    2+ 2+      Clonus of the ankle negative bilaterally       MEDICAL DECISION MAKING    Medical records review: see under HPI section.     DATA    Labs: No new labs available for review since last visit.   Lab Results   Component Value Date/Time    SODIUM 139 03/14/2023 02:18 PM    POTASSIUM 3.8 03/14/2023 02:18 PM    CHLORIDE 101 03/14/2023 02:18 PM    CO2 25 03/14/2023 02:18 PM    ANION 13.0 03/14/2023 02:18 PM    GLUCOSE 104 (H) 03/14/2023 02:18 PM    BUN 17  03/14/2023 02:18 PM    CREATININE 0.93 03/14/2023 02:18 PM    CREATININE 0.86 12/01/2010 12:00 AM    CALCIUM 9.5 03/14/2023 02:18 PM    ASTSGOT 16 12/27/2022 09:12 AM    ALTSGPT 16 12/27/2022 09:12 AM    TBILIRUBIN 0.4 12/27/2022 09:12 AM    ALBUMIN 4.3 12/27/2022 09:12 AM    TOTPROTEIN 7.4 12/27/2022 09:12 AM    GLOBULIN 3.1 12/27/2022 09:12 AM    AGRATIO 1.4 12/27/2022 09:12 AM       No results found for: PROTHROMBTM, INR     Lab Results   Component Value Date/Time    WBC 9.2 12/27/2022 09:12 AM    RBC 4.42 12/27/2022 09:12 AM    HEMOGLOBIN 13.5 12/27/2022 09:12 AM    HEMATOCRIT 42.0 12/27/2022 09:12 AM    MCV 95.0 12/27/2022 09:12 AM    MCH 30.5 12/27/2022 09:12 AM    MCHC 32.1 (L) 12/27/2022 09:12 AM    MPV 11.2 12/27/2022 09:12 AM    NEUTSPOLYS 57.60 12/27/2022 09:12 AM    LYMPHOCYTES 27.00 12/27/2022 09:12 AM    MONOCYTES 8.40 12/27/2022 09:12 AM    EOSINOPHILS 5.90 12/27/2022 09:12 AM    BASOPHILS 0.70 12/27/2022 09:12 AM    HYPOCHROMIA 1+ 03/15/2013 06:35 AM        Lab Results   Component Value Date/Time    HBA1C 6.9 (A) 03/14/2023 03:43 PM        Imaging:   I personally reviewed following images, these are my reads  MRI lumbar spine 6/30/2023  Partially lumbarized S1 vertebrae.  At L4-5 there is severe central canal stenosis and moderate right and mild-moderate left neuroforaminal stenosis.  At L5-S1 there is moderate central canal stenosis and moderate to severe right and mild left neuroforaminal stenosis.  Facet arthropathy most notable at lower lumbar levels. See formal radiology report for further details.        IMAGING radiology reads. I reviewed the following radiology reads                      Results for orders placed during the hospital encounter of 06/30/23     MR-LUMBAR SPINE-W/O     Impression  1.  Severe facet hypertrophy greater than degenerative disc disease results in severe central stenosis at L4/5, moderate to severe right foraminal stenosis at L5/S1     2.  L2/3 disc bulge and extrusion  causes moderate to severe central stenosis     3.  Lesser degenerative changes at the levels as detailed above     4.  Mild discogenic L5/S1 edema indicating ongoing remodeling     5.  Ambiguous segmentation, careful correlation of levels is required if intervention is planned, as detailed above         Diagnosis  Visit Diagnoses     ICD-10-CM   1. Acute lumbar radiculopathy  M54.16   2. Neuroforaminal stenosis of lumbar spine  M48.061   3. Left leg weakness  R29.898   4. Numbness and tingling of left leg  R20.0    R20.2         ASSESSMENT AND PLAN:  Tena Romo (: 1950) is a female with worsened symptoms of lumbar radiculopathy mainly in the left L5 and S1 nerve root distribution.     Tena was seen today for follow-up.    Diagnoses and all orders for this visit:    Acute lumbar radiculopathy  -     Cancel: Referral to Pain Clinic  -     Referral to Pain Clinic    Neuroforaminal stenosis of lumbar spine  -     Cancel: Referral to Pain Clinic  -     Referral to Pain Clinic    Left leg weakness    Numbness and tingling of left leg          PLAN  Physical Therapy: I previously ordered physical therapy to focus on strengthening and stretching as well as a home exercise program.      Diagnostic workup: again personally reviewed at today's visit: MRI lumbar spine 2023     Medications:   - OK to cont flexeril. Was given flexeril and prednisone in the ED with improvement  - topical lidocaine patches given significant improvement with this and no unwanted side effects  - Avoid gabapentinoids due to SE facial swelling, hand swelling  - s/p naproxen, oral steroids, Robaxin with no improvement   - s/p mobic with SE hives     Interventions:   -left L5-S1 and S1 transforaminal epidural steroid injection under fluoroscopic guidance. Discussed that this would likely be done with Dr. Chou for scheduling reasons and she is fine with this plan. She was recently seen in the ED and reportedly given oral  steroids which she is unsure of, as she has not filled her prescriptions yet. Discussed that she should hold any possible oral steroids for 5 days prior to the procedure for safety reasons to minimize the risk of bleeding. The risks, benefits, and alternatives to this procedure were discussed and the patient wishes to proceed with the procedure. Risks include but are not limited to damage to surrounding structures, infection, bleeding, worsening of pain which can be permanent, and weakness which can be permanent. Benefits include pain relief and improved function. Alternatives include not doing the procedure.       Follow-up: 4 weeks after injection    Orders Placed This Encounter    Referral to Pain Clinic       Libertad Dowd MD  Interventional Pain and Spine  Physical Medicine and Rehabilitation  [facility]      The above note documents my personal evaluation of this patient. In addition, I have reviewed and confirmed with the patient and MA the supportive information documented in today's Patient Health Questionnaire and Office Note.     Please note that this dictation was created using voice recognition software. I have made every reasonable attempt to correct obvious errors, but I expect that there are errors of grammar and possibly content that I did not discover before finalizing the note.

## 2023-08-08 ENCOUNTER — OFFICE VISIT (OUTPATIENT)
Dept: MEDICAL GROUP | Facility: PHYSICIAN GROUP | Age: 73
End: 2023-08-08
Payer: MEDICARE

## 2023-08-08 ENCOUNTER — HOSPITAL ENCOUNTER (OUTPATIENT)
Facility: MEDICAL CENTER | Age: 73
End: 2023-08-08
Attending: STUDENT IN AN ORGANIZED HEALTH CARE EDUCATION/TRAINING PROGRAM
Payer: MEDICARE

## 2023-08-08 VITALS
OXYGEN SATURATION: 93 % | WEIGHT: 200 LBS | HEART RATE: 90 BPM | BODY MASS INDEX: 35.44 KG/M2 | SYSTOLIC BLOOD PRESSURE: 122 MMHG | HEIGHT: 63 IN | TEMPERATURE: 98.6 F | DIASTOLIC BLOOD PRESSURE: 54 MMHG | RESPIRATION RATE: 20 BRPM

## 2023-08-08 DIAGNOSIS — E11.69 TYPE 2 DIABETES MELLITUS WITH OTHER SPECIFIED COMPLICATION, WITHOUT LONG-TERM CURRENT USE OF INSULIN (HCC): ICD-10-CM

## 2023-08-08 DIAGNOSIS — Z86.19 HISTORY OF ESBL E. COLI INFECTION: ICD-10-CM

## 2023-08-08 DIAGNOSIS — N30.00 ACUTE CYSTITIS WITHOUT HEMATURIA: ICD-10-CM

## 2023-08-08 LAB
APPEARANCE UR: NORMAL
BILIRUB UR STRIP-MCNC: NEGATIVE MG/DL
COLOR UR AUTO: NORMAL
GLUCOSE UR STRIP.AUTO-MCNC: NEGATIVE MG/DL
KETONES UR STRIP.AUTO-MCNC: NEGATIVE MG/DL
LEUKOCYTE ESTERASE UR QL STRIP.AUTO: NORMAL
NITRITE UR QL STRIP.AUTO: POSITIVE
PH UR STRIP.AUTO: 5.5 [PH] (ref 5–8)
PROT UR QL STRIP: NEGATIVE MG/DL
RBC UR QL AUTO: NORMAL
SP GR UR STRIP.AUTO: 1.01
UROBILINOGEN UR STRIP-MCNC: 0.2 MG/DL

## 2023-08-08 PROCEDURE — 87077 CULTURE AEROBIC IDENTIFY: CPT

## 2023-08-08 PROCEDURE — 99213 OFFICE O/P EST LOW 20 MIN: CPT | Performed by: STUDENT IN AN ORGANIZED HEALTH CARE EDUCATION/TRAINING PROGRAM

## 2023-08-08 PROCEDURE — 81002 URINALYSIS NONAUTO W/O SCOPE: CPT | Performed by: STUDENT IN AN ORGANIZED HEALTH CARE EDUCATION/TRAINING PROGRAM

## 2023-08-08 PROCEDURE — 3074F SYST BP LT 130 MM HG: CPT | Performed by: STUDENT IN AN ORGANIZED HEALTH CARE EDUCATION/TRAINING PROGRAM

## 2023-08-08 PROCEDURE — 87086 URINE CULTURE/COLONY COUNT: CPT

## 2023-08-08 PROCEDURE — 3078F DIAST BP <80 MM HG: CPT | Performed by: STUDENT IN AN ORGANIZED HEALTH CARE EDUCATION/TRAINING PROGRAM

## 2023-08-08 PROCEDURE — 87186 SC STD MICRODIL/AGAR DIL: CPT

## 2023-08-08 RX ORDER — NITROFURANTOIN 25; 75 MG/1; MG/1
100 CAPSULE ORAL 2 TIMES DAILY
Qty: 10 CAPSULE | Refills: 0 | Status: SHIPPED | OUTPATIENT
Start: 2023-08-08 | End: 2023-08-13

## 2023-08-08 RX ORDER — LANCETS 30 GAUGE
EACH MISCELLANEOUS
Qty: 100 EACH | Refills: 3 | Status: SHIPPED | OUTPATIENT
Start: 2023-08-08

## 2023-08-08 ASSESSMENT — ENCOUNTER SYMPTOMS: ABDOMINAL PAIN: 1

## 2023-08-08 ASSESSMENT — FIBROSIS 4 INDEX: FIB4 SCORE: 0.88

## 2023-08-08 NOTE — PROGRESS NOTES
"Subjective:     Chief Complaint   Patient presents with    UTI     Foggy urine,      HPI:   Tena presents today concern for UTI.  PCP unavailable.    Patient states that her urine has been cloudy and she has been experiencing lower abdominal pressure since Friday.  Denies any nausea, vomiting, constipation, diarrhea, flank pain and fever.  States that she experiences chills on occasion.  Reports that she was told she cannot have a procedure for her back pain until her urinary tract infection has resolved.  Has not followed up with urology.    Patient does have a history of ESBL E. coli    Review of Systems   Gastrointestinal:  Positive for abdominal pain.   Genitourinary:  Negative for hematuria.     Objective:     Exam:  /54 (BP Location: Right arm, Patient Position: Sitting, BP Cuff Size: Adult)   Pulse 90   Temp 37 °C (98.6 °F) (Temporal)   Resp 20   Ht 1.6 m (5' 3\")   Wt 90.7 kg (200 lb)   SpO2 93%   BMI 35.43 kg/m²  Body mass index is 35.43 kg/m².    Physical Exam  Vitals reviewed.   Constitutional:       General: She is not in acute distress.     Appearance: Normal appearance. She is not ill-appearing.   HENT:      Head: Normocephalic and atraumatic.      Mouth/Throat:      Mouth: Mucous membranes are moist.   Eyes:      General: No scleral icterus.  Cardiovascular:      Rate and Rhythm: Normal rate and regular rhythm.      Heart sounds: Normal heart sounds.   Pulmonary:      Effort: Pulmonary effort is normal.      Breath sounds: Normal breath sounds.   Abdominal:      General: Abdomen is flat. Bowel sounds are normal. There is no distension.      Palpations: Abdomen is soft. There is no mass.      Tenderness: There is abdominal tenderness (generalized lower, mostly suprapubic). There is no right CVA tenderness, left CVA tenderness, guarding or rebound.   Skin:     General: Skin is warm and dry.      Coloration: Skin is not jaundiced.   Neurological:      Mental Status: She is alert and oriented " to person, place, and time.      Gait: Gait abnormal (Needs walker for ambulation).   Psychiatric:         Mood and Affect: Mood normal.         Behavior: Behavior normal.         Thought Content: Thought content normal.       Labs: Reviewed from 12/27/2022, 3/14/2023, 4/20/2023  Imaging: Reviewed from 6/30/2023    Assessment & Plan:     73 y.o. female with the following -     1. Acute cystitis without hematuria  2. History of ESBL E. coli infection  Acute, reviewed prior culture results. Treat with macrobid as below. F/u urine culture and change antibiotics if needed. Gave return precautions.   - POCT Urinalysis  - URINE CULTURE(NEW); Future  - nitrofurantoin (MACROBID) 100 MG Cap; Take 1 Capsule by mouth 2 times a day for 5 days.  Dispense: 10 Capsule; Refill: 0    3. Type 2 diabetes mellitus with other specified complication, without long-term current use of insulin (HCC)  Chronic, controlled. Continue medication(s) as below.  Continue diabetic diet and exercise as tolerated.  - A1C due soon, declines today.  - Lancets; Lancets order: Lancets for Freestylemeter. Sig: use once daily and prn ssx high or low sugar. #100 RF x 3  Dispense: 100 Each; Refill: 3    glipiZIDE (GLUCOTROL) 5 MG Tab, Take 1 Tablet by mouth every day., Disp: 90 Tablet, Rfl: 3    Return if symptoms worsen or fail to improve.    Please note that this dictation was created using voice recognition software. I have made every reasonable attempt to correct obvious errors, but I expect that there are errors of grammar and possibly content that I did not discover before finalizing the note.

## 2023-08-22 ENCOUNTER — APPOINTMENT (OUTPATIENT)
Dept: RADIOLOGY | Facility: REHABILITATION | Age: 73
End: 2023-08-22
Attending: PHYSICAL MEDICINE & REHABILITATION
Payer: MEDICARE

## 2023-08-22 ENCOUNTER — HOSPITAL ENCOUNTER (OUTPATIENT)
Facility: REHABILITATION | Age: 73
End: 2023-08-22
Attending: PHYSICAL MEDICINE & REHABILITATION | Admitting: PHYSICAL MEDICINE & REHABILITATION
Payer: MEDICARE

## 2023-08-22 VITALS
BODY MASS INDEX: 34.69 KG/M2 | OXYGEN SATURATION: 96 % | DIASTOLIC BLOOD PRESSURE: 86 MMHG | SYSTOLIC BLOOD PRESSURE: 153 MMHG | HEIGHT: 63 IN | WEIGHT: 195.77 LBS | RESPIRATION RATE: 18 BRPM | HEART RATE: 87 BPM | TEMPERATURE: 97.7 F

## 2023-08-22 LAB — GLUCOSE BLD STRIP.AUTO-MCNC: 149 MG/DL (ref 65–99)

## 2023-08-22 PROCEDURE — 82962 GLUCOSE BLOOD TEST: CPT

## 2023-08-22 PROCEDURE — 700117 HCHG RX CONTRAST REV CODE 255

## 2023-08-22 PROCEDURE — 64484 NJX AA&/STRD TFRM EPI L/S EA: CPT

## 2023-08-22 PROCEDURE — 64483 NJX AA&/STRD TFRM EPI L/S 1: CPT

## 2023-08-22 PROCEDURE — 700111 HCHG RX REV CODE 636 W/ 250 OVERRIDE (IP)

## 2023-08-22 RX ORDER — DEXAMETHASONE SODIUM PHOSPHATE 10 MG/ML
INJECTION, SOLUTION INTRAMUSCULAR; INTRAVENOUS
Status: COMPLETED
Start: 2023-08-22 | End: 2023-08-22

## 2023-08-22 RX ORDER — LIDOCAINE HYDROCHLORIDE 10 MG/ML
INJECTION, SOLUTION EPIDURAL; INFILTRATION; INTRACAUDAL; PERINEURAL
Status: COMPLETED
Start: 2023-08-22 | End: 2023-08-22

## 2023-08-22 RX ADMIN — IOHEXOL 3 ML: 240 INJECTION, SOLUTION INTRATHECAL; INTRAVASCULAR; INTRAVENOUS; ORAL at 08:05

## 2023-08-22 RX ADMIN — DEXAMETHASONE SODIUM PHOSPHATE 10 MG: 10 INJECTION, SOLUTION INTRAMUSCULAR; INTRAVENOUS at 08:06

## 2023-08-22 RX ADMIN — LIDOCAINE HYDROCHLORIDE 10 ML: 10 INJECTION, SOLUTION EPIDURAL; INFILTRATION; INTRACAUDAL at 08:05

## 2023-08-22 ASSESSMENT — FIBROSIS 4 INDEX: FIB4 SCORE: 0.88

## 2023-08-22 ASSESSMENT — PAIN DESCRIPTION - PAIN TYPE: TYPE: CHRONIC PAIN

## 2023-08-22 NOTE — OR SURGEON
Immediate Post OP Note    Pre-Op Diagnosis Codes:     * Lumbar radiculopathy [M54.16]      Post-Op Diagnosis Codes:     * Lumbar radiculopathy [M54.16]      Procedure(s):  LEFT L5-S1 and S1 transforaminal epidural steroid injection with fluoroscopic guidance - Wound Class: Clean    Surgeon(s):  Kieran Chou M.D.    Anesthesiologist/Type of Anesthesia:  No anesthesia staff entered./Local    Surgical Staff:  Circulator: Myriam England R.N.  Scrub Person: Niranjan Song C.N.A.  Radiology Technologist: Jay De La Rosa    Specimens removed if any:  * No specimens in log *    Estimated Blood Loss: None    Findings: None    Complications: None        8/22/2023 8:20 AM Kieran Chou M.D.

## 2023-08-22 NOTE — INTERVAL H&P NOTE
H&P reviewed. The patient was examined and there are no changes to the H&P     Lungs clear to auscultation bilaterally.  No abdominal tenderness.  Heart regular rate and rhythm.  Vitals reviewed.    Proceed with the originally scheduled procedure.  The risks, benefits and alternatives were discussed.  The patient understands these.    Pre-Op Diagnosis Codes:     * Lumbar radiculopathy [M54.16]  Procedure(s):  LEFT L5-S1 and S1 transforaminal epidural steroid injection with fluoroscopic guidance    Kieran Chou MD  Physical Medicine and Rehabilitation  Interventional Spine and Sports Physiatry  [facility]

## 2023-08-22 NOTE — PROGRESS NOTES
Admitting assessment completed w 2 identifiers. Pt stated procedure. Medical Hx, meds +allergies reviewed +systems: respiratory,cardiac, anticoagulants+ antibiotic therapy, renal, surgeries, pain, falls, implants,diabetes, hepatitis. Confirmed Pt understanding of DC Instructions. Infection prevention including COVID precautions reviewed. Pt has  waiting to be called  FBS this morning in the unit is 149.

## 2023-08-22 NOTE — OP REPORT
Date of Service: 8/22/2023     Patient: Tena Romo 73 y.o. female     MRN: [id number]     Physician/s: Kieran Chou MD    Pre-operative Diagnosis: Lumbar radiculopathy    Post-operative Diagnosis: Lumbar radiculopathy    Procedure: left Lumbar Transforaminal Epidural Steroid  at the L5-S1 and S1 levels.     Description of procedure:    The risks, benefits, and alternatives of the procedure were reviewed and discussed with the patient.  Written informed consent was freely obtained. A pre-procedural time-out was conducted by the physician verifying patient’s identity, procedure to be performed, procedure site and side, and allergy verification. Appropriate equipment was determined to be in place for the procedure.       The patient's vital signs were carefully monitored before, throughout, and after the procedure.     In the fluoroscopy suite the patient was placed in a prone position, a pillow placed underneath their umbilicus. The skin was prepped and draped in the usual sterile fashion.     The patient's transitional anatomy was noted.  I reviewed the previous notes as well as the previous imaging.     The fluoroscope was placed over the lumbar spine and adjusted into the proper AP/Oblique view to enter the transforaminal space just adjacent to the pedicle at the levels below. The targets for injection were then marked at the left L5-S1. A 27g 1.5 inch needle was placed into the marked site, and 1mL of 1% Lidocaine was injected subcutaneously into the epidermal and dermal layers. The needle was removed intact.  A 22g 5 inch spinal needle was then placed and advanced under fluoroscopic guidance in an oblique view towards the epidural space of the levels noted above. The needle position was confirmed to not be past the 6 o'clock position in the AP view and it was in the neuroforamen in the lateral view.        The fluoroscope was was then adjusted over the lumbar spine and adjusted into the proper  AP/Oblique view to enter the transforaminal space adjacent to the pedicle at the LEFT  S1. A 27g 1.5 inch needle was placed into the marked site, and 1mL of 1% Lidocaine was injected subcutaneously into the epidermal and dermal layers. The needle was removed intact.  A 22g 5 inch spinal needle was then placed and advanced under fluoroscopic guidance in an oblique view towards the epidural space of the levels noted above. The needle position was confirmed to not be past the 6 o'clock position in the AP view and it was in the neuroforamen in the lateral view.       Under live fluoroscopic guidance in the AP view, contrast dye was used to highlight the epidural space spread of each level above. Final fluoroscopic images were saved.  Following negative aspiration, 1mL of 1% lidocaine preservative free with 5mg dexamethasone   was then injected at each level above, and the needles were removed intact after restyleted. The patient's back was covered with a 4x4 gauze, the area was cleansed with sterile normal saline, and a dressing was applied. There were no complications noted.     This was a challenging procedure given the patients Body mass index is 34.68 kg/m².. This required longer needles.  A typical procedure such as this would require 25g 3.5 inch needles.  This procedure required 22g 5 inch needles.  This added to the mental effort for complexity this procedure.  This also made acquiring fluoroscopic images more time-consuming and challenging.  Final fluoroscopic images were obtained and saved.     The patient was then evaluated post-procedure, and was hemodynamically stable prior to leaving the post-operative care unit.       Preprocedural pain: 5/10 NRS  Postprocedural pain: 0/10 NRS    The patient  had 100% pain relief postprocedure    Follow-up as scheduled    Kieran Chou MD  Interventional Spine and Pain  Physical Medicine and Rehabilitation  [facility]          CPT codes  Transforaminal epidural  injection- lumbar or sacral (first level):  93563-53  Transforaminal epidural injection- lumbar or sacral (each additional level):  84341-15

## 2023-08-22 NOTE — PROGRESS NOTES
Discharge instructions reviewed again with reinforcement of infection prevention tips; ie:hand washing, covering cough,site  observation, Social distancing and mask use.. Taking fluids w/o difficulty. Dressing clean dry intact.   DC to  via ambulatory.

## 2023-08-25 DIAGNOSIS — E11.69 DYSLIPIDEMIA ASSOCIATED WITH TYPE 2 DIABETES MELLITUS (HCC): ICD-10-CM

## 2023-08-25 DIAGNOSIS — E78.5 DYSLIPIDEMIA ASSOCIATED WITH TYPE 2 DIABETES MELLITUS (HCC): ICD-10-CM

## 2023-08-28 RX ORDER — SIMVASTATIN 40 MG
TABLET ORAL
Qty: 90 TABLET | Refills: 3 | Status: SHIPPED | OUTPATIENT
Start: 2023-08-28 | End: 2023-11-21 | Stop reason: SDUPTHER

## 2023-09-11 ENCOUNTER — OFFICE VISIT (OUTPATIENT)
Dept: PHYSICAL MEDICINE AND REHAB | Facility: MEDICAL CENTER | Age: 73
End: 2023-09-11
Payer: MEDICARE

## 2023-09-11 VITALS
WEIGHT: 199.74 LBS | SYSTOLIC BLOOD PRESSURE: 132 MMHG | TEMPERATURE: 97.7 F | HEART RATE: 80 BPM | DIASTOLIC BLOOD PRESSURE: 70 MMHG | BODY MASS INDEX: 35.39 KG/M2 | OXYGEN SATURATION: 94 % | HEIGHT: 63 IN

## 2023-09-11 DIAGNOSIS — R20.0 NUMBNESS AND TINGLING OF LEFT LEG: ICD-10-CM

## 2023-09-11 DIAGNOSIS — E66.9 OBESITY (BMI 30-39.9): ICD-10-CM

## 2023-09-11 DIAGNOSIS — R29.898 LEFT LEG WEAKNESS: ICD-10-CM

## 2023-09-11 DIAGNOSIS — Z13.31 POSITIVE DEPRESSION SCREENING: ICD-10-CM

## 2023-09-11 DIAGNOSIS — M54.42 ACUTE LEFT-SIDED LOW BACK PAIN WITH LEFT-SIDED SCIATICA: ICD-10-CM

## 2023-09-11 DIAGNOSIS — M54.16 LUMBAR RADICULOPATHY: ICD-10-CM

## 2023-09-11 DIAGNOSIS — Z71.82 EXERCISE COUNSELING: ICD-10-CM

## 2023-09-11 DIAGNOSIS — M54.42 CHRONIC LEFT-SIDED LOW BACK PAIN WITH LEFT-SIDED SCIATICA: ICD-10-CM

## 2023-09-11 DIAGNOSIS — R20.2 NUMBNESS AND TINGLING OF LEFT LEG: ICD-10-CM

## 2023-09-11 DIAGNOSIS — G89.29 CHRONIC LEFT-SIDED LOW BACK PAIN WITH LEFT-SIDED SCIATICA: ICD-10-CM

## 2023-09-11 PROCEDURE — 99214 OFFICE O/P EST MOD 30 MIN: CPT | Performed by: PHYSICAL MEDICINE & REHABILITATION

## 2023-09-11 PROCEDURE — 1125F AMNT PAIN NOTED PAIN PRSNT: CPT | Performed by: PHYSICAL MEDICINE & REHABILITATION

## 2023-09-11 PROCEDURE — 3075F SYST BP GE 130 - 139MM HG: CPT | Performed by: PHYSICAL MEDICINE & REHABILITATION

## 2023-09-11 PROCEDURE — 3078F DIAST BP <80 MM HG: CPT | Performed by: PHYSICAL MEDICINE & REHABILITATION

## 2023-09-11 ASSESSMENT — PATIENT HEALTH QUESTIONNAIRE - PHQ9
SUM OF ALL RESPONSES TO PHQ QUESTIONS 1-9: 14
5. POOR APPETITE OR OVEREATING: 0 - NOT AT ALL
CLINICAL INTERPRETATION OF PHQ2 SCORE: 6

## 2023-09-11 ASSESSMENT — FIBROSIS 4 INDEX: FIB4 SCORE: 0.88

## 2023-09-11 ASSESSMENT — PAIN SCALES - GENERAL: PAINLEVEL: 7=MODERATE-SEVERE PAIN

## 2023-09-11 NOTE — H&P (VIEW-ONLY)
Follow-up patient Note    Interventional Pain and Spine  Physiatry (Physical Medicine and Rehabilitation)     Patient Name: Tena Romo  : 1950  Date of service: 2023    Chief Complaint:   Chief Complaint   Patient presents with    Follow-Up     Low Back Pain       HISTORY FROM INITIAL VISIT (2023):  Tena Romo is a 73 y.o. female who presents today with pain radiating down her left glute down her lateral left leg to her lateral proximal calf x 6 weeks that started with no known inciting event. It has worsened with time and has caused her to walk with an antalgic gait. 1 week ago she started noticing pain at her right glute that feels similar. Denies numbness/tingling, focal leg weakness.     Pain right now is 7/10 on the numeric pain scale. Her pain at its best-worse level during the course of the day is typically 3-8/10, respectively.  Pain worsens with walking, bending forward, bending backwards, side bending or twisting, walking upstairs, and walking downstairs and improves with standing and laying down. Her pain interferes somewhat with ADLs.  She notes significant postural component to her symptoms, typically worse with sitting or bending forward.     The patient has not done physical therapy for this problem     Patient has tried the following medications with varied success (current meds in bold):   Gabapentin - facial swelling, hand swelling  Topical lidocaine-significant improvement  Naproxen- no improvement  Oral steroids - no improvement  Robaxin - no improvement     Therapeutic modalities and interventional therapies to date include:  -No injections     Medical history includes hypertension, type 2 diabetes.    HPI  Today's visit   Tena Romo ( 1950) is a female with Diagnoses of Lumbar radiculopathy, Left leg weakness, Numbness and tingling of left leg, Chronic left-sided low back pain with left-sided sciatica, Acute left-sided low back  pain with left-sided sciatica, Positive depression screening, Obesity (BMI 30-39.9), and Exercise counseling were pertinent to this visit.    The patient had temporary improvement after the epidural steroid injections on the left at L5-S1 and S1 transforaminal approach.  Patient had 100% pain relief for the first several days after the injection however the pain returned after this.  Pain is in the left low back rating down the posterior aspect of the left buttocks towards the posterior aspect of the left leg with intermittent numbness.  7 out of 10 intensity.  Constant.  Worse with walking standing bending lifting.  Causing difficulty with ADLs.    She has attempted a provider driven home exercise program including the past 3 months.    Medications tried in the past include Dilaudid, Flexeril, Zofran, acetaminophen, Robaxin      ROS:   Red Flags ROS:   Fever, Chills, Sweats: Denies  Involuntary Weight Loss: Denies  Bladder Incontinence: Denies  Bowel Incontinence: denies  Saddle Anesthesia: Denies    All other systems reviewed and negative.     PMHx:   Past Medical History:   Diagnosis Date    Anesthesia     post of nausea    Arthritis     Breast cancer (HCC) 02/10/2017    Chronic fibrosis of lung (HCC) 5/9/2023    Likely 2/2 COVID 19 PNA in 2020    Cough     Dental disorder     permanent bridge    Diabetes (HCC)     Diverticulosis of colon 11/04/2011    HTN (hypertension) 03/14/2013    Hypertension     Pain     right shoulder down to thumb    Post hysterectomy menopause 11/04/2011    Trochanteric bursitis of right hip 10/01/2019    Use of anastrozole (Arimidex) 01/11/2019    Wheezing        PSHx:   Past Surgical History:   Procedure Laterality Date    VA NJX AA&/STRD TFRML EPI LUMBAR/SACRAL 1 LEVEL Left 8/22/2023    Procedure: LEFT L5-S1 and S1 transforaminal epidural steroid injection with fluoroscopic guidance;  Surgeon: Kieran Chou M.D.;  [facility];  Service: Pain Management  "   SHOULDER ARTHROSCOPY W/ ROTATOR CUFF REPAIR  2/9/2012    Performed by JENNY LIMON at SURGERY [facility]    SHOULDER DECOMPRESSION ARTHROSCOPIC  2/9/2012    Performed by JENNY LIMON at SURGERY [facility]    CLAVICLE DISTAL EXCISION  2/9/2012    Performed by JENNY LIMON at SURGERY [facility]    BUNIONECTOMY  2009    bilateral    ABDOMINAL HYSTERECTOMY TOTAL  2001    endometriosis    CHOLECYSTECTOMY  1999    laparoscopy    MASTECTOMY Bilateral     TONSILLECTOMY  as child       Family Hx:   Family History   Problem Relation Age of Onset    Stroke Mother     Lung Disease Father         pneumonia    Hyperlipidemia Father        Social Hx:  Social History     Socioeconomic History    Marital status:      Spouse name: Not on file    Number of children: Not on file    Years of education: Not on file    Highest education level: Not on file   Occupational History    Not on file   Tobacco Use    Smoking status: Never    Smokeless tobacco: Never    Tobacco comments:     avoid any and all tobacco products   Vaping Use    Vaping Use: Never used   Substance and Sexual Activity    Alcohol use: Yes     Alcohol/week: 0.0 oz     Comment: once a year    Drug use: No    Sexual activity: Not Currently     Partners: Male     Comment: . Work at [employer]   Other Topics Concern    Not on file   Social History Narrative    Not on file     Social Determinants of Health     Financial Resource Strain: Not on file   Food Insecurity: Not on file   Transportation Needs: Not on file   Physical Activity: Not on file   Stress: Not on file   Social Connections: Not on file   Intimate Partner Violence: Not on file   Housing Stability: Not on file       Allergies:  Allergies   Allergen Reactions    Cipro Xr Hives    Gabapentin      Pt states she was \"all puffed up\"    Meloxicam Vomiting    Aspirin      Intolerance    Other reaction(s): Other    Diclofenac Rash    Metformin Rash     Erythematous rash " "      Medications: reviewed on epic.   No outpatient medications have been marked as taking for the 9/11/23 encounter (Office Visit) with Kieran Chou M.D..        Current Outpatient Medications on File Prior to Visit   Medication Sig Dispense Refill    simvastatin (ZOCOR) 40 MG Tab TAKE 1 TABLET BY MOUTH EVERY DAY IN THE EVENING 90 Tablet 3    Lancets Lancets order: Lancets for [employer]estyle[employer]. Sig: use once daily and prn ssx high or low sugar. #100 RF x 3 100 Each 3    lidocaine (LIDODERM) 5 % Patch Place 1 Patch on the skin every 24 hours. Apply for 12 hours maximum and then take off for 12 hours. 30 Patch 7    methocarbamol (ROBAXIN) 750 MG Tab       albuterol 108 (90 Base) MCG/ACT Aero Soln inhalation aerosol Inhale 2 Puffs every 6 hours as needed for Shortness of Breath. 8.5 g 6    telmisartan-hydrochlorothiazide (MICARDIS HCT) 40-12.5 MG per tablet TAKE 1 TABLET BY MOUTH EVERY DAY 90 Tablet 3    glipiZIDE (GLUCOTROL) 5 MG Tab Take 1 Tablet by mouth every day. 90 Tablet 3    Multiple Vitamin (MULTIVITAMIN ADULT PO) Take  by mouth every day.      acetaminophen (TYLENOL) 500 MG Tab Take 500-1,000 mg by mouth every 6 hours as needed.      multivitamin (THERAGRAN) Tab Take 1 Tablet by mouth every day.      omeprazole (PRILOSEC) 40 MG delayed-release capsule Take 40 mg by mouth every day.      Blood Glucose Test Strips Use one strip to test blood sugar once daily early morning before first meal. 300 Strip 11     No current facility-administered medications on file prior to visit.         EXAMINATION     Physical Exam:   /70 (BP Location: Right arm, Patient Position: Sitting, BP Cuff Size: Adult)   Pulse 80   Temp 36.5 °C (97.7 °F) (Temporal)   Ht 1.6 m (5' 3\")   Wt 90.6 kg (199 lb 11.8 oz)   SpO2 94%     Constitutional:   Body Habitus: Body mass index is 35.38 kg/m².  Cooperation: Fully cooperates with exam  Appearance: Well-groomed, well-nourished.    Eyes: No scleral icterus to suggest severe liver " disease, no proptosis to suggest severe hyperthyroidism    ENT -no obvious auditory deficits, no noticeable facial droop     Skin -no rashes or lesions noted     Respiratory-  breathing comfortably on room air, no audible wheezing    Cardiovascular-distal extremities warm and well perfused.  No lower extremity edema is noted.     Gastrointestinal - no obvious abdominal masses, non-distended    Psychiatric- alert and oriented ×3. Normal affect.     Gait - normal gait, no use of ambulatory device, nonantalgic. Heel walking and toe walking intact.     Musculoskeletal and Neuro -        Thoracic/Lumbar Spine/Sacral Spine/Hips   There are no signs of infection around the injection sites.   decreased active range of motion with flexion, lateral flexion, and rotation bilaterally.   There is decreased active range of motion with lumbar extension.    There is  pain with lumbar extension.   There is pain with facet loading maneuver (extension rotation) with axial low back pain on the LEFT side(s)       Palpation:   No tenderness to palpation in midline at T1-T12 levels. No tenderness to palpation in the left and right of the midline T1-L5, NEGATIVE for tenderness to palpation to the para-midline region in the lower lumbar levels.  palpation over SI joint: negative bilaterally    palpation in hip or over the gluteus medius tendon insertion: negative bilaterally      Lumbar spine Special tests  Neuro tension  Straight leg test negative right, positive left    Slump test negative right, positive left      Key points for the international standards for neurological classification of spinal cord injury (ISNCSCI) to light touch.     Dermatome R L                                      L2 2 2   L3 2 2   L4 2 2   L5 2 1   S1 2 2   S2 2 2         Motor Exam Lower Extremities    ? Myotome R L   Hip flexion L2 5 5   Knee extension L3 5 5   Ankle dorsiflexion L4 5 5   Toe extension L5 5 5-   Ankle plantarflexion S1 5 5         MEDICAL  "DECISION MAKING    Medical records review: see under HPI section.     DATA    Labs: No new labs available for review since last visit.   Lab Results   Component Value Date/Time    SODIUM 139 03/14/2023 02:18 PM    POTASSIUM 3.8 03/14/2023 02:18 PM    CHLORIDE 101 03/14/2023 02:18 PM    CO2 25 03/14/2023 02:18 PM    ANION 13.0 03/14/2023 02:18 PM    GLUCOSE 104 (H) 03/14/2023 02:18 PM    BUN 17 03/14/2023 02:18 PM    CREATININE 0.93 03/14/2023 02:18 PM    CREATININE 0.86 12/01/2010 12:00 AM    CALCIUM 9.5 03/14/2023 02:18 PM    ASTSGOT 16 12/27/2022 09:12 AM    ALTSGPT 16 12/27/2022 09:12 AM    TBILIRUBIN 0.4 12/27/2022 09:12 AM    ALBUMIN 4.3 12/27/2022 09:12 AM    TOTPROTEIN 7.4 12/27/2022 09:12 AM    GLOBULIN 3.1 12/27/2022 09:12 AM    AGRATIO 1.4 12/27/2022 09:12 AM       No results found for: \"PROTHROMBTM\", \"INR\"     Lab Results   Component Value Date/Time    WBC 9.2 12/27/2022 09:12 AM    RBC 4.42 12/27/2022 09:12 AM    HEMOGLOBIN 13.5 12/27/2022 09:12 AM    HEMATOCRIT 42.0 12/27/2022 09:12 AM    MCV 95.0 12/27/2022 09:12 AM    MCH 30.5 12/27/2022 09:12 AM    MCHC 32.1 (L) 12/27/2022 09:12 AM    MPV 11.2 12/27/2022 09:12 AM    NEUTSPOLYS 57.60 12/27/2022 09:12 AM    LYMPHOCYTES 27.00 12/27/2022 09:12 AM    MONOCYTES 8.40 12/27/2022 09:12 AM    EOSINOPHILS 5.90 12/27/2022 09:12 AM    BASOPHILS 0.70 12/27/2022 09:12 AM    HYPOCHROMIA 1+ 03/15/2013 06:35 AM        Lab Results   Component Value Date/Time    HBA1C 6.9 (A) 03/14/2023 03:43 PM        Imaging:   I personally reviewed following images, these are my reads  MRI lumbar spine 6/30/2023  Partially lumbarized S1 vertebrae.  At L4-5 there is severe central canal stenosis and moderate right and mild-moderate left neuroforaminal stenosis.  At L5-S1 there is moderate central canal stenosis and moderate to severe right and mild left neuroforaminal stenosis.  Facet arthropathy most notable at lower lumbar levels. See formal radiology report for further details.   "      IMAGING radiology reads. I reviewed the following radiology reads                      Results for orders placed during the hospital encounter of 23     MR-LUMBAR SPINE-W/O     Impression  1.  Severe facet hypertrophy greater than degenerative disc disease results in severe central stenosis at L4/5, moderate to severe right foraminal stenosis at L5/S1     2.  L2/3 disc bulge and extrusion causes moderate to severe central stenosis     3.  Lesser degenerative changes at the levels as detailed above     4.  Mild discogenic L5/S1 edema indicating ongoing remodeling     5.  Ambiguous segmentation, careful correlation of levels is required if intervention is planned, as detailed above         Diagnosis  Visit Diagnoses     ICD-10-CM   1. Lumbar radiculopathy  M54.16   2. Left leg weakness  R29.898   3. Numbness and tingling of left leg  R20.0    R20.2   4. Chronic left-sided low back pain with left-sided sciatica  M54.42    G89.29   5. Acute left-sided low back pain with left-sided sciatica  M54.42   6. Positive depression screening  Z13.31   7. Obesity (BMI 30-39.9)  E66.9   8. Exercise counseling  Z71.82         ASSESSMENT AND PLAN:  Tena Romo (: 1950) is a female with worsened symptoms of lumbar radiculopathy mainly in the left L5 and S1 nerve root distribution.     Tena was seen today for follow-up.    Diagnoses and all orders for this visit:    Lumbar radiculopathy  -     Referral to Physical Medicine Rehab  -     Referral to Physical Therapy    Left leg weakness  -     Referral to Physical Therapy    Numbness and tingling of left leg  -     Referral to Physical Therapy    Chronic left-sided low back pain with left-sided sciatica  -     Referral to Physical Therapy    Acute left-sided low back pain with left-sided sciatica  -     Referral to Physical Therapy    Positive depression screening  -     Patient has been identified as having a positive depression screening. Appropriate  orders and counseling have been given.    Obesity (BMI 30-39.9)    Exercise counseling      Partial improvement after epidural steroid injection transforaminal approach.  We will proceed with an interlaminar approach.    I have ordered an L5-S1 left interlaminar epidural steroid injection.  Transitional anatomy noted.    The risks benefits and alternatives to this procedure were discussed and the patient wishes to proceed with the procedure. Risks include but are not limited to damage to surrounding structures, infection, bleeding, worsening of pain which can be permanent, weakness which can be permanent. Benefits include pain relief, improved function. Alternatives includes not doing the procedure.      I also discussed the case with the patient's  Abdirizak who was at today's visit and assisted with the HPI.    Medications: Continue Robaxin as needed.  Continue Lidoderm patches 12 hours on 12 hours off.     Follow-up: 10/16/2023     Kieran Chou MD  Interventional Spine and Pain  Physical Medicine and Rehabilitation  [facility]         The above note documents my personal evaluation of this patient. In addition, I have reviewed and confirmed with the patient and MA the supportive information documented in today's Patient Health Questionnaire and Office Note.     Please note that this dictation was created using voice recognition software. I have made every reasonable attempt to correct obvious errors, but I expect that there are errors of grammar and possibly content that I did not discover before finalizing the note.

## 2023-09-11 NOTE — PROGRESS NOTES
Follow-up patient Note    Interventional Pain and Spine  Physiatry (Physical Medicine and Rehabilitation)     Patient Name: Tena Romo  : 1950  Date of service: 2023    Chief Complaint:   Chief Complaint   Patient presents with    Follow-Up     Low Back Pain       HISTORY FROM INITIAL VISIT (2023):  Tena Romo is a 73 y.o. female who presents today with pain radiating down her left glute down her lateral left leg to her lateral proximal calf x 6 weeks that started with no known inciting event. It has worsened with time and has caused her to walk with an antalgic gait. 1 week ago she started noticing pain at her right glute that feels similar. Denies numbness/tingling, focal leg weakness.     Pain right now is 7/10 on the numeric pain scale. Her pain at its best-worse level during the course of the day is typically 3-8/10, respectively.  Pain worsens with walking, bending forward, bending backwards, side bending or twisting, walking upstairs, and walking downstairs and improves with standing and laying down. Her pain interferes somewhat with ADLs.  She notes significant postural component to her symptoms, typically worse with sitting or bending forward.     The patient has not done physical therapy for this problem     Patient has tried the following medications with varied success (current meds in bold):   Gabapentin - facial swelling, hand swelling  Topical lidocaine-significant improvement  Naproxen- no improvement  Oral steroids - no improvement  Robaxin - no improvement     Therapeutic modalities and interventional therapies to date include:  -No injections     Medical history includes hypertension, type 2 diabetes.    HPI  Today's visit   Tena Romo ( 1950) is a female with Diagnoses of Lumbar radiculopathy, Left leg weakness, Numbness and tingling of left leg, Chronic left-sided low back pain with left-sided sciatica, Acute left-sided low back  pain with left-sided sciatica, Positive depression screening, Obesity (BMI 30-39.9), and Exercise counseling were pertinent to this visit.    The patient had temporary improvement after the epidural steroid injections on the left at L5-S1 and S1 transforaminal approach.  Patient had 100% pain relief for the first several days after the injection however the pain returned after this.  Pain is in the left low back rating down the posterior aspect of the left buttocks towards the posterior aspect of the left leg with intermittent numbness.  7 out of 10 intensity.  Constant.  Worse with walking standing bending lifting.  Causing difficulty with ADLs.    She has attempted a provider driven home exercise program including the past 3 months.    Medications tried in the past include Dilaudid, Flexeril, Zofran, acetaminophen, Robaxin      ROS:   Red Flags ROS:   Fever, Chills, Sweats: Denies  Involuntary Weight Loss: Denies  Bladder Incontinence: Denies  Bowel Incontinence: denies  Saddle Anesthesia: Denies    All other systems reviewed and negative.     PMHx:   Past Medical History:   Diagnosis Date    Anesthesia     post of nausea    Arthritis     Breast cancer (HCC) 02/10/2017    Chronic fibrosis of lung (HCC) 5/9/2023    Likely 2/2 COVID 19 PNA in 2020    Cough     Dental disorder     permanent bridge    Diabetes (HCC)     Diverticulosis of colon 11/04/2011    HTN (hypertension) 03/14/2013    Hypertension     Pain     right shoulder down to thumb    Post hysterectomy menopause 11/04/2011    Trochanteric bursitis of right hip 10/01/2019    Use of anastrozole (Arimidex) 01/11/2019    Wheezing        PSHx:   Past Surgical History:   Procedure Laterality Date    CO NJX AA&/STRD TFRML EPI LUMBAR/SACRAL 1 LEVEL Left 8/22/2023    Procedure: LEFT L5-S1 and S1 transforaminal epidural steroid injection with fluoroscopic guidance;  Surgeon: Kieran Chou M.D.;  [facility];  Service: Pain Management  "   SHOULDER ARTHROSCOPY W/ ROTATOR CUFF REPAIR  2/9/2012    Performed by JENNY LIMON at SURGERY [facility]S    SHOULDER DECOMPRESSION ARTHROSCOPIC  2/9/2012    Performed by JENNY LIMON at SURGERY [facility] ORS    CLAVICLE DISTAL EXCISION  2/9/2012    Performed by JENNY LIMON at SURGERY [facility]    BUNIONECTOMY  2009    bilateral    ABDOMINAL HYSTERECTOMY TOTAL  2001    endometriosis    CHOLECYSTECTOMY  1999    laparoscopy    MASTECTOMY Bilateral     TONSILLECTOMY  as child       Family Hx:   Family History   Problem Relation Age of Onset    Stroke Mother     Lung Disease Father         pneumonia    Hyperlipidemia Father        Social Hx:  Social History     Socioeconomic History    Marital status:      Spouse name: Not on file    Number of children: Not on file    Years of education: Not on file    Highest education level: Not on file   Occupational History    Not on file   Tobacco Use    Smoking status: Never    Smokeless tobacco: Never    Tobacco comments:     avoid any and all tobacco products   Vaping Use    Vaping Use: Never used   Substance and Sexual Activity    Alcohol use: Yes     Alcohol/week: 0.0 oz     Comment: once a year    Drug use: No    Sexual activity: Not Currently     Partners: Male     Comment: . Work at [employer]   Other Topics Concern    Not on file   Social History Narrative    Not on file     Social Determinants of Health     Financial Resource Strain: Not on file   Food Insecurity: Not on file   Transportation Needs: Not on file   Physical Activity: Not on file   Stress: Not on file   Social Connections: Not on file   Intimate Partner Violence: Not on file   Housing Stability: Not on file       Allergies:  Allergies   Allergen Reactions    Cipro Xr Hives    Gabapentin      Pt states she was \"all puffed up\"    Meloxicam Vomiting    Aspirin      Intolerance    Other reaction(s): Other    Diclofenac Rash    Metformin Rash     Erythematous rash " "      Medications: reviewed on epic.   No outpatient medications have been marked as taking for the 9/11/23 encounter (Office Visit) with Kieran Chou M.D..        Current Outpatient Medications on File Prior to Visit   Medication Sig Dispense Refill    simvastatin (ZOCOR) 40 MG Tab TAKE 1 TABLET BY MOUTH EVERY DAY IN THE EVENING 90 Tablet 3    Lancets Lancets order: Lancets for [employer]estyle[employer]. Sig: use once daily and prn ssx high or low sugar. #100 RF x 3 100 Each 3    lidocaine (LIDODERM) 5 % Patch Place 1 Patch on the skin every 24 hours. Apply for 12 hours maximum and then take off for 12 hours. 30 Patch 7    methocarbamol (ROBAXIN) 750 MG Tab       albuterol 108 (90 Base) MCG/ACT Aero Soln inhalation aerosol Inhale 2 Puffs every 6 hours as needed for Shortness of Breath. 8.5 g 6    telmisartan-hydrochlorothiazide (MICARDIS HCT) 40-12.5 MG per tablet TAKE 1 TABLET BY MOUTH EVERY DAY 90 Tablet 3    glipiZIDE (GLUCOTROL) 5 MG Tab Take 1 Tablet by mouth every day. 90 Tablet 3    Multiple Vitamin (MULTIVITAMIN ADULT PO) Take  by mouth every day.      acetaminophen (TYLENOL) 500 MG Tab Take 500-1,000 mg by mouth every 6 hours as needed.      multivitamin (THERAGRAN) Tab Take 1 Tablet by mouth every day.      omeprazole (PRILOSEC) 40 MG delayed-release capsule Take 40 mg by mouth every day.      Blood Glucose Test Strips Use one strip to test blood sugar once daily early morning before first meal. 300 Strip 11     No current facility-administered medications on file prior to visit.         EXAMINATION     Physical Exam:   /70 (BP Location: Right arm, Patient Position: Sitting, BP Cuff Size: Adult)   Pulse 80   Temp 36.5 °C (97.7 °F) (Temporal)   Ht 1.6 m (5' 3\")   Wt 90.6 kg (199 lb 11.8 oz)   SpO2 94%     Constitutional:   Body Habitus: Body mass index is 35.38 kg/m².  Cooperation: Fully cooperates with exam  Appearance: Well-groomed, well-nourished.    Eyes: No scleral icterus to suggest severe liver " disease, no proptosis to suggest severe hyperthyroidism    ENT -no obvious auditory deficits, no noticeable facial droop     Skin -no rashes or lesions noted     Respiratory-  breathing comfortably on room air, no audible wheezing    Cardiovascular-distal extremities warm and well perfused.  No lower extremity edema is noted.     Gastrointestinal - no obvious abdominal masses, non-distended    Psychiatric- alert and oriented ×3. Normal affect.     Gait - normal gait, no use of ambulatory device, nonantalgic. Heel walking and toe walking intact.     Musculoskeletal and Neuro -        Thoracic/Lumbar Spine/Sacral Spine/Hips   There are no signs of infection around the injection sites.   decreased active range of motion with flexion, lateral flexion, and rotation bilaterally.   There is decreased active range of motion with lumbar extension.    There is  pain with lumbar extension.   There is pain with facet loading maneuver (extension rotation) with axial low back pain on the LEFT side(s)       Palpation:   No tenderness to palpation in midline at T1-T12 levels. No tenderness to palpation in the left and right of the midline T1-L5, NEGATIVE for tenderness to palpation to the para-midline region in the lower lumbar levels.  palpation over SI joint: negative bilaterally    palpation in hip or over the gluteus medius tendon insertion: negative bilaterally      Lumbar spine Special tests  Neuro tension  Straight leg test negative right, positive left    Slump test negative right, positive left      Key points for the international standards for neurological classification of spinal cord injury (ISNCSCI) to light touch.     Dermatome R L                                      L2 2 2   L3 2 2   L4 2 2   L5 2 1   S1 2 2   S2 2 2         Motor Exam Lower Extremities    ? Myotome R L   Hip flexion L2 5 5   Knee extension L3 5 5   Ankle dorsiflexion L4 5 5   Toe extension L5 5 5-   Ankle plantarflexion S1 5 5         MEDICAL  "DECISION MAKING    Medical records review: see under HPI section.     DATA    Labs: No new labs available for review since last visit.   Lab Results   Component Value Date/Time    SODIUM 139 03/14/2023 02:18 PM    POTASSIUM 3.8 03/14/2023 02:18 PM    CHLORIDE 101 03/14/2023 02:18 PM    CO2 25 03/14/2023 02:18 PM    ANION 13.0 03/14/2023 02:18 PM    GLUCOSE 104 (H) 03/14/2023 02:18 PM    BUN 17 03/14/2023 02:18 PM    CREATININE 0.93 03/14/2023 02:18 PM    CREATININE 0.86 12/01/2010 12:00 AM    CALCIUM 9.5 03/14/2023 02:18 PM    ASTSGOT 16 12/27/2022 09:12 AM    ALTSGPT 16 12/27/2022 09:12 AM    TBILIRUBIN 0.4 12/27/2022 09:12 AM    ALBUMIN 4.3 12/27/2022 09:12 AM    TOTPROTEIN 7.4 12/27/2022 09:12 AM    GLOBULIN 3.1 12/27/2022 09:12 AM    AGRATIO 1.4 12/27/2022 09:12 AM       No results found for: \"PROTHROMBTM\", \"INR\"     Lab Results   Component Value Date/Time    WBC 9.2 12/27/2022 09:12 AM    RBC 4.42 12/27/2022 09:12 AM    HEMOGLOBIN 13.5 12/27/2022 09:12 AM    HEMATOCRIT 42.0 12/27/2022 09:12 AM    MCV 95.0 12/27/2022 09:12 AM    MCH 30.5 12/27/2022 09:12 AM    MCHC 32.1 (L) 12/27/2022 09:12 AM    MPV 11.2 12/27/2022 09:12 AM    NEUTSPOLYS 57.60 12/27/2022 09:12 AM    LYMPHOCYTES 27.00 12/27/2022 09:12 AM    MONOCYTES 8.40 12/27/2022 09:12 AM    EOSINOPHILS 5.90 12/27/2022 09:12 AM    BASOPHILS 0.70 12/27/2022 09:12 AM    HYPOCHROMIA 1+ 03/15/2013 06:35 AM        Lab Results   Component Value Date/Time    HBA1C 6.9 (A) 03/14/2023 03:43 PM        Imaging:   I personally reviewed following images, these are my reads  MRI lumbar spine 6/30/2023  Partially lumbarized S1 vertebrae.  At L4-5 there is severe central canal stenosis and moderate right and mild-moderate left neuroforaminal stenosis.  At L5-S1 there is moderate central canal stenosis and moderate to severe right and mild left neuroforaminal stenosis.  Facet arthropathy most notable at lower lumbar levels. See formal radiology report for further details.   "      IMAGING radiology reads. I reviewed the following radiology reads                      Results for orders placed during the hospital encounter of 23     MR-LUMBAR SPINE-W/O     Impression  1.  Severe facet hypertrophy greater than degenerative disc disease results in severe central stenosis at L4/5, moderate to severe right foraminal stenosis at L5/S1     2.  L2/3 disc bulge and extrusion causes moderate to severe central stenosis     3.  Lesser degenerative changes at the levels as detailed above     4.  Mild discogenic L5/S1 edema indicating ongoing remodeling     5.  Ambiguous segmentation, careful correlation of levels is required if intervention is planned, as detailed above         Diagnosis  Visit Diagnoses     ICD-10-CM   1. Lumbar radiculopathy  M54.16   2. Left leg weakness  R29.898   3. Numbness and tingling of left leg  R20.0    R20.2   4. Chronic left-sided low back pain with left-sided sciatica  M54.42    G89.29   5. Acute left-sided low back pain with left-sided sciatica  M54.42   6. Positive depression screening  Z13.31   7. Obesity (BMI 30-39.9)  E66.9   8. Exercise counseling  Z71.82         ASSESSMENT AND PLAN:  Tena Romo (: 1950) is a female with worsened symptoms of lumbar radiculopathy mainly in the left L5 and S1 nerve root distribution.     Tena was seen today for follow-up.    Diagnoses and all orders for this visit:    Lumbar radiculopathy  -     Referral to Physical Medicine Rehab  -     Referral to Physical Therapy    Left leg weakness  -     Referral to Physical Therapy    Numbness and tingling of left leg  -     Referral to Physical Therapy    Chronic left-sided low back pain with left-sided sciatica  -     Referral to Physical Therapy    Acute left-sided low back pain with left-sided sciatica  -     Referral to Physical Therapy    Positive depression screening  -     Patient has been identified as having a positive depression screening. Appropriate  orders and counseling have been given.    Obesity (BMI 30-39.9)    Exercise counseling      Partial improvement after epidural steroid injection transforaminal approach.  We will proceed with an interlaminar approach.    I have ordered an L5-S1 left interlaminar epidural steroid injection.  Transitional anatomy noted.    The risks benefits and alternatives to this procedure were discussed and the patient wishes to proceed with the procedure. Risks include but are not limited to damage to surrounding structures, infection, bleeding, worsening of pain which can be permanent, weakness which can be permanent. Benefits include pain relief, improved function. Alternatives includes not doing the procedure.      I also discussed the case with the patient's  Abdirizak who was at today's visit and assisted with the HPI.    Medications: Continue Robaxin as needed.  Continue Lidoderm patches 12 hours on 12 hours off.     Follow-up: 10/16/2023     Kieran Chou MD  Interventional Spine and Pain  Physical Medicine and Rehabilitation  [facility]         The above note documents my personal evaluation of this patient. In addition, I have reviewed and confirmed with the patient and MA the supportive information documented in today's Patient Health Questionnaire and Office Note.     Please note that this dictation was created using voice recognition software. I have made every reasonable attempt to correct obvious errors, but I expect that there are errors of grammar and possibly content that I did not discover before finalizing the note.

## 2023-09-13 ENCOUNTER — APPOINTMENT (OUTPATIENT)
Dept: RADIOLOGY | Facility: REHABILITATION | Age: 73
End: 2023-09-13
Attending: PHYSICAL MEDICINE & REHABILITATION
Payer: MEDICARE

## 2023-09-13 ENCOUNTER — HOSPITAL ENCOUNTER (OUTPATIENT)
Facility: REHABILITATION | Age: 73
End: 2023-09-13
Attending: PHYSICAL MEDICINE & REHABILITATION | Admitting: PHYSICAL MEDICINE & REHABILITATION
Payer: MEDICARE

## 2023-09-13 VITALS
BODY MASS INDEX: 35.31 KG/M2 | HEART RATE: 100 BPM | SYSTOLIC BLOOD PRESSURE: 177 MMHG | HEIGHT: 63 IN | OXYGEN SATURATION: 95 % | DIASTOLIC BLOOD PRESSURE: 79 MMHG | TEMPERATURE: 98.1 F | WEIGHT: 199.3 LBS | RESPIRATION RATE: 16 BRPM

## 2023-09-13 LAB — GLUCOSE BLD STRIP.AUTO-MCNC: 123 MG/DL (ref 65–99)

## 2023-09-13 PROCEDURE — 700117 HCHG RX CONTRAST REV CODE 255

## 2023-09-13 PROCEDURE — 700111 HCHG RX REV CODE 636 W/ 250 OVERRIDE (IP): Mod: JZ

## 2023-09-13 PROCEDURE — 62323 NJX INTERLAMINAR LMBR/SAC: CPT

## 2023-09-13 PROCEDURE — 82962 GLUCOSE BLOOD TEST: CPT

## 2023-09-13 RX ORDER — LIDOCAINE HYDROCHLORIDE 10 MG/ML
INJECTION, SOLUTION EPIDURAL; INFILTRATION; INTRACAUDAL; PERINEURAL
Status: COMPLETED
Start: 2023-09-13 | End: 2023-09-13

## 2023-09-13 RX ORDER — DEXAMETHASONE SODIUM PHOSPHATE 10 MG/ML
INJECTION, SOLUTION INTRAMUSCULAR; INTRAVENOUS
Status: COMPLETED
Start: 2023-09-13 | End: 2023-09-13

## 2023-09-13 RX ADMIN — LIDOCAINE HYDROCHLORIDE 10 ML: 10 INJECTION, SOLUTION EPIDURAL; INFILTRATION; INTRACAUDAL at 09:40

## 2023-09-13 RX ADMIN — IOHEXOL 2 ML: 240 INJECTION, SOLUTION INTRATHECAL; INTRAVASCULAR; INTRAVENOUS; ORAL at 09:40

## 2023-09-13 RX ADMIN — DEXAMETHASONE SODIUM PHOSPHATE 10 MG: 10 INJECTION, SOLUTION INTRAMUSCULAR; INTRAVENOUS at 09:40

## 2023-09-13 ASSESSMENT — FIBROSIS 4 INDEX: FIB4 SCORE: 0.88

## 2023-09-13 ASSESSMENT — PAIN DESCRIPTION - PAIN TYPE
TYPE: CHRONIC PAIN
TYPE: CHRONIC PAIN

## 2023-09-13 NOTE — INTERVAL H&P NOTE
H&P reviewed. The patient was examined and there are no changes to the H&P     Lungs clear to auscultation bilaterally.  No abdominal tenderness.  Heart regular rate and rhythm.  Vitals reviewed.    Proceed with the originally scheduled procedure.  The risks, benefits and alternatives were discussed.  The patient understands these.    Pre-Op Diagnosis Codes:     * Lumbar radiculopathy [M54.16]  Procedure(s):  Lumbar LEFT L5-S1 interlaminar epidural steroid injection.     Note: transitional anatomy with disc space S1-2. Review previous procedure done by Dr Joés Antonio Chou MD  Physical Medicine and Rehabilitation  Interventional Spine and Sports Physiatry  [facility]

## 2023-09-13 NOTE — OP REPORT
Date of Service: 9/13/2023    Physician/s: Kieran Chou MD    Pre-operative Diagnosis: Lumbar radiculopathy     Post-operative Diagnosis: Lumbar radiculopathy,     Procedure: Lumbar LEFT L5-S1   interlaminar epidural steroid injection     Description of procedure:    The risks, benefits, and alternatives of the procedure were reviewed and discussed with the patient.  Written informed consent was freely obtained. A pre-procedural time-out was conducted by the physician verifying patient’s identity, procedure to be performed, procedure site and side, and allergy verification. Appropriate equipment was determined to be in place for the procedure.     The patient's transitional anatomy was noted.    In the fluoroscopy suite the patient was placed in a prone position, a pillow placed underneath their umbilicus. The skin was prepped and draped in the usual sterile fashion. The fluoroscope was placed over the lumbar spine at the appropriate injection AP angle view, and the target for injection was marked. A 27g needle was placed into the marked site, and 2mL of 1% Lidocaine was injected subcutaneously into the epidermal and dermal layers. The needle was removed. A 20g Tuohy needle was then placed and advanced under fluoroscopic guidance into the LEFT L5-S1  interlaminar space utilizing the initial position AP view and a lateral view to ensure proper location of the needle tip at all times. A loss of resistance technique was used to guide the needle into the epidural space in a contralateral oblique fluoroscopic view. In the AP and lateral views, contrast dye was used to highlight the epidural space spread while the fluoroscope was running live. Following negative aspiration, 1mL of 1% lidocaine,  preservative free with 1mL of 10mg/ml dexamethasone, 1mL sterile saline was then injected, and the needle was removed intact after restyleted. The patient's back was covered with a 4x4 gauze, the area was cleansed with sterile  normal saline, and a dressing was applied. There were no complications noted.     The patient had 100% pain relief postprocedure  Preprocedural pain: 7/10 NRS  Postprocedural pain: 0/10 NRS    The patient was then evaluated post-procedure, and was hemodynamically stable prior to leaving the post-operative care unit.     Follow-up as scheduled    Kieran Chou MD      CPT  Interlaminar epidural injection - lumbar or sacral (caudal): 86916

## 2023-09-13 NOTE — PROGRESS NOTES
0910 Pt admitted to Pre Procedure area.  Consent signed and post op instructions reviewed with pt, all questions answered.   0930 Blood sugar checked by RN, results 123.  0922  Dr. Chou in to see pt.     0949 Pt received to Post Procedure area with updates from procedure RN.  Snack and drink tolerated without C/O N/V.  Dressing clear, dry, no swelling noted.    0959 Pt seen by Dr. Chou for post procedure evaluation.     1001 Pt ambulated without difficulty & meets criteria for DC, accompanied to car and placed in passenger seat.  Discharged to designated .

## 2023-09-13 NOTE — OR SURGEON
Immediate Post OP Note    Pre-Op Diagnosis Codes:     * Lumbar radiculopathy [M54.16]      Post-Op Diagnosis Codes:     * Lumbar radiculopathy [M54.16]      Procedure(s):  Lumbar LEFT L5-S1 interlaminar epidural steroid injection.     Note: transitional anatomy with disc space S1-2. Review previous procedure done by Dr hCou - Wound Class: Clean    Surgeon(s):  Kieran Chou M.D.    Anesthesiologist/Type of Anesthesia:  No anesthesia staff entered./Local    Surgical Staff:  Circulator: Abbie Sharma R.N.  Scrub Person: Kaur Perdomo  Radiology Technologist: Laura Molina    Specimens removed if any:  * No specimens in log *    Estimated Blood Loss: None    Findings: None    Complications: None        9/13/2023 9:46 AM Kieran Chou M.D.

## 2023-09-21 ENCOUNTER — HOSPITAL ENCOUNTER (OUTPATIENT)
Facility: MEDICAL CENTER | Age: 73
End: 2023-09-21
Attending: FAMILY MEDICINE
Payer: MEDICARE

## 2023-09-21 ENCOUNTER — OFFICE VISIT (OUTPATIENT)
Dept: MEDICAL GROUP | Facility: PHYSICIAN GROUP | Age: 73
End: 2023-09-21
Payer: MEDICARE

## 2023-09-21 VITALS
DIASTOLIC BLOOD PRESSURE: 68 MMHG | RESPIRATION RATE: 14 BRPM | WEIGHT: 197 LBS | HEART RATE: 109 BPM | TEMPERATURE: 98 F | SYSTOLIC BLOOD PRESSURE: 140 MMHG | HEIGHT: 63 IN | OXYGEN SATURATION: 93 % | BODY MASS INDEX: 34.91 KG/M2

## 2023-09-21 DIAGNOSIS — N39.0 RECURRENT UTI (URINARY TRACT INFECTION): ICD-10-CM

## 2023-09-21 DIAGNOSIS — E11.69 TYPE 2 DIABETES MELLITUS WITH OTHER SPECIFIED COMPLICATION, WITHOUT LONG-TERM CURRENT USE OF INSULIN (HCC): ICD-10-CM

## 2023-09-21 DIAGNOSIS — J02.9 PHARYNGITIS, UNSPECIFIED ETIOLOGY: ICD-10-CM

## 2023-09-21 DIAGNOSIS — N95.8 GENITOURINARY SYNDROME OF MENOPAUSE: ICD-10-CM

## 2023-09-21 LAB
APPEARANCE UR: NORMAL
BILIRUB UR STRIP-MCNC: NORMAL MG/DL
COLOR UR AUTO: NORMAL
GLUCOSE UR STRIP.AUTO-MCNC: NORMAL MG/DL
HBA1C MFR BLD: 7.3 % (ref ?–5.8)
KETONES UR STRIP.AUTO-MCNC: NORMAL MG/DL
LEUKOCYTE ESTERASE UR QL STRIP.AUTO: NORMAL
NITRITE UR QL STRIP.AUTO: NORMAL
PH UR STRIP.AUTO: 5.5 [PH] (ref 5–8)
POCT INT CON NEG: NEGATIVE
POCT INT CON POS: POSITIVE
PROT UR QL STRIP: NORMAL MG/DL
RBC UR QL AUTO: NORMAL
S PYO DNA SPEC NAA+PROBE: NOT DETECTED
SP GR UR STRIP.AUTO: 1.02
UROBILINOGEN UR STRIP-MCNC: 0.2 MG/DL

## 2023-09-21 PROCEDURE — 3077F SYST BP >= 140 MM HG: CPT | Performed by: FAMILY MEDICINE

## 2023-09-21 PROCEDURE — 83036 HEMOGLOBIN GLYCOSYLATED A1C: CPT | Performed by: FAMILY MEDICINE

## 2023-09-21 PROCEDURE — 87186 SC STD MICRODIL/AGAR DIL: CPT

## 2023-09-21 PROCEDURE — 99214 OFFICE O/P EST MOD 30 MIN: CPT | Performed by: FAMILY MEDICINE

## 2023-09-21 PROCEDURE — 87651 STREP A DNA AMP PROBE: CPT | Performed by: FAMILY MEDICINE

## 2023-09-21 PROCEDURE — 87086 URINE CULTURE/COLONY COUNT: CPT

## 2023-09-21 PROCEDURE — 87077 CULTURE AEROBIC IDENTIFY: CPT

## 2023-09-21 PROCEDURE — 81002 URINALYSIS NONAUTO W/O SCOPE: CPT | Performed by: FAMILY MEDICINE

## 2023-09-21 PROCEDURE — 3078F DIAST BP <80 MM HG: CPT | Performed by: FAMILY MEDICINE

## 2023-09-21 RX ORDER — ESTRADIOL 0.1 MG/G
0.5 CREAM VAGINAL
Qty: 42.5 G | Refills: 3 | Status: SHIPPED | OUTPATIENT
Start: 2023-09-22

## 2023-09-21 RX ORDER — NITROFURANTOIN 25; 75 MG/1; MG/1
100 CAPSULE ORAL 2 TIMES DAILY
Qty: 10 CAPSULE | Refills: 0 | Status: SHIPPED | OUTPATIENT
Start: 2023-09-21 | End: 2023-09-26

## 2023-09-21 ASSESSMENT — FIBROSIS 4 INDEX: FIB4 SCORE: 0.88

## 2023-09-21 NOTE — PROGRESS NOTES
"CHIEF COMPLAINT / REASON FOR VISIT  Tena Romo is a 73 y.o. female that presents today for UTI, sore throat    HISTORY OF PRESENT ILLNESS  Dysuria, cloudy urine, increased urgency, suprapubic. Duration 4 days  Vaginal dryness. Not sexually active    Sore throat just started last night. Has longterm nasal congestion due to allergies. No cough    OBJECTIVE     BP (!) 140/68 (BP Location: Left arm, Patient Position: Sitting, BP Cuff Size: Adult)   Pulse (!) 109   Temp 36.7 °C (98 °F) (Temporal)   Resp 14   Ht 1.6 m (5' 3\")   Wt 89.4 kg (197 lb)   SpO2 93%   BMI 34.90 kg/m²      PHYSICAL EXAM  Constitutional: Sitting comfortably, in no acute distress, responds to questions appropriately.  Eyes:  No conjunctival injection, no scleral icterus, PERRL  Ears: Normal tympanic membranes, external ear canals clear  Neck: No cervical lymphadenopathy. No JVD  Mouth: oral mucosa moist  Throat: Oropharynx clear  Heart: Regular S1 S2, no murmurs, rub, or gallops  Lungs: Clear to auscultation bilaterally, no wheezes, rales, or rhonchi  Skin: Warm and dry, no rashes or lesions on face or exposed upper extremities    ASSESSMENT & PLAN  1. Genitourinary syndrome of menopause  Vaginal dryness with recurrent urinary tract infections in postmenopausal woman.  After discussion decided to initiate topical estradiol cream 0.5 g 3 times per week  - estradiol (ESTRACE) 0.1 MG/GM vaginal cream; Insert 0.5 g into the vagina every Monday, Wednesday, and Friday.  Dispense: 42.5 g; Refill: 3    2. Recurrent UTI (urinary tract infection)  Recurrent acute cystitis, with point-of-care urinalysis today showing positive leukocyte esterase and trace blood.  We will treat empirically with 5-day course of Macrobid based on sensitivities of last urine culture.  We will also send this urine sample for culture.  - URINE CULTURE(NEW); Future  - POCT Urinalysis    3. Type 2 diabetes mellitus with other specified complication, without " long-term current use of insulin (HCC)  Chronic, mildly uncontrolled with hemoglobin A1c 7.3, currently taking glipizide 5 mg daily.  Reportedly had allergic rash with metformin.  Has recurrent UTIs so SGLT2 inhibitor would not be ideal.  She has documented a history of acute pancreatitis, so GLP-1 agonists may increase her risk for this.  For now we will continue with lifestyle modification including healthy low carbohydrate diet and regular exercise, follow-up in 6 months with repeat hemoglobin A1c  - POCT A1C    4. Pharyngitis, unspecified etiology  Acute mild viral pharyngitis, well compensated.  Group A strep swab negative.  Recommend at home symptomatic cares  - POCT CEPHEID GROUP A STREP - PCR

## 2023-10-04 ENCOUNTER — OFFICE VISIT (OUTPATIENT)
Dept: MEDICAL GROUP | Facility: PHYSICIAN GROUP | Age: 73
End: 2023-10-04
Payer: MEDICARE

## 2023-10-04 ENCOUNTER — HOSPITAL ENCOUNTER (OUTPATIENT)
Facility: MEDICAL CENTER | Age: 73
End: 2023-10-04
Attending: FAMILY MEDICINE
Payer: MEDICARE

## 2023-10-04 VITALS
SYSTOLIC BLOOD PRESSURE: 142 MMHG | HEART RATE: 110 BPM | TEMPERATURE: 98.1 F | RESPIRATION RATE: 14 BRPM | DIASTOLIC BLOOD PRESSURE: 72 MMHG | WEIGHT: 197 LBS | HEIGHT: 63 IN | BODY MASS INDEX: 34.91 KG/M2

## 2023-10-04 DIAGNOSIS — R09.81 CHRONIC NASAL CONGESTION: ICD-10-CM

## 2023-10-04 DIAGNOSIS — R30.0 DYSURIA: ICD-10-CM

## 2023-10-04 DIAGNOSIS — Z23 NEED FOR VACCINATION: ICD-10-CM

## 2023-10-04 DIAGNOSIS — N39.0 RECURRENT UTI: ICD-10-CM

## 2023-10-04 LAB
APPEARANCE UR: NORMAL
BILIRUB UR STRIP-MCNC: NORMAL MG/DL
COLOR UR AUTO: NORMAL
GLUCOSE UR STRIP.AUTO-MCNC: NORMAL MG/DL
KETONES UR STRIP.AUTO-MCNC: NORMAL MG/DL
LEUKOCYTE ESTERASE UR QL STRIP.AUTO: NORMAL
NITRITE UR QL STRIP.AUTO: NORMAL
PH UR STRIP.AUTO: 6 [PH] (ref 5–8)
PROT UR QL STRIP: NORMAL MG/DL
RBC UR QL AUTO: NORMAL
SP GR UR STRIP.AUTO: 1.02
UROBILINOGEN UR STRIP-MCNC: 0.2 MG/DL

## 2023-10-04 PROCEDURE — 3077F SYST BP >= 140 MM HG: CPT | Performed by: FAMILY MEDICINE

## 2023-10-04 PROCEDURE — 81002 URINALYSIS NONAUTO W/O SCOPE: CPT | Performed by: FAMILY MEDICINE

## 2023-10-04 PROCEDURE — 90662 IIV NO PRSV INCREASED AG IM: CPT | Performed by: FAMILY MEDICINE

## 2023-10-04 PROCEDURE — 99214 OFFICE O/P EST MOD 30 MIN: CPT | Mod: 25 | Performed by: FAMILY MEDICINE

## 2023-10-04 PROCEDURE — 87186 SC STD MICRODIL/AGAR DIL: CPT

## 2023-10-04 PROCEDURE — G0008 ADMIN INFLUENZA VIRUS VAC: HCPCS | Performed by: FAMILY MEDICINE

## 2023-10-04 PROCEDURE — 87077 CULTURE AEROBIC IDENTIFY: CPT

## 2023-10-04 PROCEDURE — 3078F DIAST BP <80 MM HG: CPT | Performed by: FAMILY MEDICINE

## 2023-10-04 PROCEDURE — 87086 URINE CULTURE/COLONY COUNT: CPT

## 2023-10-04 RX ORDER — METHENAMINE HIPPURATE 1000 MG/1
1 TABLET ORAL 2 TIMES DAILY
Qty: 60 TABLET | Refills: 3 | Status: SHIPPED | OUTPATIENT
Start: 2023-10-04 | End: 2023-11-21 | Stop reason: SDUPTHER

## 2023-10-04 RX ORDER — NITROFURANTOIN 25; 75 MG/1; MG/1
100 CAPSULE ORAL 2 TIMES DAILY
Qty: 10 CAPSULE | Refills: 0 | Status: SHIPPED | OUTPATIENT
Start: 2023-10-04 | End: 2023-11-21

## 2023-10-04 RX ORDER — METHENAMINE HIPPURATE 1000 MG/1
1 TABLET ORAL 2 TIMES DAILY
Qty: 60 TABLET | Refills: 3 | Status: SHIPPED | OUTPATIENT
Start: 2023-10-04 | End: 2023-10-04

## 2023-10-04 RX ORDER — FLUTICASONE PROPIONATE 50 MCG
2 SPRAY, SUSPENSION (ML) NASAL DAILY
Qty: 16 G | Refills: 3 | Status: SHIPPED | OUTPATIENT
Start: 2023-10-04

## 2023-10-04 ASSESSMENT — FIBROSIS 4 INDEX: FIB4 SCORE: 0.88

## 2023-10-04 NOTE — PROGRESS NOTES
"CHIEF COMPLAINT / REASON FOR VISIT  Tena Romo is a 73 y.o. female that presents today for bladder issues    HISTORY OF PRESENT ILLNESS  Had dysuria and suprapubic pressure, consistent with previous UTIs.  Took 4 tablets of amoxicillin spaced 12 hours apart, which she had laying around at home.  This made her symptoms temporarily better.  However still has suprapubic pressure.    OBJECTIVE     BP (!) 142/72 (BP Location: Left arm, Patient Position: Sitting, BP Cuff Size: Adult)   Pulse (!) 110   Temp 36.7 °C (98.1 °F) (Temporal)   Resp 14   Ht 1.6 m (5' 3\")   Wt 89.4 kg (197 lb)   BMI 34.90 kg/m²      PHYSICAL EXAM  Constitutional: Sitting comfortably, in no acute distress, responds to questions appropriately.  Skin: Warm and dry, no rashes or lesions on face or exposed upper extremities    ASSESSMENT & PLAN  1. Recurrent UTI  2. Dysuria  Symptoms consistent with acute cystitis, point-of-care urinalysis today shows small leukocyte esterase, will treat with 5-day course of nitrofurantoin (per previous UTI sensitivities).  We will also send urine for culture.  Because of her recurrent UTIs we will treat with methenamine 1 g twice daily for prevention.  - nitrofurantoin (MACROBID) 100 MG Cap; Take 1 Capsule by mouth 2 times a day.  Dispense: 10 Capsule; Refill: 0  - methenamine hip (HIPPREX) 1 GM Tab; Take 1 Tablet by mouth 2 times a day. For urinary tract infection prevention  Dispense: 60 Tablet; Refill: 3  - URINE CULTURE(NEW); Future  - POCT Urinalysis    3. Chronic nasal congestion  Desires treatment for her chronic nasal congestion and sore throat.  I believe her sore throat is probably related to postnasal drip.  Recommend nasal corticosteroid spray.  - fluticasone (FLONASE) 50 MCG/ACT nasal spray; Administer 2 Sprays into affected nostril(S) every day.  Dispense: 16 g; Refill: 3    4. Need for vaccination  - Influenza Vaccine, High Dose (65+ Only)      "

## 2023-10-16 ENCOUNTER — OFFICE VISIT (OUTPATIENT)
Dept: PHYSICAL MEDICINE AND REHAB | Facility: MEDICAL CENTER | Age: 73
End: 2023-10-16
Payer: MEDICARE

## 2023-10-16 VITALS
BODY MASS INDEX: 35.16 KG/M2 | DIASTOLIC BLOOD PRESSURE: 64 MMHG | HEIGHT: 63 IN | SYSTOLIC BLOOD PRESSURE: 114 MMHG | WEIGHT: 198.41 LBS | TEMPERATURE: 97.7 F | OXYGEN SATURATION: 95 % | HEART RATE: 78 BPM

## 2023-10-16 DIAGNOSIS — M54.42 CHRONIC LEFT-SIDED LOW BACK PAIN WITH LEFT-SIDED SCIATICA: ICD-10-CM

## 2023-10-16 DIAGNOSIS — G89.29 CHRONIC LEFT-SIDED LOW BACK PAIN WITH LEFT-SIDED SCIATICA: ICD-10-CM

## 2023-10-16 DIAGNOSIS — M54.16 LUMBAR RADICULOPATHY: ICD-10-CM

## 2023-10-16 DIAGNOSIS — E66.9 OBESITY (BMI 30-39.9): ICD-10-CM

## 2023-10-16 DIAGNOSIS — Z71.82 EXERCISE COUNSELING: ICD-10-CM

## 2023-10-16 DIAGNOSIS — M48.061 NEUROFORAMINAL STENOSIS OF LUMBAR SPINE: ICD-10-CM

## 2023-10-16 DIAGNOSIS — M54.42 ACUTE LEFT-SIDED LOW BACK PAIN WITH LEFT-SIDED SCIATICA: ICD-10-CM

## 2023-10-16 PROCEDURE — 1126F AMNT PAIN NOTED NONE PRSNT: CPT | Performed by: PHYSICAL MEDICINE & REHABILITATION

## 2023-10-16 PROCEDURE — 3078F DIAST BP <80 MM HG: CPT | Performed by: PHYSICAL MEDICINE & REHABILITATION

## 2023-10-16 PROCEDURE — 99214 OFFICE O/P EST MOD 30 MIN: CPT | Performed by: PHYSICAL MEDICINE & REHABILITATION

## 2023-10-16 PROCEDURE — 3074F SYST BP LT 130 MM HG: CPT | Performed by: PHYSICAL MEDICINE & REHABILITATION

## 2023-10-16 ASSESSMENT — FIBROSIS 4 INDEX: FIB4 SCORE: 0.88

## 2023-10-16 ASSESSMENT — PAIN SCALES - GENERAL: PAINLEVEL: NO PAIN

## 2023-10-16 ASSESSMENT — PATIENT HEALTH QUESTIONNAIRE - PHQ9: CLINICAL INTERPRETATION OF PHQ2 SCORE: 0

## 2023-10-16 NOTE — PROGRESS NOTES
Follow up patient note  Interventional spine and sports physiatry, Physical medicine rehabilitation      Chief complaint:   Chief Complaint   Patient presents with    Follow-Up     Lumbar radiculopathy          HISTORY    Please see new patient note dated  by Dr Chou,  for more details.     HPI  Patient identification: Tena Romo 73 y.o. female  With Diagnoses of Lumbar radiculopathy, Chronic left-sided low back pain with left-sided sciatica, Acute left-sided low back pain with left-sided sciatica,  BMI 30-39.9, Exercise counseling, and Neuroforaminal stenosis of lumbar spine were pertinent to this visit.     Procedures:  8/22/2023 left L5-S1 and S1 transforaminal epidural steroid injection 100% improvement initially  9/13/2023 left L5-S1 interlaminar epidural steroid injection 100% improvement at the follow-up visit    The patient had excellent improvement in pain and function after the epidural steroid injections.  She is now going on walks for a mile at a time and has no difficulty with ADLs.  She has no pain in the back currently.  She rarely has pain radiating down the left leg which is 1 out of 10 intensity intermittent.  Not causing functional deficits.  She is able to easily rise from a chair and do all of her ADLs.  Denies current numbness tingling or weakness.    The patient's been physical therapy in the past and has a comprehensive pain program     ROS Red Flags :   Fever, Chills, Sweats: Denies  Involuntary Weight Loss: Denies  Bowel/Bladder Incontinence: Denies  Saddle Anesthesia: Denies        PMHx:   Past Medical History:   Diagnosis Date    Anesthesia     post of nausea    Arthritis     Breast cancer (HCC) 02/10/2017    Chronic fibrosis of lung (HCC) 5/9/2023    Likely 2/2 COVID 19 PNA in 2020    Cough     Dental disorder     permanent bridge    Diabetes (HCC)     Diverticulosis of colon 11/04/2011    HTN (hypertension) 03/14/2013    Hypertension     Pain     right shoulder down to  thumb    Post hysterectomy menopause 11/04/2011    Trochanteric bursitis of right hip 10/01/2019    Use of anastrozole (Arimidex) 01/11/2019    Wheezing        PSHx:   Past Surgical History:   Procedure Laterality Date    VT INJ LUMBAR/SACRAL,W/ IMAGING Left 9/13/2023    Procedure: Lumbar LEFT L5-S1 interlaminar epidural steroid injection.     Note: transitional anatomy with disc space S1-2. Review previous procedure done by Dr Chou;  Surgeon: Kieran Chou M.D.;  [facility];  Service: Pain Management    VT NJX AA&/STRD TFRML EPI LUMBAR/SACRAL 1 LEVEL Left 8/22/2023    Procedure: LEFT L5-S1 and S1 transforaminal epidural steroid injection with fluoroscopic guidance;  Surgeon: Kieran Chou M.D.;  Location: [facility];  Service: Pain Management    SHOULDER ARTHROSCOPY W/ ROTATOR CUFF REPAIR  2/9/2012    Performed by JENNY LIMON at [facility]    SHOULDER DECOMPRESSION ARTHROSCOPIC  2/9/2012    Performed by JENNY LIMON at [facility]    CLAVICLE DISTAL EXCISION  2/9/2012    Performed by JENNY LIMON at [facility]    BUNIONECTOMY  2009    bilateral    ABDOMINAL HYSTERECTOMY TOTAL  2001    endometriosis    CHOLECYSTECTOMY  1999    laparoscopy    MASTECTOMY Bilateral     TONSILLECTOMY  as child       Family history   Family History   Problem Relation Age of Onset    Stroke Mother     Lung Disease Father         pneumonia    Hyperlipidemia Father          Medications:   Outpatient Medications Marked as Taking for the 10/16/23 encounter (Office Visit) with Kieran Chou M.D.   Medication Sig Dispense Refill    nitrofurantoin (MACROBID) 100 MG Cap Take 1 Capsule by mouth 2 times a day. 10 Capsule 0    fluticasone (FLONASE) 50 MCG/ACT nasal spray Administer 2 Sprays into affected nostril(S) every day. 16 g 3    methenamine hip (HIPPREX) 1 GM Tab Take 1 Tablet by mouth 2 times a day. For urinary tract infection  prevention 60 Tablet 3    estradiol (ESTRACE) 0.1 MG/GM vaginal cream Insert 0.5 g into the vagina every Monday, Wednesday, and Friday. 42.5 g 3    simvastatin (ZOCOR) 40 MG Tab TAKE 1 TABLET BY MOUTH EVERY DAY IN THE EVENING 90 Tablet 3    Lancets Lancets order: Lancets for Freestylemeter. Sig: use once daily and prn ssx high or low sugar. #100 RF x 3 100 Each 3    lidocaine (LIDODERM) 5 % Patch Place 1 Patch on the skin every 24 hours. Apply for 12 hours maximum and then take off for 12 hours. 30 Patch 7    methocarbamol (ROBAXIN) 750 MG Tab       albuterol 108 (90 Base) MCG/ACT Aero Soln inhalation aerosol Inhale 2 Puffs every 6 hours as needed for Shortness of Breath. 8.5 g 6    telmisartan-hydrochlorothiazide (MICARDIS HCT) 40-12.5 MG per tablet TAKE 1 TABLET BY MOUTH EVERY DAY 90 Tablet 3    glipiZIDE (GLUCOTROL) 5 MG Tab Take 1 Tablet by mouth every day. 90 Tablet 3    Multiple Vitamin (MULTIVITAMIN ADULT PO) Take  by mouth every day.      acetaminophen (TYLENOL) 500 MG Tab Take 500-1,000 mg by mouth every 6 hours as needed.      multivitamin (THERAGRAN) Tab Take 1 Tablet by mouth every day.      omeprazole (PRILOSEC) 40 MG delayed-release capsule Take 40 mg by mouth every day.      Blood Glucose Test Strips Use one strip to test blood sugar once daily early morning before first meal. 300 Strip 11        Current Outpatient Medications on File Prior to Visit   Medication Sig Dispense Refill    nitrofurantoin (MACROBID) 100 MG Cap Take 1 Capsule by mouth 2 times a day. 10 Capsule 0    fluticasone (FLONASE) 50 MCG/ACT nasal spray Administer 2 Sprays into affected nostril(S) every day. 16 g 3    methenamine hip (HIPPREX) 1 GM Tab Take 1 Tablet by mouth 2 times a day. For urinary tract infection prevention 60 Tablet 3    estradiol (ESTRACE) 0.1 MG/GM vaginal cream Insert 0.5 g into the vagina every Monday, Wednesday, and Friday. 42.5 g 3    simvastatin (ZOCOR) 40 MG Tab TAKE 1 TABLET BY MOUTH EVERY DAY IN THE  "EVENING 90 Tablet 3    Lancets Lancets order: Lancets for Freestylemeter. Sig: use once daily and prn ssx high or low sugar. #100 RF x 3 100 Each 3    lidocaine (LIDODERM) 5 % Patch Place 1 Patch on the skin every 24 hours. Apply for 12 hours maximum and then take off for 12 hours. 30 Patch 7    methocarbamol (ROBAXIN) 750 MG Tab       albuterol 108 (90 Base) MCG/ACT Aero Soln inhalation aerosol Inhale 2 Puffs every 6 hours as needed for Shortness of Breath. 8.5 g 6    telmisartan-hydrochlorothiazide (MICARDIS HCT) 40-12.5 MG per tablet TAKE 1 TABLET BY MOUTH EVERY DAY 90 Tablet 3    glipiZIDE (GLUCOTROL) 5 MG Tab Take 1 Tablet by mouth every day. 90 Tablet 3    Multiple Vitamin (MULTIVITAMIN ADULT PO) Take  by mouth every day.      acetaminophen (TYLENOL) 500 MG Tab Take 500-1,000 mg by mouth every 6 hours as needed.      multivitamin (THERAGRAN) Tab Take 1 Tablet by mouth every day.      omeprazole (PRILOSEC) 40 MG delayed-release capsule Take 40 mg by mouth every day.      Blood Glucose Test Strips Use one strip to test blood sugar once daily early morning before first meal. 300 Strip 11     No current facility-administered medications on file prior to visit.         Allergies:   Allergies   Allergen Reactions    Cipro Xr Hives    Gabapentin      Pt states she was \"all puffed up\"    Meloxicam Vomiting    Aspirin      Intolerance    Other reaction(s): Other    Diclofenac Rash    Metformin Rash     Erythematous rash       Social Hx:   Social History     Socioeconomic History    Marital status:      Spouse name: Not on file    Number of children: Not on file    Years of education: Not on file    Highest education level: Not on file   Occupational History    Not on file   Tobacco Use    Smoking status: Never    Smokeless tobacco: Never    Tobacco comments:     avoid any and all tobacco products   Vaping Use    Vaping Use: Never used   Substance and Sexual Activity    Alcohol use: Yes     Alcohol/week: 0.0 oz " "    Comment: once a year    Drug use: No    Sexual activity: Not Currently     Partners: Male     Comment: . Work at [employer]   Other Topics Concern    Not on file   Social History Narrative    Not on file     Social Determinants of Health     Financial Resource Strain: Not on file   Food Insecurity: Not on file   Transportation Needs: Not on file   Physical Activity: Not on file   Stress: Not on file   Social Connections: Not on file   Intimate Partner Violence: Not on file   Housing Stability: Not on file         EXAMINATION     Physical Exam:   Vitals: /64 (BP Location: Right arm, Patient Position: Sitting, BP Cuff Size: Adult)   Pulse 78   Temp 36.5 °C (97.7 °F) (Temporal)   Ht 1.6 m (5' 3\")   Wt 90 kg (198 lb 6.6 oz)   SpO2 95%     Constitutional:   Body Habitus: Body mass index is 35.15 kg/m².  Cooperation: Fully cooperates with exam  Appearance: Well-groomed no disheveled    Respiratory-  breathing comfortable on room air, no audible wheezing  Cardiovascular- capillary refills less than 2 seconds. No lower extremity edema is noted.   Psychiatric- alert and oriented ×3. Normal affect.    MSK: -    Thoracic/Lumbar Spine/Sacral Spine/Hips   There are no signs of infection around the injection sites.   full  active range of motion with flexion, lateral flexion, and rotation bilaterally.   There is full  active range of motion with lumbar extension.    There is no  pain with lumbar extension.   There is no pain with facet loading maneuver (extension rotation) with axial low back pain on the BILATERAL side(s)       Palpation:   No tenderness to palpation in midline at T1-T12 levels. No tenderness to palpation in the left and right of the midline T1-L5, NEGATIVE for tenderness to palpation to the para-midline region in the lower lumbar levels.  palpation over SI joint: negative bilaterally    palpation in hip or over the gluteus medius tendon insertion: negative bilaterally      Lumbar spine Special " "tests  Neuro tension  Straight leg test negative bilaterally    Slump test negative bilaterally      Key points for the international standards for neurological classification of spinal cord injury (ISNCSCI) to light touch.     Dermatome R L                                      L2 2 2   L3 2 2   L4 2 2   L5 2 2   S1 2 2   S2 2 2         Motor Exam Lower Extremities    ? Myotome R L   Hip flexion L2 5 5   Knee extension L3 5 5   Ankle dorsiflexion L4 5 5   Toe extension L5 5 5   Ankle plantarflexion S1 5 5           MEDICAL DECISION MAKING    DATA    Labs:   Lab Results   Component Value Date/Time    SODIUM 139 03/14/2023 02:18 PM    POTASSIUM 3.8 03/14/2023 02:18 PM    CHLORIDE 101 03/14/2023 02:18 PM    CO2 25 03/14/2023 02:18 PM    GLUCOSE 104 (H) 03/14/2023 02:18 PM    BUN 17 03/14/2023 02:18 PM    CREATININE 0.93 03/14/2023 02:18 PM    CREATININE 0.86 12/01/2010 12:00 AM    BUNCREATRAT 18 06/26/2020 04:28 AM    BUNCREATRAT 27 12/01/2010 12:00 AM    GLOMRATE >59 12/01/2010 12:00 AM        No results found for: \"PROTHROMBTM\", \"INR\"     Lab Results   Component Value Date/Time    WBC 9.2 12/27/2022 09:12 AM    RBC 4.42 12/27/2022 09:12 AM    HEMOGLOBIN 13.5 12/27/2022 09:12 AM    HEMATOCRIT 42.0 12/27/2022 09:12 AM    MCV 95.0 12/27/2022 09:12 AM    MCH 30.5 12/27/2022 09:12 AM    MCHC 32.1 (L) 12/27/2022 09:12 AM    MPV 11.2 12/27/2022 09:12 AM    NEUTSPOLYS 57.60 12/27/2022 09:12 AM    LYMPHOCYTES 27.00 12/27/2022 09:12 AM    MONOCYTES 8.40 12/27/2022 09:12 AM    EOSINOPHILS 5.90 12/27/2022 09:12 AM    BASOPHILS 0.70 12/27/2022 09:12 AM    HYPOCHROMIA 1+ 03/15/2013 06:35 AM        Lab Results   Component Value Date/Time    HBA1C 7.3 (A) 09/21/2023 09:36 AM          Imaging:   I personally reviewed following images    MRI lumbar spine 6/30/2023  Partially lumbarized S1 vertebrae.  At L4-5 there is severe central canal stenosis and moderate right and mild-moderate left neuroforaminal stenosis.  At L5-S1 there is " moderate central canal stenosis and moderate to severe right and mild left neuroforaminal stenosis.  Facet arthropathy most notable at lower lumbar levels. See formal radiology report for further details.     I reviewed the fluoroscopic images from 9/13/2023 showing successful left L5-S1 interlaminar epidural steroid injection.  I reviewed this with the patient and her  at the visit on 10/16/2023    I reviewed the following radiology reports     Results for orders placed during the hospital encounter of 06/30/23     MR-LUMBAR SPINE-W/O     Impression  1.  Severe facet hypertrophy greater than degenerative disc disease results in severe central stenosis at L4/5, moderate to severe right foraminal stenosis at L5/S1     2.  L2/3 disc bulge and extrusion causes moderate to severe central stenosis     3.  Lesser degenerative changes at the levels as detailed above     4.  Mild discogenic L5/S1 edema indicating ongoing remodeling     5.  Ambiguous segmentation, careful correlation of levels is required if intervention is planned, as detailed above      DIAGNOSIS   Visit Diagnoses     ICD-10-CM   1. Lumbar radiculopathy  M54.16   2. Chronic left-sided low back pain with left-sided sciatica  M54.42    G89.29   3. Acute left-sided low back pain with left-sided sciatica  M54.42   4.  BMI 30-39.9  E66.9   5. Exercise counseling  Z71.82   6. Neuroforaminal stenosis of lumbar spine  M48.061         ASSESSMENT and PLAN:     Tena Huizarjessica Romo 73 y.o. female      Tena was seen today for follow-up.    Diagnoses and all orders for this visit:    Lumbar radiculopathy    Chronic left-sided low back pain with left-sided sciatica    Acute left-sided low back pain with left-sided sciatica     BMI 30-39.9    Exercise counseling    Neuroforaminal stenosis of lumbar spine        The patient had excellent improvement in pain and function after the epidural steroid injections.  We discussed additions to the home exercise  program.    Medications: Continue Lidoderm patches as needed.   Acetaminophen up to 1000 mg 3 times daily as needed not to exceed 3000 mg per 24-hours.    Follow up: 1 year or sooner as needed    Thank you for allowing me to participate in the care of this patient. If you have any questions please not hesitate to contact me.          Please note that this dictation was created using voice recognition software. I have made every reasonable attempt to correct obvious errors but there may be errors of grammar and content that I may have overlooked prior to finalization of this note.      Kieran Chou MD  Interventional Spine and Sports Physiatry  Physical Medicine and Rehabilitation  [facility]

## 2023-10-23 ENCOUNTER — HOSPITAL ENCOUNTER (OUTPATIENT)
Facility: MEDICAL CENTER | Age: 73
End: 2023-10-23
Attending: UROLOGY
Payer: MEDICARE

## 2023-10-23 PROCEDURE — 87077 CULTURE AEROBIC IDENTIFY: CPT

## 2023-10-23 PROCEDURE — 87086 URINE CULTURE/COLONY COUNT: CPT

## 2023-10-23 PROCEDURE — 87186 SC STD MICRODIL/AGAR DIL: CPT

## 2023-11-21 ENCOUNTER — OFFICE VISIT (OUTPATIENT)
Dept: MEDICAL GROUP | Facility: PHYSICIAN GROUP | Age: 73
End: 2023-11-21
Payer: MEDICARE

## 2023-11-21 VITALS
RESPIRATION RATE: 16 BRPM | BODY MASS INDEX: 35.44 KG/M2 | SYSTOLIC BLOOD PRESSURE: 126 MMHG | TEMPERATURE: 98.8 F | HEART RATE: 77 BPM | DIASTOLIC BLOOD PRESSURE: 82 MMHG | OXYGEN SATURATION: 96 % | WEIGHT: 200 LBS | HEIGHT: 63 IN

## 2023-11-21 DIAGNOSIS — E11.69 DYSLIPIDEMIA ASSOCIATED WITH TYPE 2 DIABETES MELLITUS (HCC): ICD-10-CM

## 2023-11-21 DIAGNOSIS — E78.5 DYSLIPIDEMIA ASSOCIATED WITH TYPE 2 DIABETES MELLITUS (HCC): ICD-10-CM

## 2023-11-21 DIAGNOSIS — N39.0 RECURRENT UTI: ICD-10-CM

## 2023-11-21 LAB
APPEARANCE UR: CLEAR
BILIRUB UR STRIP-MCNC: NORMAL MG/DL
COLOR UR AUTO: YELLOW
GLUCOSE UR STRIP.AUTO-MCNC: NORMAL MG/DL
KETONES UR STRIP.AUTO-MCNC: NORMAL MG/DL
LEUKOCYTE ESTERASE UR QL STRIP.AUTO: NORMAL
NITRITE UR QL STRIP.AUTO: NORMAL
PH UR STRIP.AUTO: 6 [PH] (ref 5–8)
PROT UR QL STRIP: NORMAL MG/DL
RBC UR QL AUTO: NORMAL
SP GR UR STRIP.AUTO: 1.03
UROBILINOGEN UR STRIP-MCNC: 0.2 MG/DL

## 2023-11-21 PROCEDURE — 99213 OFFICE O/P EST LOW 20 MIN: CPT | Performed by: FAMILY MEDICINE

## 2023-11-21 PROCEDURE — 3074F SYST BP LT 130 MM HG: CPT | Performed by: FAMILY MEDICINE

## 2023-11-21 PROCEDURE — 3079F DIAST BP 80-89 MM HG: CPT | Performed by: FAMILY MEDICINE

## 2023-11-21 PROCEDURE — 81002 URINALYSIS NONAUTO W/O SCOPE: CPT | Performed by: FAMILY MEDICINE

## 2023-11-21 RX ORDER — SIMVASTATIN 40 MG
40 TABLET ORAL EVERY EVENING
Qty: 90 TABLET | Refills: 3 | Status: SHIPPED | OUTPATIENT
Start: 2023-11-21

## 2023-11-21 RX ORDER — METHENAMINE HIPPURATE 1000 MG/1
1 TABLET ORAL 2 TIMES DAILY
Qty: 180 TABLET | Refills: 3 | Status: SHIPPED | OUTPATIENT
Start: 2023-11-21 | End: 2024-02-21 | Stop reason: SDUPTHER

## 2023-11-21 ASSESSMENT — FIBROSIS 4 INDEX: FIB4 SCORE: 0.88

## 2023-11-21 NOTE — PROGRESS NOTES
"CHIEF COMPLAINT / REASON FOR VISIT  Tena Romo is a 73 y.o. female that presents today for urinary symptoms    HISTORY OF PRESENT ILLNESS  Had bladder botox through urology for urinary incontinence    1 week later she is starting to get cloudy urine and is wanting to make sure this isn't a UTI. Denies dysuria. No fevers.   She has also started the methenamine    OBJECTIVE     /82 (BP Location: Right arm, Patient Position: Sitting, BP Cuff Size: Adult)   Pulse 77   Temp 37.1 °C (98.8 °F) (Temporal)   Resp 16   Ht 1.6 m (5' 3\")   Wt 90.7 kg (200 lb)   SpO2 96%   BMI 35.43 kg/m²      PHYSICAL EXAM  Constitutional: Sitting comfortably, in no acute distress, responds to questions appropriately.  Skin: Warm and dry, no rashes or lesions on face or exposed upper extremities    ASSESSMENT & PLAN  1. Recurrent UTI  Cloudy urine since starting methenamine, point-of-care urinalysis today is negative for urinary tract infection.  She otherwise feels fine.  Recommended to continue methenamine for UTI prevention.  - POCT Urinalysis  - methenamine hip (HIPPREX) 1 GM Tab; Take 1 Tablet by mouth 2 times a day. For urinary tract infection prevention  Dispense: 180 Tablet; Refill: 3    2. Dyslipidemia associated with type 2 diabetes mellitus (HCC)  Chronic, well-controlled on simvastatin 40 mg daily.  Continue current therapy.  - simvastatin (ZOCOR) 40 MG Tab; Take 1 Tablet by mouth every evening.  Dispense: 90 Tablet; Refill: 3      "

## 2023-11-23 DIAGNOSIS — E11.69 TYPE 2 DIABETES MELLITUS WITH OTHER SPECIFIED COMPLICATION, WITHOUT LONG-TERM CURRENT USE OF INSULIN (HCC): ICD-10-CM

## 2023-11-27 DIAGNOSIS — E11.69 TYPE 2 DIABETES MELLITUS WITH OTHER SPECIFIED COMPLICATION, WITHOUT LONG-TERM CURRENT USE OF INSULIN (HCC): ICD-10-CM

## 2023-11-28 RX ORDER — GLIPIZIDE 5 MG/1
5 TABLET ORAL DAILY
Qty: 90 TABLET | Refills: 3 | Status: SHIPPED | OUTPATIENT
Start: 2023-11-28

## 2023-11-28 RX ORDER — GLIPIZIDE 5 MG/1
5 TABLET ORAL DAILY
Qty: 90 TABLET | Refills: 3 | OUTPATIENT
Start: 2023-11-28

## 2024-02-21 DIAGNOSIS — N39.0 RECURRENT UTI: ICD-10-CM

## 2024-02-21 NOTE — TELEPHONE ENCOUNTER
Received request via: Patient    Was the patient seen in the last year in this department? Yes    Does the patient have an active prescription (recently filled or refills available) for medication(s) requested? No    [facility] Name[facility]     Does the patient have custodial Plus and need 100 day supply (blood pressure, diabetes and cholesterol meds only)? Patient does not have SCP

## 2024-02-21 NOTE — TELEPHONE ENCOUNTER
VOICEMAIL  1. Caller Name: Tena Romo                        Call Back Number: [phone] (home)       2. Message: Patient is calling with to get a refill on her medication.  Patient states she will has a few days with the meds.    3. Patient approves office to leave a detailed voicemail/MyChart message: yes

## 2024-02-22 RX ORDER — METHENAMINE HIPPURATE 1000 MG/1
1 TABLET ORAL 2 TIMES DAILY
Qty: 180 TABLET | Refills: 3 | Status: SHIPPED | OUTPATIENT
Start: 2024-02-22

## 2024-04-26 ENCOUNTER — HOSPITAL ENCOUNTER (OUTPATIENT)
Facility: MEDICAL CENTER | Age: 74
End: 2024-04-26
Attending: UROLOGY
Payer: MEDICARE

## 2024-04-26 PROCEDURE — 87186 SC STD MICRODIL/AGAR DIL: CPT

## 2024-04-26 PROCEDURE — 87086 URINE CULTURE/COLONY COUNT: CPT

## 2024-04-26 PROCEDURE — 87077 CULTURE AEROBIC IDENTIFY: CPT

## 2024-04-30 ENCOUNTER — OFFICE VISIT (OUTPATIENT)
Dept: MEDICAL GROUP | Facility: PHYSICIAN GROUP | Age: 74
End: 2024-04-30
Payer: MEDICARE

## 2024-04-30 VITALS
BODY MASS INDEX: 36.14 KG/M2 | DIASTOLIC BLOOD PRESSURE: 60 MMHG | HEART RATE: 73 BPM | RESPIRATION RATE: 16 BRPM | OXYGEN SATURATION: 95 % | WEIGHT: 204 LBS | HEIGHT: 63 IN | SYSTOLIC BLOOD PRESSURE: 112 MMHG | TEMPERATURE: 98 F

## 2024-04-30 DIAGNOSIS — N39.41 URGE INCONTINENCE OF URINE: ICD-10-CM

## 2024-04-30 DIAGNOSIS — N32.81 OVERACTIVE BLADDER: ICD-10-CM

## 2024-04-30 DIAGNOSIS — N39.0 RECURRENT UTI: ICD-10-CM

## 2024-04-30 DIAGNOSIS — R32 URINARY INCONTINENCE, UNSPECIFIED TYPE: ICD-10-CM

## 2024-04-30 DIAGNOSIS — N95.2 ATROPHIC VAGINITIS: ICD-10-CM

## 2024-04-30 DIAGNOSIS — N95.8 GENITOURINARY SYNDROME OF MENOPAUSE: ICD-10-CM

## 2024-04-30 LAB
APPEARANCE UR: NORMAL
BILIRUB UR STRIP-MCNC: NORMAL MG/DL
COLOR UR AUTO: YELLOW
GLUCOSE UR STRIP.AUTO-MCNC: NORMAL MG/DL
KETONES UR STRIP.AUTO-MCNC: NORMAL MG/DL
LEUKOCYTE ESTERASE UR QL STRIP.AUTO: NORMAL
NITRITE UR QL STRIP.AUTO: NORMAL
PH UR STRIP.AUTO: 6 [PH] (ref 5–8)
PROT UR QL STRIP: NORMAL MG/DL
RBC UR QL AUTO: NORMAL
SP GR UR STRIP.AUTO: 1.02
UROBILINOGEN UR STRIP-MCNC: 0.2 MG/DL

## 2024-04-30 RX ORDER — ESTRADIOL 0.1 MG/G
0.5 CREAM VAGINAL
Qty: 42.5 G | Refills: 3 | Status: SHIPPED | OUTPATIENT
Start: 2024-05-01

## 2024-04-30 RX ORDER — SULFAMETHOXAZOLE AND TRIMETHOPRIM 800; 160 MG/1; MG/1
1 TABLET ORAL 2 TIMES DAILY
COMMUNITY
Start: 2024-04-29

## 2024-04-30 RX ORDER — NITROFURANTOIN 25; 75 MG/1; MG/1
100 CAPSULE ORAL NIGHTLY
Qty: 90 CAPSULE | Refills: 1 | Status: SHIPPED | OUTPATIENT
Start: 2024-04-30

## 2024-04-30 ASSESSMENT — FIBROSIS 4 INDEX: FIB4 SCORE: .8888888888888888889

## 2024-04-30 NOTE — PROGRESS NOTES
"  Chief Complaint   Patient presents with    UTI     Asymptomatic                                                                                                                                        HPI:   Tena presents today with the following.    The patient presents with a chief complaint of recurrent urinary tract infections (UTIs), which she reports are frequently caused by E. coli. She expresses frustration with the cost and effectiveness of her current medication, Hiprex, which she takes twice daily but finds expensive and ineffective. She mentions having no insurance, which adds to her financial burden.    She has a history of undergoing a cystoscopy, which she found to be a horrible experience, leading her to cancel a scheduled repeat of the procedure. She also reports using vaginal estrogen cream prescribed by Dr. Jade but experiences burning upon application. She has been advised to continue using the cream despite the discomfort, as it is expected to alleviate inflammation after a few days of use.    The patient inquires about an alternatives to Hiprex as she does not feel that it is \"doing its job\". Discussion of a potential switch to Macrobid for prophylactic use, suggested by the clinician due to its effectiveness in treating her previous UTI episodes. However, there are concerns about its long-term use due to her age and renal function.    Additionally, the patient mentions a referral to a urogynecologist, Dr. Márquez, for further evaluation of her frequent UTIs and other pelvic issues. She expresses willingness to consult with Dr. Márquez, hoping for a thorough examination and effective treatment plan.    The patient also shares concerns about her urine appearance, noting it is consistently cloudy, which she suspects might be due to her current medication. She reports occasional sharp pains, which she plans to discuss with her doctor, and mentions a history of procedures including a colonoscopy in " "2021 and an upper GI in 2022, which diagnosed esophagitis.    Overall, the patient seeks effective management of her recurrent UTIs, relief from associated symptoms, and a better understanding of potential underlying causes, with a strong desire for cost-effective treatment options due to her lack of insurance.    ROS:  All systems negative expect as addressed in assessment and plan.     /60 (BP Location: Left arm, Patient Position: Sitting)   Pulse 73   Temp 36.7 °C (98 °F) (Temporal)   Resp 16   Ht 1.6 m (5' 3\")   Wt 92.5 kg (204 lb)   SpO2 95%   BMI 36.14 kg/m²     Objective:    Physical Exam  Abdominal:      Tenderness: There is no right CVA tenderness or left CVA tenderness.       Diagnostic Test Results:  - Urine culture consistently showing E. coli bacteria. Has a history of ESBL Oct 2023. Cultures since have not shown resistance.   - Current urine analysis indicating presence of trace blood, positive nitrates, and large leukocytes, suggesting a urinary tract infection.  - Recent culture (4/26) shows positive for E. coli.     Lab Results   Component Value Date/Time    POCCOLOR Yellow 04/30/2024 07:52 AM    POCAPPEAR Cloudy 04/30/2024 07:52 AM    POCLEUKEST Large 04/30/2024 07:52 AM    POCNITRITE Pos 04/30/2024 07:52 AM    POCUROBILIGE 0.2 04/30/2024 07:52 AM    POCPROTEIN Neg 04/30/2024 07:52 AM    POCURPH 6.0 04/30/2024 07:52 AM    POCBLOOD Trace 04/30/2024 07:52 AM    POCSPGRV 1.020 04/30/2024 07:52 AM    POCKETONES Neg 04/30/2024 07:52 AM    POCBILIRUBIN Neg 04/30/2024 07:52 AM    POCGLUCUA Neg 04/30/2024 07:52 AM              Component 4 d ago   Significant Indicator POS Positive (POS)   Source UR   Site -   Culture Result Usual urogenital ava 10-50,000 cfu/mL Abnormal    Culture Result  Abnormal   Escherichia coli  >100,000 cfu/mL    Resulting Agency [facility]        Susceptibility     Escherichia coli    SANCHEZ    Amoxicillin/CA <=8/4 mcg/mL Sensitive    Ampicillin <=8 mcg/mL Sensitive    " Ampicillin/sulbactam <=4/2 mcg/mL Sensitive    Cefazolin <=2 mcg/mL Sensitive 1    Cefepime <=2 mcg/mL Sensitive    Ceftriaxone <=1 mcg/mL Sensitive    Cefuroxime 8 mcg/mL Sensitive    Ciprofloxacin <=0.25 mcg/mL Sensitive    Gentamicin <=2 mcg/mL Sensitive    Levofloxacin <=0.5 mcg/mL Sensitive    Minocycline <=4 mcg/mL Sensitive    Nitrofurantoin <=32 mcg/mL Sensitive    Pip/Tazobactam <=8 mcg/mL Sensitive    Tigecycline <=2 mcg/mL Sensitive    Tobramycin <=2 mcg/mL Sensitive    Trimeth/Sulfa <=0.5/9.5 m... Sensitive                Assessment and Plan:  74 y.o. female with the following issues.    1. Recurrent UTI  - POCT Urinalysis  - Referral to Urogynecology  - nitrofurantoin (MACROBID) 100 MG Cap; Take 1 Capsule by mouth every evening.  Dispense: 90 Capsule; Refill: 1    2. Atrophic vaginitis  - Referral to Urogynecology    3. Urinary incontinence, unspecified type  - Referral to Urogynecology    4. Urge incontinence of urine  - Referral to Urogynecology    5. Overactive bladder  - Referral to Urogynecology    6. Genitourinary syndrome of menopause  - estradiol (ESTRACE) 0.1 MG/GM vaginal cream; Insert 0.5 g into the vagina every Monday, Wednesday, and Friday.  Dispense: 42.5 g; Refill: 3    Other orders  - sulfamethoxazole-trimethoprim (BACTRIM DS) 800-160 MG tablet; Take 1 Tablet by mouth 2 times a day.      1. Recurrent Urinary Tract Infections (UTIs) - E. coli:     - Assessment: Patient experiencing recurrent UTIs caused by E. coli.     - Plan:          a. Discontinue Hiprex due to lack of efficacy and potential side effects.          b. Complete current course of Bactrim (1 tablet twice a day for 7 days) as prescribed by urology for the current UTI.          c. Start prophylactic Macrobid (1 capsule nightly at bedtime) after completing Bactrim course for a 3-month period.          d. Referral to Dr. Márquez, urogynecologist, for further evaluation and management of recurrent UTIs and pelvic organ  dysfunction.    2. Vaginal Atrophy (Atrophic Vaginitis):     - Assessment: Patient experiencing vaginal atrophy.     - Plan:          a. Encourage consistent use of prescribed vaginal estrogen cream (0.5 grams or less if needed, 3 times a week at night).          b. Monitor for improvement in symptoms and potential reduction in UTI frequency.    3. Trace Intact Blood in Urine:     - Assessment: Presence of trace intact blood in urine.     - Plan:          a. Likely related to the current UTI; monitor for resolution after completing Bactrim course.          b. Reevaluate if hematuria persists or worsens.    4. Chronic Diarrhea:     - Assessment: Patient experiencing chronic diarrhea. Reports that it is often food dependent.      - Plan: No specific intervention at this time; consider referral to gastroenterology if symptoms worsen or persist.    Follow-up:     - Plan:          a. Schedule a follow-up appointment in 3 months to assess the effectiveness of the treatment plan and monitor for any new symptoms or concerns.          b. Encourage the patient to return sooner if any new symptoms arise or if there is a suspicion of another UTI.    Return in about 3 months (around 7/30/2024) for follow up recurrent UTI.    Please note that this dictation was created using voice recognition software. I have worked with consultants from the vendor as well as technical experts from [facility] [employer] to optimize the interface. I have made every reasonable attempt to correct obvious errors, but I expect that there are errors of grammar and possibly content that I did not discover before finalizing the note.

## 2024-05-08 ENCOUNTER — DOCUMENTATION (OUTPATIENT)
Dept: HEALTH INFORMATION MANAGEMENT | Facility: OTHER | Age: 74
End: 2024-05-08
Payer: MEDICARE

## 2024-05-12 NOTE — PROGRESS NOTES
Pulmonary Clinic Note    Chief Complaint:  Chief Complaint   Patient presents with    Follow-Up     PIMENTEL. Last seen 05/09/23         HPI:   This patient is a 74 y.o. female whom is followed in our clinic for COVID 19 last seen by Dr. Holguin on 5/9/23. She had a history of breast cancer and obesity, never smoker.  She was diagnosed with Covid in October if 2021.  She has a history of chronic rhinosinusitis and peripheral eosinophilia. 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  She was in the hospital on high flow and bipap for 3 weeks at the time of her covid infection.  She complains of ongoing fatigue and PIMENTEL with walks longer thatn 20 minutes.  She feels ok if she walks at her own pace.  She feels just exhausted all the time. She does not exercise much and doesn't feel she really needs to invest much energy into that at this point.  She is using as needed albuterol.  WE discussed the use of this prior to exercise to see if this helps and then discussed the option of possibly placing her on an ICS/LABA if she is improving significantly with albuterol with exercise. Her TLC improved from her first to second PFTs.      Past Medical History:   Diagnosis Date    Anesthesia     post of nausea    Arthritis     Breast cancer (HCC) 02/10/2017    Chronic fibrosis of lung (HCC) 5/9/2023    Likely 2/2 COVID 19 PNA in 2020    Cough     Dental disorder     permanent bridge    Diabetes (HCC)     Diverticulosis of colon 11/04/2011    HTN (hypertension) 03/14/2013    Hypertension     Pain     right shoulder down to thumb    Post hysterectomy menopause 11/04/2011    Trochanteric bursitis of right hip 10/01/2019    Use of anastrozole (Arimidex) 01/11/2019    Wheezing        Social History     Socioeconomic History    Marital status:      Spouse name: Not on file    Number of children: Not on file    Years of education: Not on file    Highest education level: Not on file   Occupational  History    Not on file   Tobacco Use    Smoking status: Never    Smokeless tobacco: Never    Tobacco comments:     avoid any and all tobacco products   Vaping Use    Vaping Use: Never used   Substance and Sexual Activity    Alcohol use: Yes     Alcohol/week: 0.0 oz     Comment: once a year    Drug use: No    Sexual activity: Not Currently     Partners: Male     Comment: . Work at [employer]   Other Topics Concern    Not on file   Social History Narrative    Not on file     Social Determinants of Health     Financial Resource Strain: Not on file   Food Insecurity: Not on file   Transportation Needs: Not on file   Physical Activity: Not on file   Stress: Not on file   Social Connections: Not on file   Intimate Partner Violence: Not on file   Housing Stability: Not on file          Family History   Problem Relation Age of Onset    Stroke Mother     Lung Disease Father         pneumonia    Hyperlipidemia Father        Current Outpatient Medications on File Prior to Visit   Medication Sig Dispense Refill    estradiol (ESTRACE) 0.1 MG/GM vaginal cream Insert 0.5 g into the vagina every Monday, Wednesday, and Friday. 42.5 g 3    glipiZIDE (GLUCOTROL) 5 MG Tab TAKE 1 TABLET BY MOUTH EVERY DAY 90 Tablet 3    simvastatin (ZOCOR) 40 MG Tab Take 1 Tablet by mouth every evening. 90 Tablet 3    fluticasone (FLONASE) 50 MCG/ACT nasal spray Administer 2 Sprays into affected nostril(S) every day. 16 g 3    Lancets Lancets order: Lancets for Freestylemeter. Sig: use once daily and prn ssx high or low sugar. #100 RF x 3 100 Each 3    lidocaine (LIDODERM) 5 % Patch Place 1 Patch on the skin every 24 hours. Apply for 12 hours maximum and then take off for 12 hours. 30 Patch 7    albuterol 108 (90 Base) MCG/ACT Aero Soln inhalation aerosol Inhale 2 Puffs every 6 hours as needed for Shortness of Breath. 8.5 g 6    telmisartan-hydrochlorothiazide (MICARDIS HCT) 40-12.5 MG per tablet TAKE 1 TABLET BY MOUTH EVERY DAY 90 Tablet 3     "Multiple Vitamin (MULTIVITAMIN ADULT PO) Take  by mouth every day.      acetaminophen (TYLENOL) 500 MG Tab Take 500-1,000 mg by mouth every 6 hours as needed.      multivitamin (THERAGRAN) Tab Take 1 Tablet by mouth every day. ZINC      omeprazole (PRILOSEC) 40 MG delayed-release capsule Take 40 mg by mouth every day.      Blood Glucose Test Strips Use one strip to test blood sugar once daily early morning before first meal. 300 Strip 11    nitrofurantoin (MACROBID) 100 MG Cap Take 1 Capsule by mouth every evening. (Patient not taking: Reported on 5/13/2024) 90 Capsule 1    sulfamethoxazole-trimethoprim (BACTRIM DS) 800-160 MG tablet Take 1 Tablet by mouth 2 times a day. (Patient not taking: Reported on 5/13/2024)      methocarbamol (ROBAXIN) 750 MG Tab  (Patient not taking: Reported on 5/13/2024)       No current facility-administered medications on file prior to visit.       Cipro xr, Gabapentin, Meloxicam, Aspirin, Diclofenac, and Metformin      ROS:   Review of Systems   Constitutional:  Positive for malaise/fatigue. Negative for chills and fever.   HENT:  Negative for congestion.    Respiratory:  Positive for shortness of breath. Negative for cough and sputum production.    Cardiovascular:  Negative for chest pain, palpitations and leg swelling.   Neurological:  Negative for dizziness and headaches.       Vitals:  /74 (BP Location: Left arm, Patient Position: Sitting, BP Cuff Size: Large adult)   Pulse 90   Ht 1.6 m (5' 3\")   Wt 93 kg (205 lb)   SpO2 95%     Physical Exam:  Physical Exam  Constitutional:       Appearance: She is obese.   HENT:      Head: Atraumatic.      Mouth/Throat:      Mouth: Mucous membranes are dry.   Cardiovascular:      Rate and Rhythm: Normal rate and regular rhythm.   Pulmonary:      Effort: Pulmonary effort is normal. No respiratory distress.      Breath sounds: Normal breath sounds. No wheezing or rales.   Musculoskeletal:         General: No swelling.   Skin:     General: " Skin is warm and dry.   Neurological:      General: No focal deficit present.      Mental Status: She is alert and oriented to person, place, and time.         Vaccinations:    Pneumovax: Vaccinated  Covid: Vaccinated   Annual Flu: Vaccinated    Pertinent Studies:  Laboratory Data:    6MWT:    PFTs as reviewed by me personally show:    Imaging as reviewed by me personally show:      Pertinent Cardiac Studies:  Echo:    Assessment/Plan:  Problem List Items Addressed This Visit          Pulmonary/Sleep Medicine Problems    Chronic fibrosis of lung (HCC) (Chronic)     2/2 Covid  Plan:  - albuterol prior to exercise  - Work on exercise tolerance            Other    Seasonal allergic rhinitis     Likely with RADS  Plan:  - Trial albuterol with exercise.  - ICS LABA if significant improvement  - PFT in 1 year  - Follow up in 1 year          Other Visit Diagnoses       Post-COVID chronic dyspnea        Relevant Orders    PULMONARY FUNCTION TESTS -Test requested: Complete Pulmonary Function Test              Return in about 1 year (around 5/13/2025) for darrell .   (return to office in...)    Time spent in record review prior to patient arrival, reviewing results, and in face-to-face encounter totaled 30 min, excluding any procedures if performed.    Tita Trejo MD RD  Pulmonary and Critical Care

## 2024-05-13 ENCOUNTER — OFFICE VISIT (OUTPATIENT)
Dept: SLEEP MEDICINE | Facility: MEDICAL CENTER | Age: 74
End: 2024-05-13
Attending: INTERNAL MEDICINE
Payer: MEDICARE

## 2024-05-13 VITALS
OXYGEN SATURATION: 95 % | DIASTOLIC BLOOD PRESSURE: 74 MMHG | HEART RATE: 90 BPM | WEIGHT: 205 LBS | BODY MASS INDEX: 36.32 KG/M2 | SYSTOLIC BLOOD PRESSURE: 122 MMHG | HEIGHT: 63 IN

## 2024-05-13 DIAGNOSIS — J30.2 SEASONAL ALLERGIC RHINITIS, UNSPECIFIED TRIGGER: ICD-10-CM

## 2024-05-13 DIAGNOSIS — R06.09 POST-COVID CHRONIC DYSPNEA: ICD-10-CM

## 2024-05-13 DIAGNOSIS — U09.9 POST-COVID CHRONIC DYSPNEA: ICD-10-CM

## 2024-05-13 DIAGNOSIS — J84.10 CHRONIC FIBROSIS OF LUNG (HCC): Chronic | ICD-10-CM

## 2024-05-13 PROCEDURE — 3074F SYST BP LT 130 MM HG: CPT | Performed by: INTERNAL MEDICINE

## 2024-05-13 PROCEDURE — 99214 OFFICE O/P EST MOD 30 MIN: CPT | Performed by: INTERNAL MEDICINE

## 2024-05-13 PROCEDURE — 3078F DIAST BP <80 MM HG: CPT | Performed by: INTERNAL MEDICINE

## 2024-05-13 ASSESSMENT — FIBROSIS 4 INDEX: FIB4 SCORE: .8888888888888888889

## 2024-05-13 ASSESSMENT — ENCOUNTER SYMPTOMS
COUGH: 0
PALPITATIONS: 0
CHILLS: 0
HEADACHES: 0
SPUTUM PRODUCTION: 0
DIZZINESS: 0
SHORTNESS OF BREATH: 1
FEVER: 0

## 2024-05-13 ASSESSMENT — PATIENT HEALTH QUESTIONNAIRE - PHQ9: CLINICAL INTERPRETATION OF PHQ2 SCORE: 0

## 2024-05-13 NOTE — ASSESSMENT & PLAN NOTE
Likely with RADS  Plan:  - Trial albuterol with exercise.  - ICS LABA if significant improvement  - PFT in 1 year  - Follow up in 1 year

## 2024-05-16 DIAGNOSIS — E11.59 HYPERTENSION ASSOCIATED WITH TYPE 2 DIABETES MELLITUS (HCC): ICD-10-CM

## 2024-05-16 DIAGNOSIS — I15.2 HYPERTENSION ASSOCIATED WITH TYPE 2 DIABETES MELLITUS (HCC): ICD-10-CM

## 2024-05-23 RX ORDER — TELMISARTAN AND HYDROCHLORTHIAZIDE 40; 12.5 MG/1; MG/1
1 TABLET ORAL
Qty: 90 TABLET | Refills: 3 | Status: SHIPPED | OUTPATIENT
Start: 2024-05-23

## 2024-05-30 ENCOUNTER — HOSPITAL ENCOUNTER (OUTPATIENT)
Facility: MEDICAL CENTER | Age: 74
End: 2024-05-30
Attending: PHYSICIAN ASSISTANT
Payer: MEDICARE

## 2024-06-02 LAB
BACTERIA UR CULT: NORMAL
SIGNIFICANT IND 70042: NORMAL
SITE SITE: NORMAL
SOURCE SOURCE: NORMAL

## 2024-06-11 ENCOUNTER — OFFICE VISIT (OUTPATIENT)
Dept: MEDICAL GROUP | Facility: PHYSICIAN GROUP | Age: 74
End: 2024-06-11
Payer: MEDICARE

## 2024-06-11 VITALS
TEMPERATURE: 97.8 F | DIASTOLIC BLOOD PRESSURE: 56 MMHG | WEIGHT: 215 LBS | SYSTOLIC BLOOD PRESSURE: 132 MMHG | RESPIRATION RATE: 14 BRPM | HEIGHT: 63 IN | OXYGEN SATURATION: 91 % | BODY MASS INDEX: 38.09 KG/M2 | HEART RATE: 110 BPM

## 2024-06-11 DIAGNOSIS — R07.89 RIGHT-SIDED CHEST WALL PAIN: ICD-10-CM

## 2024-06-11 DIAGNOSIS — Z85.3 HISTORY OF BREAST CANCER: ICD-10-CM

## 2024-06-11 DIAGNOSIS — E11.69 TYPE 2 DIABETES MELLITUS WITH OTHER SPECIFIED COMPLICATION, WITHOUT LONG-TERM CURRENT USE OF INSULIN (HCC): ICD-10-CM

## 2024-06-11 LAB
HBA1C MFR BLD: 7.2 % (ref ?–5.8)
POCT INT CON NEG: NEGATIVE
POCT INT CON POS: POSITIVE

## 2024-06-11 PROCEDURE — 3075F SYST BP GE 130 - 139MM HG: CPT | Performed by: FAMILY MEDICINE

## 2024-06-11 PROCEDURE — 83036 HEMOGLOBIN GLYCOSYLATED A1C: CPT | Performed by: FAMILY MEDICINE

## 2024-06-11 PROCEDURE — 3078F DIAST BP <80 MM HG: CPT | Performed by: FAMILY MEDICINE

## 2024-06-11 PROCEDURE — 99214 OFFICE O/P EST MOD 30 MIN: CPT | Performed by: FAMILY MEDICINE

## 2024-06-11 ASSESSMENT — FIBROSIS 4 INDEX: FIB4 SCORE: .8888888888888888889

## 2024-06-11 NOTE — PROGRESS NOTES
"CHIEF COMPLAINT / REASON FOR VISIT  Tena Romo is a 74 y.o. female that presents today for   Chief Complaint   Patient presents with    Arm Pain     Under r armpit, hx of mastectomy      HISTORY OF PRESENT ILLNESS  History of bilateral mastectomy 2015, no radiation or chemotherapy, completed course of anastrozole.  Right chest wall pain, 3 weeks, sharp pain. No inciting incident. Aggravated by arm abduction.     OBJECTIVE     BP (!) 148/66 (BP Location: Right arm, Patient Position: Sitting, BP Cuff Size: Adult)   Pulse (!) 110   Temp 36.6 °C (97.8 °F) (Temporal)   Resp 14   Ht 1.6 m (5' 3\")   Wt 97.5 kg (215 lb)   SpO2 91%   BMI 38.09 kg/m²     PHYSICAL EXAM  Constitutional: Sitting comfortably, in no acute distress, responds to questions appropriately.  Chest: tenderness to palpation of right chest wall along pectoralis, approximately at lateral aspect of mastectomy scar.  No palpable nodularity  Skin: Warm and dry, no rashes or lesions on face or exposed upper extremities    ASSESSMENT & PLAN  1. Type 2 diabetes mellitus with other specified complication, without long-term current use of insulin (HCC)  Chronic, mildly uncontrolled with hemoglobin A1c 7.2, currently taking glipizide 5 mg daily.  Reportedly had allergic rash with metformin.  Has recurrent UTIs so SGLT2 inhibitor would not be ideal.  She has documented a history of acute pancreatitis, so GLP-1 agonists may increase her risk for this.  For now we will continue with lifestyle modification including healthy low carbohydrate diet and regular exercise, follow-up in 6 months with repeat hemoglobin A1c   - POCT A1C    2. Right-sided chest wall pain  3. History of breast cancer  Right chest wall pain.  Suspect probable pectoralis muscle strain but given her history of breast cancer and given the location of the pain will obtain ultrasound of the area for further evaluation.  - US-BREAST LIMITED-RIGHT; Future      "

## 2024-06-13 DIAGNOSIS — Z85.3 HISTORY OF BREAST CANCER: ICD-10-CM

## 2024-06-13 DIAGNOSIS — R07.89 RIGHT-SIDED CHEST WALL PAIN: ICD-10-CM

## 2024-07-08 ENCOUNTER — APPOINTMENT (OUTPATIENT)
Dept: PHYSICAL MEDICINE AND REHAB | Facility: MEDICAL CENTER | Age: 74
End: 2024-07-08
Payer: MEDICARE

## 2024-07-08 VITALS
HEART RATE: 77 BPM | TEMPERATURE: 97.3 F | SYSTOLIC BLOOD PRESSURE: 118 MMHG | BODY MASS INDEX: 35.93 KG/M2 | WEIGHT: 202.8 LBS | DIASTOLIC BLOOD PRESSURE: 68 MMHG | HEIGHT: 63 IN | OXYGEN SATURATION: 93 %

## 2024-07-08 DIAGNOSIS — M47.816 LUMBAR SPONDYLOSIS: ICD-10-CM

## 2024-07-08 DIAGNOSIS — Z71.82 EXERCISE COUNSELING: ICD-10-CM

## 2024-07-08 DIAGNOSIS — M48.061 NEUROFORAMINAL STENOSIS OF LUMBAR SPINE: ICD-10-CM

## 2024-07-08 DIAGNOSIS — E66.9 OBESITY (BMI 30-39.9): ICD-10-CM

## 2024-07-08 DIAGNOSIS — M54.16 LUMBAR RADICULOPATHY: ICD-10-CM

## 2024-07-08 DIAGNOSIS — Z91.81 RISK FOR FALLS: ICD-10-CM

## 2024-07-08 PROCEDURE — 99214 OFFICE O/P EST MOD 30 MIN: CPT | Performed by: PHYSICAL MEDICINE & REHABILITATION

## 2024-07-08 PROCEDURE — G2211 COMPLEX E/M VISIT ADD ON: HCPCS | Performed by: PHYSICAL MEDICINE & REHABILITATION

## 2024-07-08 PROCEDURE — 1125F AMNT PAIN NOTED PAIN PRSNT: CPT | Performed by: PHYSICAL MEDICINE & REHABILITATION

## 2024-07-08 PROCEDURE — 3074F SYST BP LT 130 MM HG: CPT | Performed by: PHYSICAL MEDICINE & REHABILITATION

## 2024-07-08 PROCEDURE — 3078F DIAST BP <80 MM HG: CPT | Performed by: PHYSICAL MEDICINE & REHABILITATION

## 2024-07-08 ASSESSMENT — PATIENT HEALTH QUESTIONNAIRE - PHQ9: CLINICAL INTERPRETATION OF PHQ2 SCORE: 0

## 2024-07-08 ASSESSMENT — PAIN SCALES - GENERAL: PAINLEVEL: 9=SEVERE PAIN

## 2024-07-08 ASSESSMENT — FIBROSIS 4 INDEX: FIB4 SCORE: .8888888888888888889

## 2024-07-10 ENCOUNTER — HOSPITAL ENCOUNTER (OUTPATIENT)
Dept: RADIOLOGY | Facility: MEDICAL CENTER | Age: 74
End: 2024-07-10
Attending: FAMILY MEDICINE
Payer: MEDICARE

## 2024-07-10 DIAGNOSIS — Z85.3 HISTORY OF BREAST CANCER: ICD-10-CM

## 2024-07-10 DIAGNOSIS — R07.89 RIGHT-SIDED CHEST WALL PAIN: ICD-10-CM

## 2024-07-10 PROCEDURE — 76642 ULTRASOUND BREAST LIMITED: CPT | Mod: RT

## 2024-07-17 ENCOUNTER — HOSPITAL ENCOUNTER (OUTPATIENT)
Dept: RADIOLOGY | Facility: REHABILITATION | Age: 74
End: 2024-07-17
Attending: PHYSICAL MEDICINE & REHABILITATION

## 2024-07-17 ENCOUNTER — HOSPITAL ENCOUNTER (OUTPATIENT)
Facility: REHABILITATION | Age: 74
End: 2024-07-17
Attending: PHYSICAL MEDICINE & REHABILITATION | Admitting: PHYSICAL MEDICINE & REHABILITATION
Payer: MEDICARE

## 2024-07-17 VITALS
WEIGHT: 202.16 LBS | TEMPERATURE: 98.8 F | SYSTOLIC BLOOD PRESSURE: 166 MMHG | RESPIRATION RATE: 16 BRPM | HEIGHT: 63 IN | OXYGEN SATURATION: 97 % | DIASTOLIC BLOOD PRESSURE: 70 MMHG | HEART RATE: 76 BPM | BODY MASS INDEX: 35.82 KG/M2

## 2024-07-17 LAB — GLUCOSE BLD STRIP.AUTO-MCNC: 126 MG/DL (ref 65–99)

## 2024-07-17 PROCEDURE — 82962 GLUCOSE BLOOD TEST: CPT

## 2024-07-17 PROCEDURE — 700117 HCHG RX CONTRAST REV CODE 255

## 2024-07-17 PROCEDURE — 700111 HCHG RX REV CODE 636 W/ 250 OVERRIDE (IP): Mod: JZ

## 2024-07-17 PROCEDURE — 62323 NJX INTERLAMINAR LMBR/SAC: CPT

## 2024-07-17 RX ORDER — LIDOCAINE HYDROCHLORIDE 10 MG/ML
INJECTION, SOLUTION EPIDURAL; INFILTRATION; INTRACAUDAL; PERINEURAL
Status: COMPLETED
Start: 2024-07-17 | End: 2024-07-17

## 2024-07-17 RX ORDER — DEXAMETHASONE SODIUM PHOSPHATE 10 MG/ML
INJECTION, SOLUTION INTRAMUSCULAR; INTRAVENOUS
Status: COMPLETED
Start: 2024-07-17 | End: 2024-07-17

## 2024-07-17 RX ORDER — TELMISARTAN 40 MG/1
12.5 TABLET ORAL DAILY
COMMUNITY

## 2024-07-17 RX ADMIN — IOHEXOL 10 ML: 240 INJECTION, SOLUTION INTRATHECAL; INTRAVASCULAR; INTRAVENOUS; ORAL at 09:17

## 2024-07-17 RX ADMIN — LIDOCAINE HYDROCHLORIDE 10 ML: 10 INJECTION, SOLUTION EPIDURAL; INFILTRATION; INTRACAUDAL; PERINEURAL at 09:17

## 2024-07-17 RX ADMIN — DEXAMETHASONE SODIUM PHOSPHATE 10 MG: 10 INJECTION, SOLUTION INTRAMUSCULAR; INTRAVENOUS at 09:17

## 2024-07-17 ASSESSMENT — PAIN DESCRIPTION - PAIN TYPE
TYPE: CHRONIC PAIN
TYPE: CHRONIC PAIN

## 2024-07-17 ASSESSMENT — FIBROSIS 4 INDEX: FIB4 SCORE: .8888888888888888889

## 2024-07-18 ENCOUNTER — TELEPHONE (OUTPATIENT)
Dept: PHYSICAL MEDICINE AND REHAB | Facility: MEDICAL CENTER | Age: 74
End: 2024-07-18
Payer: MEDICARE

## 2024-07-30 ENCOUNTER — APPOINTMENT (OUTPATIENT)
Dept: MEDICAL GROUP | Facility: PHYSICIAN GROUP | Age: 74
End: 2024-07-30
Payer: MEDICARE

## 2024-07-30 DIAGNOSIS — N39.0 RECURRENT UTI: ICD-10-CM

## 2024-07-30 RX ORDER — NITROFURANTOIN 25; 75 MG/1; MG/1
100 CAPSULE ORAL NIGHTLY
Qty: 90 CAPSULE | Refills: 1 | Status: SHIPPED | OUTPATIENT
Start: 2024-07-30

## 2024-08-29 ENCOUNTER — OFFICE VISIT (OUTPATIENT)
Dept: PHYSICAL MEDICINE AND REHAB | Facility: MEDICAL CENTER | Age: 74
End: 2024-08-29
Payer: MEDICARE

## 2024-08-29 VITALS
TEMPERATURE: 97.1 F | BODY MASS INDEX: 36 KG/M2 | DIASTOLIC BLOOD PRESSURE: 90 MMHG | SYSTOLIC BLOOD PRESSURE: 149 MMHG | HEART RATE: 78 BPM | WEIGHT: 203.2 LBS | HEIGHT: 63 IN | OXYGEN SATURATION: 95 %

## 2024-08-29 DIAGNOSIS — Z71.82 EXERCISE COUNSELING: ICD-10-CM

## 2024-08-29 DIAGNOSIS — E66.9 OBESITY (BMI 30-39.9): ICD-10-CM

## 2024-08-29 DIAGNOSIS — M47.816 LUMBAR SPONDYLOSIS: ICD-10-CM

## 2024-08-29 DIAGNOSIS — M79.10 MYALGIA: ICD-10-CM

## 2024-08-29 DIAGNOSIS — M48.062 LUMBAR STENOSIS WITH NEUROGENIC CLAUDICATION: ICD-10-CM

## 2024-08-29 DIAGNOSIS — M54.16 LUMBAR RADICULOPATHY: ICD-10-CM

## 2024-08-29 RX ORDER — LIDOCAINE 50 MG/G
1 PATCH TOPICAL EVERY 24 HOURS
Qty: 30 PATCH | Refills: 11 | Status: SHIPPED | OUTPATIENT
Start: 2024-08-29

## 2024-08-29 ASSESSMENT — PATIENT HEALTH QUESTIONNAIRE - PHQ9
CLINICAL INTERPRETATION OF PHQ2 SCORE: 2
SUM OF ALL RESPONSES TO PHQ QUESTIONS 1-9: 3
5. POOR APPETITE OR OVEREATING: 0 - NOT AT ALL

## 2024-08-29 ASSESSMENT — PAIN SCALES - GENERAL: PAINLEVEL: 10=SEVERE PAIN

## 2024-08-29 ASSESSMENT — FIBROSIS 4 INDEX: FIB4 SCORE: .8888888888888888889

## 2024-08-29 NOTE — PROGRESS NOTES
Follow up patient note  Interventional spine and sports physiatry, Physical medicine rehabilitation      Chief complaint:   Chief Complaint   Patient presents with    Follow-Up     Back pain          HISTORY    Please see new patient note dated  by Dr Chou,  for more details.     HPI  Patient identification: Tena Romo 73 y.o. female  With Diagnoses of Lumbar radiculopathy, Lumbar spondylosis, Lumbar stenosis with neurogenic claudication, Myalgia, Exercise counseling, and  BMI 30-39.9 were pertinent to this visit.     Procedures:  8/22/2023 left L5-S1 and S1 transforaminal epidural steroid injection 100% improvement initially  9/13/2023 left L5-S1 interlaminar epidural steroid injection 100% improvement at the follow-up visit  7/17/2024 right L5-S1 interlaminar epidural steroid injection 100% improvement for the first 2 weeks and then pain returned back to baseline    History of Present Illness  The patient is a 74-year-old female who presents for evaluation of back pain. She is accompanied by her .    She reports that her mornings are generally comfortable, but her condition worsens in the evening. The pain, which she describes as intense, radiates from her back down to her right calf. She experiences severe pain, rating it a 10 out of 10, after walking or doing household chores. Her pain intensifies with standing and walking in the afternoon. She estimates that she walks about 3 to 4 miles throughout the day but needs to take breaks due to her breathing issues.  She can only walk for about 1 block at a time before having to rest.    She has found some relief from Tylenol and uses a patch in the afternoon. She mentions occasional backaches on her left side when bending over but otherwise feels fine. She has no history of spinal problems and has not previously consulted a spine surgeon.            The patient's been physical therapy in the past and has a comprehensive pain program     [facility]  Flags :   Fever, Chills, Sweats: Denies  Involuntary Weight Loss: Denies  Bowel/Bladder Incontinence: Denies  Saddle Anesthesia: Denies        PMHx:   Past Medical History:   Diagnosis Date    Anesthesia     post of nausea    Arthritis     Breast cancer (HCC) 02/10/2017    Chronic fibrosis of lung (HCC) 5/9/2023    Likely 2/2 COVID 19 PNA in 2020    Cough     Dental disorder     permanent bridge    Diabetes (HCC)     Diverticulosis of colon 11/04/2011    HTN (hypertension) 03/14/2013    Hypertension     Pain     right shoulder down to thumb    Post hysterectomy menopause 11/04/2011    Trochanteric bursitis of right hip 10/01/2019    Use of anastrozole (Arimidex) 01/11/2019    Wheezing        PSHx:   Past Surgical History:   Procedure Laterality Date    AK INJ LUMBAR/SACRAL,W/ IMAGING Right 7/17/2024    Procedure: RIGHT Lumbar L5-S1 interlaminar epidural steroid injection…..Note: there is a disc space S1-2. OK to continue antibiotics;  Surgeon: Kieran Chou M.D.;  Location: [facility];  Service: Pain Management    AK INJ LUMBAR/SACRAL,W/ IMAGING Left 9/13/2023    Procedure: Lumbar LEFT L5-S1 interlaminar epidural steroid injection.     Note: transitional anatomy with disc space S1-2. Review previous procedure done by Dr Chou;  Surgeon: Kieran Chou M.D.;  Location: [facility];  Service: Pain Management    AK NJX AA&/STRD TFRML EPI LUMBAR/SACRAL 1 LEVEL Left 8/22/2023    Procedure: LEFT L5-S1 and S1 transforaminal epidural steroid injection with fluoroscopic guidance;  Surgeon: Kieran Chou M.D.;  Location: [facility];  Service: Pain Management    SHOULDER ARTHROSCOPY W/ ROTATOR CUFF REPAIR  2/9/2012    Performed by JENNY LIMON at [facility] ORS    SHOULDER DECOMPRESSION ARTHROSCOPIC  2/9/2012    Performed by JENNY LIMON at [facility] ORS    CLAVICLE DISTAL EXCISION  2/9/2012    Performed by JENNY LIMON at SURGERY  [facility] ORS    BUNIONECTOMY  2009    bilateral    ABDOMINAL HYSTERECTOMY TOTAL  2001    endometriosis    CHOLECYSTECTOMY  1999    laparoscopy    MASTECTOMY Bilateral     TONSILLECTOMY  as child       Family history   Family History   Problem Relation Age of Onset    Stroke Mother     Lung Disease Father         pneumonia    Hyperlipidemia Father          Medications:   Outpatient Medications Marked as Taking for the 8/29/24 encounter (Office Visit) with Kieran Chou M.D.   Medication Sig Dispense Refill    lidocaine (LIDODERM) 5 % Patch Place 1 Patch on the skin every 24 hours. Apply for 12 hours maximum and then take off for 12 hours. 30 Patch 11    nitrofurantoin (MACROBID) 100 MG Cap Take 1 Capsule by mouth every evening. 90 Capsule 1    telmisartan (MICARDIS) 40 MG Tab Take 12.5 mg by mouth every day.      estradiol (ESTRACE) 0.1 MG/GM vaginal cream Insert 0.5 g into the vagina every Monday, Wednesday, and Friday. 42.5 g 3    glipiZIDE (GLUCOTROL) 5 MG Tab TAKE 1 TABLET BY MOUTH EVERY DAY 90 Tablet 3    simvastatin (ZOCOR) 40 MG Tab Take 1 Tablet by mouth every evening. 90 Tablet 3    fluticasone (FLONASE) 50 MCG/ACT nasal spray Administer 2 Sprays into affected nostril(S) every day. 16 g 3    Lancets Lancets order: Lancets for Freestylemeter. Sig: use once daily and prn ssx high or low sugar. #100 RF x 3 100 Each 3    albuterol 108 (90 Base) MCG/ACT Aero Soln inhalation aerosol Inhale 2 Puffs every 6 hours as needed for Shortness of Breath. 8.5 g 6    Multiple Vitamin (MULTIVITAMIN ADULT PO) Take  by mouth every day.      acetaminophen (TYLENOL) 500 MG Tab Take 500-1,000 mg by mouth every 6 hours as needed.      multivitamin (THERAGRAN) Tab Take 1 Tablet by mouth every day. ZINC      omeprazole (PRILOSEC) 40 MG delayed-release capsule Take 40 mg by mouth every day.      Blood Glucose Test Strips Use one strip to test blood sugar once daily early morning before first meal. 300 Strip 11        Current  "Outpatient Medications on File Prior to Visit   Medication Sig Dispense Refill    nitrofurantoin (MACROBID) 100 MG Cap Take 1 Capsule by mouth every evening. 90 Capsule 1    telmisartan (MICARDIS) 40 MG Tab Take 12.5 mg by mouth every day.      estradiol (ESTRACE) 0.1 MG/GM vaginal cream Insert 0.5 g into the vagina every Monday, Wednesday, and Friday. 42.5 g 3    glipiZIDE (GLUCOTROL) 5 MG Tab TAKE 1 TABLET BY MOUTH EVERY DAY 90 Tablet 3    simvastatin (ZOCOR) 40 MG Tab Take 1 Tablet by mouth every evening. 90 Tablet 3    fluticasone (FLONASE) 50 MCG/ACT nasal spray Administer 2 Sprays into affected nostril(S) every day. 16 g 3    Lancets Lancets order: Lancets for Freestylemeter. Sig: use once daily and prn ssx high or low sugar. #100 RF x 3 100 Each 3    albuterol 108 (90 Base) MCG/ACT Aero Soln inhalation aerosol Inhale 2 Puffs every 6 hours as needed for Shortness of Breath. 8.5 g 6    Multiple Vitamin (MULTIVITAMIN ADULT PO) Take  by mouth every day.      acetaminophen (TYLENOL) 500 MG Tab Take 500-1,000 mg by mouth every 6 hours as needed.      multivitamin (THERAGRAN) Tab Take 1 Tablet by mouth every day. ZINC      omeprazole (PRILOSEC) 40 MG delayed-release capsule Take 40 mg by mouth every day.      Blood Glucose Test Strips Use one strip to test blood sugar once daily early morning before first meal. 300 Strip 11     No current facility-administered medications on file prior to visit.         Allergies:   Allergies   Allergen Reactions    Cipro Xr Hives    Gabapentin      Pt states she was \"all puffed up\"    Meloxicam Vomiting    Aspirin      Intolerance    Other reaction(s): Other    Diclofenac Rash    Metformin Rash     Erythematous rash       Social Hx:   Social History     Socioeconomic History    Marital status:      Spouse name: Not on file    Number of children: Not on file    Years of education: Not on file    Highest education level: Not on file   Occupational History    Not on file " "  Tobacco Use    Smoking status: Never    Smokeless tobacco: Never    Tobacco comments:     avoid any and all tobacco products   Vaping Use    Vaping status: Never Used   Substance and Sexual Activity    Alcohol use: Yes     Alcohol/week: 0.0 oz     Comment: once a year    Drug use: No    Sexual activity: Not Currently     Partners: Male     Comment: . Work at [employer]   Other Topics Concern    Not on file   Social History Narrative    Not on file     Social Determinants of Health     Financial Resource Strain: Not on file   Food Insecurity: Not on file   Transportation Needs: Not on file   Physical Activity: Not on file   Stress: Not on file   Social Connections: Not on file   Intimate Partner Violence: Not on file   Housing Stability: Not on file         EXAMINATION     Physical Exam:   Vitals: BP (!) 149/90 (BP Location: Left arm, Patient Position: Sitting, BP Cuff Size: Adult long)   Pulse 78   Temp 36.2 °C (97.1 °F) (Temporal)   Ht 1.6 m (5' 3\")   Wt 92.2 kg (203 lb 3.2 oz)   SpO2 95%     Constitutional:   Body Habitus: Body mass index is 36 kg/m².  Cooperation: Fully cooperates with exam  Appearance: Well-groomed no disheveled    Respiratory-  breathing comfortable on room air, no audible wheezing  Cardiovascular- capillary refills less than 2 seconds. No lower extremity edema is noted.   Psychiatric- alert and oriented ×3. Normal affect.    MSK: -          Thoracic/Lumbar Spine/Sacral Spine/Hips   There are no signs of infection around the injection sites.   decreased active range of motion with flexion, lateral flexion, and rotation bilaterally.   There is decreased active range of motion with lumbar extension.    There is  pain with lumbar extension.   There is pain with facet loading maneuver (extension rotation) with axial low back pain on the BILATERAL side(s)       Palpation:   No tenderness to palpation in midline at T1-T12 levels. No tenderness to palpation in the left and right of the " "midline T1-L5, NEGATIVE for tenderness to palpation to the para-midline region in the lower lumbar levels.  palpation over SI joint: negative bilaterally    palpation in hip or over the gluteus medius tendon insertion: negative bilaterally      Lumbar spine Special tests  Neuro tension  Straight leg test positive right, negative left    Slump test positive right, negative left      Key points for the international standards for neurological classification of spinal cord injury (ISNCSCI) to light touch.     Dermatome R L                                      L2 2 2   L3 2 2   L4 1 2   L5 1 2   S1 1 2   S2 2 2         Motor Exam Lower Extremities    ? Myotome R L   Hip flexion L2 5 5   Knee extension L3 5 5   Ankle dorsiflexion L4 5- 5   Toe extension L5 4 5   Ankle plantarflexion S1 5- 5             MEDICAL DECISION MAKING    DATA    Labs:   Lab Results   Component Value Date/Time    SODIUM 139 03/14/2023 02:18 PM    POTASSIUM 3.8 03/14/2023 02:18 PM    CHLORIDE 101 03/14/2023 02:18 PM    CO2 25 03/14/2023 02:18 PM    GLUCOSE 104 (H) 03/14/2023 02:18 PM    BUN 17 03/14/2023 02:18 PM    CREATININE 0.93 03/14/2023 02:18 PM    CREATININE 0.86 12/01/2010 12:00 AM    BUNCREATRAT 18 06/26/2020 04:28 AM    BUNCREATRAT 27 12/01/2010 12:00 AM    GLOMRATE >59 12/01/2010 12:00 AM        No results found for: \"PROTHROMBTM\", \"INR\"     Lab Results   Component Value Date/Time    WBC 9.2 12/27/2022 09:12 AM    RBC 4.42 12/27/2022 09:12 AM    HEMOGLOBIN 13.5 12/27/2022 09:12 AM    HEMATOCRIT 42.0 12/27/2022 09:12 AM    MCV 95.0 12/27/2022 09:12 AM    MCH 30.5 12/27/2022 09:12 AM    MCHC 32.1 (L) 12/27/2022 09:12 AM    MPV 11.2 12/27/2022 09:12 AM    NEUTSPOLYS 57.60 12/27/2022 09:12 AM    LYMPHOCYTES 27.00 12/27/2022 09:12 AM    MONOCYTES 8.40 12/27/2022 09:12 AM    EOSINOPHILS 5.90 12/27/2022 09:12 AM    BASOPHILS 0.70 12/27/2022 09:12 AM    HYPOCHROMIA 1+ 03/15/2013 06:35 AM        Lab Results   Component Value Date/Time    HBA1C " 7.2 (A) 06/11/2024 04:47 PM          Imaging:   I personally reviewed following images    MRI lumbar spine 6/30/2023  Partially lumbarized S1 vertebrae.  At L4-5 there is severe central canal stenosis and moderate right and mild-moderate left neuroforaminal stenosis.  At L5-S1 there is moderate central canal stenosis and moderate to severe right and mild left neuroforaminal stenosis.  Facet arthropathy most notable at lower lumbar levels. See formal radiology report for further details.     I reviewed the fluoroscopic images from 9/13/2023 showing successful left L5-S1 interlaminar epidural steroid injection.  I reviewed this with the patient and her  at the visit on 10/16/2023    I reviewed the following radiology reports     Results for orders placed during the hospital encounter of 06/30/23     MR-LUMBAR SPINE-W/O     Impression  1.  Severe facet hypertrophy greater than degenerative disc disease results in severe central stenosis at L4/5, moderate to severe right foraminal stenosis at L5/S1     2.  L2/3 disc bulge and extrusion causes moderate to severe central stenosis     3.  Lesser degenerative changes at the levels as detailed above     4.  Mild discogenic L5/S1 edema indicating ongoing remodeling     5.  Ambiguous segmentation, careful correlation of levels is required if intervention is planned, as detailed above      DIAGNOSIS   Visit Diagnoses     ICD-10-CM   1. Lumbar radiculopathy  M54.16   2. Lumbar spondylosis  M47.816   3. Lumbar stenosis with neurogenic claudication  M48.062   4. Myalgia  M79.10   5. Exercise counseling  Z71.82   6.  BMI 30-39.9  E66.9             ASSESSMENT and PLAN:     Tena Maura Romo 73 y.o. female      Tena was seen today for follow-up.    Diagnoses and all orders for this visit:    Lumbar radiculopathy  -     Referral to Physical Medicine Rehab  -     lidocaine (LIDODERM) 5 % Patch; Place 1 Patch on the skin every 24 hours. Apply for 12 hours maximum and then  take off for 12 hours.    Lumbar spondylosis  -     lidocaine (LIDODERM) 5 % Patch; Place 1 Patch on the skin every 24 hours. Apply for 12 hours maximum and then take off for 12 hours.    Lumbar stenosis with neurogenic claudication  -     lidocaine (LIDODERM) 5 % Patch; Place 1 Patch on the skin every 24 hours. Apply for 12 hours maximum and then take off for 12 hours.    Myalgia  -     lidocaine (LIDODERM) 5 % Patch; Place 1 Patch on the skin every 24 hours. Apply for 12 hours maximum and then take off for 12 hours.    Exercise counseling     BMI 30-39.9          Assessment & Plan    Lumbar radiculopathy and severe spinal stenosis was observed on her MRI, suggesting a pinched nerve rather than age-related changes as the primary cause of her discomfort. A consultation with a spine surgeon was recommended to explore potential treatment options, including decompression surgery.  The patient declined surgical consultation at this time.    The patient has no red flag signs on today's exam.  Specifically the patient has no saddle anesthesia, bowel incontinence, bladder incontinence.  We discussed emergency precautions. The patient understands emergency precautions.       A right transforaminal epidural steroid injection at L5-S1 and S1 will be administered prior to the surgical consultation to alleviate symptoms.    The risks benefits and alternatives to this procedure were discussed and the patient wishes to proceed with the procedure. Risks include but are not limited to damage to surrounding structures, infection, bleeding, worsening of pain which can be permanent, weakness which can be permanent. Benefits include pain relief, improved function. Alternatives includes not doing the procedure.         If the epidural injection does not provide relief, surgical options will be considered. She was advised to maintain her walking regimen as much as possible. A prescription for additional patches was provided, and she was  instructed to continue using Tylenol up to 1000 mg three times a day as needed for pain management. Her pharmacy is [facility].    I also discussed the case with the patient's  Raymon who was at today's visit and assisted with the HPI    Medications: Acetaminophen up to 1000 mg 3 times daily as needed not to exceed 3000 mg per 24-hours.  Continue 5% Lidoderm patches once daily not to be on for greater than 12 hours at a time.      Follow up: After the above procedure    Thank you for allowing me to participate in the care of this patient. If you have any questions please not hesitate to contact me.          Please note that this dictation was created using voice recognition software. I have made every reasonable attempt to correct obvious errors but there may be errors of grammar and content that I may have overlooked prior to finalization of this note.      Kieran Chou MD  Interventional Spine and Sports Physiatry  Physical Medicine and Rehabilitation  [facility]

## 2024-09-16 ENCOUNTER — HOSPITAL ENCOUNTER (OUTPATIENT)
Facility: REHABILITATION | Age: 74
End: 2024-09-16
Attending: PHYSICAL MEDICINE & REHABILITATION | Admitting: PHYSICAL MEDICINE & REHABILITATION
Payer: MEDICARE

## 2024-09-17 ENCOUNTER — APPOINTMENT (OUTPATIENT)
Dept: MEDICAL GROUP | Facility: PHYSICIAN GROUP | Age: 74
End: 2024-09-17
Payer: MEDICARE

## 2024-09-17 ENCOUNTER — HOSPITAL ENCOUNTER (OUTPATIENT)
Facility: MEDICAL CENTER | Age: 74
End: 2024-09-17
Payer: MEDICARE

## 2024-09-17 VITALS
WEIGHT: 198 LBS | HEIGHT: 63 IN | DIASTOLIC BLOOD PRESSURE: 80 MMHG | BODY MASS INDEX: 35.08 KG/M2 | TEMPERATURE: 96.5 F | HEART RATE: 83 BPM | OXYGEN SATURATION: 95 % | SYSTOLIC BLOOD PRESSURE: 122 MMHG

## 2024-09-17 DIAGNOSIS — E11.40 NEUROPATHY DUE TO TYPE 2 DIABETES MELLITUS (HCC): ICD-10-CM

## 2024-09-17 DIAGNOSIS — N39.0 RECURRENT UTI: ICD-10-CM

## 2024-09-17 DIAGNOSIS — G89.29 CHRONIC RIGHT-SIDED LOW BACK PAIN WITH RIGHT-SIDED SCIATICA: ICD-10-CM

## 2024-09-17 DIAGNOSIS — K29.00 ACUTE GASTRITIS WITHOUT HEMORRHAGE, UNSPECIFIED GASTRITIS TYPE: ICD-10-CM

## 2024-09-17 DIAGNOSIS — N30.00 ACUTE CYSTITIS WITHOUT HEMATURIA: ICD-10-CM

## 2024-09-17 DIAGNOSIS — E11.69 DYSLIPIDEMIA ASSOCIATED WITH TYPE 2 DIABETES MELLITUS (HCC): ICD-10-CM

## 2024-09-17 DIAGNOSIS — I15.2 HYPERTENSION ASSOCIATED WITH TYPE 2 DIABETES MELLITUS (HCC): ICD-10-CM

## 2024-09-17 DIAGNOSIS — R05.1 ACUTE COUGH: ICD-10-CM

## 2024-09-17 DIAGNOSIS — J01.40 ACUTE PANSINUSITIS, RECURRENCE NOT SPECIFIED: ICD-10-CM

## 2024-09-17 DIAGNOSIS — J45.20 MILD INTERMITTENT ASTHMA WITHOUT COMPLICATION: ICD-10-CM

## 2024-09-17 DIAGNOSIS — E11.59 HYPERTENSION ASSOCIATED WITH TYPE 2 DIABETES MELLITUS (HCC): ICD-10-CM

## 2024-09-17 DIAGNOSIS — E11.69 TYPE 2 DIABETES MELLITUS WITH OTHER SPECIFIED COMPLICATION, WITHOUT LONG-TERM CURRENT USE OF INSULIN (HCC): ICD-10-CM

## 2024-09-17 DIAGNOSIS — E78.5 DYSLIPIDEMIA ASSOCIATED WITH TYPE 2 DIABETES MELLITUS (HCC): ICD-10-CM

## 2024-09-17 DIAGNOSIS — M54.41 CHRONIC RIGHT-SIDED LOW BACK PAIN WITH RIGHT-SIDED SCIATICA: ICD-10-CM

## 2024-09-17 PROCEDURE — 82570 ASSAY OF URINE CREATININE: CPT

## 2024-09-17 PROCEDURE — 82043 UR ALBUMIN QUANTITATIVE: CPT

## 2024-09-17 PROCEDURE — 99214 OFFICE O/P EST MOD 30 MIN: CPT

## 2024-09-17 PROCEDURE — 3079F DIAST BP 80-89 MM HG: CPT

## 2024-09-17 PROCEDURE — 3074F SYST BP LT 130 MM HG: CPT

## 2024-09-17 RX ORDER — TELMISARTAN 40 MG/1
40 TABLET ORAL DAILY
Qty: 100 TABLET | Refills: 3 | Status: SHIPPED | OUTPATIENT
Start: 2024-09-17

## 2024-09-17 RX ORDER — NITROFURANTOIN 25; 75 MG/1; MG/1
100 CAPSULE ORAL NIGHTLY
Qty: 90 CAPSULE | Refills: 1 | Status: SHIPPED | OUTPATIENT
Start: 2024-09-17

## 2024-09-17 RX ORDER — GLIPIZIDE 5 MG/1
5 TABLET ORAL DAILY
Qty: 90 TABLET | Refills: 3 | Status: SHIPPED | OUTPATIENT
Start: 2024-09-17

## 2024-09-17 RX ORDER — SIMVASTATIN 40 MG
40 TABLET ORAL EVERY EVENING
Qty: 90 TABLET | Refills: 3 | Status: SHIPPED | OUTPATIENT
Start: 2024-09-17

## 2024-09-17 RX ORDER — ALBUTEROL SULFATE 90 UG/1
2 INHALANT RESPIRATORY (INHALATION) EVERY 6 HOURS PRN
Qty: 8.5 G | Refills: 6 | Status: SHIPPED | OUTPATIENT
Start: 2024-09-17

## 2024-09-17 ASSESSMENT — FIBROSIS 4 INDEX: FIB4 SCORE: .8888888888888888889

## 2024-09-17 NOTE — ASSESSMENT & PLAN NOTE
Chronic, ongoing. Continue glipizide daily  Will do uacr, monofilament, retinal eye exam in clinic today.    Monofilament testing with a 10 gram force: sensation intact: decreased bilaterally 2/10 left foot, 0/10 right foot  Visual Inspection: Feet without maceration, ulcers, fissures.  Pedal pulses: intact bilaterally 2+

## 2024-09-17 NOTE — ASSESSMENT & PLAN NOTE
Chronic, ongoing. Reports symptoms of gagging and food sticking in esophagus when eating. Has upcoming appointment with GI for endoscopy continue omeprazole 40 mg daily

## 2024-09-17 NOTE — ASSESSMENT & PLAN NOTE
Reduced peripheral sensation to feet and hands bilaterally. Monofilament to hands and feet not felt

## 2024-09-17 NOTE — PROGRESS NOTES
Chief Complaint   Patient presents with    Medication Refill    URI        HPI: Patient is a 74 y.o. female complaining of 8 days of illness including: nonproductive cough, nasal congestion, rhinorrhea, sore throat, headache .   Mucus is: thin.  Similarly ill exposures: yes. spouse  Treatments tried: otc, nyquil  She  reports that she has never smoked. She has never used smokeless tobacco..     ROS:  No fever, nausea, changes in bowel movements or skin rash. Headache, cough, congestion, sore throat.     I reviewed the patient's medications, allergies and medical history:  Current Outpatient Medications   Medication Sig Dispense Refill    amoxicillin-clavulanate (AUGMENTIN) 875-125 MG Tab Take 1 Tablet by mouth 2 times a day for 5 days. 10 Tablet 0    nitrofurantoin (MACROBID) 100 MG Cap Take 1 Capsule by mouth every evening. 90 Capsule 1    glipiZIDE (GLUCOTROL) 5 MG Tab Take 1 Tablet by mouth every day. 90 Tablet 3    albuterol 108 (90 Base) MCG/ACT Aero Soln inhalation aerosol Inhale 2 Puffs every 6 hours as needed for Shortness of Breath. 8.5 g 6    simvastatin (ZOCOR) 40 MG Tab Take 1 Tablet by mouth every evening. 90 Tablet 3    telmisartan (MICARDIS) 40 MG Tab Take 1 Tablet by mouth every day. 100 Tablet 3    lidocaine (LIDODERM) 5 % Patch Place 1 Patch on the skin every 24 hours. Apply for 12 hours maximum and then take off for 12 hours. 30 Patch 11    estradiol (ESTRACE) 0.1 MG/GM vaginal cream Insert 0.5 g into the vagina every Monday, Wednesday, and Friday. 42.5 g 3    fluticasone (FLONASE) 50 MCG/ACT nasal spray Administer 2 Sprays into affected nostril(S) every day. 16 g 3    Lancets Lancets order: Lancets for Freestylemeter. Sig: use once daily and prn ssx high or low sugar. #100 RF x 3 100 Each 3    Multiple Vitamin (MULTIVITAMIN ADULT PO) Take  by mouth every day.      acetaminophen (TYLENOL) 500 MG Tab Take 500-1,000 mg by mouth every 6 hours as needed.      multivitamin (THERAGRAN) Tab Take 1 Tablet  "by mouth every day. ZINC      omeprazole (PRILOSEC) 40 MG delayed-release capsule Take 40 mg by mouth every day.      Blood Glucose Test Strips Use one strip to test blood sugar once daily early morning before first meal. 300 Strip 11     No current facility-administered medications for this visit.     Cipro xr, Gabapentin, Meloxicam, Aspirin, Diclofenac, and Metformin  Past Medical History:   Diagnosis Date    Anesthesia     post of nausea    Arthritis     Breast cancer ([facility]) 02/10/2017    Chronic fibrosis of lung (HCC) 5/9/2023    Likely 2/2 COVID 19 PNA in 2020    Cough     Dental disorder     permanent bridge    Diabetes ([facility])     Diverticulosis of colon 11/04/2011    HTN (hypertension) 03/14/2013    Hypertension     Pain     right shoulder down to thumb    Post hysterectomy menopause 11/04/2011    Trochanteric bursitis of right hip 10/01/2019    Use of anastrozole (Arimidex) 01/11/2019    Wheezing         EXAM:  /80 (BP Location: Left arm, Patient Position: Sitting, BP Cuff Size: Adult)   Pulse 83   Temp 35.8 °C (96.5 °F) (Temporal)   Ht 1.6 m (5' 3\")   Wt 89.8 kg (198 lb)   SpO2 95%   General: Alert, no conversational dyspnea or audible wheeze, non-toxic appearance.  Eyes: PERRL, conjunctiva slightly injected, no eye discharge.  Ears: Normal pinnae,TM's erythematous bilaterally.  Nares: Patent with clear mucus.  Sinuses: non tender over maxillary / frontal sinuses.  Throat: Erythematous injection without exudate.   Neck: Supple, with mildly enlarged cervical nodes.  Lungs: Clear to auscultation bilaterally, no wheeze, crackles or rhonchi.   Heart: Regular rate without murmur.  Skin: Warm and dry without rash.     ASSESSMENT:   1. Type 2 diabetes mellitus with other specified complication, without long-term current use of insulin ([facility])    2. Acute gastritis without hemorrhage, unspecified gastritis type    3. Dyslipidemia associated with type 2 diabetes mellitus ([facility])    4. Acute cystitis without " hematuria    5. Chronic right-sided low back pain with right-sided sciatica    6. Acute cough    7. Acute pansinusitis, recurrence not specified    8. Recurrent UTI    9. Mild intermittent asthma without complication    10. Hypertension associated with type 2 diabetes mellitus (HCC)    11. Neuropathy due to type 2 diabetes mellitus (HCC)      Problem List Items Addressed This Visit          Family Medicine Problems    Chronic right-sided low back pain with right-sided sciatica     Chronic, ongoing. Seeing zach springer for this. Using topical lidocaine patches, has upcoming epidural for sciatic pain.            Other    Dyslipidemia associated with type 2 diabetes mellitus (HCC)     Chronic, stable .continue simvastatin 40 mg daily         Relevant Medications    glipiZIDE (GLUCOTROL) 5 MG Tab    simvastatin (ZOCOR) 40 MG Tab    Hypertension associated with type 2 diabetes mellitus (HCC)    Relevant Medications    glipiZIDE (GLUCOTROL) 5 MG Tab    simvastatin (ZOCOR) 40 MG Tab    telmisartan (MICARDIS) 40 MG Tab    Type 2 diabetes mellitus with other specified complication (HCC)     Chronic, ongoing. Continue glipizide daily  Will do uacr, monofilament, retinal eye exam in clinic today.    Monofilament testing with a 10 gram force: sensation intact: decreased bilaterally 2/10 left foot, 0/10 right foot  Visual Inspection: Feet without maceration, ulcers, fissures.  Pedal pulses: intact bilaterally 2+           Relevant Medications    glipiZIDE (GLUCOTROL) 5 MG Tab    Other Relevant Orders    POCT Retinal Eye Exam    MICROALBUMIN CREAT RATIO URINE    Diabetic Monofilament LE Exam (Completed)    Gastritis, GERD and dyspepsia     Chronic, ongoing. Reports symptoms of gagging and food sticking in esophagus when eating. Has upcoming appointment with GI for endoscopy continue omeprazole 40 mg daily         Urinary tract infectious disease     Chronic, ongoing. Has seen urology for this, with continue nitrofurantoin 100 mg  nightly, topical estradiol cream- reports she is using this once weekly with reduced symptoms.         Neuropathy due to type 2 diabetes mellitus (HCC)     Reduced peripheral sensation to feet and hands bilaterally. Monofilament to hands and feet not felt         Relevant Medications    glipiZIDE (GLUCOTROL) 5 MG Tab     Other Visit Diagnoses       Acute cough        Relevant Medications    amoxicillin-clavulanate (AUGMENTIN) 875-125 MG Tab    Acute pansinusitis, recurrence not specified        Relevant Medications    amoxicillin-clavulanate (AUGMENTIN) 875-125 MG Tab    Recurrent UTI        Relevant Medications    nitrofurantoin (MACROBID) 100 MG Cap    Mild intermittent asthma without complication        Relevant Medications    albuterol 108 (90 Base) MCG/ACT Aero Soln inhalation aerosol              PLAN:  1. Educated patient that majority of upper respiratory infections are viral and do not need antibiotics. As symptoms have been worsening over the last week, will treat with antibiotics.  2. Twice daily use of nasal saline rinse or Neti-Pot.  3. OTC anti-pyretics and decongestants as needed.  4. Follow-up in office or urgent care for worsening symptoms, difficulty breathing, lack of expected recovery, or should new symptoms or problems arise.

## 2024-09-17 NOTE — ASSESSMENT & PLAN NOTE
Chronic, ongoing. Seeing zach springer for this. Using topical lidocaine patches, has upcoming epidural for sciatic pain.

## 2024-09-17 NOTE — ASSESSMENT & PLAN NOTE
Chronic, ongoing. Has seen urology for this, with continue nitrofurantoin 100 mg nightly, topical estradiol cream- reports she is using this once weekly with reduced symptoms.

## 2024-09-18 ENCOUNTER — TELEPHONE (OUTPATIENT)
Dept: URGENT CARE | Facility: PHYSICIAN GROUP | Age: 74
End: 2024-09-18

## 2024-09-18 LAB
CREAT UR-MCNC: 62.46 MG/DL
MICROALBUMIN UR-MCNC: <1.2 MG/DL
MICROALBUMIN/CREAT UR: NORMAL MG/G (ref 0–30)

## 2024-09-19 NOTE — TELEPHONE ENCOUNTER
Phone Number Called: [phone] (home)       Call outcome: Did not leave a detailed message. Requested patient to call back.    Message: left vm informing pt to call back regarding test results.

## 2024-10-01 ENCOUNTER — APPOINTMENT (OUTPATIENT)
Dept: RADIOLOGY | Facility: REHABILITATION | Age: 74
End: 2024-10-01
Attending: PHYSICAL MEDICINE & REHABILITATION
Payer: MEDICARE

## 2024-10-01 VITALS
DIASTOLIC BLOOD PRESSURE: 73 MMHG | SYSTOLIC BLOOD PRESSURE: 179 MMHG | HEIGHT: 63 IN | OXYGEN SATURATION: 97 % | HEART RATE: 75 BPM | RESPIRATION RATE: 17 BRPM | WEIGHT: 200.18 LBS | TEMPERATURE: 97.2 F | BODY MASS INDEX: 35.47 KG/M2

## 2024-10-01 LAB — GLUCOSE BLD STRIP.AUTO-MCNC: 106 MG/DL (ref 65–99)

## 2024-10-01 PROCEDURE — 64483 NJX AA&/STRD TFRM EPI L/S 1: CPT

## 2024-10-01 PROCEDURE — 64484 NJX AA&/STRD TFRM EPI L/S EA: CPT

## 2024-10-01 PROCEDURE — 700117 HCHG RX CONTRAST REV CODE 255

## 2024-10-01 PROCEDURE — 700111 HCHG RX REV CODE 636 W/ 250 OVERRIDE (IP): Mod: JZ

## 2024-10-01 PROCEDURE — 82962 GLUCOSE BLOOD TEST: CPT

## 2024-10-01 RX ORDER — DEXAMETHASONE SODIUM PHOSPHATE 10 MG/ML
INJECTION, SOLUTION INTRAMUSCULAR; INTRAVENOUS
Status: COMPLETED
Start: 2024-10-01 | End: 2024-10-01

## 2024-10-01 RX ORDER — LIDOCAINE HYDROCHLORIDE 10 MG/ML
INJECTION, SOLUTION EPIDURAL; INFILTRATION; INTRACAUDAL; PERINEURAL
Status: COMPLETED
Start: 2024-10-01 | End: 2024-10-01

## 2024-10-01 RX ADMIN — LIDOCAINE HYDROCHLORIDE 10 ML: 10 INJECTION, SOLUTION EPIDURAL; INFILTRATION; INTRACAUDAL; PERINEURAL at 09:50

## 2024-10-01 RX ADMIN — DEXAMETHASONE SODIUM PHOSPHATE 10 MG: 10 INJECTION, SOLUTION INTRAMUSCULAR; INTRAVENOUS at 09:51

## 2024-10-01 RX ADMIN — IOHEXOL 50 ML: 240 INJECTION, SOLUTION INTRATHECAL; INTRAVASCULAR; INTRAVENOUS; ORAL at 09:51

## 2024-10-01 ASSESSMENT — FIBROSIS 4 INDEX: FIB4 SCORE: .8888888888888888889

## 2024-10-01 ASSESSMENT — PAIN DESCRIPTION - PAIN TYPE: TYPE: CHRONIC PAIN

## 2024-10-02 ENCOUNTER — TELEPHONE (OUTPATIENT)
Dept: PHYSICAL MEDICINE AND REHAB | Facility: MEDICAL CENTER | Age: 74
End: 2024-10-02
Payer: MEDICARE

## 2024-10-14 ENCOUNTER — APPOINTMENT (OUTPATIENT)
Dept: PHYSICAL MEDICINE AND REHAB | Facility: MEDICAL CENTER | Age: 74
End: 2024-10-14
Payer: MEDICARE

## 2024-11-07 ENCOUNTER — OFFICE VISIT (OUTPATIENT)
Dept: PHYSICAL MEDICINE AND REHAB | Facility: MEDICAL CENTER | Age: 74
End: 2024-11-07
Payer: MEDICARE

## 2024-11-07 VITALS
WEIGHT: 200 LBS | SYSTOLIC BLOOD PRESSURE: 148 MMHG | OXYGEN SATURATION: 96 % | DIASTOLIC BLOOD PRESSURE: 79 MMHG | HEIGHT: 63 IN | TEMPERATURE: 97.9 F | BODY MASS INDEX: 35.44 KG/M2 | HEART RATE: 83 BPM

## 2024-11-07 DIAGNOSIS — M47.816 LUMBAR SPONDYLOSIS: ICD-10-CM

## 2024-11-07 DIAGNOSIS — M79.10 MYALGIA: ICD-10-CM

## 2024-11-07 DIAGNOSIS — M48.062 LUMBAR STENOSIS WITH NEUROGENIC CLAUDICATION: ICD-10-CM

## 2024-11-07 DIAGNOSIS — M54.16 LUMBAR RADICULOPATHY: ICD-10-CM

## 2024-11-07 PROCEDURE — 3077F SYST BP >= 140 MM HG: CPT | Performed by: PHYSICAL MEDICINE & REHABILITATION

## 2024-11-07 PROCEDURE — 1125F AMNT PAIN NOTED PAIN PRSNT: CPT | Performed by: PHYSICAL MEDICINE & REHABILITATION

## 2024-11-07 PROCEDURE — 99214 OFFICE O/P EST MOD 30 MIN: CPT | Performed by: PHYSICAL MEDICINE & REHABILITATION

## 2024-11-07 PROCEDURE — G2211 COMPLEX E/M VISIT ADD ON: HCPCS | Performed by: PHYSICAL MEDICINE & REHABILITATION

## 2024-11-07 PROCEDURE — 3078F DIAST BP <80 MM HG: CPT | Performed by: PHYSICAL MEDICINE & REHABILITATION

## 2024-11-07 RX ORDER — LIDOCAINE 50 MG/G
1 PATCH TOPICAL EVERY 24 HOURS
Qty: 30 PATCH | Refills: 11 | Status: SHIPPED | OUTPATIENT
Start: 2024-11-07

## 2024-11-07 ASSESSMENT — FIBROSIS 4 INDEX: FIB4 SCORE: .8888888888888888889

## 2024-11-07 ASSESSMENT — PATIENT HEALTH QUESTIONNAIRE - PHQ9: CLINICAL INTERPRETATION OF PHQ2 SCORE: 0

## 2024-11-07 ASSESSMENT — PAIN SCALES - GENERAL: PAINLEVEL_OUTOF10: 5=MODERATE PAIN

## 2024-11-07 NOTE — PROGRESS NOTES
Follow up patient note  Interventional spine and sports physiatry, Physical medicine rehabilitation      Chief complaint:   Chief Complaint   Patient presents with    Follow-Up     Back pain          HISTORY    Please see new patient note dated  by Dr Chou,  for more details.     HPI  Patient identification: Tena Romo 1950  female  With Diagnoses of Lumbar radiculopathy, Lumbar spondylosis, Lumbar stenosis with neurogenic claudication, and Myalgia were pertinent to this visit.     Procedures:  8/22/2023 left L5-S1 and S1 transforaminal epidural steroid injection 100% improvement initially  9/13/2023 left L5-S1 interlaminar epidural steroid injection 100% improvement at the follow-up visit  7/17/2024 right L5-S1 interlaminar epidural steroid injection 100% improvement for the first 2 weeks and then pain returned back to baseline  10/1/2024 right L5-S1 and S1 transforaminal epidural steroid injection 80% improvement during the diagnostic phase however pain returned to baseline after the first week.    History of Present Illness  The patient is a 74-year-old female here for follow-up. She is status post procedure on 10/01/2024, having undergone a right L5-S1 and S1 transforaminal epidural steroid injection.    She continues to experience severe pain radiating down the posterior aspect of her right leg, currently rated at 8 out of 10 in intensity. The pain worsens with forward flexion and lumbar extension. It is so severe that it has caused her to faint on occasion. She also experiences pain while driving and walking, and once had to hold onto a bird feeder due to the intensity of the pain.     She walks half a mile in the morning, weather permitting, and takes Tylenol and applies patches around 1:00 PM. The pain is so severe at times that she has to lie down for a few hours. She is running out of patches and does not want to rely on Tylenol for the rest of her life.    She has a history of  scoliosis surgery and is currently under the care of Dr. Darrius Chen. She is accompanied by her .            The patient's been physical therapy in the past and has a comprehensive pain program     ROS Red Flags :   Fever, Chills, Sweats: Denies  Involuntary Weight Loss: Denies  Bowel/Bladder Incontinence: Denies  Saddle Anesthesia: Denies        PMHx:   Past Medical History:   Diagnosis Date    Anesthesia     post of nausea    Arthritis     Breast cancer (HCC) 02/10/2017    Chronic fibrosis of lung (HCC) 5/9/2023    Likely 2/2 COVID 19 PNA in 2020    Cough     Dental disorder     permanent bridge    Diabetes (HCC)     Diverticulosis of colon 11/04/2011    HTN (hypertension) 03/14/2013    Hypertension     Pain     right shoulder down to thumb    Post hysterectomy menopause 11/04/2011    Trochanteric bursitis of right hip 10/01/2019    Use of anastrozole (Arimidex) 01/11/2019    Wheezing        PSHx:   Past Surgical History:   Procedure Laterality Date    BLOCK EPIDURAL STEROID INJECTION Right 10/1/2024    Procedure: RIGHT L5-S1 and S1 transforaminal epidural steroid injection with fluoroscopic guidance…..Note: 22-5. transitional anatomy disc space at S1-2;  Surgeon: Kieran Chou M.D.;  [facility];  Service: Pain Management    W[place] INJ LUMBAR/SACRAL,W/ IMAGING Right 7/17/2024    Procedure: RIGHT Lumbar L5-S1 interlaminar epidural steroid injection…..Note: there is a disc space S1-2. OK to continue antibiotics;  Surgeon: Kieran Chou M.D.;  Location[facility];  Service: Pain Management    W[place] INJ LUMBAR/SACRAL,W/ IMAGING Left 9/13/2023    Procedure: Lumbar LEFT L5-S1 interlaminar epidural steroid injection.     Note: transitional anatomy with disc space S1-2. Review previous procedure done by Dr Chou;  Surgeon: Kieran Chou M.D.;  Location: [facility];  Service: Pain Management    [place] NJX AA&/STRD TFRML EPI LUMBAR/SACRAL 1 LEVEL  Left 8/22/2023    Procedure: LEFT L5-S1 and S1 transforaminal epidural steroid injection with fluoroscopic guidance;  Surgeon: Kieran Chou M.D.;  Location: [facility];  Service: Pain Management    SHOULDER ARTHROSCOPY W/ ROTATOR CUFF REPAIR  2/9/2012    Performed by JENNY LIMON at SURGERY [facility]    SHOULDER DECOMPRESSION ARTHROSCOPIC  2/9/2012    Performed by JENNY LIMON at SURGERY [facility]    CLAVICLE DISTAL EXCISION  2/9/2012    Performed by JENNY LIMON at [facility]    BUNIONECTOMY  2009    bilateral    ABDOMINAL HYSTERECTOMY TOTAL  2001    endometriosis    CHOLECYSTECTOMY  1999    laparoscopy    MASTECTOMY Bilateral     TONSILLECTOMY  as child       Family history   Family History   Problem Relation Age of Onset    Stroke Mother     Lung Disease Father         pneumonia    Hyperlipidemia Father          Medications:   Outpatient Medications Marked as Taking for the 11/7/24 encounter (Office Visit) with Kieran Chou M.D.   Medication Sig Dispense Refill    lidocaine (LIDODERM) 5 % Patch Place 1 Patch on the skin every 24 hours. Apply for 12 hours maximum and then take off for 12 hours. 30 Patch 11    nitrofurantoin (MACROBID) 100 MG Cap Take 1 Capsule by mouth every evening. 90 Capsule 1    glipiZIDE (GLUCOTROL) 5 MG Tab Take 1 Tablet by mouth every day. 90 Tablet 3    albuterol 108 (90 Base) MCG/ACT Aero Soln inhalation aerosol Inhale 2 Puffs every 6 hours as needed for Shortness of Breath. 8.5 g 6    simvastatin (ZOCOR) 40 MG Tab Take 1 Tablet by mouth every evening. 90 Tablet 3    telmisartan (MICARDIS) 40 MG Tab Take 1 Tablet by mouth every day. 100 Tablet 3    estradiol (ESTRACE) 0.1 MG/GM vaginal cream Insert 0.5 g into the vagina every Monday, Wednesday, and Friday. 42.5 g 3    fluticasone (FLONASE) 50 MCG/ACT nasal spray Administer 2 Sprays into affected nostril(S) every day. 16 g 3    Lancets Lancets order: Lancets for  Freestylemeter. Sig: use once daily and prn ssx high or low sugar. #100 RF x 3 100 Each 3    Multiple Vitamin (MULTIVITAMIN ADULT PO) Take  by mouth every day.      acetaminophen (TYLENOL) 500 MG Tab Take 500-1,000 mg by mouth every 6 hours as needed.      multivitamin (THERAGRAN) Tab Take 1 Tablet by mouth every day. ZINC      omeprazole (PRILOSEC) 40 MG delayed-release capsule Take 40 mg by mouth every day.      Blood Glucose Test Strips Use one strip to test blood sugar once daily early morning before first meal. 300 Strip 11        Current Outpatient Medications on File Prior to Visit   Medication Sig Dispense Refill    nitrofurantoin (MACROBID) 100 MG Cap Take 1 Capsule by mouth every evening. 90 Capsule 1    glipiZIDE (GLUCOTROL) 5 MG Tab Take 1 Tablet by mouth every day. 90 Tablet 3    albuterol 108 (90 Base) MCG/ACT Aero Soln inhalation aerosol Inhale 2 Puffs every 6 hours as needed for Shortness of Breath. 8.5 g 6    simvastatin (ZOCOR) 40 MG Tab Take 1 Tablet by mouth every evening. 90 Tablet 3    telmisartan (MICARDIS) 40 MG Tab Take 1 Tablet by mouth every day. 100 Tablet 3    estradiol (ESTRACE) 0.1 MG/GM vaginal cream Insert 0.5 g into the vagina every Monday, Wednesday, and Friday. 42.5 g 3    fluticasone (FLONASE) 50 MCG/ACT nasal spray Administer 2 Sprays into affected nostril(S) every day. 16 g 3    Lancets Lancets order: Lancets for Freestylemeter. Sig: use once daily and prn ssx high or low sugar. #100 RF x 3 100 Each 3    Multiple Vitamin (MULTIVITAMIN ADULT PO) Take  by mouth every day.      acetaminophen (TYLENOL) 500 MG Tab Take 500-1,000 mg by mouth every 6 hours as needed.      multivitamin (THERAGRAN) Tab Take 1 Tablet by mouth every day. ZINC      omeprazole (PRILOSEC) 40 MG delayed-release capsule Take 40 mg by mouth every day.      Blood Glucose Test Strips Use one strip to test blood sugar once daily early morning before first meal. 300 Strip 11     No current facility-administered  "medications on file prior to visit.         Allergies:   Allergies   Allergen Reactions    Cipro Xr Hives    Gabapentin      Pt states she was \"all puffed up\"    Meloxicam Vomiting    Aspirin      Intolerance    Other reaction(s): Other    Diclofenac Rash    Metformin Rash     Erythematous rash       Social Hx:   Social History     Socioeconomic History    Marital status:      Spouse name: Not on file    Number of children: Not on file    Years of education: Not on file    Highest education level: Not on file   Occupational History    Not on file   Tobacco Use    Smoking status: Never    Smokeless tobacco: Never    Tobacco comments:     avoid any and all tobacco products   Vaping Use    Vaping status: Never Used   Substance and Sexual Activity    Alcohol use: Yes     Alcohol/week: 0.0 oz     Comment: once a year    Drug use: No    Sexual activity: Not Currently     Partners: Male     Comment: . Work at [employer]   Other Topics Concern    Not on file   Social History Narrative    Not on file     Social Drivers of Health     Financial Resource Strain: Not on file   Food Insecurity: Not on file   Transportation Needs: Not on file   Physical Activity: Not on file   Stress: Not on file   Social Connections: Not on file   Intimate Partner Violence: Not on file   Housing Stability: Not on file         EXAMINATION     Physical Exam:   Vitals: BP (!) 148/79 (BP Location: Right arm, Patient Position: Sitting, BP Cuff Size: Large adult)   Pulse 83   Temp 36.6 °C (97.9 °F) (Temporal)   Ht 1.6 m (5' 3\")   Wt 90.7 kg (200 lb)   SpO2 96%     Constitutional:   Body Habitus: Body mass index is 35.43 kg/m².  Cooperation: Fully cooperates with exam  Appearance: Well-groomed no disheveled    Respiratory-  breathing comfortable on room air, no audible wheezing  Cardiovascular- capillary refills less than 2 seconds. No lower extremity edema is noted.   Psychiatric- alert and oriented ×3. Normal affect.    MSK: " "-          Thoracic/Lumbar Spine/Sacral Spine/Hips   There are no signs of infection around the injection sites.   decreased active range of motion with flexion, lateral flexion, and rotation bilaterally.   There is decreased active range of motion with lumbar extension.    There is  pain with lumbar extension.   There is pain with facet loading maneuver (extension rotation) with axial low back pain on the BILATERAL side(s)       Palpation:   No tenderness to palpation in midline at T1-T12 levels. No tenderness to palpation in the left and right of the midline T1-L5, NEGATIVE for tenderness to palpation to the para-midline region in the lower lumbar levels.  palpation over SI joint: negative bilaterally    palpation in hip or over the gluteus medius tendon insertion: negative bilaterally      Lumbar spine Special tests  Neuro tension  Straight leg test positive right, negative left    Slump test positive right, negative left      Key points for the international standards for neurological classification of spinal cord injury (ISNCSCI) to light touch.     Dermatome R L                                      L2 2 2   L3 2 2   L4 1 2   L5 1 2   S1 1 2   S2 2 2         Motor Exam Lower Extremities    ? Myotome R L   Hip flexion L2 5 5   Knee extension L3 5 5   Ankle dorsiflexion L4 5- 5   Toe extension L5 4 5   Ankle plantarflexion S1 5- 5             MEDICAL DECISION MAKING    DATA    Labs:   Lab Results   Component Value Date/Time    SODIUM 139 03/14/2023 02:18 PM    POTASSIUM 3.8 03/14/2023 02:18 PM    CHLORIDE 101 03/14/2023 02:18 PM    CO2 25 03/14/2023 02:18 PM    GLUCOSE 104 (H) 03/14/2023 02:18 PM    BUN 17 03/14/2023 02:18 PM    CREATININE 0.93 03/14/2023 02:18 PM    CREATININE 0.86 12/01/2010 12:00 AM    BUNCREATRAT 18 06/26/2020 04:28 AM    BUNCREATRAT 27 12/01/2010 12:00 AM    GLOMRATE >59 12/01/2010 12:00 AM        No results found for: \"PROTHROMBTM\", \"INR\"     Lab Results   Component Value Date/Time    WBC " 9.2 12/27/2022 09:12 AM    RBC 4.42 12/27/2022 09:12 AM    HEMOGLOBIN 13.5 12/27/2022 09:12 AM    HEMATOCRIT 42.0 12/27/2022 09:12 AM    MCV 95.0 12/27/2022 09:12 AM    MCH 30.5 12/27/2022 09:12 AM    MCHC 32.1 (L) 12/27/2022 09:12 AM    MPV 11.2 12/27/2022 09:12 AM    NEUTSPOLYS 57.60 12/27/2022 09:12 AM    LYMPHOCYTES 27.00 12/27/2022 09:12 AM    MONOCYTES 8.40 12/27/2022 09:12 AM    EOSINOPHILS 5.90 12/27/2022 09:12 AM    BASOPHILS 0.70 12/27/2022 09:12 AM    HYPOCHROMIA 1+ 03/15/2013 06:35 AM        Lab Results   Component Value Date/Time    HBA1C 7.2 (A) 06/11/2024 04:47 PM          Imaging:   I personally reviewed following images    MRI lumbar spine 6/30/2023  Partially lumbarized S1 vertebrae.  At L4-5 there is severe central canal stenosis and moderate right and mild-moderate left neuroforaminal stenosis.  At L5-S1 there is moderate central canal stenosis and moderate to severe right and mild left neuroforaminal stenosis.  Facet arthropathy most notable at lower lumbar levels. See formal radiology report for further details.     I reviewed the fluoroscopic images from 9/13/2023 showing successful left L5-S1 interlaminar epidural steroid injection.  I reviewed this with the patient and her  at the visit on 10/16/2023    I reviewed the following radiology reports     Results for orders placed during the hospital encounter of 06/30/23     MR-LUMBAR SPINE-W/O     Impression  1.  Severe facet hypertrophy greater than degenerative disc disease results in severe central stenosis at L4/5, moderate to severe right foraminal stenosis at L5/S1     2.  L2/3 disc bulge and extrusion causes moderate to severe central stenosis     3.  Lesser degenerative changes at the levels as detailed above     4.  Mild discogenic L5/S1 edema indicating ongoing remodeling     5.  Ambiguous segmentation, careful correlation of levels is required if intervention is planned, as detailed above      DIAGNOSIS   Visit Diagnoses      ICD-10-CM   1. Lumbar radiculopathy  M54.16   2. Lumbar spondylosis  M47.816   3. Lumbar stenosis with neurogenic claudication  M48.062   4. Myalgia  M79.10             ASSESSMENT and PLAN:     Tena Romo 73 y.o. female      Tena was seen today for follow-up.    Diagnoses and all orders for this visit:    Lumbar radiculopathy  -     lidocaine (LIDODERM) 5 % Patch; Place 1 Patch on the skin every 24 hours. Apply for 12 hours maximum and then take off for 12 hours.  -     Referral to Spine Surgery    Lumbar spondylosis  -     lidocaine (LIDODERM) 5 % Patch; Place 1 Patch on the skin every 24 hours. Apply for 12 hours maximum and then take off for 12 hours.  -     Referral to Spine Surgery    Lumbar stenosis with neurogenic claudication  -     lidocaine (LIDODERM) 5 % Patch; Place 1 Patch on the skin every 24 hours. Apply for 12 hours maximum and then take off for 12 hours.  -     Referral to Spine Surgery    Myalgia  -     lidocaine (LIDODERM) 5 % Patch; Place 1 Patch on the skin every 24 hours. Apply for 12 hours maximum and then take off for 12 hours.          Assessment & Plan    Severe spinal stenosis is causing significant dysfunction and pain radiating down the posterior aspect of the right leg, rated 8 out of 10 in intensity.  There was temporary axial improvement after epidural steroid injection but this was not long-lasting.  The pain worsens with forward flexion and lumbar extension. The MRI of the lumbar spine from 06/30/2023 shows severe stenosis with nerve compression at L4-5. Despite the use of Lidoderm 5% patches, there has been no significant improvement. A consultation with Dr. Chen, a spine surgeon, is recommended to explore potential surgical options. The prescription for Lidoderm 5% patches will be renewed.    Follow-up  After the above consultation    Medications: Acetaminophen up to 1000 mg 3 times daily as needed not to exceed 3000 mg per 24-hours.  Continue 5% Lidoderm patches  once daily not to be on for greater than 12 hours at a time.      Follow up: After the above procedure    Thank you for allowing me to participate in the care of this patient. If you have any questions please not hesitate to contact me.          Please note that this dictation was created using voice recognition software. I have made every reasonable attempt to correct obvious errors but there may be errors of grammar and content that I may have overlooked prior to finalization of this note.      Kieran Chou MD  Interventional Spine and Sports Physiatry  Physical Medicine and Rehabilitation  [facility]

## 2024-12-20 ENCOUNTER — OFFICE VISIT (OUTPATIENT)
Dept: MEDICAL GROUP | Facility: PHYSICIAN GROUP | Age: 74
End: 2024-12-20
Payer: MEDICARE

## 2024-12-20 VITALS
SYSTOLIC BLOOD PRESSURE: 120 MMHG | WEIGHT: 200 LBS | HEIGHT: 63 IN | HEART RATE: 85 BPM | RESPIRATION RATE: 16 BRPM | DIASTOLIC BLOOD PRESSURE: 68 MMHG | TEMPERATURE: 97.9 F | OXYGEN SATURATION: 94 % | BODY MASS INDEX: 35.44 KG/M2

## 2024-12-20 DIAGNOSIS — I70.0 AORTIC ATHEROSCLEROSIS (HCC): ICD-10-CM

## 2024-12-20 DIAGNOSIS — I15.2 HYPERTENSION ASSOCIATED WITH TYPE 2 DIABETES MELLITUS (HCC): ICD-10-CM

## 2024-12-20 DIAGNOSIS — E11.40 NEUROPATHY DUE TO TYPE 2 DIABETES MELLITUS (HCC): ICD-10-CM

## 2024-12-20 DIAGNOSIS — E11.69 TYPE 2 DIABETES MELLITUS WITH OTHER SPECIFIED COMPLICATION, WITHOUT LONG-TERM CURRENT USE OF INSULIN (HCC): ICD-10-CM

## 2024-12-20 DIAGNOSIS — M48.062 LUMBAR STENOSIS WITH NEUROGENIC CLAUDICATION: ICD-10-CM

## 2024-12-20 DIAGNOSIS — M79.10 MYALGIA: ICD-10-CM

## 2024-12-20 DIAGNOSIS — E11.69 DYSLIPIDEMIA ASSOCIATED WITH TYPE 2 DIABETES MELLITUS (HCC): ICD-10-CM

## 2024-12-20 DIAGNOSIS — E55.9 VITAMIN D DEFICIENCY: ICD-10-CM

## 2024-12-20 DIAGNOSIS — M47.816 LUMBAR SPONDYLOSIS: ICD-10-CM

## 2024-12-20 DIAGNOSIS — Z13.29 SCREENING FOR THYROID DISORDER: ICD-10-CM

## 2024-12-20 DIAGNOSIS — E78.5 DYSLIPIDEMIA ASSOCIATED WITH TYPE 2 DIABETES MELLITUS (HCC): ICD-10-CM

## 2024-12-20 DIAGNOSIS — E11.59 HYPERTENSION ASSOCIATED WITH TYPE 2 DIABETES MELLITUS (HCC): ICD-10-CM

## 2024-12-20 DIAGNOSIS — M54.16 LUMBAR RADICULOPATHY: ICD-10-CM

## 2024-12-20 PROBLEM — R06.09 DYSPNEA ON EXERTION: Status: RESOLVED | Noted: 2023-01-18 | Resolved: 2024-12-20

## 2024-12-20 PROBLEM — K59.00 CONSTIPATION: Status: RESOLVED | Noted: 2022-12-07 | Resolved: 2024-12-20

## 2024-12-20 PROBLEM — R32 URINARY INCONTINENCE: Status: RESOLVED | Noted: 2022-04-01 | Resolved: 2024-12-20

## 2024-12-20 PROBLEM — Z91.89 AT HIGH RISK FOR OSTEOPOROSIS: Status: RESOLVED | Noted: 2021-02-24 | Resolved: 2024-12-20

## 2024-12-20 PROBLEM — N39.0 URINARY TRACT INFECTIOUS DISEASE: Status: RESOLVED | Noted: 2022-08-30 | Resolved: 2024-12-20

## 2024-12-20 LAB
HBA1C MFR BLD: 7.1 % (ref ?–5.8)
POCT INT CON NEG: NEGATIVE
POCT INT CON POS: POSITIVE

## 2024-12-20 PROCEDURE — 99214 OFFICE O/P EST MOD 30 MIN: CPT | Performed by: NURSE PRACTITIONER

## 2024-12-20 PROCEDURE — 3078F DIAST BP <80 MM HG: CPT | Performed by: NURSE PRACTITIONER

## 2024-12-20 PROCEDURE — 83036 HEMOGLOBIN GLYCOSYLATED A1C: CPT | Performed by: NURSE PRACTITIONER

## 2024-12-20 PROCEDURE — 3074F SYST BP LT 130 MM HG: CPT | Performed by: NURSE PRACTITIONER

## 2024-12-20 RX ORDER — LIDOCAINE 50 MG/G
2 PATCH TOPICAL EVERY 24 HOURS
Qty: 60 PATCH | Refills: 11 | Status: SHIPPED | OUTPATIENT
Start: 2024-12-20

## 2024-12-20 ASSESSMENT — FIBROSIS 4 INDEX: FIB4 SCORE: .8888888888888888889

## 2024-12-20 NOTE — PROGRESS NOTES
"  Chief Complaint   Patient presents with   • Diabetes Follow-up                                                                                                                                       HPI:   Tena presents today with the following.    ***    ROS:  All systems negative expect as addressed in assessment and plan.     /68 (BP Location: Left arm, Patient Position: Sitting)   Pulse 85   Temp 36.6 °C (97.9 °F) (Temporal)   Resp 16   Ht 1.6 m (5' 3\")   Wt 90.7 kg (200 lb)   SpO2 94%   BMI 35.43 kg/m²     Objective:    Physical Exam      ***    Assessment and Plan:  74 y.o. female with the following issues.    1. Type 2 diabetes mellitus with other specified complication, without long-term current use of insulin (HCC)  - POCT Hemoglobin A1C  - Comp Metabolic Panel; Future  - CBC WITHOUT DIFFERENTIAL; Future    2. Screening for thyroid disorder  - TSH; Future  - FREE THYROXINE; Future    3. Dyslipidemia associated with type 2 diabetes mellitus (HCC)  - Lipid Profile; Future    4. Vitamin D deficiency  - VITAMIN D,25 HYDROXY (DEFICIENCY); Future    5. Neuropathy due to type 2 diabetes mellitus (HCC)  - CBC WITHOUT DIFFERENTIAL; Future    6. Aortic atherosclerosis (HCC)  - Lipid Profile; Future    7. Hypertension associated with type 2 diabetes mellitus (HCC)    8. Lumbar radiculopathy  - lidocaine (LIDODERM) 5 % Patch; Place 2 Patches on the skin every 24 hours. Apply for 12 hours maximum and then take off for 12 hours.  Dispense: 60 Patch; Refill: 11    9. Lumbar spondylosis  - lidocaine (LIDODERM) 5 % Patch; Place 2 Patches on the skin every 24 hours. Apply for 12 hours maximum and then take off for 12 hours.  Dispense: 60 Patch; Refill: 11    10. Lumbar stenosis with neurogenic claudication  - lidocaine (LIDODERM) 5 % Patch; Place 2 Patches on the skin every 24 hours. Apply for 12 hours maximum and then take off for 12 hours.  Dispense: 60 Patch; Refill: 11    11. Myalgia  - lidocaine (LIDODERM) 5 " % Patch; Place 2 Patches on the skin every 24 hours. Apply for 12 hours maximum and then take off for 12 hours.  Dispense: 60 Patch; Refill: 11        ***    No follow-ups on file.    I spent a total of *** minutes with record review, exam, and communication with the patient, communication with other providers, and documentation of this encounter. This does not include time spent on separately billable procedures/tests.    Please note that this dictation was created using voice recognition software. I have worked with consultants from the vendor as well as technical experts from [employer] to optimize the interface. I have made every reasonable attempt to correct obvious errors, but I expect that there are errors of grammar and possibly content that I did not discover before finalizing the note.        back pain:     - Assessment: Shots ineffective; PT provides some relief.     - Plan:          a. MRI scheduled 29th with Dr. Francois.          b. Continue current pain management and PT.    2. DM2:     - Assessment: Blood sugar 7.1 (within target).     - Plan:          a. Refilled medication for 3 months.          b. Continue current regimen and monitoring.    3. Sinus infection:     - Assessment: Current sinus infection.     - Plan:          a. Antibiotics prescribed by Demi.          b. Continue course, monitor improvement.    4. Vaginal dryness:     - Assessment: Using Estradiol cream 1x/week.     - Plan:          a. Advise more frequent external application if needed.          b. Monitor improvement.    5. Peripheral neuropathy in feet:     - Assessment: Reports lack of sensation.     - Plan: Continue management, monitor changes.    6. Lab work:     - Plan: Schedule for January; patient to make appointment or use [facility].    7. Seborrheic keratosis:     - Assessment: Mole appears benign.     - Plan: Monitor for rapid growth or color changes.    8. Lidocaine patches:     - Assessment: Uses 2 patches/day at night for pain.     - Plan: Continue, monitor effectiveness.    9. Eye health:     - Assessment: Reports difficulty driving at night.     - Plan:          a. Encourage regular eye exams.          b. Monitor vision changes.        Return in about 3 months (around 3/20/2025) for Diabetes.      Please note that this dictation was created using voice recognition software. I have worked with consultants from the vendor as well as technical experts from [facility] to optimize the interface. I have made every reasonable attempt to correct obvious errors, but I expect that there are errors of grammar and possibly content that I did not discover before finalizing the note.

## 2025-01-20 ENCOUNTER — OFFICE VISIT (OUTPATIENT)
Dept: URGENT CARE | Facility: PHYSICIAN GROUP | Age: 75
End: 2025-01-20
Payer: MEDICARE

## 2025-01-20 ENCOUNTER — APPOINTMENT (OUTPATIENT)
Dept: RADIOLOGY | Facility: IMAGING CENTER | Age: 75
End: 2025-01-20
Attending: PHYSICIAN ASSISTANT
Payer: MEDICARE

## 2025-01-20 VITALS
DIASTOLIC BLOOD PRESSURE: 76 MMHG | BODY MASS INDEX: 35.6 KG/M2 | HEIGHT: 63 IN | RESPIRATION RATE: 20 BRPM | OXYGEN SATURATION: 93 % | TEMPERATURE: 98.1 F | WEIGHT: 200.9 LBS | SYSTOLIC BLOOD PRESSURE: 124 MMHG | HEART RATE: 90 BPM

## 2025-01-20 DIAGNOSIS — R09.A2 GLOBUS SENSATION: ICD-10-CM

## 2025-01-20 DIAGNOSIS — J02.9 SORE THROAT: ICD-10-CM

## 2025-01-20 LAB — S PYO DNA SPEC NAA+PROBE: NOT DETECTED

## 2025-01-20 PROCEDURE — 3078F DIAST BP <80 MM HG: CPT | Performed by: PHYSICIAN ASSISTANT

## 2025-01-20 PROCEDURE — 87651 STREP A DNA AMP PROBE: CPT | Performed by: PHYSICIAN ASSISTANT

## 2025-01-20 PROCEDURE — 99213 OFFICE O/P EST LOW 20 MIN: CPT | Performed by: PHYSICIAN ASSISTANT

## 2025-01-20 PROCEDURE — 70360 X-RAY EXAM OF NECK: CPT | Mod: TC | Performed by: PHYSICIAN ASSISTANT

## 2025-01-20 PROCEDURE — 3074F SYST BP LT 130 MM HG: CPT | Performed by: PHYSICIAN ASSISTANT

## 2025-01-20 NOTE — PROGRESS NOTES
Subjective     Tena Romo is a 74 y.o. female who presents with Pharyngitis (X1 month)    PMH:  has a past medical history of Anesthesia, Arthritis, Breast cancer (HCC) (02/10/2017), Chronic fibrosis of lung (HCC) (5/9/2023), Cough, Dental disorder, Diabetes (HCC), Diverticulosis of colon (11/04/2011), HTN (hypertension) (03/14/2013), Hypertension, Pain, Post hysterectomy menopause (11/04/2011), Trochanteric bursitis of right hip (10/01/2019), Use of anastrozole (Arimidex) (01/11/2019), and Wheezing.    She has no past medical history of Painful breathing or Sputum production.  MEDS:   Current Outpatient Medications:     lidocaine (LIDODERM) 5 % Patch, Place 2 Patches on the skin every 24 hours. Apply for 12 hours maximum and then take off for 12 hours., Disp: 60 Patch, Rfl: 11    glipiZIDE (GLUCOTROL) 5 MG Tab, Take 1 Tablet by mouth every day., Disp: 90 Tablet, Rfl: 3    albuterol 108 (90 Base) MCG/ACT Aero Soln inhalation aerosol, Inhale 2 Puffs every 6 hours as needed for Shortness of Breath., Disp: 8.5 g, Rfl: 6    simvastatin (ZOCOR) 40 MG Tab, Take 1 Tablet by mouth every evening., Disp: 90 Tablet, Rfl: 3    telmisartan (MICARDIS) 40 MG Tab, Take 1 Tablet by mouth every day., Disp: 100 Tablet, Rfl: 3    estradiol (ESTRACE) 0.1 MG/GM vaginal cream, Insert 0.5 g into the vagina every Monday, Wednesday, and Friday., Disp: 42.5 g, Rfl: 3    fluticasone (FLONASE) 50 MCG/ACT nasal spray, Administer 2 Sprays into affected nostril(S) every day., Disp: 16 g, Rfl: 3    Lancets, Lancets order: Lancets for Freestylemeter. Sig: use once daily and prn ssx high or low sugar. #100 RF x 3, Disp: 100 Each, Rfl: 3    Multiple Vitamin (MULTIVITAMIN ADULT PO), Take  by mouth every day., Disp: , Rfl:     acetaminophen (TYLENOL) 500 MG Tab, Take 500-1,000 mg by mouth every 6 hours as needed., Disp: , Rfl:     multivitamin (THERAGRAN) Tab, Take 1 Tablet by mouth every day. ZINC, Disp: , Rfl:     omeprazole (PRILOSEC)  "40 MG delayed-release capsule, Take 40 mg by mouth every day., Disp: , Rfl:     Blood Glucose Test Strips, Use one strip to test blood sugar once daily early morning before first meal., Disp: 300 Strip, Rfl: 11    nitrofurantoin (MACROBID) 100 MG Cap, Take 1 Capsule by mouth every evening. (Patient not taking: Reported on 1/20/2025), Disp: 90 Capsule, Rfl: 1  ALLERGIES:   Allergies   Allergen Reactions    Cipro Xr Hives    Gabapentin      Pt states she was \"all puffed up\"    Meloxicam Vomiting    Aspirin      Intolerance    Other reaction(s): Other    Diclofenac Rash    Metformin Rash     Erythematous rash     SURGHX:   Past Surgical History:   Procedure Laterality Date    BLOCK EPIDURAL STEROID INJECTION Right 10/1/2024    Procedure: RIGHT L5-S1 and S1 transforaminal epidural steroid injection with fluoroscopic guidance…..Note: 22-5. transitional anatomy disc space at S1-2;  Surgeon: Kieran Chou M.D.;  [facility];  Service: Pain Management    KS INJ LUMBAR/SACRAL,W/ IMAGING Right 7/17/2024    Procedure: RIGHT Lumbar L5-S1 interlaminar epidural steroid injection…..Note: there is a disc space S1-2. OK to continue antibiotics;  Surgeon: Kieran Chou M.D.;  [facility];  Service: Pain Management    KS INJ LUMBAR/SACRAL,W/ IMAGING Left 9/13/2023    Procedure: Lumbar LEFT L5-S1 interlaminar epidural steroid injection.     Note: transitional anatomy with disc space S1-2. Review previous procedure done by Dr Chou;  Surgeon: Kieran Chou M.D.;  [facility];  Service: Pain Management    KS NJX AA&/STRD TFRML EPI LUMBAR/SACRAL 1 LEVEL Left 8/22/2023    Procedure: LEFT L5-S1 and S1 transforaminal epidural steroid injection with fluoroscopic guidance;  Surgeon: Kieran Chou M.D.;  [facility];  Service: Pain Management    SHOULDER ARTHROSCOPY W/ ROTATOR CUFF REPAIR  2/9/2012    Performed by " "JENNY LIMON at [facility] ORS    SHOULDER DECOMPRESSION ARTHROSCOPIC  2/9/2012    Performed by JENNY LIMON at [facility] ORS    CLAVICLE DISTAL EXCISION  2/9/2012    Performed by JENNY LIMON at [facility]    BUNIONECTOMY  2009    bilateral    ABDOMINAL HYSTERECTOMY TOTAL  2001    endometriosis    CHOLECYSTECTOMY  1999    laparoscopy    MASTECTOMY Bilateral     TONSILLECTOMY  as child     SOCHX:  reports that she has never smoked. She has never used smokeless tobacco. She reports current alcohol use. She reports that she does not use drugs.  FH: Reviewed with patient, not pertinent to this visit.           Patient presents with:  Pharyngitis: X1 month.  Pt reports having a globus sensation on the left side of her neck after she eats and drinks.  PT denies choking on food or fluids, just feels like it sits there.  Pt denies vomiting or regurgitation of food.   Pt has an endoscopy scheduled in 2 weeks to evaluate this but patient wanted to make sure it isn't just strep throat.  PT denies fever, chills, congestion.  No other complaints.       Pharyngitis         Review of Systems   Constitutional:  Negative for chills and fever.   HENT:  Positive for sore throat.    All other systems reviewed and are negative.             Objective     /76 (BP Location: Left arm, Patient Position: Sitting, BP Cuff Size: Adult)   Pulse 90   Temp 36.7 °C (98.1 °F) (Temporal)   Resp 20   Ht 1.6 m (5' 3\")   Wt 91.1 kg (200 lb 14.4 oz)   SpO2 93%   BMI 35.59 kg/m²      Physical Exam  Vitals and nursing note reviewed.   Constitutional:       General: She is not in acute distress.     Appearance: Normal appearance. She is well-developed and normal weight. She is not ill-appearing or toxic-appearing.   HENT:      Head: Normocephalic and atraumatic.      Right Ear: Tympanic membrane normal.      Left Ear: Tympanic membrane normal.      Nose: Nose normal.      Mouth/Throat:      Lips: " Pink.      Mouth: Mucous membranes are moist.      Tongue: Tongue does not deviate from midline.      Palate: No lesions.      Pharynx: Oropharynx is clear. Uvula midline. No pharyngeal swelling, oropharyngeal exudate, posterior oropharyngeal erythema, uvula swelling or postnasal drip.      Tonsils: 0 on the right. 0 on the left.      Comments: Tonsils surgically absent.  No noted lymphadenopathy.  Pt phonates normally.    Eyes:      Extraocular Movements: Extraocular movements intact.      Conjunctiva/sclera: Conjunctivae normal.      Pupils: Pupils are equal, round, and reactive to light.   Cardiovascular:      Rate and Rhythm: Normal rate and regular rhythm.      Pulses: Normal pulses.      Heart sounds: Normal heart sounds.   Pulmonary:      Effort: Pulmonary effort is normal.      Breath sounds: Normal breath sounds.   Abdominal:      General: Bowel sounds are normal.      Palpations: Abdomen is soft.   Musculoskeletal:         General: Normal range of motion.      Cervical back: Normal range of motion and neck supple.   Lymphadenopathy:      Head:      Left side of head: No submental, submandibular, tonsillar, preauricular, posterior auricular or occipital adenopathy.      Cervical:      Left cervical: No superficial, deep or posterior cervical adenopathy.      Upper Body:      Left upper body: No supraclavicular adenopathy.   Skin:     General: Skin is warm and dry.      Capillary Refill: Capillary refill takes less than 2 seconds.   Neurological:      General: No focal deficit present.      Mental Status: She is alert and oriented to person, place, and time.      Cranial Nerves: No cranial nerve deficit.      Motor: Motor function is intact.      Coordination: Coordination is intact.      Gait: Gait normal.   Psychiatric:         Mood and Affect: Mood normal.                             Assessment & Plan        Assessment & Plan  Globus sensation    Orders:    DX-NECK FOR SOFT TISSUE; Future    POCT CEPHEID  GROUP A STREP - PCR    Sore throat    Orders:    POCT CEPHEID GROUP A STREP - PCR              Results for orders placed or performed in visit on 01/20/25   POCT CEPHEID GROUP A STREP - PCR    Collection Time: 01/20/25 12:32 PM   Result Value Ref Range    POC Group A Strep, PCR Not Detected Not Detected, Invalid     *Note: Due to a large number of results and/or encounters for the requested time period, some results have not been displayed. A complete set of results can be found in Results Review.          RADIOLOGY RESULTS   DX-NECK FOR SOFT TISSUE    Result Date: 1/20/2025 1/20/2025 11:18 AM HISTORY/REASON FOR EXAM:  Globus sensation. TECHNIQUE/EXAM DESCRIPTION AND NUMBER OF VIEWS:  2 views of the soft tissue neck. COMPARISON:  None FINDINGS: Airway: Patent. Epiglottis: Normal. Tonsils: Not enlarged. Lung apices: Clear. Cervical spine: No acute fracture. Mild cervical spondylosis.     Normal cervical spine radiographs.       PT strep test is negative.  Xray of soft tissue of neck was completed, no FB or mass noted.      PT instructed to keep her appointment for her endoscopy on February 11, I suspect she may be experiencing GERD with esophagitis or perhaps a diverticulum of the esophagus on the left which they should be able to identify with that procedure.      IN the meantime, pt encouraged to try softer foods like yogurt, soups, stews etc to see if this helps the globus sensation.      Differential diagnosis, supportive care, and indications for immediate follow-up discussed with patient.  Instructed to return to clinic or nearest emergency department for any change in condition, further concerns, or worsening of symptoms.    I personally reviewed prior external notes and test results pertinent to today's visit.  I have independently reviewed and interpreted all diagnostics ordered during this urgent care visit.    PT should follow up with PCP in 1-2 days for re-evaluation if symptoms have not improved.       Discussed red flags and reasons to return to UC or ED.      Pt and/or family verbalized understanding of diagnosis and follow up instructions and was offered informational handout on diagnosis.  PT discharged.     Please note that this dictation was created using voice recognition software. I have made every reasonable attempt to correct obvious errors, but I expect that there may be errors of grammar and possibly content that I did not discover before finalizing the note.

## 2025-01-24 ASSESSMENT — ENCOUNTER SYMPTOMS
FEVER: 0
CHILLS: 0
SORE THROAT: 1

## 2025-03-10 ENCOUNTER — APPOINTMENT (OUTPATIENT)
Dept: MEDICAL GROUP | Facility: PHYSICIAN GROUP | Age: 75
End: 2025-03-10
Payer: MEDICARE

## 2025-03-14 ENCOUNTER — TELEPHONE (OUTPATIENT)
Dept: SLEEP MEDICINE | Facility: MEDICAL CENTER | Age: 75
End: 2025-03-14

## 2025-03-14 ENCOUNTER — OFFICE VISIT (OUTPATIENT)
Dept: MEDICAL GROUP | Facility: PHYSICIAN GROUP | Age: 75
End: 2025-03-14
Payer: MEDICARE

## 2025-03-14 VITALS
HEIGHT: 63 IN | SYSTOLIC BLOOD PRESSURE: 124 MMHG | TEMPERATURE: 97.8 F | HEART RATE: 85 BPM | BODY MASS INDEX: 35.44 KG/M2 | OXYGEN SATURATION: 96 % | WEIGHT: 200 LBS | DIASTOLIC BLOOD PRESSURE: 72 MMHG | RESPIRATION RATE: 16 BRPM

## 2025-03-14 DIAGNOSIS — M54.16 LUMBAR RADICULOPATHY: ICD-10-CM

## 2025-03-14 DIAGNOSIS — M54.41 CHRONIC RIGHT-SIDED LOW BACK PAIN WITH RIGHT-SIDED SCIATICA: ICD-10-CM

## 2025-03-14 DIAGNOSIS — G89.29 CHRONIC RIGHT-SIDED LOW BACK PAIN WITH RIGHT-SIDED SCIATICA: ICD-10-CM

## 2025-03-14 PROCEDURE — 3078F DIAST BP <80 MM HG: CPT | Performed by: NURSE PRACTITIONER

## 2025-03-14 PROCEDURE — 99214 OFFICE O/P EST MOD 30 MIN: CPT | Performed by: NURSE PRACTITIONER

## 2025-03-14 PROCEDURE — 3074F SYST BP LT 130 MM HG: CPT | Performed by: NURSE PRACTITIONER

## 2025-03-14 ASSESSMENT — PATIENT HEALTH QUESTIONNAIRE - PHQ9: CLINICAL INTERPRETATION OF PHQ2 SCORE: 0

## 2025-03-14 NOTE — TELEPHONE ENCOUNTER
Re: Scheduling pt's 1 yr follow up with Dr Trejo.    Outcome: Called and spoke with pt. Offered a follow up with Dr Trejo. Pt would like to keep her appt on 3/17/25 with Zakiya DIAZ because she wanted a sooner appt due to her upcoming surgery.

## 2025-03-17 ENCOUNTER — OFFICE VISIT (OUTPATIENT)
Dept: SLEEP MEDICINE | Facility: MEDICAL CENTER | Age: 75
End: 2025-03-17
Attending: NURSE PRACTITIONER
Payer: MEDICARE

## 2025-03-17 VITALS
WEIGHT: 198.3 LBS | DIASTOLIC BLOOD PRESSURE: 60 MMHG | BODY MASS INDEX: 35.14 KG/M2 | OXYGEN SATURATION: 96 % | HEIGHT: 63 IN | SYSTOLIC BLOOD PRESSURE: 140 MMHG | RESPIRATION RATE: 16 BRPM | HEART RATE: 72 BPM

## 2025-03-17 DIAGNOSIS — Z01.818 PREOP EXAMINATION: ICD-10-CM

## 2025-03-17 DIAGNOSIS — U09.9 COVID-19 LONG HAULER: ICD-10-CM

## 2025-03-17 DIAGNOSIS — R94.2 ABNORMAL RESULTS OF PULMONARY FUNCTION STUDIES: ICD-10-CM

## 2025-03-17 DIAGNOSIS — G47.30 SLEEP-DISORDERED BREATHING: ICD-10-CM

## 2025-03-17 DIAGNOSIS — J84.10 CHRONIC FIBROSIS OF LUNG (HCC): Chronic | ICD-10-CM

## 2025-03-17 DIAGNOSIS — Z78.9 NONSMOKER: ICD-10-CM

## 2025-03-17 PROCEDURE — 99214 OFFICE O/P EST MOD 30 MIN: CPT | Performed by: NURSE PRACTITIONER

## 2025-03-17 PROCEDURE — 3078F DIAST BP <80 MM HG: CPT | Performed by: NURSE PRACTITIONER

## 2025-03-17 PROCEDURE — 3077F SYST BP >= 140 MM HG: CPT | Performed by: NURSE PRACTITIONER

## 2025-03-17 PROCEDURE — 99213 OFFICE O/P EST LOW 20 MIN: CPT | Performed by: NURSE PRACTITIONER

## 2025-03-17 NOTE — PROGRESS NOTES
"Chief Complaint   Patient presents with    Follow-Up     Dx/Last seen:  Chronic fibrosis of lung (HCC)  5/13/24 Tita Trejo    Tests not completed: PFT       HPI:  Tena Romo is a 75 y.o. year old female here today for follow-up on COVID-19 long hauler with abnormal CT findings and PIMENTEL.  Last OV 5/13/24 with Dr. Trejo     MMRC Grade: 2-3    Currently using Albuterol HFA prn.    Interval Events:  3/17/25: Patient notes breathing to be stable. She notes she is sedentary due to sciatica pain. She uses RACHEL when doing activity or walking dog. She had sinus infection last September. She denies seasonal allergies currently. She notes AM cough, phlegm and sneezing that clears. Not chest pain/tightness/wheezing. Shortness of breath with activity.  She is seeking surgical clearance for back surgery by Dr. Miki Chen in April 2025.    Patient notes her  tells her she breaths \"funny\" at night and has to sit up. Reviewed CNOX results for 2021 with suspicion of sleep apnea.    PULM HX:  Never smoker but had secondhand smoke exposure working in a [employer].  History of breast cancer.  Diagnosed with COVID 19 October 2021 with ILD changes on imaging over several studies.  PFT 2/24/23 noted no obstruction, TLC 3.7L or 71% Normal DLCO.  CT chest 3/22/23:  1.  Bilateral mastectomy. No metastatic disease in the chest.  2.  Unchanged interstitial pulmonary opacities as described above, representing areas of postinfectious/postinflammatory fibrosis related to prior multifocal pneumonia.    CNOX 10/13/21 indicates basal spo2 89.8%, <88% for 44.9min and cyclical desaturations noted.    ROS: As per HPI and otherwise negative if not stated.    Past Medical History:   Diagnosis Date    Anesthesia     post of nausea    Arthritis     Breast cancer (HCC) 02/10/2017    Chronic fibrosis of lung (HCC) 5/9/2023    Likely 2/2 COVID 19 PNA in 2020    Cough     Dental disorder     permanent bridge    Diabetes (HCC)     " Diverticulosis of colon 11/04/2011    HTN (hypertension) 03/14/2013    Hypertension     Pain     right shoulder down to thumb    Post hysterectomy menopause 11/04/2011    Trochanteric bursitis of right hip 10/01/2019    Use of anastrozole (Arimidex) 01/11/2019    Wheezing        Past Surgical History:   Procedure Laterality Date    BLOCK EPIDURAL STEROID INJECTION Right 10/1/2024    Procedure: RIGHT L5-S1 and S1 transforaminal epidural steroid injection with fluoroscopic guidance…..Note: 22-5. transitional anatomy disc space at S1-2;  Surgeon: Kieran Chou M.D.;  [facility];  Service: Pain Management    MT INJ LUMBAR/SACRAL,W/ IMAGING Right 7/17/2024    Procedure: RIGHT Lumbar L5-S1 interlaminar epidural steroid injection…..Note: there is a disc space S1-2. OK to continue antibiotics;  Surgeon: Kieran Chou M.D.;  [facility];  Service: Pain Management    MT INJ LUMBAR/SACRAL,W/ IMAGING Left 9/13/2023    Procedure: Lumbar LEFT L5-S1 interlaminar epidural steroid injection.     Note: transitional anatomy with disc space S1-2. Review previous procedure done by Dr Chou;  Surgeon: Kieran Chou M.D.;  [facility];  Service: Pain Management    MT NJX AA&/STRD TFRML EPI LUMBAR/SACRAL 1 LEVEL Left 8/22/2023    Procedure: LEFT L5-S1 and S1 transforaminal epidural steroid injection with fluoroscopic guidance;  Surgeon: Kieran Chou M.D.;  Location: [facility];  Service: Pain Management    SHOULDER ARTHROSCOPY W/ ROTATOR CUFF REPAIR  2/9/2012    Performed by JENNY LIMON at [facility] ORS    SHOULDER DECOMPRESSION ARTHROSCOPIC  2/9/2012    Performed by JENNY LIMON at [facility] ORS    CLAVICLE DISTAL EXCISION  2/9/2012    Performed by JENNY LIMON at [facility] ORS    BUNIONECTOMY  2009    bilateral    ABDOMINAL HYSTERECTOMY TOTAL  2001    endometriosis     "CHOLECYSTECTOMY  1999    laparoscopy    MASTECTOMY Bilateral     TONSILLECTOMY  as child       Family History   Problem Relation Age of Onset    Stroke Mother     Lung Disease Father         pneumonia    Hyperlipidemia Father        Social History     Socioeconomic History    Marital status:      Spouse name: Not on file    Number of children: Not on file    Years of education: Not on file    Highest education level: Not on file   Occupational History    Not on file   Tobacco Use    Smoking status: Never    Smokeless tobacco: Never    Tobacco comments:     avoid any and all tobacco products   Vaping Use    Vaping status: Never Used   Substance and Sexual Activity    Alcohol use: Yes     Alcohol/week: 0.0 oz     Comment: once a year    Drug use: No    Sexual activity: Not Currently     Partners: Male     Comment: . Work at [employer]   Other Topics Concern    Not on file   Social History Narrative    Not on file     Social Drivers of Health     Financial Resource Strain: Not on file   Food Insecurity: Not on file   Transportation Needs: Not on file   Physical Activity: Not on file   Stress: Not on file   Social Connections: Not on file   Intimate Partner Violence: Not on file   Housing Stability: Not on file       Allergies as of 03/17/2025 - Reviewed 03/17/2025   Allergen Reaction Noted    Cipro xr Hives 10/30/2010    Gabapentin  07/07/2023    Meloxicam Vomiting 07/27/2023    Aspirin  10/16/2014    Diclofenac Rash 02/02/2012    Metformin Rash 04/27/2017        Vitals:  BP (!) 140/60 (BP Location: Left arm, Patient Position: Sitting, BP Cuff Size: Large adult)   Pulse 72   Resp 16   Ht 1.6 m (5' 3\")   Wt 89.9 kg (198 lb 4.8 oz)   SpO2 96%     Current medications as of today   Current Outpatient Medications   Medication Sig Dispense Refill    lidocaine (LIDODERM) 5 % Patch Place 2 Patches on the skin every 24 hours. Apply for 12 hours maximum and then take off for 12 hours. 60 Patch 11    " nitrofurantoin (MACROBID) 100 MG Cap Take 1 Capsule by mouth every evening. 90 Capsule 1    glipiZIDE (GLUCOTROL) 5 MG Tab Take 1 Tablet by mouth every day. 90 Tablet 3    albuterol 108 (90 Base) MCG/ACT Aero Soln inhalation aerosol Inhale 2 Puffs every 6 hours as needed for Shortness of Breath. 8.5 g 6    simvastatin (ZOCOR) 40 MG Tab Take 1 Tablet by mouth every evening. 90 Tablet 3    telmisartan (MICARDIS) 40 MG Tab Take 1 Tablet by mouth every day. 100 Tablet 3    estradiol (ESTRACE) 0.1 MG/GM vaginal cream Insert 0.5 g into the vagina every Monday, Wednesday, and Friday. 42.5 g 3    fluticasone (FLONASE) 50 MCG/ACT nasal spray Administer 2 Sprays into affected nostril(S) every day. 16 g 3    Lancets Lancets order: Lancets for Freestylemeter. Sig: use once daily and prn ssx high or low sugar. #100 RF x 3 100 Each 3    Multiple Vitamin (MULTIVITAMIN ADULT PO) Take  by mouth every day.      acetaminophen (TYLENOL) 500 MG Tab Take 500-1,000 mg by mouth every 6 hours as needed.      multivitamin (THERAGRAN) Tab Take 1 Tablet by mouth every day. ZINC      omeprazole (PRILOSEC) 40 MG delayed-release capsule Take 40 mg by mouth every day.      Blood Glucose Test Strips Use one strip to test blood sugar once daily early morning before first meal. 300 Strip 11     No current facility-administered medications for this visit.         Physical Exam:   Gen:           Alert and oriented, No apparent distress. Mood and affect appropriate, normal interaction with examiner.  Eyes:          PERRL, EOM intact, sclere white, conjunctive moist.  Ears:          Not examined.  Hearing:     Grossly intact.  Nose:          Normal, no lesions or deformities.  Dentition:    Not examined.  Oropharynx:   Not examined.  Mallampati Classification: Not examined.  Neck:        Supple, trachea midline, no masses.  Respiratory Effort: No intercostal retractions or use of accessory muscles.   Lung Auscultation:      Clear to auscultation  bilaterally; no rales, rhonchi or wheezing.  CV:            Regular rate and rhythm. No murmurs, rubs or gallops.  Abd:           Not examined.   Lymphadenopathy: Not examined.  Gait and Station: Normal.  Digits and Nails: No clubbing, cyanosis, petechiae, or nodes.   Cranial Nerves: II-XII grossly intact.  Skin:        No rashes, lesions or ulcers noted.               Ext:           No cyanosis or edema.      Assessment:  1. Chronic fibrosis of lung (HCC)  CT-CHEST (THORAX) W/O    PULMONARY FUNCTION TESTS -Test requested: Complete Pulmonary Function Test; Include MIPS/MEPS? No    CANCELED: PULMONARY FUNCTION TESTS -Test requested: Complete Pulmonary Function Test; Include MIPS/MEPS? No    CANCELED: CT-CHEST (THORAX) W/O      2. COVID-19 long hauler  CT-CHEST (THORAX) W/O    PULMONARY FUNCTION TESTS -Test requested: Complete Pulmonary Function Test; Include MIPS/MEPS? No    CANCELED: PULMONARY FUNCTION TESTS -Test requested: Complete Pulmonary Function Test; Include MIPS/MEPS? No    CANCELED: CT-CHEST (THORAX) W/O      3. Abnormal results of pulmonary function studies  PULMONARY FUNCTION TESTS -Test requested: Complete Pulmonary Function Test; Include MIPS/MEPS? No    CANCELED: PULMONARY FUNCTION TESTS -Test requested: Complete Pulmonary Function Test; Include MIPS/MEPS? No      4. Sleep-disordered breathing  Overnight Home Sleep Study      5. BMI 35.0-35.9,adult  HEIGHT AND WEIGHT      6. Nonsmoker        7. Preop examination  CT-CHEST (THORAX) W/O    PULMONARY FUNCTION TESTS -Test requested: Complete Pulmonary Function Test; Include MIPS/MEPS? No    CANCELED: PULMONARY FUNCTION TESTS -Test requested: Complete Pulmonary Function Test; Include MIPS/MEPS? No    CANCELED: CT-CHEST (THORAX) W/O               Immunizations:    Flu:9/2024  Pneumovax 23:2015  Prevnar 13:2019  PCV 20: not due  COVID-19: 2022    Plan:  ILD changes on CT imaging that has improved overtime Stable respiratory symptoms - recommend updating  imaging and PFT now for surgical clearance.  See above  PFT now  HST for abnormal CNOX results in 2021  Encourage weight loss through healthy diet/activity  Follow up in 1 year to review symptoms, sooner if needed. Patient will complete testing and further recommendations will be discussed via mychart.    Please note that this dictation was created using voice recognition software. I have made every reasonable attempt to correct obvious errors, but it is possible there are errors of grammar and possibly content that I did not discover before finalizing the note.

## 2025-03-17 NOTE — PATIENT INSTRUCTIONS
Schedule CT chest w/o contrast now - pending results of this and pulmonary function test will send surgical clearance to Dr. Miki Chen

## 2025-03-19 ENCOUNTER — APPOINTMENT (OUTPATIENT)
Dept: OBGYN | Facility: CLINIC | Age: 75
End: 2025-03-19
Payer: MEDICARE

## 2025-03-19 NOTE — Clinical Note
REFERRAL APPROVAL NOTICE         Sent on March 19, 2025                   Tena Carter Demetrius  [address] [place] [address]                   Dear Ms. Raul Romo,    After a careful review of the medical information and benefit coverage, [facility] has processed your referral. See below for additional details.    If applicable, you must be actively enrolled with your insurance for coverage of the authorized service. If you have any questions regarding your coverage, please contact your insurance directly.    REFERRAL INFORMATION   Referral #:  [id number]  Referred-To Department    Referred-By Provider:  Pulmonary and Sleep Medicine    TRISHA Bai   [facility]      [address]  [address]  [place] [address]  [phone] [address]  [address]  [place] [id number] [phone]    Referral Start Date:  03/17/2025  Referral End Date:   03/17/2026             SCHEDULING  If you do not already have an appointment, please call [phone] to make an appointment.     MORE INFORMATION  If you do not already have a [facility] account, sign up at: [email or web address]  You can access your medical information, make appointments, see lab results, billing information, and more.  If you have questions regarding this referral, please contact  the [facility] Referrals department at:             [phone]. Monday - Friday 8:00AM - 5:00PM.     Sincerely,    [facility]

## 2025-03-19 NOTE — Clinical Note
REFERRAL APPROVAL NOTICE         Sent on March 19, 2025                   Tena Romo  [address] [place] [address]                   Dear Ms. Raul Romo,    After a careful review of the medical information and benefit coverage, [facility] has processed your referral. See below for additional details.    If applicable, you must be actively enrolled with your insurance for coverage of the authorized service. If you have any questions regarding your coverage, please contact your insurance directly.    REFERRAL INFORMATION   Referral #:  [id number]  Referred-To Department    Referred-By Provider:  Pulmonary and Sleep Medicine    TRISHA Bai   [place]ulmonary[place] [facility] [place]      [address]  [address]  [place] [address]  [phone] [address], [address]  [place] [id number] [phone]    Referral Start Date:  03/18/2025  Referral End Date:   03/18/2026           SCHEDULING  If you do not already have an appointment, please call [phone] to make an appointment.   MORE INFORMATION  As a reminder, [facility] ownership has changed, meaning this location is now owned and operated by [facility]. As such, we want to clarify that our patients should expect to receive two separate bills for the services received at [facility] - one representing the [facility] facility fees as the owner of the establishment, and the other to represent the physician's services and subsequent fees. You can speak with your insurance carrier for a pricing estimate by calling the customer service number on the back of your card and ask about charges for a hospital outpatient visit.  If you do not already have a [employer] account, sign up at: [email or web address]  You can access your medical information, make appointments, see lab results,  billing information, and more.  If you have questions regarding this referral, please contact  the [facility] department at:             [phone]. Monday - Friday 7:30AM - 5:00PM.      Sincerely,  [facility]

## 2025-03-28 ENCOUNTER — HOSPITAL ENCOUNTER (OUTPATIENT)
Dept: RADIOLOGY | Facility: MEDICAL CENTER | Age: 75
End: 2025-03-28
Attending: NURSE PRACTITIONER
Payer: MEDICARE

## 2025-03-28 DIAGNOSIS — U09.9 COVID-19 LONG HAULER: ICD-10-CM

## 2025-03-28 DIAGNOSIS — J84.10 CHRONIC FIBROSIS OF LUNG (HCC): Chronic | ICD-10-CM

## 2025-03-28 DIAGNOSIS — Z01.818 PREOP EXAMINATION: ICD-10-CM

## 2025-03-28 PROCEDURE — 71250 CT THORAX DX C-: CPT

## 2025-03-31 ENCOUNTER — RESULTS FOLLOW-UP (OUTPATIENT)
Dept: SLEEP MEDICINE | Facility: MEDICAL CENTER | Age: 75
End: 2025-03-31

## 2025-04-01 ENCOUNTER — TELEPHONE (OUTPATIENT)
Dept: HEALTH INFORMATION MANAGEMENT | Facility: OTHER | Age: 75
End: 2025-04-01
Payer: MEDICARE

## 2025-05-01 ENCOUNTER — HOSPITAL ENCOUNTER (OUTPATIENT)
Dept: RADIOLOGY | Facility: MEDICAL CENTER | Age: 75
End: 2025-05-01
Attending: NURSE PRACTITIONER
Payer: MEDICARE

## 2025-05-01 DIAGNOSIS — M79.604 RIGHT LEG PAIN: ICD-10-CM

## 2025-05-01 PROCEDURE — 93971 EXTREMITY STUDY: CPT | Mod: RT

## 2025-05-20 DIAGNOSIS — N39.0 RECURRENT UTI: ICD-10-CM

## 2025-05-20 NOTE — TELEPHONE ENCOUNTER
Received request via: Pharmacy    Was the patient seen in the last year in this department? Yes    Does the patient have an active prescription (recently filled or refills available) for medication(s) requested? No    Pharmacy Name:   [id number] - [place] - [address] AT [facility] [address] [address] [address] [address]  Phone: [phone] Fax: [phone]          Does the patient have custodial Plus and need 100-day supply? (This applies to ALL medications) Patient does not have SCP

## 2025-05-27 RX ORDER — NITROFURANTOIN 25; 75 MG/1; MG/1
100 CAPSULE ORAL NIGHTLY
Qty: 30 CAPSULE | Refills: 0 | Status: SHIPPED | OUTPATIENT
Start: 2025-05-27

## 2025-08-15 ENCOUNTER — OFFICE VISIT (OUTPATIENT)
Dept: MEDICAL GROUP | Facility: PHYSICIAN GROUP | Age: 75
End: 2025-08-15
Payer: MEDICARE

## 2025-08-15 VITALS
BODY MASS INDEX: 34.14 KG/M2 | WEIGHT: 192.7 LBS | TEMPERATURE: 98.1 F | HEIGHT: 63 IN | DIASTOLIC BLOOD PRESSURE: 64 MMHG | OXYGEN SATURATION: 95 % | SYSTOLIC BLOOD PRESSURE: 130 MMHG | RESPIRATION RATE: 16 BRPM | HEART RATE: 79 BPM

## 2025-08-15 DIAGNOSIS — E11.69 TYPE 2 DIABETES MELLITUS WITH OTHER SPECIFIED COMPLICATION, WITHOUT LONG-TERM CURRENT USE OF INSULIN (HCC): Primary | ICD-10-CM

## 2025-08-15 DIAGNOSIS — E11.69 DYSLIPIDEMIA ASSOCIATED WITH TYPE 2 DIABETES MELLITUS (HCC): ICD-10-CM

## 2025-08-15 DIAGNOSIS — E11.59 HYPERTENSION ASSOCIATED WITH TYPE 2 DIABETES MELLITUS (HCC): ICD-10-CM

## 2025-08-15 DIAGNOSIS — E78.5 DYSLIPIDEMIA ASSOCIATED WITH TYPE 2 DIABETES MELLITUS (HCC): ICD-10-CM

## 2025-08-15 DIAGNOSIS — I15.2 HYPERTENSION ASSOCIATED WITH TYPE 2 DIABETES MELLITUS (HCC): ICD-10-CM

## 2025-08-15 DIAGNOSIS — N95.8 GENITOURINARY SYNDROME OF MENOPAUSE: ICD-10-CM

## 2025-08-15 DIAGNOSIS — G89.29 CHRONIC RIGHT-SIDED LOW BACK PAIN WITH RIGHT-SIDED SCIATICA: ICD-10-CM

## 2025-08-15 DIAGNOSIS — M54.41 CHRONIC RIGHT-SIDED LOW BACK PAIN WITH RIGHT-SIDED SCIATICA: ICD-10-CM

## 2025-08-15 LAB
HBA1C MFR BLD: 7.2 % (ref ?–5.8)
POCT INT CON NEG: NEGATIVE
POCT INT CON POS: POSITIVE

## 2025-08-15 PROCEDURE — 83036 HEMOGLOBIN GLYCOSYLATED A1C: CPT | Performed by: FAMILY MEDICINE

## 2025-08-15 PROCEDURE — 99214 OFFICE O/P EST MOD 30 MIN: CPT | Performed by: FAMILY MEDICINE

## 2025-08-15 PROCEDURE — 3078F DIAST BP <80 MM HG: CPT | Performed by: FAMILY MEDICINE

## 2025-08-15 PROCEDURE — 3075F SYST BP GE 130 - 139MM HG: CPT | Performed by: FAMILY MEDICINE

## 2025-08-15 RX ORDER — TELMISARTAN 40 MG/1
40 TABLET ORAL DAILY
Qty: 90 TABLET | Refills: 3 | Status: SHIPPED | OUTPATIENT
Start: 2025-08-15

## 2025-08-15 RX ORDER — GLIPIZIDE 5 MG/1
5 TABLET ORAL DAILY
Qty: 90 TABLET | Refills: 3 | Status: SHIPPED | OUTPATIENT
Start: 2025-08-15

## 2025-08-15 RX ORDER — SIMVASTATIN 40 MG
40 TABLET ORAL EVERY EVENING
Qty: 90 TABLET | Refills: 3 | Status: SHIPPED | OUTPATIENT
Start: 2025-08-15

## 2025-08-15 RX ORDER — ESTRADIOL 0.1 MG/G
0.5 CREAM VAGINAL
Qty: 42.5 G | Refills: 3 | Status: SHIPPED | OUTPATIENT
Start: 2025-08-15

## 2025-08-15 ASSESSMENT — FIBROSIS 4 INDEX: FIB4 SCORE: 0.92

## 2025-08-15 ASSESSMENT — LIFESTYLE VARIABLES: HOW MANY STANDARD DRINKS CONTAINING ALCOHOL DO YOU HAVE ON A TYPICAL DAY: PATIENT DOES NOT DRINK
